# Patient Record
Sex: FEMALE | Race: BLACK OR AFRICAN AMERICAN | NOT HISPANIC OR LATINO | Employment: FULL TIME | URBAN - METROPOLITAN AREA
[De-identification: names, ages, dates, MRNs, and addresses within clinical notes are randomized per-mention and may not be internally consistent; named-entity substitution may affect disease eponyms.]

---

## 2017-12-18 PROBLEM — I87.2: Status: ACTIVE | Noted: 2017-12-18

## 2017-12-18 PROBLEM — L98.499 TYPE 2 DIABETES MELLITUS WITH OTHER SKIN ULCER: Status: ACTIVE | Noted: 2017-12-18

## 2017-12-18 PROBLEM — E66.9 OBESITY (BMI 30-39.9): Status: ACTIVE | Noted: 2017-12-18

## 2017-12-18 PROBLEM — L97.225: Status: ACTIVE | Noted: 2017-12-18

## 2017-12-18 PROBLEM — E11.622 TYPE 2 DIABETES MELLITUS WITH OTHER SKIN ULCER: Status: ACTIVE | Noted: 2017-12-18

## 2017-12-27 ENCOUNTER — HOSPITAL ENCOUNTER (OUTPATIENT)
Dept: WOUND CARE | Facility: HOSPITAL | Age: 37
Discharge: HOME | End: 2017-12-27
Attending: SURGERY
Payer: COMMERCIAL

## 2017-12-27 VITALS
HEART RATE: 101 BPM | BODY MASS INDEX: 48.82 KG/M2 | DIASTOLIC BLOOD PRESSURE: 68 MMHG | SYSTOLIC BLOOD PRESSURE: 141 MMHG | TEMPERATURE: 99 F | WEIGHT: 293 LBS | HEIGHT: 65 IN

## 2017-12-27 DIAGNOSIS — E11.622 TYPE 2 DIABETES MELLITUS WITH OTHER SKIN ULCER, WITH LONG-TERM CURRENT USE OF INSULIN: Primary | ICD-10-CM

## 2017-12-27 DIAGNOSIS — Z79.4 TYPE 2 DIABETES MELLITUS WITH OTHER SKIN ULCER, WITH LONG-TERM CURRENT USE OF INSULIN: Primary | ICD-10-CM

## 2017-12-27 PROCEDURE — 27201912 HC WOUND CARE DEBRIDEMENT SUPPLIES

## 2017-12-27 PROCEDURE — 11042 DBRDMT SUBQ TIS 1ST 20SQCM/<: CPT

## 2017-12-27 PROCEDURE — 87075 CULTR BACTERIA EXCEPT BLOOD: CPT

## 2017-12-27 PROCEDURE — 87176 TISSUE HOMOGENIZATION CULTR: CPT

## 2017-12-27 PROCEDURE — 87070 CULTURE OTHR SPECIMN AEROBIC: CPT

## 2017-12-27 PROCEDURE — 99214 OFFICE O/P EST MOD 30 MIN: CPT

## 2017-12-27 NOTE — PROGRESS NOTES
Much of the documentation for this visit was completed in the [facility] Expert system. Please see the attached documentation for further details about this patient's care.     Lexus Bean RN

## 2018-01-02 LAB — BACTERIA SPEC AEROBE CULT: NO GROWTH

## 2018-01-03 LAB — BACTERIA SPEC ANAEROBE CULT: NORMAL

## 2018-01-04 ENCOUNTER — HOSPITAL ENCOUNTER (OUTPATIENT)
Dept: WOUND CARE | Facility: HOSPITAL | Age: 38
Discharge: HOME | End: 2018-01-04
Attending: SURGERY
Payer: COMMERCIAL

## 2018-01-04 VITALS
TEMPERATURE: 97 F | HEIGHT: 65 IN | SYSTOLIC BLOOD PRESSURE: 152 MMHG | BODY MASS INDEX: 48.82 KG/M2 | DIASTOLIC BLOOD PRESSURE: 101 MMHG | HEART RATE: 105 BPM | WEIGHT: 293 LBS

## 2018-01-04 DIAGNOSIS — L98.499 DIABETES MELLITUS WITH SKIN ULCER: ICD-10-CM

## 2018-01-04 DIAGNOSIS — E11.622 DIABETES MELLITUS WITH SKIN ULCER: ICD-10-CM

## 2018-01-04 PROCEDURE — 99213 OFFICE O/P EST LOW 20 MIN: CPT | Mod: 25

## 2018-01-04 PROCEDURE — 93923 UPR/LXTR ART STDY 3+ LVLS: CPT | Mod: CCAT

## 2018-01-04 NOTE — PROGRESS NOTES
.Much of the documentation for this visit was completed in the [facility] Expert system. Please see the attached documentation for further details about this patient's care.     Marcel Pennington MD

## 2018-01-11 ENCOUNTER — HOSPITAL ENCOUNTER (OUTPATIENT)
Dept: WOUND CARE | Facility: HOSPITAL | Age: 38
Discharge: HOME | End: 2018-01-11
Attending: SURGERY
Payer: COMMERCIAL

## 2018-01-11 VITALS
WEIGHT: 293 LBS | SYSTOLIC BLOOD PRESSURE: 110 MMHG | HEART RATE: 99 BPM | DIASTOLIC BLOOD PRESSURE: 84 MMHG | TEMPERATURE: 98 F | HEIGHT: 65 IN | BODY MASS INDEX: 48.82 KG/M2

## 2018-01-11 DIAGNOSIS — I87.2 VENOUS STASIS ULCER OF LEFT CALF WITH MUSCLE INVOLVEMENT WITHOUT EVIDENCE OF NECROSIS WITHOUT VARICOSE VEINS: ICD-10-CM

## 2018-01-11 DIAGNOSIS — D50.9 IRON DEFICIENCY ANEMIA, UNSPECIFIED IRON DEFICIENCY ANEMIA TYPE: Primary | ICD-10-CM

## 2018-01-11 DIAGNOSIS — E11.622 TYPE 2 DIABETES MELLITUS WITH OTHER SKIN ULCER, WITH LONG-TERM CURRENT USE OF INSULIN: ICD-10-CM

## 2018-01-11 DIAGNOSIS — Z79.4 TYPE 2 DIABETES MELLITUS WITH OTHER SKIN ULCER, WITH LONG-TERM CURRENT USE OF INSULIN: ICD-10-CM

## 2018-01-11 DIAGNOSIS — E66.9 OBESITY (BMI 30-39.9): ICD-10-CM

## 2018-01-11 DIAGNOSIS — L97.225 VENOUS STASIS ULCER OF LEFT CALF WITH MUSCLE INVOLVEMENT WITHOUT EVIDENCE OF NECROSIS WITHOUT VARICOSE VEINS: ICD-10-CM

## 2018-01-11 PROCEDURE — 29580 STRAPPING UNNA BOOT: CPT

## 2018-01-11 PROCEDURE — 87070 CULTURE OTHR SPECIMN AEROBIC: CPT

## 2018-01-11 PROCEDURE — 87075 CULTR BACTERIA EXCEPT BLOOD: CPT

## 2018-01-11 NOTE — PROGRESS NOTES
Much of the documentation for this visit was completed in the [facility] Expert system. Please see the attached documentation for further details about this patient's care.     Marcel Pennington MD

## 2018-01-15 LAB — BACTERIA SPEC AEROBE CULT: NO GROWTH

## 2018-01-18 LAB — BACTERIA SPEC ANAEROBE CULT: NORMAL

## 2018-01-23 ENCOUNTER — OFFICE VISIT (OUTPATIENT)
Dept: CARDIOLOGY | Facility: CLINIC | Age: 38
End: 2018-01-23
Payer: COMMERCIAL

## 2018-01-23 VITALS
SYSTOLIC BLOOD PRESSURE: 133 MMHG | DIASTOLIC BLOOD PRESSURE: 87 MMHG | WEIGHT: 293 LBS | OXYGEN SATURATION: 99 % | BODY MASS INDEX: 48.82 KG/M2 | HEIGHT: 65 IN | HEART RATE: 113 BPM

## 2018-01-23 DIAGNOSIS — L97.909 VENOUS STASIS ULCER, UNSPECIFIED SITE, UNSPECIFIED ULCER STAGE, UNSPECIFIED WHETHER VARICOSE VEINS PRESENT: Primary | ICD-10-CM

## 2018-01-23 DIAGNOSIS — L98.499 CHRONIC SKIN ULCER, UNSPECIFIED ULCER STAGE: ICD-10-CM

## 2018-01-23 DIAGNOSIS — Z79.4 TYPE 2 DIABETES MELLITUS WITH OTHER SKIN ULCER, WITH LONG-TERM CURRENT USE OF INSULIN: ICD-10-CM

## 2018-01-23 DIAGNOSIS — D50.9 IRON DEFICIENCY ANEMIA, UNSPECIFIED IRON DEFICIENCY ANEMIA TYPE: ICD-10-CM

## 2018-01-23 DIAGNOSIS — E11.622 TYPE 2 DIABETES MELLITUS WITH OTHER SKIN ULCER, WITH LONG-TERM CURRENT USE OF INSULIN: ICD-10-CM

## 2018-01-23 DIAGNOSIS — I83.009 VENOUS STASIS ULCER, UNSPECIFIED SITE, UNSPECIFIED ULCER STAGE, UNSPECIFIED WHETHER VARICOSE VEINS PRESENT: Primary | ICD-10-CM

## 2018-01-23 PROCEDURE — 99999 PR PBB SHADOW E&M-EST. PATIENT-LVL III: CPT | Mod: PBBFAC,,, | Performed by: INTERNAL MEDICINE

## 2018-01-23 PROCEDURE — 99204 OFFICE O/P NEW MOD 45 MIN: CPT | Mod: S$GLB,,, | Performed by: INTERNAL MEDICINE

## 2018-01-23 NOTE — LETTER
January 23, 2018      Marcel Pennington MD  [address]  [address]  [place] [address]           [facility]  [address], [address]  [place] [address]  Phone: [phone]          Patient: Rodney Infante   MR Number: [id number]   YOB: 1980   Date of Visit: 1/23/2018       Dear Dr. Marcel Pennington:    Thank you for referring Rodney Infante to me for evaluation. Attached you will find relevant portions of my assessment and plan of care.    If you have questions, please do not hesitate to call me. I look forward to following Rodney Infante along with you.    Sincerely,    Yunior Hassan MD    Enclosure  CC:  No Recipients    If you would like to receive this communication electronically, please contact [email or web address] or [phone] to request more information on [facility] access.    For providers and/or their staff who would like to refer a patient to [facility], please contact us through our one-stop-shop provider referral line, [facility], at [phone].    If you feel you have received this communication in error or would no longer like to receive these types of communications, please e-mail [email or web address]

## 2018-01-23 NOTE — PROGRESS NOTES
"Subjective:    Patient ID:  Rodney Infante is a 37 y.o. female who presents for evaluation of Venous Ulcer      HPI  38 y/o female with hx of venous stasis ulceration of RLE (anterior shin now healed- took 2 years per pt) with current venous stasis of LLE in anterior shin (started approx 1 year ago) recently established care with Dr Pennington, DM, PAT, morbid obesity who presents for evaluation. Lesion on LLE started about 1 year ago after a small lesion was noted. Had been following with another physician and wound had not been healing. Seen by Dr Pennington in Dec 2017 and she claims wound has been healing better over the last few weeks. Per wound care note available in media section, on 1/4/2018 JESUS 1.03. TCOM's at that visit  (left lateral lower leg),  (left dorsal foot), 67-45 (left medial heel). Denies paresthesias of LE's. Complains of LLE "heaviness" with exertion with relieves with rest. Denies CP, orthopnea, PND, palps, syncope. Has chronic LIRA with moderate activity. Intermittent ankle swelling.     Review of Systems   Constitution: Negative for weakness and malaise/fatigue.   HENT: Negative for congestion.    Eyes: Negative for blurred vision.   Cardiovascular: Positive for dyspnea on exertion and leg swelling. Negative for chest pain, claudication, cyanosis, irregular heartbeat, near-syncope, orthopnea, palpitations, paroxysmal nocturnal dyspnea and syncope.   Respiratory: Negative for shortness of breath.    Endocrine: Negative for polyuria.   Hematologic/Lymphatic: Negative for bleeding problem.   Skin: Positive for poor wound healing. Negative for itching and rash.   Musculoskeletal: Negative for joint swelling, muscle cramps and muscle weakness.   Gastrointestinal: Negative for abdominal pain, hematemesis, hematochezia, melena, nausea and vomiting.   Genitourinary: Negative for dysuria and hematuria.   Neurological: Negative for dizziness, focal weakness, headaches, light-headedness " and loss of balance.   Psychiatric/Behavioral: Negative for depression. The patient is not nervous/anxious.         Objective:    Physical Exam   Constitutional: She is oriented to person, place, and time. She appears well-developed and well-nourished.   HENT:   Head: Normocephalic and atraumatic.   Neck: Neck supple. No JVD present.   Cardiovascular: Normal rate, regular rhythm and normal heart sounds.    Pulses:       Carotid pulses are 2+ on the right side, and 2+ on the left side.       Radial pulses are 2+ on the right side, and 2+ on the left side.        Femoral pulses are 2+ on the right side, and 2+ on the left side.  Biphasic bilateral PT/DP per doppler   Pulmonary/Chest: Effort normal and breath sounds normal.   Abdominal: Soft. Bowel sounds are normal.   Musculoskeletal: She exhibits no edema.   Neurological: She is alert and oriented to person, place, and time.   Skin: Skin is warm and dry.        Psychiatric: She has a normal mood and affect. Her behavior is normal. Thought content normal.         Assessment:       1. Venous stasis ulcer, unspecified site, unspecified ulcer stage, unspecified whether varicose veins present    2. Chronic skin ulcer, unspecified ulcer stage    3. Iron deficiency anemia, unspecified iron deficiency anemia type    4. Type 2 diabetes mellitus with other skin ulcer, with long-term current use of insulin      36 y/o female with hx and presentation as above. Venous stasis ulcer of LLE, currently being managed by Dr Pennington and appears to be healing slowly. Good distal pulses and JESUS normal. Will obtain arterial doppler. Will obtain venous insufficiency doppler.       Plan:       -LLE arterial doppler  -Venous insufficiency doppler  -f/u in 1 month

## 2018-01-25 ENCOUNTER — HOSPITAL ENCOUNTER (OUTPATIENT)
Dept: WOUND CARE | Facility: HOSPITAL | Age: 38
Discharge: HOME | End: 2018-01-25
Attending: SURGERY
Payer: COMMERCIAL

## 2018-01-25 VITALS
HEART RATE: 97 BPM | TEMPERATURE: 99 F | SYSTOLIC BLOOD PRESSURE: 137 MMHG | WEIGHT: 293 LBS | DIASTOLIC BLOOD PRESSURE: 87 MMHG | HEIGHT: 65 IN | BODY MASS INDEX: 48.82 KG/M2

## 2018-01-25 DIAGNOSIS — L97.225 VENOUS STASIS ULCER OF LEFT CALF WITH MUSCLE INVOLVEMENT WITHOUT EVIDENCE OF NECROSIS WITHOUT VARICOSE VEINS: ICD-10-CM

## 2018-01-25 DIAGNOSIS — Z79.4 TYPE 2 DIABETES MELLITUS WITH OTHER SKIN ULCER, WITH LONG-TERM CURRENT USE OF INSULIN: ICD-10-CM

## 2018-01-25 DIAGNOSIS — I87.2 VENOUS STASIS ULCER OF LEFT CALF WITH MUSCLE INVOLVEMENT WITHOUT EVIDENCE OF NECROSIS WITHOUT VARICOSE VEINS: ICD-10-CM

## 2018-01-25 DIAGNOSIS — E11.622 TYPE 2 DIABETES MELLITUS WITH OTHER SKIN ULCER, WITH LONG-TERM CURRENT USE OF INSULIN: ICD-10-CM

## 2018-01-25 DIAGNOSIS — E66.9 OBESITY (BMI 30-39.9): Primary | ICD-10-CM

## 2018-01-25 DIAGNOSIS — D50.9 IRON DEFICIENCY ANEMIA, UNSPECIFIED IRON DEFICIENCY ANEMIA TYPE: ICD-10-CM

## 2018-01-25 PROCEDURE — 29580 STRAPPING UNNA BOOT: CPT

## 2018-01-29 ENCOUNTER — HOSPITAL ENCOUNTER (OUTPATIENT)
Dept: WOUND CARE | Facility: HOSPITAL | Age: 38
Discharge: HOME | End: 2018-01-29
Attending: SURGERY
Payer: COMMERCIAL

## 2018-01-29 ENCOUNTER — HOSPITAL ENCOUNTER (OUTPATIENT)
Dept: RADIOLOGY | Facility: HOSPITAL | Age: 38
Discharge: HOME | End: 2018-01-29
Attending: INTERNAL MEDICINE
Payer: COMMERCIAL

## 2018-01-29 DIAGNOSIS — E11.622 DIABETES MELLITUS WITH SKIN ULCER: Primary | ICD-10-CM

## 2018-01-29 DIAGNOSIS — L97.909 VENOUS STASIS ULCER, UNSPECIFIED SITE, UNSPECIFIED ULCER STAGE, UNSPECIFIED WHETHER VARICOSE VEINS PRESENT: ICD-10-CM

## 2018-01-29 DIAGNOSIS — L98.499 DIABETES MELLITUS WITH SKIN ULCER: Primary | ICD-10-CM

## 2018-01-29 DIAGNOSIS — L98.499 CHRONIC SKIN ULCER, UNSPECIFIED ULCER STAGE: ICD-10-CM

## 2018-01-29 DIAGNOSIS — I83.009 VENOUS STASIS ULCER, UNSPECIFIED SITE, UNSPECIFIED ULCER STAGE, UNSPECIFIED WHETHER VARICOSE VEINS PRESENT: ICD-10-CM

## 2018-01-29 PROCEDURE — 93925 LOWER EXTREMITY STUDY: CPT | Mod: TC,PO

## 2018-01-29 PROCEDURE — 29580 STRAPPING UNNA BOOT: CPT

## 2018-01-29 PROCEDURE — 93970 EXTREMITY STUDY: CPT | Mod: TC,PO

## 2018-01-29 PROCEDURE — 29581 APPL MULTLAYER CMPRN SYS LEG: CPT

## 2018-02-01 ENCOUNTER — HOSPITAL ENCOUNTER (OUTPATIENT)
Dept: WOUND CARE | Facility: HOSPITAL | Age: 38
Discharge: HOME | End: 2018-02-01
Attending: SURGERY
Payer: COMMERCIAL

## 2018-02-01 VITALS
TEMPERATURE: 99 F | BODY MASS INDEX: 48.82 KG/M2 | SYSTOLIC BLOOD PRESSURE: 133 MMHG | WEIGHT: 293 LBS | HEIGHT: 65 IN | HEART RATE: 90 BPM | DIASTOLIC BLOOD PRESSURE: 92 MMHG

## 2018-02-01 PROCEDURE — 29580 STRAPPING UNNA BOOT: CPT

## 2018-02-01 NOTE — PROGRESS NOTES
Much of the documentation for this visit was completed in the [facility] Expert system. Please see the attached documentation for further details about this patient's care.     Esther Prieto RN

## 2018-02-08 ENCOUNTER — HOSPITAL ENCOUNTER (OUTPATIENT)
Dept: WOUND CARE | Facility: HOSPITAL | Age: 38
Discharge: HOME | End: 2018-02-08
Attending: SURGERY
Payer: COMMERCIAL

## 2018-02-08 VITALS
SYSTOLIC BLOOD PRESSURE: 125 MMHG | WEIGHT: 293 LBS | HEART RATE: 92 BPM | HEIGHT: 65 IN | BODY MASS INDEX: 48.82 KG/M2 | TEMPERATURE: 98 F | DIASTOLIC BLOOD PRESSURE: 80 MMHG

## 2018-02-08 DIAGNOSIS — L98.499 DIABETES MELLITUS WITH SKIN ULCER: ICD-10-CM

## 2018-02-08 DIAGNOSIS — E11.622 DIABETES MELLITUS WITH SKIN ULCER: ICD-10-CM

## 2018-02-08 PROCEDURE — 29580 STRAPPING UNNA BOOT: CPT

## 2018-02-08 PROCEDURE — 29581 APPL MULTLAYER CMPRN SYS LEG: CPT

## 2018-02-15 ENCOUNTER — HOSPITAL ENCOUNTER (OUTPATIENT)
Dept: WOUND CARE | Facility: HOSPITAL | Age: 38
Discharge: HOME | End: 2018-02-15
Attending: SURGERY
Payer: COMMERCIAL

## 2018-02-15 VITALS
SYSTOLIC BLOOD PRESSURE: 110 MMHG | TEMPERATURE: 98 F | HEART RATE: 107 BPM | BODY MASS INDEX: 48.82 KG/M2 | DIASTOLIC BLOOD PRESSURE: 60 MMHG | HEIGHT: 65 IN | WEIGHT: 293 LBS

## 2018-02-15 DIAGNOSIS — E11.622 DIABETES MELLITUS WITH SKIN ULCER: Primary | ICD-10-CM

## 2018-02-15 DIAGNOSIS — I87.2 VENOUS STASIS ULCER OF LEFT CALF WITH MUSCLE INVOLVEMENT WITHOUT EVIDENCE OF NECROSIS WITHOUT VARICOSE VEINS: ICD-10-CM

## 2018-02-15 DIAGNOSIS — E66.9 OBESITY (BMI 30-39.9): ICD-10-CM

## 2018-02-15 DIAGNOSIS — L02.212 ABSCESS OF BACK: ICD-10-CM

## 2018-02-15 DIAGNOSIS — E11.622 TYPE 2 DIABETES MELLITUS WITH OTHER SKIN ULCER, WITH LONG-TERM CURRENT USE OF INSULIN: ICD-10-CM

## 2018-02-15 DIAGNOSIS — L97.225 VENOUS STASIS ULCER OF LEFT CALF WITH MUSCLE INVOLVEMENT WITHOUT EVIDENCE OF NECROSIS WITHOUT VARICOSE VEINS: ICD-10-CM

## 2018-02-15 DIAGNOSIS — L98.499 DIABETES MELLITUS WITH SKIN ULCER: Primary | ICD-10-CM

## 2018-02-15 DIAGNOSIS — Z79.4 TYPE 2 DIABETES MELLITUS WITH OTHER SKIN ULCER, WITH LONG-TERM CURRENT USE OF INSULIN: ICD-10-CM

## 2018-02-15 DIAGNOSIS — D50.9 IRON DEFICIENCY ANEMIA, UNSPECIFIED IRON DEFICIENCY ANEMIA TYPE: ICD-10-CM

## 2018-02-15 PROCEDURE — 29580 STRAPPING UNNA BOOT: CPT

## 2018-02-15 RX ORDER — SULFAMETHOXAZOLE AND TRIMETHOPRIM 800; 160 MG/1; MG/1
1 TABLET ORAL 2 TIMES DAILY
Qty: 30 TABLET | Refills: 1 | Status: SHIPPED | OUTPATIENT
Start: 2018-02-15 | End: 2018-02-27 | Stop reason: ALTCHOICE

## 2018-02-15 RX ORDER — SODIUM CHLORIDE 9 MG/ML
INJECTION, SOLUTION INTRAVENOUS CONTINUOUS
Status: CANCELLED | OUTPATIENT
Start: 2018-02-15

## 2018-02-15 RX ORDER — CLINDAMYCIN PHOSPHATE 900 MG/50ML
900 INJECTION, SOLUTION INTRAVENOUS
Status: CANCELLED | OUTPATIENT
Start: 2018-02-15

## 2018-02-15 RX ORDER — TRAMADOL HYDROCHLORIDE 50 MG/1
50 TABLET ORAL EVERY 6 HOURS PRN
Qty: 24 TABLET | Refills: 0 | Status: SHIPPED | OUTPATIENT
Start: 2018-02-15 | End: 2018-02-25

## 2018-02-15 NOTE — PROGRESS NOTES
Today`s Date: 2/15/2018     Admit Date: 2/15/2018    Admitting Physician: Marcel Pennington MD    Patient`s Name: Rodney Infante , 37 y.o. female    HISTORY AND CHIEF COMPLAINT  Wound care and drainage of back abscess, c/o painful tender swelling  Patient Active Problem List   Diagnosis    Iron deficiency anemia    Type 2 diabetes mellitus with other skin ulcer    Venous stasis ulcer of left calf with muscle involvement without evidence of necrosis without varicose veins    Obesity (BMI 30-39.9)    Diabetes mellitus with skin ulcer    Abscess of back       Past Medical History:   Diagnosis Date    Anemia     Diabetes     Diabetes mellitus, type 2     Neuropathy        Past Surgical History:   Procedure Laterality Date    CYST REMOVAL  2014    back       Prior to Admission medications    Medication Sig Start Date End Date Taking? Authorizing Provider   allopurinol (ZYLOPRIM) 100 MG tablet  1/5/16  Yes Historical Provider, MD   canagliflozin-metformin (INVOKAMET XR) 150-1,000 mg TBph Take by mouth.   Yes Historical Provider, MD   clindamycin (CLEOCIN) 300 MG capsule Take 1 capsule (300 mg total) by mouth every 8 (eight) hours. 12/18/17  Yes Marcel Pennington MD   collagenase (SANTYL) ointment Apply topically once daily. 1/4/18  Yes Marcel Pennington MD   cyanocobalamin (VITAMIN B-12) 1000 MCG tablet Take 1 tablet (1,000 mcg total) by mouth once daily. This is a prescription for 1 year.  Take 1 tablet Monday, Wednesday, and Friday 4/9/15  Yes Harjeet Zaidi MD   gabapentin (NEURONTIN) 600 MG tablet Take 600 mg by mouth daily as needed. 10/24/17  Yes Historical Provider, MD   glimepiride (AMARYL) 4 MG tablet Take 1 tablet (4 mg total) by mouth before dinner. 5/16/16  Yes Harjeet Zaidi MD   ibuprofen (ADVIL,MOTRIN) 800 MG tablet  1/5/16  Yes Historical Provider, MD   mupirocin (BACTROBAN) 2 % ointment Apply topically once daily. appy locally daily 12/18/17  Yes Marcel CROCKER  MD [place]   ondansetron (ZOFRAN) 4 MG tablet  12/11/17  Yes Historical Provider, MD   TOPCARE UNIVERSAL1 LANCET Misc  10/24/17  Yes Historical Provider, MD   traMADol (ULTRAM) 50 mg tablet Take 50 mg by mouth every 6 (six) hours as needed. 10/24/17  Yes Historical Provider, MD   TRUETRACK TEST Strp  10/24/17  Yes Historical Provider, MD   VITAMIN D2 50,000 unit capsule  3/2/16  Yes Historical Provider, MD   folic acid (FOLVITE) 1 MG tablet Take 1 tablet (1 mg total) by mouth once daily. This is a prescription for 1 year 4/9/15 12/27/17  Harjeet Zaidi MD   sulfamethoxazole-trimethoprim 800-160mg (BACTRIM DS) 800-160 mg Tab Take 1 tablet by mouth 2 (two) times daily. 2/15/18   Marcel Pennington MD   traMADol (ULTRAM) 50 mg tablet Take 1 tablet (50 mg total) by mouth every 6 (six) hours as needed for Pain. 2/15/18 2/25/18  Marcel Pennington MD     Current Outpatient Prescriptions on File Prior to Encounter   Medication Sig Dispense Refill    allopurinol (ZYLOPRIM) 100 MG tablet       canagliflozin-metformin (INVOKAMET XR) 150-1,000 mg TBph Take by mouth.      clindamycin (CLEOCIN) 300 MG capsule Take 1 capsule (300 mg total) by mouth every 8 (eight) hours. 30 capsule 2    collagenase (SANTYL) ointment Apply topically once daily. 30 g 2    cyanocobalamin (VITAMIN B-12) 1000 MCG tablet Take 1 tablet (1,000 mcg total) by mouth once daily. This is a prescription for 1 year.  Take 1 tablet Monday, Wednesday, and Friday 36 tablet 3    gabapentin (NEURONTIN) 600 MG tablet Take 600 mg by mouth daily as needed.      glimepiride (AMARYL) 4 MG tablet Take 1 tablet (4 mg total) by mouth before dinner. 30 tablet 5    ibuprofen (ADVIL,MOTRIN) 800 MG tablet       mupirocin (BACTROBAN) 2 % ointment Apply topically once daily. appy locally daily 30 g 1    ondansetron (ZOFRAN) 4 MG tablet       TOPCARE UNIVERSAL1 LANCET Misc       traMADol (ULTRAM) 50 mg tablet Take 50 mg by mouth every 6 (six) hours as  needed.      TRUETRACK TEST Strp       VITAMIN D2 50,000 unit capsule       folic acid (FOLVITE) 1 MG tablet Take 1 tablet (1 mg total) by mouth once daily. This is a prescription for 1 year 90 tablet 3     No current facility-administered medications on file prior to encounter.         Review of patient's allergies indicates:   Allergen Reactions    Pcn [penicillins] Swelling       Social History:   reports that she has never smoked. She has never used smokeless tobacco. She reports that she does not drink alcohol or use drugs.     Family History   Problem Relation Age of Onset    No Known Problems Mother     Drug abuse Father     No Known Problems Sister        PHYSICAL EXAMINATION  Temp:  [98.3 °F (36.8 °C)] 98.3 °F (36.8 °C)  Pulse:  [107] 107  BP: (110)/(60) 110/60    General Condition:   alert x 3    Head & Neck  Anemia: None  Jaundice: None  Neck vein: Not distended  Carotid Bruits: none  Lymph nodes: none palpable  Thyroid: normal    Chest: normal    Heart: normal    Rt. Breast: not examined  Lt. Breast: not examined  Axillary lymph nodes: none    Abdomen: Soft,  None tender with no palpable mass or organ  Hernia: none    Rectal: Defered    Extremities: normal    Vascular: normal    Specific focus Examination   infected diabetic abscess back    Imp: Diabetic infected Abscess back, obesity, venous stasis ulcer bilateral legs     Plan: excision and debridement of back abscess in am

## 2018-02-15 NOTE — PROGRESS NOTES
Much of the documentation for this visit was completed in the [facility] Expert system. Please see the attached documentation for further details about this patient's care.     Nanci Gleason RN

## 2018-02-15 NOTE — PATIENT INSTRUCTIONS
Patient instructed to keep dressings dry and intact and to be NPO after midnight due to surgical I&D in am. Patient verbalized understanding of all instructions.

## 2018-02-16 ENCOUNTER — SURGERY (OUTPATIENT)
Age: 38
End: 2018-02-16

## 2018-02-16 ENCOUNTER — HOSPITAL ENCOUNTER (OUTPATIENT)
Facility: HOSPITAL | Age: 38
Discharge: HOME | End: 2018-02-16
Attending: SURGERY | Admitting: SURGERY
Payer: COMMERCIAL

## 2018-02-16 ENCOUNTER — ANESTHESIA EVENT (OUTPATIENT)
Dept: SURGERY | Facility: HOSPITAL | Age: 38
End: 2018-02-16
Payer: COMMERCIAL

## 2018-02-16 ENCOUNTER — ANESTHESIA (OUTPATIENT)
Dept: SURGERY | Facility: HOSPITAL | Age: 38
End: 2018-02-16
Payer: COMMERCIAL

## 2018-02-16 VITALS
DIASTOLIC BLOOD PRESSURE: 69 MMHG | BODY MASS INDEX: 48.82 KG/M2 | SYSTOLIC BLOOD PRESSURE: 132 MMHG | RESPIRATION RATE: 18 BRPM | TEMPERATURE: 97 F | HEART RATE: 96 BPM | WEIGHT: 293 LBS | HEIGHT: 65 IN | OXYGEN SATURATION: 97 %

## 2018-02-16 DIAGNOSIS — E66.9 OBESITY (BMI 30-39.9): ICD-10-CM

## 2018-02-16 DIAGNOSIS — L02.212 ABSCESS OF BACK: ICD-10-CM

## 2018-02-16 DIAGNOSIS — E11.622 DIABETES MELLITUS WITH SKIN ULCER: ICD-10-CM

## 2018-02-16 DIAGNOSIS — E11.622 TYPE 2 DIABETES MELLITUS WITH OTHER SKIN ULCER, WITH LONG-TERM CURRENT USE OF INSULIN: ICD-10-CM

## 2018-02-16 DIAGNOSIS — Z01.818 PREOP TESTING: ICD-10-CM

## 2018-02-16 DIAGNOSIS — Z79.4 TYPE 2 DIABETES MELLITUS WITH OTHER SKIN ULCER, WITH LONG-TERM CURRENT USE OF INSULIN: ICD-10-CM

## 2018-02-16 DIAGNOSIS — I87.2 VENOUS STASIS ULCER OF LEFT CALF WITH MUSCLE INVOLVEMENT WITHOUT EVIDENCE OF NECROSIS WITHOUT VARICOSE VEINS: ICD-10-CM

## 2018-02-16 DIAGNOSIS — L97.225 VENOUS STASIS ULCER OF LEFT CALF WITH MUSCLE INVOLVEMENT WITHOUT EVIDENCE OF NECROSIS WITHOUT VARICOSE VEINS: ICD-10-CM

## 2018-02-16 DIAGNOSIS — L98.499 DIABETES MELLITUS WITH SKIN ULCER: ICD-10-CM

## 2018-02-16 DIAGNOSIS — D50.9 IRON DEFICIENCY ANEMIA, UNSPECIFIED IRON DEFICIENCY ANEMIA TYPE: ICD-10-CM

## 2018-02-16 LAB
ANION GAP SERPL CALC-SCNC: 10 MMOL/L
BASOPHILS # BLD AUTO: 0.04 K/UL
BASOPHILS NFR BLD: 0.3 %
BUN SERPL-MCNC: 8 MG/DL
CALCIUM SERPL-MCNC: 9.6 MG/DL
CHLORIDE SERPL-SCNC: 108 MMOL/L
CO2 SERPL-SCNC: 17 MMOL/L
CREAT SERPL-MCNC: 0.9 MG/DL
DIFFERENTIAL METHOD: ABNORMAL
EOSINOPHIL # BLD AUTO: 0.5 K/UL
EOSINOPHIL NFR BLD: 3.5 %
ERYTHROCYTE [DISTWIDTH] IN BLOOD BY AUTOMATED COUNT: 14.8 %
EST. GFR  (AFRICAN AMERICAN): >60 ML/MIN/1.73 M^2
EST. GFR  (NON AFRICAN AMERICAN): >60 ML/MIN/1.73 M^2
GLUCOSE SERPL-MCNC: 194 MG/DL
GRAM STN SPEC: NORMAL
HCT VFR BLD AUTO: 34.2 %
HGB BLD-MCNC: 11.1 G/DL
LYMPHOCYTES # BLD AUTO: 3.4 K/UL
LYMPHOCYTES NFR BLD: 21.8 %
MCH RBC QN AUTO: 28.2 PG
MCHC RBC AUTO-ENTMCNC: 32.5 G/DL
MCV RBC AUTO: 87 FL
MONOCYTES # BLD AUTO: 1.1 K/UL
MONOCYTES NFR BLD: 7.1 %
NEUTROPHILS # BLD AUTO: 10.2 K/UL
NEUTROPHILS NFR BLD: 67.3 %
PLATELET # BLD AUTO: 474 K/UL
PMV BLD AUTO: 8.2 FL
POCT GLUCOSE: 126 MG/DL (ref 70–110)
POTASSIUM SERPL-SCNC: 3.8 MMOL/L
RBC # BLD AUTO: 3.93 M/UL
SODIUM SERPL-SCNC: 135 MMOL/L
WBC # BLD AUTO: 15.47 K/UL

## 2018-02-16 PROCEDURE — 37000008 HC ANESTHESIA 1ST 15 MINUTES: Performed by: SURGERY

## 2018-02-16 PROCEDURE — 87015 SPECIMEN INFECT AGNT CONCNTJ: CPT

## 2018-02-16 PROCEDURE — 25000003 PHARM REV CODE 250: Performed by: NURSE ANESTHETIST, CERTIFIED REGISTERED

## 2018-02-16 PROCEDURE — 87076 CULTURE ANAEROBE IDENT EACH: CPT

## 2018-02-16 PROCEDURE — 85025 COMPLETE CBC W/AUTO DIFF WBC: CPT

## 2018-02-16 PROCEDURE — 93010 ELECTROCARDIOGRAM REPORT: CPT | Mod: ,,, | Performed by: INTERNAL MEDICINE

## 2018-02-16 PROCEDURE — 25000003 PHARM REV CODE 250: Performed by: ANESTHESIOLOGY

## 2018-02-16 PROCEDURE — 36000706: Performed by: SURGERY

## 2018-02-16 PROCEDURE — 36000707: Performed by: SURGERY

## 2018-02-16 PROCEDURE — S0077 INJECTION, CLINDAMYCIN PHOSP: HCPCS | Performed by: SURGERY

## 2018-02-16 PROCEDURE — 36415 COLL VENOUS BLD VENIPUNCTURE: CPT

## 2018-02-16 PROCEDURE — 71000016 HC POSTOP RECOV ADDL HR: Performed by: SURGERY

## 2018-02-16 PROCEDURE — 87205 SMEAR GRAM STAIN: CPT | Mod: 59

## 2018-02-16 PROCEDURE — 87075 CULTR BACTERIA EXCEPT BLOOD: CPT

## 2018-02-16 PROCEDURE — 87206 SMEAR FLUORESCENT/ACID STAI: CPT | Mod: 91

## 2018-02-16 PROCEDURE — 25000003 PHARM REV CODE 250: Performed by: SURGERY

## 2018-02-16 PROCEDURE — 71000033 HC RECOVERY, INTIAL HOUR: Performed by: SURGERY

## 2018-02-16 PROCEDURE — 88304 TISSUE EXAM BY PATHOLOGIST: CPT | Mod: 26,,, | Performed by: PATHOLOGY

## 2018-02-16 PROCEDURE — 87186 SC STD MICRODIL/AGAR DIL: CPT

## 2018-02-16 PROCEDURE — 87070 CULTURE OTHR SPECIMN AEROBIC: CPT | Mod: 59

## 2018-02-16 PROCEDURE — 63600175 PHARM REV CODE 636 W HCPCS: Performed by: ANESTHESIOLOGY

## 2018-02-16 PROCEDURE — 87102 FUNGUS ISOLATION CULTURE: CPT

## 2018-02-16 PROCEDURE — 80048 BASIC METABOLIC PNL TOTAL CA: CPT

## 2018-02-16 PROCEDURE — 63600175 PHARM REV CODE 636 W HCPCS: Performed by: NURSE ANESTHETIST, CERTIFIED REGISTERED

## 2018-02-16 PROCEDURE — 87077 CULTURE AEROBIC IDENTIFY: CPT

## 2018-02-16 PROCEDURE — 37000009 HC ANESTHESIA EA ADD 15 MINS: Performed by: SURGERY

## 2018-02-16 PROCEDURE — 71000015 HC POSTOP RECOV 1ST HR: Performed by: SURGERY

## 2018-02-16 PROCEDURE — 88304 TISSUE EXAM BY PATHOLOGIST: CPT | Performed by: PATHOLOGY

## 2018-02-16 PROCEDURE — 93005 ELECTROCARDIOGRAM TRACING: CPT

## 2018-02-16 PROCEDURE — 87116 MYCOBACTERIA CULTURE: CPT

## 2018-02-16 RX ORDER — PROPOFOL 10 MG/ML
VIAL (ML) INTRAVENOUS
Status: DISCONTINUED | OUTPATIENT
Start: 2018-02-16 | End: 2018-02-16

## 2018-02-16 RX ORDER — GLYCOPYRROLATE 0.2 MG/ML
INJECTION INTRAMUSCULAR; INTRAVENOUS
Status: DISCONTINUED | OUTPATIENT
Start: 2018-02-16 | End: 2018-02-16

## 2018-02-16 RX ORDER — MIDAZOLAM HYDROCHLORIDE 1 MG/ML
INJECTION INTRAMUSCULAR; INTRAVENOUS
Status: DISCONTINUED | OUTPATIENT
Start: 2018-02-16 | End: 2018-02-16

## 2018-02-16 RX ORDER — ONDANSETRON HYDROCHLORIDE 2 MG/ML
INJECTION, SOLUTION INTRAMUSCULAR; INTRAVENOUS
Status: DISCONTINUED | OUTPATIENT
Start: 2018-02-16 | End: 2018-02-16

## 2018-02-16 RX ORDER — FENTANYL CITRATE 50 UG/ML
INJECTION, SOLUTION INTRAMUSCULAR; INTRAVENOUS
Status: DISCONTINUED | OUTPATIENT
Start: 2018-02-16 | End: 2018-02-16

## 2018-02-16 RX ORDER — BACITRACIN 50000 [IU]/1
INJECTION, POWDER, FOR SOLUTION INTRAMUSCULAR
Status: DISCONTINUED | OUTPATIENT
Start: 2018-02-16 | End: 2018-02-16 | Stop reason: HOSPADM

## 2018-02-16 RX ORDER — LIDOCAINE HCL/PF 100 MG/5ML
SYRINGE (ML) INTRAVENOUS
Status: DISCONTINUED | OUTPATIENT
Start: 2018-02-16 | End: 2018-02-16

## 2018-02-16 RX ORDER — NEOSTIGMINE METHYLSULFATE 1 MG/ML
INJECTION, SOLUTION INTRAVENOUS
Status: DISCONTINUED | OUTPATIENT
Start: 2018-02-16 | End: 2018-02-16

## 2018-02-16 RX ORDER — SODIUM CHLORIDE 9 MG/ML
INJECTION, SOLUTION INTRAVENOUS CONTINUOUS
Status: DISCONTINUED | OUTPATIENT
Start: 2018-02-16 | End: 2018-02-16 | Stop reason: HOSPADM

## 2018-02-16 RX ORDER — SODIUM CHLORIDE 0.9 % (FLUSH) 0.9 %
3 SYRINGE (ML) INJECTION EVERY 8 HOURS
Status: DISCONTINUED | OUTPATIENT
Start: 2018-02-16 | End: 2018-02-16 | Stop reason: HOSPADM

## 2018-02-16 RX ORDER — SODIUM CHLORIDE, SODIUM LACTATE, POTASSIUM CHLORIDE, CALCIUM CHLORIDE 600; 310; 30; 20 MG/100ML; MG/100ML; MG/100ML; MG/100ML
INJECTION, SOLUTION INTRAVENOUS CONTINUOUS PRN
Status: DISCONTINUED | OUTPATIENT
Start: 2018-02-16 | End: 2018-02-16

## 2018-02-16 RX ORDER — CLINDAMYCIN HYDROCHLORIDE 300 MG/1
300 CAPSULE ORAL EVERY 6 HOURS
Qty: 60 CAPSULE | Refills: 1 | Status: SHIPPED | OUTPATIENT
Start: 2018-02-16 | End: 2018-02-27 | Stop reason: ALTCHOICE

## 2018-02-16 RX ORDER — HYDROMORPHONE HYDROCHLORIDE 2 MG/ML
0.4 INJECTION, SOLUTION INTRAMUSCULAR; INTRAVENOUS; SUBCUTANEOUS EVERY 5 MIN PRN
Status: DISCONTINUED | OUTPATIENT
Start: 2018-02-16 | End: 2018-02-16 | Stop reason: HOSPADM

## 2018-02-16 RX ORDER — CLINDAMYCIN PHOSPHATE 900 MG/50ML
900 INJECTION, SOLUTION INTRAVENOUS
Status: COMPLETED | OUTPATIENT
Start: 2018-02-16 | End: 2018-02-16

## 2018-02-16 RX ORDER — SUCCINYLCHOLINE CHLORIDE 20 MG/ML
INJECTION INTRAMUSCULAR; INTRAVENOUS
Status: DISCONTINUED | OUTPATIENT
Start: 2018-02-16 | End: 2018-02-16

## 2018-02-16 RX ORDER — SODIUM CHLORIDE 0.9 % (FLUSH) 0.9 %
3 SYRINGE (ML) INJECTION
Status: DISCONTINUED | OUTPATIENT
Start: 2018-02-16 | End: 2018-02-16 | Stop reason: HOSPADM

## 2018-02-16 RX ORDER — HYDROCODONE BITARTRATE AND ACETAMINOPHEN 5; 325 MG/1; MG/1
1 TABLET ORAL EVERY 6 HOURS PRN
Qty: 32 TABLET | Refills: 0 | Status: SHIPPED | OUTPATIENT
Start: 2018-02-16 | End: 2018-06-06 | Stop reason: SDUPTHER

## 2018-02-16 RX ORDER — LIDOCAINE HYDROCHLORIDE 10 MG/ML
INJECTION, SOLUTION EPIDURAL; INFILTRATION; INTRACAUDAL; PERINEURAL
Status: DISCONTINUED | OUTPATIENT
Start: 2018-02-16 | End: 2018-02-16 | Stop reason: HOSPADM

## 2018-02-16 RX ORDER — HYDROMORPHONE HYDROCHLORIDE 2 MG/ML
0.2 INJECTION, SOLUTION INTRAMUSCULAR; INTRAVENOUS; SUBCUTANEOUS EVERY 5 MIN PRN
Status: DISCONTINUED | OUTPATIENT
Start: 2018-02-16 | End: 2018-02-16 | Stop reason: HOSPADM

## 2018-02-16 RX ORDER — ROCURONIUM BROMIDE 10 MG/ML
INJECTION, SOLUTION INTRAVENOUS
Status: DISCONTINUED | OUTPATIENT
Start: 2018-02-16 | End: 2018-02-16

## 2018-02-16 RX ORDER — HYDROCODONE BITARTRATE AND ACETAMINOPHEN 5; 325 MG/1; MG/1
1 TABLET ORAL EVERY 4 HOURS PRN
Status: DISCONTINUED | OUTPATIENT
Start: 2018-02-16 | End: 2018-02-16 | Stop reason: HOSPADM

## 2018-02-16 RX ADMIN — LIDOCAINE HYDROCHLORIDE 100 MG: 20 INJECTION, SOLUTION INTRAVENOUS at 09:02

## 2018-02-16 RX ADMIN — CLINDAMYCIN PHOSPHATE 900 MG: 18 INJECTION, SOLUTION INTRAVENOUS at 09:02

## 2018-02-16 RX ADMIN — FENTANYL CITRATE 100 MCG: 50 INJECTION, SOLUTION INTRAMUSCULAR; INTRAVENOUS at 09:02

## 2018-02-16 RX ADMIN — GLYCOPYRROLATE 0.4 MG: 0.2 INJECTION, SOLUTION INTRAMUSCULAR; INTRAVENOUS at 09:02

## 2018-02-16 RX ADMIN — PROMETHAZINE HYDROCHLORIDE 6.25 MG: 25 INJECTION INTRAMUSCULAR; INTRAVENOUS at 11:02

## 2018-02-16 RX ADMIN — BACITRACIN 50000 UNITS: 5000 INJECTION, POWDER, FOR SOLUTION INTRAMUSCULAR at 10:02

## 2018-02-16 RX ADMIN — SODIUM CHLORIDE, SODIUM LACTATE, POTASSIUM CHLORIDE, AND CALCIUM CHLORIDE: .6; .31; .03; .02 INJECTION, SOLUTION INTRAVENOUS at 09:02

## 2018-02-16 RX ADMIN — ONDANSETRON 8 MG: 2 INJECTION, SOLUTION INTRAMUSCULAR; INTRAVENOUS at 09:02

## 2018-02-16 RX ADMIN — PROMETHAZINE HYDROCHLORIDE 6.25 MG: 25 INJECTION INTRAMUSCULAR; INTRAVENOUS at 10:02

## 2018-02-16 RX ADMIN — ROCURONIUM BROMIDE 10 MG: 10 INJECTION, SOLUTION INTRAVENOUS at 09:02

## 2018-02-16 RX ADMIN — SUCCINYLCHOLINE CHLORIDE 160 MG: 20 INJECTION, SOLUTION INTRAMUSCULAR; INTRAVENOUS at 09:02

## 2018-02-16 RX ADMIN — MIDAZOLAM HYDROCHLORIDE 2 MG: 1 INJECTION, SOLUTION INTRAMUSCULAR; INTRAVENOUS at 09:02

## 2018-02-16 RX ADMIN — HYDROCODONE BITARTRATE AND ACETAMINOPHEN 1 TABLET: 5; 325 TABLET ORAL at 11:02

## 2018-02-16 RX ADMIN — LIDOCAINE HYDROCHLORIDE 20 ML: 10 INJECTION, SOLUTION EPIDURAL; INFILTRATION; INTRACAUDAL; PERINEURAL at 10:02

## 2018-02-16 RX ADMIN — NEOSTIGMINE METHYLSULFATE 3 MG: 1 INJECTION INTRAVENOUS at 09:02

## 2018-02-16 RX ADMIN — PROPOFOL 200 MG: 10 INJECTION, EMULSION INTRAVENOUS at 09:02

## 2018-02-16 NOTE — OP NOTE
DATE OF PROCEDURE:  02/16/2018.    PREOPERATIVE DIAGNOSIS:  Diabetic infected back abscess carbuncle.    POSTOPERATIVE DIAGNOSIS:  Diabetic infected back abscess carbuncle.    OPERATION:  Excision and debridement of carbuncle big abscess 7 x 8 x 7 cm.    SURGEON:  Marcel Pennington M.D.    ASSISTANT:  Dr. Tamayo.    ANESTHESIA:  General.    PROCEDURE:  After satisfactory general endotracheal anesthesia, the patient in   semiprone position, the area of the abscess, which was located in the middle of   the back was prepped and draped in normal sterile manner using Betadine scrub   solution.  Elliptical incision was made at the area of the necrotic abscess   excising the skin and subcutaneous tissue.  The dissection was carried down to   the deep subcutaneous tissue.  The abscess was tracking laterally to the left   flank area.  The tract was opened all infected necrotic tissue was debrided up   to freshly bleeding tissue.  Wound was cauterized using electrocautery.  Some of   the bleeders were clamped and bovied.  Hemostasis satisfactorily maintained.    Wound was again irrigated with antibiotic solution.  Wound was then openly   packed using iodoform packing.  Sterile gauze dressing was applied.  The   instrument count, sponge count, and needle count was correct.  The patient   tolerated it well.  Estimated blood loss was 50 mL.  Specimen removed was   infected tissue, the skin and fascia.  The patient was sent to Recovery Room in   stable condition.  The patient will be [facility] for dressing   changes and was given a prescription for antibiotics and the pain pill.      MS/IN  dd: 02/16/2018 10:08:24 (CST)  td: 02/16/2018 11:21:10 (CST)  Doc ID   #[id number]  Job ID #[id number]    CC:

## 2018-02-16 NOTE — OP NOTE
This note has been moved to another encounter. If you have any questions, please contact HIM Chart Correction at [phone].

## 2018-02-16 NOTE — ANESTHESIA POSTPROCEDURE EVALUATION
"Anesthesia Post Evaluation    Patient: Rodney Infante    Procedure(s) Performed: Procedure(s) (LRB):  DEBRIDEMENT-WOUND (N/A)    Final Anesthesia Type: general  Patient location during evaluation: PACU  Patient participation: Yes- Able to Participate  Level of consciousness: awake  Post-procedure vital signs: reviewed and stable  Pain management: adequate  Airway patency: patent  PONV status at discharge: No PONV  Anesthetic complications: no      Cardiovascular status: stable  Respiratory status: unassisted, spontaneous ventilation and room air  Hydration status: euvolemic  Follow-up needed         Visit Vitals  /81   Pulse 94   Temp 36.1 °C (97 °F) (Temporal)   Resp 20   Ht 5' 5" (1.651 m)   Wt (!) 168.3 kg (371 lb)   LMP 01/16/2015   SpO2 100%   Breastfeeding? No   BMI 61.74 kg/m²       Pain/Milagros Score: Pain Assessment Performed: Yes (2/16/2018 11:35 AM)  Presence of Pain: denies (2/16/2018 11:35 AM)  Pain Rating Prior to Med Admin: 6 (2/16/2018 11:10 AM)  Pain Rating Post Med Admin: 6 (2/16/2018 11:27 AM)  Milagros Score: 10 (2/16/2018 11:35 AM)      "

## 2018-02-16 NOTE — PLAN OF CARE
Pt is doing fine in recovery, VSS, nausea relieved, only has pain when she moves around. Mostly resting with eyes closed. Responds readily to verbal stim, sates feels better and ready to go back to her room. Released per Dr. Stein. Family updated by text.

## 2018-02-16 NOTE — ANESTHESIA RELEASE NOTE
"Anesthesia Release from PACU Note    Patient: Rodney Infante    Procedure(s) Performed: Procedure(s) (LRB):  DEBRIDEMENT-WOUND (N/A)    Anesthesia type: general    Post pain: Adequate analgesia    Post assessment: no apparent anesthetic complications    Last Vitals:   Visit Vitals  /81   Pulse 94   Temp 36.1 °C (97 °F) (Temporal)   Resp 20   Ht 5' 5" (1.651 m)   Wt (!) 168.3 kg (371 lb)   LMP 01/16/2015   SpO2 100%   Breastfeeding? No   BMI 61.74 kg/m²       Post vital signs: stable    Level of consciousness: awake and oriented    Nausea/Vomiting: no nausea/no vomiting    Complications: none    Airway Patency: patent    Respiratory: unassisted    Cardiovascular: stable    Hydration: euvolemic  "

## 2018-02-16 NOTE — ANESTHESIA PREPROCEDURE EVALUATION
02/16/2018  Rodney Infante is a 37 y.o., diabetic female w absces of back for debridement ([place]).    PRIOR ANES    nothing in Epic [id number]   states prior GA [facility]    ANES-RELATED MED/SURG  Patient Active Problem List   Diagnosis    Iron deficiency anemia    Type 2 diabetes mellitus with other skin ulcer    Venous stasis ulcer of left calf with muscle involvement without evidence of necrosis without varicose veins    Obesity (BMI 30-39.9)    Diabetes mellitus with skin ulcer    Abscess of back     Past Medical History:   Diagnosis Date    Anemia     Diabetes     Diabetes mellitus, type 2     Neuropathy      Past Surgical History:   Procedure Laterality Date    CYST REMOVAL  2014    back     ALLERGIES  Review of patient's allergies indicates:   Allergen Reactions    Pcn [penicillins] Swelling     ANES-RELATED MAR  Clindamycin OCTOR    ANES-RELATED HOME Rx   Glimepiride   Canagliflozin-metofrmin    Anesthesia Evaluation         Review of Systems  Anesthesia Hx:  Hx of Anesthetic complications History of prior surgery of interest to airway management or planning:  Denies Personal Hx of Anesthesia complications.   Hematology/Oncology:         -- Anemia:   EENT/Dental:EENT/Dental Normal   Cardiovascular:  Cardiovascular Normal     Pulmonary:  Pulmonary Normal  Denies Sleep Apnea.    Renal/:  Renal/ Normal     Hepatic/GI:  Hepatic/GI Normal    Neurological:   Peripheral Neuropathy    Endocrine:   Diabetes      Wt Readings from Last 1 Encounters:   02/16/18 (!) 168.3 kg (371 lb)     Temp Readings from Last 1 Encounters:   02/16/18 36.3 °C (97.3 °F) (Skin)     BP Readings from Last 1 Encounters:   02/16/18 125/80     Pulse Readings from Last 1 Encounters:   02/16/18 104     SpO2 Readings from Last 1 Encounters:   02/16/18 97%       Physical Exam  General:  Obesity    Airway/Jaw/Neck:  AIRWAY  FINDINGS: Normal       Dental:  DENTAL FINDINGS: Normal   Chest/Lungs:  Chest/Lungs Clear    Heart/Vascular:  Heart Findings: Normal Heart murmur: negative       Mental Status:  Mental Status Findings: Normal       Lab Results   Component Value Date    WBC 12.37 11/07/2017    HGB 10.0 (L) 11/07/2017    HCT 32.0 (L) 11/07/2017    MCV 90 11/07/2017     (H) 11/07/2017       Chemistry        Component Value Date/Time     (L) 11/07/2017 1154    K 3.2 (L) 11/07/2017 1154     11/07/2017 1154    CO2 21 (L) 11/07/2017 1154    BUN 9 11/07/2017 1154    CREATININE 0.8 11/07/2017 1154     (H) 11/07/2017 1154        Component Value Date/Time    CALCIUM 8.7 11/07/2017 1154    ALKPHOS 78 11/07/2017 1154    AST 17 11/07/2017 1154    ALT 12 11/07/2017 1154    BILITOT 0.3 11/07/2017 1154    ESTGFRAFRICA >60.0 11/07/2017 1154    EGFRNONAA >60.0 11/07/2017 1154            Lab Results   Component Value Date    ALBUMIN 3.1 (L) 11/07/2017    No results found for: TSH, T0WHZHW, S8AMTNE, THYROIDAB    CXR      EKG      ECHO      Anesthesia Plan  Type of Anesthesia, risks & benefits discussed:  Anesthesia Type:  general  Patient's Preference:   Intra-op Monitoring Plan:   Intra-op Monitoring Plan Comments:   Post Op Pain Control Plan:   Post Op Pain Control Plan Comments:   Induction:    Beta Blocker:  Patient is not currently on a Beta-Blocker (No further documentation required).       Informed Consent:  Anesthesia consent signed with patient.  ASA Score: 2     Day of Surgery Review of History & Physical:        Anesthesia Plan Notes: [id number]   - needs AM glucose & UPT        Ready For Surgery From Anesthesia Perspective.

## 2018-02-16 NOTE — H&P
Admitting Physician: Marcel Pennington MD     Patient`s Name: Rodney Infante , 37 y.o. female     HISTORY AND CHIEF COMPLAINT  Wound care and drainage of back abscess, c/o painful tender swelling      Patient Active Problem List   Diagnosis    Iron deficiency anemia    Type 2 diabetes mellitus with other skin ulcer    Venous stasis ulcer of left calf with muscle involvement without evidence of necrosis without varicose veins    Obesity (BMI 30-39.9)    Diabetes mellitus with skin ulcer    Abscess of back              Past Medical History:   Diagnosis Date    Anemia      Diabetes      Diabetes mellitus, type 2      Neuropathy                 Past Surgical History:   Procedure Laterality Date    CYST REMOVAL   2014     back                 Prior to Admission medications    Medication Sig Start Date End Date Taking? Authorizing Provider   allopurinol (ZYLOPRIM) 100 MG tablet   1/5/16   Yes Historical Provider, MD   canagliflozin-metformin (INVOKAMET XR) 150-1,000 mg TBph Take by mouth.     Yes Historical Provider, MD   clindamycin (CLEOCIN) 300 MG capsule Take 1 capsule (300 mg total) by mouth every 8 (eight) hours. 12/18/17   Yes Marcel Pennington MD   collagenase (SANTYL) ointment Apply topically once daily. 1/4/18   Yes Marcel Pennington MD   cyanocobalamin (VITAMIN B-12) 1000 MCG tablet Take 1 tablet (1,000 mcg total) by mouth once daily. This is a prescription for 1 year.  Take 1 tablet Monday, Wednesday, and Friday 4/9/15   Yes Harjeet Zaidi MD   gabapentin (NEURONTIN) 600 MG tablet Take 600 mg by mouth daily as needed. 10/24/17   Yes Historical Provider, MD   glimepiride (AMARYL) 4 MG tablet Take 1 tablet (4 mg total) by mouth before dinner. 5/16/16   Yes Harjeet Zaidi MD   ibuprofen (ADVIL,MOTRIN) 800 MG tablet   1/5/16   Yes Historical Provider, MD   mupirocin (BACTROBAN) 2 % ointment Apply topically once daily. appy locally daily 12/18/17   Yes Marcel Pennington,  MD   ondansetron (ZOFRAN) 4 MG tablet   12/11/17   Yes Historical Provider, MD   TOPCARE UNIVERSAL1 LANCET Misc   10/24/17   Yes Historical Provider, MD   traMADol (ULTRAM) 50 mg tablet Take 50 mg by mouth every 6 (six) hours as needed. 10/24/17   Yes Historical Provider, MD   TRUETRACK TEST Strp   10/24/17   Yes Historical Provider, MD   VITAMIN D2 50,000 unit capsule   3/2/16   Yes Historical Provider, MD   folic acid (FOLVITE) 1 MG tablet Take 1 tablet (1 mg total) by mouth once daily. This is a prescription for 1 year 4/9/15 12/27/17   Harjeet Zaidi MD   sulfamethoxazole-trimethoprim 800-160mg (BACTRIM DS) 800-160 mg Tab Take 1 tablet by mouth 2 (two) times daily. 2/15/18     Marcel Pennington MD   traMADol (ULTRAM) 50 mg tablet Take 1 tablet (50 mg total) by mouth every 6 (six) hours as needed for Pain. 2/15/18 2/25/18   Marcel Pennington MD             Current Outpatient Prescriptions on File Prior to Encounter   Medication Sig Dispense Refill    allopurinol (ZYLOPRIM) 100 MG tablet          canagliflozin-metformin (INVOKAMET XR) 150-1,000 mg TBph Take by mouth.        clindamycin (CLEOCIN) 300 MG capsule Take 1 capsule (300 mg total) by mouth every 8 (eight) hours. 30 capsule 2    collagenase (SANTYL) ointment Apply topically once daily. 30 g 2    cyanocobalamin (VITAMIN B-12) 1000 MCG tablet Take 1 tablet (1,000 mcg total) by mouth once daily. This is a prescription for 1 year.  Take 1 tablet Monday, Wednesday, and Friday 36 tablet 3    gabapentin (NEURONTIN) 600 MG tablet Take 600 mg by mouth daily as needed.        glimepiride (AMARYL) 4 MG tablet Take 1 tablet (4 mg total) by mouth before dinner. 30 tablet 5    ibuprofen (ADVIL,MOTRIN) 800 MG tablet          mupirocin (BACTROBAN) 2 % ointment Apply topically once daily. appy locally daily 30 g 1    ondansetron (ZOFRAN) 4 MG tablet          TOPCARE UNIVERSAL1 LANCET Misc          traMADol (ULTRAM) 50 mg tablet Take 50 mg by mouth  every 6 (six) hours as needed.        TRUETRACK TEST Strp          VITAMIN D2 50,000 unit capsule          folic acid (FOLVITE) 1 MG tablet Take 1 tablet (1 mg total) by mouth once daily. This is a prescription for 1 year 90 tablet 3      No current facility-administered medications on file prior to encounter.               Review of patient's allergies indicates:   Allergen Reactions    Pcn [penicillins] Swelling         Social History:   reports that she has never smoked. She has never used smokeless tobacco. She reports that she does not drink alcohol or use drugs.            Family History   Problem Relation Age of Onset    No Known Problems Mother      Drug abuse Father      No Known Problems Sister           PHYSICAL EXAMINATION  Temp:  [98.3 °F (36.8 °C)] 98.3 °F (36.8 °C)  Pulse:  [107] 107  BP: (110)/(60) 110/60     General Condition:   alert x 3     Head & Neck  Anemia: None  Jaundice: None  Neck vein: Not distended  Carotid Bruits: none  Lymph nodes: none palpable  Thyroid: normal     Chest: normal     Heart: normal     Rt. Breast: not examined  Lt. Breast: not examined  Axillary lymph nodes: none     Abdomen: Soft,  None tender with no palpable mass or organ  Hernia: none     Rectal: Defered     Extremities: normal     Vascular: normal     Specific focus Examination   infected diabetic abscess back     Imp: Diabetic infected Abscess back, obesity, venous stasis ulcer bilateral legs      Plan: excision and debridement of back abscess today

## 2018-02-16 NOTE — TRANSFER OF CARE
"Anesthesia Transfer of Care Note    Patient: Rodney Infante    Procedure(s) Performed: Procedure(s) (LRB):  DEBRIDEMENT-WOUND (N/A)    Patient location: PACU    Anesthesia Type: general    Transport from OR: Transported from OR on 100% O2 by closed face mask with adequate spontaneous ventilation    Post pain: adequate analgesia    Post assessment: no apparent anesthetic complications    Post vital signs: stable    Level of consciousness: awake and alert    Nausea/Vomiting: no nausea/vomiting    Complications: none    Transfer of care protocol was followed      Last vitals:   Visit Vitals  BP (!) 170/96 (BP Location: Right arm, Patient Position: Lying)   Pulse 105   Temp 36.1 °C (97 °F) (Temporal)   Resp (!) 21   Ht 5' 5" (1.651 m)   Wt (!) 168.3 kg (371 lb)   LMP 01/16/2015   SpO2 100%   Breastfeeding? No   BMI 61.74 kg/m²     "

## 2018-02-16 NOTE — BRIEF OP NOTE
Discharge Summary/Operative Note       Surgery Date: 2/16/2018     Surgeon(s) and Role:     * Marcel Pennington MD - Primary    Pre-op Diagnosis:  Diabetes mellitus with skin ulcer [E11.622, L98.499]  Abscess of back [L02.212]  Obesity (BMI 30-39.9) [E66.9]  Venous stasis ulcer of left calf with muscle involvement without evidence of necrosis without varicose veins [I87.2, L97.225]    Post-op Diagnosis: Post-Op Diagnosis Codes:     * Diabetes mellitus with skin ulcer [E11.622, L98.499]     * Abscess of back [L02.212]     * Obesity (BMI 30-39.9) [E66.9]     * Venous stasis ulcer of left calf with muscle involvement without evidence of necrosis without varicose veins [I87.2, L97.225]    Procedure(s) (LRB):  DEBRIDEMENT-WOUND (N/A)  Back 7 x 8 x 6 cm   Anesthesia: General    Procedure in Detail/Findings:  dictated    Estimated Blood Loss: 50 cc         Specimens      infected tissue skin, subcutaneous and fascia        Implants: * No implants in log *           Disposition: PACU - hemodynamically stable.           Condition: Good    Attestation:  I performed the procedure.           Discharge Note    Admit Date: 2/16/2018    Attending Physician: Marcel Pennington MD     Discharge Physician: Marcel Pennington MD    Final Diagnosis: Post-Op Diagnosis Codes:     * Diabetes mellitus with skin ulcer [E11.622, L98.499]     * Abscess of back [L02.212]     * Obesity (BMI 30-39.9) [E66.9]     * Venous stasis ulcer of left calf with muscle involvement without evidence of necrosis without varicose veins [I87.2, L97.225]    Disposition: Home or Self Care    Patient Instructions:   Current Discharge Medication List      START taking these medications    Details   !! clindamycin (CLEOCIN) 300 MG capsule Take 1 capsule (300 mg total) by mouth every 6 (six) hours.  Qty: 60 capsule, Refills: 1      hydrocodone-acetaminophen 5-325mg (NORCO) 5-325 mg per tablet Take 1 tablet by mouth every 6 (six) hours as needed for Pain.  Qty:  32 tablet, Refills: 0       !! - Potential duplicate medications found. Please discuss with provider.      CONTINUE these medications which have NOT CHANGED    Details   glimepiride (AMARYL) 4 MG tablet Take 1 tablet (4 mg total) by mouth before dinner.  Qty: 30 tablet, Refills: 5    Associated Diagnoses: Type 2 diabetes mellitus without complication      ibuprofen (ADVIL,MOTRIN) 800 MG tablet       allopurinol (ZYLOPRIM) 100 MG tablet       canagliflozin-metformin (INVOKAMET XR) 150-1,000 mg TBph Take by mouth.      !! clindamycin (CLEOCIN) 300 MG capsule Take 1 capsule (300 mg total) by mouth every 8 (eight) hours.  Qty: 30 capsule, Refills: 2      collagenase (SANTYL) ointment Apply topically once daily.  Qty: 30 g, Refills: 2      cyanocobalamin (VITAMIN B-12) 1000 MCG tablet Take 1 tablet (1,000 mcg total) by mouth once daily. This is a prescription for 1 year.  Take 1 tablet Monday, Wednesday, and Friday  Qty: 36 tablet, Refills: 3    Associated Diagnoses: Iron deficiency anemia      folic acid (FOLVITE) 1 MG tablet Take 1 tablet (1 mg total) by mouth once daily. This is a prescription for 1 year  Qty: 90 tablet, Refills: 3    Associated Diagnoses: Iron deficiency anemia      gabapentin (NEURONTIN) 600 MG tablet Take 600 mg by mouth daily as needed.      mupirocin (BACTROBAN) 2 % ointment Apply topically once daily. appy locally daily  Qty: 30 g, Refills: 1      ondansetron (ZOFRAN) 4 MG tablet       sulfamethoxazole-trimethoprim 800-160mg (BACTRIM DS) 800-160 mg Tab Take 1 tablet by mouth 2 (two) times daily.  Qty: 30 tablet, Refills: 1      TOPCARE UNIVERSAL1 LANCET Misc       !! traMADol (ULTRAM) 50 mg tablet Take 50 mg by mouth every 6 (six) hours as needed.      !! traMADol (ULTRAM) 50 mg tablet Take 1 tablet (50 mg total) by mouth every 6 (six) hours as needed for Pain.  Qty: 24 tablet, Refills: 0      TRUETRACK TEST Strp       VITAMIN D2 50,000 unit capsule        !! - Potential duplicate medications  found. Please discuss with provider.          Discharge Procedure Orders (must include Diet, Follow-up, Activity)    Discharge Procedure Orders (must include Diet, Follow-up, Activity)  Diet general     Activity as tolerated     Keep surgical extremity elevated     Lifting restrictions     Call MD for:  temperature >100.4     Call MD for:  persistent nausea and vomiting     Call MD for:  severe uncontrolled pain     Call MD for:  redness, tenderness, or signs of infection (pain, swelling, redness, odor or green/yellow discharge around incision site)     Leave dressing on - Keep it clean, dry, and intact until clinic visit          Discharge Date: 2/16/2018

## 2018-02-19 LAB
BACTERIA SPEC AEROBE CULT: NORMAL
BACTERIA SPEC AEROBE CULT: NORMAL

## 2018-02-20 LAB
BACTERIA SPEC ANAEROBE CULT: NORMAL

## 2018-02-20 NOTE — OP NOTE
DATE OF PROCEDURE:  02/16/2018.     SURGEON:  Marcel Pennington M.D.    PREOPERATIVE DIAGNOSES:  Diabetic with infected nonhealing abscess in the middle   back, obesity, venous stasis ulcer, bilateral lower leg.    POSTOPERATIVE DIAGNOSES:  Diabetic with infected nonhealing abscess in the   middle back, obesity, venous stasis ulcer, bilateral lower leg.    OPERATION:  Excision and debridement of infected back abscess.  Carbuncle 7 x 8   x 6 cm.    ANESTHESIA:  General.    PROCEDURE IN DETAIL:  After satisfactory anesthesia, the patient in semiprone   position, the patient was intubated.  Timeout was called.  The area was   confirmed, the patient was recognized.  Area of the big abscess and wound was   prepped and draped in normal sterile manner using Betadine scrub solution.  An   elliptical incision was made at the area of the abscess, was taken down to deep   subcutaneous tissue.  Subcutaneous bleeders were clamped and bovied.  Excision   and debridement of infected tissue was performed up to the deep tissue and the   fascia.  Tissue was sent for culture, aerobic and anaerobic.  Hemostasis   satisfactorily maintained.  Wound was thoroughly irrigated with antibiotic   solution.  Wound was then openly packed using iodoform packing.  Estimated blood   loss was 50 mL.  Specimen removed was infected tissue.   The patient was sent   to Recovery Room in stable condition and will be followed as an outpatient.      MS/IN  dd: 02/19/2018 16:00:40 (CST)  td: 02/19/2018 20:52:36 (CST)  Doc ID   #[id number]  Job ID #[id number]    CC:

## 2018-02-22 ENCOUNTER — HOSPITAL ENCOUNTER (OUTPATIENT)
Dept: WOUND CARE | Facility: HOSPITAL | Age: 38
Discharge: HOME | End: 2018-02-22
Attending: SURGERY
Payer: COMMERCIAL

## 2018-02-22 VITALS
SYSTOLIC BLOOD PRESSURE: 122 MMHG | TEMPERATURE: 99 F | BODY MASS INDEX: 48.82 KG/M2 | HEIGHT: 65 IN | WEIGHT: 293 LBS | DIASTOLIC BLOOD PRESSURE: 81 MMHG | HEART RATE: 80 BPM

## 2018-02-22 DIAGNOSIS — L98.499 DIABETES MELLITUS WITH SKIN ULCER: Primary | ICD-10-CM

## 2018-02-22 DIAGNOSIS — E11.622 DIABETES MELLITUS WITH SKIN ULCER: Primary | ICD-10-CM

## 2018-02-22 DIAGNOSIS — L02.212 ABSCESS OF BACK: ICD-10-CM

## 2018-02-22 DIAGNOSIS — E66.9 OBESITY (BMI 30-39.9): ICD-10-CM

## 2018-02-22 PROCEDURE — 11042 DBRDMT SUBQ TIS 1ST 20SQCM/<: CPT

## 2018-02-22 PROCEDURE — 27201912 HC WOUND CARE DEBRIDEMENT SUPPLIES

## 2018-02-22 PROCEDURE — 29580 STRAPPING UNNA BOOT: CPT

## 2018-02-22 PROCEDURE — 29581 APPL MULTLAYER CMPRN SYS LEG: CPT

## 2018-02-22 RX ORDER — TETRACYCLINE HYDROCHLORIDE 500 MG/1
500 CAPSULE ORAL 2 TIMES DAILY
Qty: 60 CAPSULE | Refills: 1 | Status: SHIPPED | OUTPATIENT
Start: 2018-02-22 | End: 2018-02-22 | Stop reason: SDUPTHER

## 2018-02-22 RX ORDER — TETRACYCLINE HYDROCHLORIDE 500 MG/1
500 CAPSULE ORAL 2 TIMES DAILY
Qty: 60 CAPSULE | Refills: 1 | Status: SHIPPED | OUTPATIENT
Start: 2018-02-22 | End: 2018-04-26

## 2018-02-28 ENCOUNTER — PATIENT MESSAGE (OUTPATIENT)
Dept: CARDIOLOGY | Facility: CLINIC | Age: 38
End: 2018-02-28

## 2018-02-28 ENCOUNTER — TELEPHONE (OUTPATIENT)
Dept: CARDIOLOGY | Facility: CLINIC | Age: 38
End: 2018-02-28

## 2018-02-28 NOTE — TELEPHONE ENCOUNTER
----- Message from Yunior Hassan MD sent at 2/27/2018  4:55 PM CST -----  Please let Ms Vuong know that her ultrasounds show no clots, blockages with normal circulation  THanks  BRANNON

## 2018-03-01 ENCOUNTER — HOSPITAL ENCOUNTER (OUTPATIENT)
Dept: WOUND CARE | Facility: HOSPITAL | Age: 38
Discharge: HOME | End: 2018-03-01
Attending: SURGERY
Payer: COMMERCIAL

## 2018-03-01 VITALS
TEMPERATURE: 99 F | BODY MASS INDEX: 48.82 KG/M2 | WEIGHT: 293 LBS | HEIGHT: 65 IN | HEART RATE: 97 BPM | DIASTOLIC BLOOD PRESSURE: 83 MMHG | SYSTOLIC BLOOD PRESSURE: 127 MMHG

## 2018-03-01 DIAGNOSIS — Z79.4 TYPE 2 DIABETES MELLITUS WITH OTHER SKIN ULCER, WITH LONG-TERM CURRENT USE OF INSULIN: ICD-10-CM

## 2018-03-01 DIAGNOSIS — I87.2 VENOUS STASIS ULCER OF LEFT CALF WITH MUSCLE INVOLVEMENT WITHOUT EVIDENCE OF NECROSIS WITHOUT VARICOSE VEINS: ICD-10-CM

## 2018-03-01 DIAGNOSIS — L02.212 ABSCESS OF BACK: ICD-10-CM

## 2018-03-01 DIAGNOSIS — E11.622 TYPE 2 DIABETES MELLITUS WITH OTHER SKIN ULCER, WITH LONG-TERM CURRENT USE OF INSULIN: ICD-10-CM

## 2018-03-01 DIAGNOSIS — L97.225 VENOUS STASIS ULCER OF LEFT CALF WITH MUSCLE INVOLVEMENT WITHOUT EVIDENCE OF NECROSIS WITHOUT VARICOSE VEINS: ICD-10-CM

## 2018-03-01 DIAGNOSIS — E66.9 OBESITY (BMI 30-39.9): Primary | ICD-10-CM

## 2018-03-01 PROCEDURE — 17250 CHEM CAUT OF GRANLTJ TISSUE: CPT

## 2018-03-01 PROCEDURE — 29580 STRAPPING UNNA BOOT: CPT

## 2018-03-08 ENCOUNTER — HOSPITAL ENCOUNTER (OUTPATIENT)
Dept: WOUND CARE | Facility: HOSPITAL | Age: 38
Discharge: HOME | End: 2018-03-08
Attending: SURGERY
Payer: COMMERCIAL

## 2018-03-08 VITALS — DIASTOLIC BLOOD PRESSURE: 87 MMHG | TEMPERATURE: 99 F | HEART RATE: 90 BPM | SYSTOLIC BLOOD PRESSURE: 122 MMHG

## 2018-03-08 DIAGNOSIS — L97.225 VENOUS STASIS ULCER OF LEFT CALF WITH MUSCLE INVOLVEMENT WITHOUT EVIDENCE OF NECROSIS WITHOUT VARICOSE VEINS: Primary | ICD-10-CM

## 2018-03-08 DIAGNOSIS — D50.9 IRON DEFICIENCY ANEMIA, UNSPECIFIED IRON DEFICIENCY ANEMIA TYPE: ICD-10-CM

## 2018-03-08 DIAGNOSIS — I87.2 VENOUS STASIS ULCER OF LEFT CALF WITH MUSCLE INVOLVEMENT WITHOUT EVIDENCE OF NECROSIS WITHOUT VARICOSE VEINS: Primary | ICD-10-CM

## 2018-03-08 PROCEDURE — 29580 STRAPPING UNNA BOOT: CPT

## 2018-03-08 NOTE — PROGRESS NOTES
Much of the documentation for this visit was completed in the [facility] system. Please see the attached documentation for further details about this patient's care.     Ana Delgado LPN

## 2018-03-15 ENCOUNTER — HOSPITAL ENCOUNTER (OUTPATIENT)
Dept: WOUND CARE | Facility: HOSPITAL | Age: 38
Discharge: HOME | End: 2018-03-15
Attending: SURGERY
Payer: COMMERCIAL

## 2018-03-15 VITALS
SYSTOLIC BLOOD PRESSURE: 119 MMHG | WEIGHT: 293 LBS | DIASTOLIC BLOOD PRESSURE: 74 MMHG | BODY MASS INDEX: 48.82 KG/M2 | HEART RATE: 97 BPM | HEIGHT: 65 IN | TEMPERATURE: 99 F

## 2018-03-15 DIAGNOSIS — L02.212 ABSCESS OF BACK: ICD-10-CM

## 2018-03-15 DIAGNOSIS — E11.622 TYPE 2 DIABETES MELLITUS WITH OTHER SKIN ULCER, WITH LONG-TERM CURRENT USE OF INSULIN: ICD-10-CM

## 2018-03-15 DIAGNOSIS — Z79.4 TYPE 2 DIABETES MELLITUS WITH OTHER SKIN ULCER, WITH LONG-TERM CURRENT USE OF INSULIN: ICD-10-CM

## 2018-03-15 DIAGNOSIS — L97.225 VENOUS STASIS ULCER OF LEFT CALF WITH MUSCLE INVOLVEMENT WITHOUT EVIDENCE OF NECROSIS WITHOUT VARICOSE VEINS: Primary | ICD-10-CM

## 2018-03-15 DIAGNOSIS — I87.2 VENOUS STASIS ULCER OF LEFT CALF WITH MUSCLE INVOLVEMENT WITHOUT EVIDENCE OF NECROSIS WITHOUT VARICOSE VEINS: Primary | ICD-10-CM

## 2018-03-15 PROCEDURE — 29581 APPL MULTLAYER CMPRN SYS LEG: CPT

## 2018-03-22 LAB
FUNGUS SPEC CULT: NORMAL
FUNGUS SPEC CULT: NORMAL

## 2018-03-29 ENCOUNTER — HOSPITAL ENCOUNTER (OUTPATIENT)
Dept: WOUND CARE | Facility: HOSPITAL | Age: 38
Discharge: HOME | End: 2018-03-29
Attending: SURGERY
Payer: COMMERCIAL

## 2018-03-29 VITALS — TEMPERATURE: 99 F | HEART RATE: 101 BPM | DIASTOLIC BLOOD PRESSURE: 76 MMHG | SYSTOLIC BLOOD PRESSURE: 124 MMHG

## 2018-03-29 DIAGNOSIS — E66.9 OBESITY (BMI 30-39.9): ICD-10-CM

## 2018-03-29 DIAGNOSIS — L98.499 DIABETES MELLITUS WITH SKIN ULCER: ICD-10-CM

## 2018-03-29 DIAGNOSIS — E11.622 TYPE 2 DIABETES MELLITUS WITH OTHER SKIN ULCER, WITH LONG-TERM CURRENT USE OF INSULIN: ICD-10-CM

## 2018-03-29 DIAGNOSIS — Z79.4 TYPE 2 DIABETES MELLITUS WITH OTHER SKIN ULCER, WITH LONG-TERM CURRENT USE OF INSULIN: ICD-10-CM

## 2018-03-29 DIAGNOSIS — L97.225 VENOUS STASIS ULCER OF LEFT CALF WITH MUSCLE INVOLVEMENT WITHOUT EVIDENCE OF NECROSIS WITHOUT VARICOSE VEINS: Primary | ICD-10-CM

## 2018-03-29 DIAGNOSIS — L02.212 ABSCESS OF BACK: ICD-10-CM

## 2018-03-29 DIAGNOSIS — I87.2 VENOUS STASIS ULCER OF LEFT CALF WITH MUSCLE INVOLVEMENT WITHOUT EVIDENCE OF NECROSIS WITHOUT VARICOSE VEINS: Primary | ICD-10-CM

## 2018-03-29 DIAGNOSIS — E11.622 DIABETES MELLITUS WITH SKIN ULCER: ICD-10-CM

## 2018-03-29 PROCEDURE — 99214 OFFICE O/P EST MOD 30 MIN: CPT

## 2018-04-05 ENCOUNTER — HOSPITAL ENCOUNTER (OUTPATIENT)
Dept: WOUND CARE | Facility: HOSPITAL | Age: 38
Discharge: HOME | End: 2018-04-05
Attending: SURGERY
Payer: COMMERCIAL

## 2018-04-05 VITALS — SYSTOLIC BLOOD PRESSURE: 134 MMHG | DIASTOLIC BLOOD PRESSURE: 79 MMHG | TEMPERATURE: 97 F | HEART RATE: 103 BPM

## 2018-04-05 DIAGNOSIS — E66.9 OBESITY (BMI 30-39.9): ICD-10-CM

## 2018-04-05 DIAGNOSIS — L98.499 DIABETES MELLITUS WITH SKIN ULCER: ICD-10-CM

## 2018-04-05 DIAGNOSIS — E11.622 DIABETES MELLITUS WITH SKIN ULCER: ICD-10-CM

## 2018-04-05 DIAGNOSIS — L97.225 VENOUS STASIS ULCER OF LEFT CALF WITH MUSCLE INVOLVEMENT WITHOUT EVIDENCE OF NECROSIS WITHOUT VARICOSE VEINS: ICD-10-CM

## 2018-04-05 DIAGNOSIS — Z79.4 TYPE 2 DIABETES MELLITUS WITH OTHER SKIN ULCER, WITH LONG-TERM CURRENT USE OF INSULIN: ICD-10-CM

## 2018-04-05 DIAGNOSIS — E11.622 TYPE 2 DIABETES MELLITUS WITH OTHER SKIN ULCER, WITH LONG-TERM CURRENT USE OF INSULIN: ICD-10-CM

## 2018-04-05 DIAGNOSIS — I87.2 VENOUS STASIS ULCER OF LEFT CALF WITH MUSCLE INVOLVEMENT WITHOUT EVIDENCE OF NECROSIS WITHOUT VARICOSE VEINS: ICD-10-CM

## 2018-04-05 DIAGNOSIS — D50.9 IRON DEFICIENCY ANEMIA, UNSPECIFIED IRON DEFICIENCY ANEMIA TYPE: ICD-10-CM

## 2018-04-05 DIAGNOSIS — L02.212 ABSCESS OF BACK: Primary | ICD-10-CM

## 2018-04-05 PROCEDURE — 87186 SC STD MICRODIL/AGAR DIL: CPT

## 2018-04-05 PROCEDURE — 99214 OFFICE O/P EST MOD 30 MIN: CPT

## 2018-04-05 PROCEDURE — 87075 CULTR BACTERIA EXCEPT BLOOD: CPT

## 2018-04-05 PROCEDURE — 87077 CULTURE AEROBIC IDENTIFY: CPT

## 2018-04-05 PROCEDURE — 87070 CULTURE OTHR SPECIMN AEROBIC: CPT

## 2018-04-05 PROCEDURE — 99213 OFFICE O/P EST LOW 20 MIN: CPT

## 2018-04-05 RX ORDER — ATORVASTATIN CALCIUM 20 MG/1
TABLET, FILM COATED ORAL
COMMUNITY
Start: 2018-03-07 | End: 2018-05-04

## 2018-04-06 ENCOUNTER — DOCUMENTATION ONLY (OUTPATIENT)
Dept: BARIATRICS | Facility: CLINIC | Age: 38
End: 2018-04-06

## 2018-04-06 NOTE — PROGRESS NOTES
Date: 2018-04-05 - 18:48  Patient's Name: Rodney Infante  Date of Birth: [id number] Email: [email or web address]  Phone: [phone]   Patient Address: [address] [place] [address] [place] [facility]  [place] [address]  Preferred Surgical Location: [facility]   I certify that I am 18 years of age or older:  Confirmation Code: [id number]  Verification of Bariatric Seminar: y  Insurance Information  Insurance or Self Pay? Insurance - Fill out fields below  Insurance Company Name: [employer] [facility]   Type of Coverage/Coverage Plan (i.e. PPO, HMO): PPO   Group Name: [facility] [employer]   Subscriber Name:   Member Name (patient's name)   Member ID/Policy #:[id number]   Plan Effective Date:   Card Issuance date: 12/18/2014

## 2018-04-09 ENCOUNTER — TELEPHONE (OUTPATIENT)
Dept: BARIATRICS | Facility: CLINIC | Age: 38
End: 2018-04-09

## 2018-04-09 LAB
BACTERIA SPEC AEROBE CULT: NORMAL
BACTERIA SPEC AEROBE CULT: NORMAL

## 2018-04-09 NOTE — TELEPHONE ENCOUNTER
----- Message from Wendy Roy sent at 4/9/2018  7:33 AM CDT -----  Pt has no coverage.   ----- Message -----  From: Loren Pryor RN  Sent: 4/6/2018   9:14 AM  To: Wendy Roy    Please verify benefits    Date: 2018-04-05 - 18:48  Patient's Name: Rodney Infante  Date of Birth: [id number] Email: [email or web address]  Phone: [phone]   Patient Address: [address] [place] [id number]  Preferred Surgical Location: [facility]   I certify that I am 18 years of age or older  Confirmation Code: [id number]  Verification of Bariatric Seminar: y  Insurance Information  Insurance or Self Pay? Insurance - Fill out fields below  Insurance Company Name: [facility]   Type of Coverage/Coverage Plan (i.e. PPO, HMO): PPO   Group Name: [employer]   Subscriber Name:   Member Name (patient's name)   Member ID/Policy #:[id number]   Plan Effective Date:   Card Issuance date: 12/18/2014

## 2018-04-10 LAB — BACTERIA SPEC ANAEROBE CULT: NORMAL

## 2018-04-12 ENCOUNTER — HOSPITAL ENCOUNTER (OUTPATIENT)
Dept: WOUND CARE | Facility: HOSPITAL | Age: 38
Discharge: HOME | End: 2018-04-12
Attending: SURGERY
Payer: COMMERCIAL

## 2018-04-12 VITALS — TEMPERATURE: 99 F | HEART RATE: 89 BPM | SYSTOLIC BLOOD PRESSURE: 121 MMHG | DIASTOLIC BLOOD PRESSURE: 86 MMHG

## 2018-04-12 DIAGNOSIS — E11.622 DIABETES MELLITUS WITH SKIN ULCER: ICD-10-CM

## 2018-04-12 DIAGNOSIS — L98.499 DIABETES MELLITUS WITH SKIN ULCER: ICD-10-CM

## 2018-04-12 DIAGNOSIS — L02.212 ABSCESS OF BACK: Primary | ICD-10-CM

## 2018-04-12 PROCEDURE — 99214 OFFICE O/P EST MOD 30 MIN: CPT

## 2018-04-12 RX ORDER — CIPROFLOXACIN 750 MG/1
750 TABLET, FILM COATED ORAL EVERY 12 HOURS
Qty: 60 TABLET | Refills: 1 | Status: SHIPPED | OUTPATIENT
Start: 2018-04-12 | End: 2018-06-06

## 2018-04-19 ENCOUNTER — HOSPITAL ENCOUNTER (OUTPATIENT)
Dept: WOUND CARE | Facility: HOSPITAL | Age: 38
Discharge: HOME | End: 2018-04-19
Attending: SURGERY
Payer: COMMERCIAL

## 2018-04-19 VITALS
HEIGHT: 65 IN | BODY MASS INDEX: 48.82 KG/M2 | WEIGHT: 293 LBS | TEMPERATURE: 99 F | DIASTOLIC BLOOD PRESSURE: 70 MMHG | SYSTOLIC BLOOD PRESSURE: 115 MMHG | HEART RATE: 103 BPM

## 2018-04-19 DIAGNOSIS — L02.212 ABSCESS OF BACK: Primary | ICD-10-CM

## 2018-04-19 PROCEDURE — 99214 OFFICE O/P EST MOD 30 MIN: CPT

## 2018-04-19 RX ORDER — MICONAZOLE NITRATE 2 %
1 CREAM WITH APPLICATOR VAGINAL NIGHTLY
Qty: 45 G | Refills: 1 | Status: SHIPPED | OUTPATIENT
Start: 2018-04-19 | End: 2018-09-11

## 2018-04-20 LAB
ACID FAST MOD KINY STN SPEC: NORMAL
ACID FAST MOD KINY STN SPEC: NORMAL
MYCOBACTERIUM SPEC QL CULT: NORMAL
MYCOBACTERIUM SPEC QL CULT: NORMAL

## 2018-04-26 ENCOUNTER — HOSPITAL ENCOUNTER (OUTPATIENT)
Dept: WOUND CARE | Facility: HOSPITAL | Age: 38
Discharge: HOME | End: 2018-04-26
Attending: SURGERY
Payer: COMMERCIAL

## 2018-04-26 VITALS
WEIGHT: 293 LBS | TEMPERATURE: 98 F | HEIGHT: 65 IN | BODY MASS INDEX: 48.82 KG/M2 | HEART RATE: 92 BPM | SYSTOLIC BLOOD PRESSURE: 128 MMHG | DIASTOLIC BLOOD PRESSURE: 83 MMHG

## 2018-04-26 PROCEDURE — 99203 OFFICE O/P NEW LOW 30 MIN: CPT

## 2018-04-26 PROCEDURE — 99213 OFFICE O/P EST LOW 20 MIN: CPT

## 2018-05-03 ENCOUNTER — HOSPITAL ENCOUNTER (OUTPATIENT)
Dept: WOUND CARE | Facility: HOSPITAL | Age: 38
Discharge: HOME | End: 2018-05-03
Attending: SURGERY
Payer: COMMERCIAL

## 2018-05-03 VITALS
WEIGHT: 293 LBS | SYSTOLIC BLOOD PRESSURE: 118 MMHG | BODY MASS INDEX: 48.82 KG/M2 | DIASTOLIC BLOOD PRESSURE: 77 MMHG | HEIGHT: 65 IN | TEMPERATURE: 99 F | HEART RATE: 92 BPM

## 2018-05-03 DIAGNOSIS — E11.622 DIABETES MELLITUS WITH SKIN ULCER: Primary | ICD-10-CM

## 2018-05-03 DIAGNOSIS — E66.9 OBESITY (BMI 30-39.9): ICD-10-CM

## 2018-05-03 DIAGNOSIS — L98.499 DIABETES MELLITUS WITH SKIN ULCER: Primary | ICD-10-CM

## 2018-05-03 DIAGNOSIS — L02.212 ABSCESS OF BACK: ICD-10-CM

## 2018-05-03 DIAGNOSIS — E11.622 TYPE 2 DIABETES MELLITUS WITH OTHER SKIN ULCER, WITH LONG-TERM CURRENT USE OF INSULIN: ICD-10-CM

## 2018-05-03 DIAGNOSIS — E66.9 OBESITY, UNSPECIFIED CLASSIFICATION, UNSPECIFIED OBESITY TYPE, UNSPECIFIED WHETHER SERIOUS COMORBIDITY PRESENT: ICD-10-CM

## 2018-05-03 DIAGNOSIS — I87.2 VENOUS STASIS ULCER OF LEFT CALF WITH MUSCLE INVOLVEMENT WITHOUT EVIDENCE OF NECROSIS WITHOUT VARICOSE VEINS: ICD-10-CM

## 2018-05-03 DIAGNOSIS — D50.9 IRON DEFICIENCY ANEMIA, UNSPECIFIED IRON DEFICIENCY ANEMIA TYPE: ICD-10-CM

## 2018-05-03 DIAGNOSIS — Z79.4 TYPE 2 DIABETES MELLITUS WITH OTHER SKIN ULCER, WITH LONG-TERM CURRENT USE OF INSULIN: ICD-10-CM

## 2018-05-03 DIAGNOSIS — L97.225 VENOUS STASIS ULCER OF LEFT CALF WITH MUSCLE INVOLVEMENT WITHOUT EVIDENCE OF NECROSIS WITHOUT VARICOSE VEINS: ICD-10-CM

## 2018-05-03 PROCEDURE — 99214 OFFICE O/P EST MOD 30 MIN: CPT

## 2018-05-03 RX ORDER — SODIUM CHLORIDE 9 MG/ML
INJECTION, SOLUTION INTRAVENOUS CONTINUOUS
Status: CANCELLED | OUTPATIENT
Start: 2018-05-03

## 2018-05-03 NOTE — PROGRESS NOTES
Today`s Date: 5/3/2018     Admit Date: 5/3/2018    Admitting Physician: Marcel Pennington MD    Patient`s Name: Rodney Infante , 37 y.o. female    HISTORY AND CHIEF COMPLAINT  Recurrent painful tender  Swelling low back area getting worse , no healing well recurrent swelling   Patient Active Problem List   Diagnosis    Iron deficiency anemia    Type 2 diabetes mellitus with other skin ulcer    Venous stasis ulcer of left calf with muscle involvement without evidence of necrosis without varicose veins    Obesity (BMI 30-39.9)    Diabetes mellitus with skin ulcer    Abscess of back       Past Medical History:   Diagnosis Date    Anemia     Diabetes     Diabetes mellitus, type 2     Neuropathy        Past Surgical History:   Procedure Laterality Date    CYST REMOVAL  2014    back       Prior to Admission medications    Medication Sig Start Date End Date Taking? Authorizing Provider   allopurinol (ZYLOPRIM) 100 MG tablet  1/5/16   Historical Provider, MD   atorvastatin (LIPITOR) 20 MG tablet  3/7/18   Historical Provider, MD   canagliflozin-metformin (INVOKAMET XR) 150-1,000 mg TBph Take by mouth.    Historical Provider, MD   ciprofloxacin HCl (CIPRO) 750 MG tablet Take 1 tablet (750 mg total) by mouth every 12 (twelve) hours. 4/12/18   Marcel Pennington MD   cyanocobalamin (VITAMIN B-12) 1000 MCG tablet Take 1 tablet (1,000 mcg total) by mouth once daily. This is a prescription for 1 year.  Take 1 tablet Monday, Wednesday, and Friday 4/9/15   Harjeet Zaidi MD   estradiol cypionate (DEPO-ESTRADIOL) 5 mg/mL injection Inject into the muscle every 28 days.    Historical Provider, MD   folic acid (FOLVITE) 1 MG tablet Take 1 tablet (1 mg total) by mouth once daily. This is a prescription for 1 year 4/9/15 2/27/18  Harjeet Zaidi MD   gabapentin (NEURONTIN) 600 MG tablet Take 600 mg by mouth daily as needed. 10/24/17   Historical Provider, MD   glimepiride (AMARYL) 4 MG tablet Take 1  tablet (4 mg total) by mouth before dinner. 5/16/16   Harjeet Zaidi MD   hydrocodone-acetaminophen 5-325mg (NORCO) 5-325 mg per tablet Take 1 tablet by mouth every 6 (six) hours as needed for Pain. 2/16/18   Marcel Pennington MD   ibuprofen (ADVIL,MOTRIN) 800 MG tablet  1/5/16   Historical Provider, MD   miconazole (MONISTAT 7) 2 % vaginal cream Place 1 applicator vaginally every evening. 4/19/18   Marcel Pennington MD   mupirocin (BACTROBAN) 2 % ointment Apply topically once daily. appy locally daily 12/18/17   Marcel Pennington MD   ondansetron (ZOFRAN) 4 MG tablet  12/11/17   Historical Provider, MD   TOPCARE UNIVERSAL1 LANCET Misc  10/24/17   Historical Provider, MD   TRUETRACK TEST Strp  10/24/17   Historical Provider, MD   VITAMIN D2 50,000 unit capsule  3/2/16   Historical Provider, MD     Current Outpatient Prescriptions on File Prior to Encounter   Medication Sig Dispense Refill    allopurinol (ZYLOPRIM) 100 MG tablet       atorvastatin (LIPITOR) 20 MG tablet       canagliflozin-metformin (INVOKAMET XR) 150-1,000 mg TBph Take by mouth.      ciprofloxacin HCl (CIPRO) 750 MG tablet Take 1 tablet (750 mg total) by mouth every 12 (twelve) hours. 60 tablet 1    cyanocobalamin (VITAMIN B-12) 1000 MCG tablet Take 1 tablet (1,000 mcg total) by mouth once daily. This is a prescription for 1 year.  Take 1 tablet Monday, Wednesday, and Friday 36 tablet 3    estradiol cypionate (DEPO-ESTRADIOL) 5 mg/mL injection Inject into the muscle every 28 days.      folic acid (FOLVITE) 1 MG tablet Take 1 tablet (1 mg total) by mouth once daily. This is a prescription for 1 year 90 tablet 3    gabapentin (NEURONTIN) 600 MG tablet Take 600 mg by mouth daily as needed.      glimepiride (AMARYL) 4 MG tablet Take 1 tablet (4 mg total) by mouth before dinner. 30 tablet 5    hydrocodone-acetaminophen 5-325mg (NORCO) 5-325 mg per tablet Take 1 tablet by mouth every 6 (six) hours as needed for Pain. 32 tablet 0     ibuprofen (ADVIL,MOTRIN) 800 MG tablet       miconazole (MONISTAT 7) 2 % vaginal cream Place 1 applicator vaginally every evening. 45 g 1    mupirocin (BACTROBAN) 2 % ointment Apply topically once daily. appy locally daily 30 g 1    ondansetron (ZOFRAN) 4 MG tablet       TOPCARE UNIVERSAL1 LANCET Misc       TRUETRACK TEST Strp       VITAMIN D2 50,000 unit capsule        No current facility-administered medications on file prior to encounter.         Review of patient's allergies indicates:   Allergen Reactions    Pcn [penicillins] Swelling       Social History:   reports that she has never smoked. She has never used smokeless tobacco. She reports that she drinks alcohol. She reports that she does not use drugs.     Family History   Problem Relation Age of Onset    No Known Problems Mother     Drug abuse Father     No Known Problems Sister        PHYSICAL EXAMINATION  Temp:  [98.5 °F (36.9 °C)] 98.5 °F (36.9 °C)  Pulse:  [92] 92  BP: (118)/(77) 118/77    General Condition:   alert x 3     Head & Neck  Anemia: None  Jaundice: None  Neck vein: Not distended  Carotid Bruits: none  Lymph nodes: none palpable  Thyroid: normal    Chest: normal    Heart: normal    Rt. Breast: not examined  Lt. Breast: not examined  Axillary lymph nodes: none    Abdomen: Soft,  None tender with no palpable mass or organ  Hernia: none    Rectal: Defered    Extremities: normal    Vascular: normal    Specific focus Examination    Imp: Recurrent infected hidradenitis back wound with hidradenitis, dm, obesity , htn    Plan; excision and debridement as out patient next week.

## 2018-05-04 ENCOUNTER — HOSPITAL ENCOUNTER (OUTPATIENT)
Dept: PREADMISSION TESTING | Facility: HOSPITAL | Age: 38
Discharge: HOME | End: 2018-05-04
Attending: SURGERY
Payer: COMMERCIAL

## 2018-05-04 ENCOUNTER — ANESTHESIA EVENT (OUTPATIENT)
Dept: SURGERY | Facility: HOSPITAL | Age: 38
End: 2018-05-04
Payer: COMMERCIAL

## 2018-05-04 VITALS
HEIGHT: 65 IN | WEIGHT: 293 LBS | BODY MASS INDEX: 48.82 KG/M2 | SYSTOLIC BLOOD PRESSURE: 129 MMHG | OXYGEN SATURATION: 100 % | HEART RATE: 82 BPM | DIASTOLIC BLOOD PRESSURE: 82 MMHG

## 2018-05-04 DIAGNOSIS — Z01.818 PRE-OP TESTING: Primary | ICD-10-CM

## 2018-05-04 PROBLEM — E66.01 MORBID OBESITY WITH BMI OF 60.0-69.9, ADULT: Status: ACTIVE | Noted: 2017-12-18

## 2018-05-04 PROCEDURE — 93005 ELECTROCARDIOGRAM TRACING: CPT

## 2018-05-04 RX ORDER — SODIUM CHLORIDE, SODIUM LACTATE, POTASSIUM CHLORIDE, CALCIUM CHLORIDE 600; 310; 30; 20 MG/100ML; MG/100ML; MG/100ML; MG/100ML
INJECTION, SOLUTION INTRAVENOUS CONTINUOUS
Status: CANCELLED | OUTPATIENT
Start: 2018-05-04

## 2018-05-04 RX ORDER — LIDOCAINE HYDROCHLORIDE 10 MG/ML
1 INJECTION, SOLUTION EPIDURAL; INFILTRATION; INTRACAUDAL; PERINEURAL ONCE
Status: CANCELLED | OUTPATIENT
Start: 2018-05-04 | End: 2018-05-04

## 2018-05-04 NOTE — ANESTHESIA PREPROCEDURE EVALUATION
05/04/2018  Rodney Infante is a 37 y.o., diabetic female is scheduled for excision of hidradenitis to right upper back under MAC/gen on 5/10/2018.         Past Surgical History:   Procedure Laterality Date    CYST REMOVAL  2014    back        Anesthesia Evaluation    I have reviewed the Patient Summary Reports.    I have reviewed the Nursing Notes.   I have reviewed the Medications.     Review of Systems  Anesthesia Hx:  No problems with previous Anesthesia  History of prior surgery of interest to airway management or planning: Previous anesthesia: General, MAC  Procedure performed at an [facility].  Denies Personal Hx of Anesthesia complications.   Social:  Non-Smoker, Social Alcohol Use    Hematology/Oncology:         -- Anemia:   EENT/Dental:EENT/Dental Normal   Cardiovascular:   Denies Hypertension.  Denies Dysrhythmias.   Denies Angina. PVD (hx of venous statis ulcers BLE)        Pulmonary:  Pulmonary Normal  Denies Shortness of breath.    Renal/:  Renal/ Normal     Hepatic/GI:  Hepatic/GI Normal    Neurological:   Peripheral Neuropathy    Endocrine:   Diabetes, poorly controlled, type 2  Diabetes, Type 2 Diabetes , controlled by oral hypoglycemics. , most recent HgA1c value was 8.4 on 2016; does not recall most recent value from 3/2018.    Dermatological:   Recurrent abscess to right upper back; last debridement 2/2018 at [facility] without difficulty           Physical Exam  General:  Morbid Obesity    Airway/Jaw/Neck:  Airway Findings: Mouth Opening: Normal Tongue: Normal  Mallampati: II  TM Distance: Normal, at least 6 cm  Jaw/Neck Findings:  Neck ROM: Extension Decreased, Mild  Neck Findings:  Girth Increased, Short Neck     Eyes/Ears/Nose:  EYES/EARS/NOSE FINDINGS: Normal   Dental:  Dental Findings: Periodontal disease, Severe   Chest/Lungs:  Chest/Lungs Clear     Heart/Vascular:  Heart Findings: Normal Heart murmur: negative    Abdomen:  Abdomen Findings: Normal     Skin:  Skin Findings:  Abscess to right upper back not examined     Mental Status:  Mental Status Findings: Normal           Anesthesia Plan  Type of Anesthesia, risks & benefits discussed:  Anesthesia Type:  general  Patient's Preference:   Intra-op Monitoring Plan:   Intra-op Monitoring Plan Comments:   Post Op Pain Control Plan:   Post Op Pain Control Plan Comments:   Induction:    Beta Blocker:  Patient is not currently on a Beta-Blocker (No further documentation required).       Informed Consent:  Anesthesia consent signed with patient.  ASA Score: 3     Day of Surgery Review of History & Physical: I have interviewed and examined the patient. I have reviewed the patient's H&P dated:        Anesthesia Plan Notes: Anesthesia consent will be obtained prior to procedure on 5/10/2018.        Ready For Surgery From Anesthesia Perspective.

## 2018-05-04 NOTE — DISCHARGE INSTRUCTIONS
Your surgery is scheduled for 5/10/18.    Please report to Outpatient Surgery Intake Office on the 2nd FLOOR at 7:00 a.m.          INSTRUCTIONS IMPORTANT!!!  ¨ Do not eat or drink after 12 midnight-including water. OK to brush teeth, no   gum, candy or mints!      ____  Proceed to [facility] on 5/7/18 for additional blood test.        ____  Do not wear makeup, including mascara.  ____  No powder, lotions or creams to surgical area.  ____  Please remove all jewelry, including piercings and leave at home.  ____  No money or valuables needed. Please leave at home.  ____  Please bring any documents given by your doctor.  ____  If going home the same day, arrange for a ride home. You will not be able to             drive if Anesthesia was used.  ____  Wear loose fitting clothing. Allow for dressings, bandages.  ____  Stop Aspirin, Ibuprofen, Motrin and Aleve at least 3-5 days before surgery, unless otherwise instructed by your doctor, or the nurse.   You MAY use Tylenol/acetaminophen until day of surgery.  ____  Wash the surgical area with Hibiclens the night before surgery, and again the             morning of surgery.  Be sure to rinse hibiclens off completely (if instructed by   nurse).  ____  If you take diabetic medication, do not take am of surgery unless instructed by Doctor.  ____  Call MD for temperature above 101 degrees.  ____ Stop taking any Fish Oil supplement or any Vitamins that contain Vitamin E at least 5 days prior to surgery.  ____ Do Not wear your contact lenses the day of your procedure.  You may wear your glasses.        I have read or had read and explained to me, and understand the above information.  Additional comments or instructions:  For additional questions call [phone]      Pre-Op Bathing Instructions    Before surgery, you can play an important role in your own health.    Because skin is not sterile, we need to be sure that your skin is as free of germs as possible. By  following the instructions below, you can reduce the number of germs on your skin before surgery.    IMPORTANT: You will need to shower with a special soap called Hibiclens*, available at any pharmacy.  If you are allergic to Chlorhexidine (the antiseptic in Hibiclens), use an antibacterial soap such as Dial Soap for your preoperative shower.  You will shower with Hibiclens both the night before your surgery and the morning of your surgery.  Do not use Hibiclens on the head, face or genitals to avoid injury to those areas.    STEP #1: THE NIGHT BEFORE YOUR SURGERY     1. Do not shave the area of your body where your surgery will be performed.  2. Shower and wash your hair and body as usual with your normal soap and shampoo.  3. Rinse your hair and body thoroughly after you shower to remove all soap residue.  4. With your hand, apply one packet of Hibiclens soap to the surgical site.   5. Wash the site gently for five (5) minutes. Do not scrub your skin too hard.   6. Do not wash with your regular soap after Hibiclens is used.  7. Rinse your body thoroughly.  8. Pat yourself dry with a clean, soft towel.  9. Do not use lotion, cream, or powder.  10. Wear clean clothes.    STEP #2: THE MORNING OF YOUR SURGERY     1. Repeat Step #1.    * Not to be used by people allergic to Chlorhexidine.

## 2018-05-10 ENCOUNTER — SURGERY (OUTPATIENT)
Age: 38
End: 2018-05-10

## 2018-05-10 ENCOUNTER — HOSPITAL ENCOUNTER (OUTPATIENT)
Facility: HOSPITAL | Age: 38
Discharge: HOME | End: 2018-05-10
Attending: SURGERY | Admitting: SURGERY
Payer: COMMERCIAL

## 2018-05-10 ENCOUNTER — ANESTHESIA (OUTPATIENT)
Dept: SURGERY | Facility: HOSPITAL | Age: 38
End: 2018-05-10
Payer: COMMERCIAL

## 2018-05-10 VITALS — DIASTOLIC BLOOD PRESSURE: 55 MMHG | HEART RATE: 100 BPM | SYSTOLIC BLOOD PRESSURE: 107 MMHG | OXYGEN SATURATION: 96 %

## 2018-05-10 VITALS
OXYGEN SATURATION: 97 % | TEMPERATURE: 98 F | RESPIRATION RATE: 16 BRPM | HEART RATE: 100 BPM | DIASTOLIC BLOOD PRESSURE: 77 MMHG | SYSTOLIC BLOOD PRESSURE: 135 MMHG

## 2018-05-10 DIAGNOSIS — E66.9 OBESITY, UNSPECIFIED CLASSIFICATION, UNSPECIFIED OBESITY TYPE, UNSPECIFIED WHETHER SERIOUS COMORBIDITY PRESENT: ICD-10-CM

## 2018-05-10 DIAGNOSIS — E11.622 DIABETES MELLITUS WITH SKIN ULCER: ICD-10-CM

## 2018-05-10 DIAGNOSIS — L97.225 VENOUS STASIS ULCER OF LEFT CALF WITH MUSCLE INVOLVEMENT WITHOUT EVIDENCE OF NECROSIS WITHOUT VARICOSE VEINS: ICD-10-CM

## 2018-05-10 DIAGNOSIS — E11.622 TYPE 2 DIABETES MELLITUS WITH OTHER SKIN ULCER, WITH LONG-TERM CURRENT USE OF INSULIN: ICD-10-CM

## 2018-05-10 DIAGNOSIS — Z79.4 TYPE 2 DIABETES MELLITUS WITH OTHER SKIN ULCER, WITH LONG-TERM CURRENT USE OF INSULIN: ICD-10-CM

## 2018-05-10 DIAGNOSIS — L02.212 ABSCESS OF BACK: ICD-10-CM

## 2018-05-10 DIAGNOSIS — E66.9 OBESITY (BMI 30-39.9): ICD-10-CM

## 2018-05-10 DIAGNOSIS — D50.9 IRON DEFICIENCY ANEMIA, UNSPECIFIED IRON DEFICIENCY ANEMIA TYPE: ICD-10-CM

## 2018-05-10 DIAGNOSIS — I87.2 VENOUS STASIS ULCER OF LEFT CALF WITH MUSCLE INVOLVEMENT WITHOUT EVIDENCE OF NECROSIS WITHOUT VARICOSE VEINS: ICD-10-CM

## 2018-05-10 DIAGNOSIS — L98.499 DIABETES MELLITUS WITH SKIN ULCER: ICD-10-CM

## 2018-05-10 LAB
GRAM STN SPEC: NORMAL
GRAM STN SPEC: NORMAL
POCT GLUCOSE: 133 MG/DL (ref 70–110)

## 2018-05-10 PROCEDURE — 37000009 HC ANESTHESIA EA ADD 15 MINS: Performed by: SURGERY

## 2018-05-10 PROCEDURE — 87075 CULTR BACTERIA EXCEPT BLOOD: CPT

## 2018-05-10 PROCEDURE — 25000003 PHARM REV CODE 250: Performed by: NURSE ANESTHETIST, CERTIFIED REGISTERED

## 2018-05-10 PROCEDURE — 36000706: Performed by: SURGERY

## 2018-05-10 PROCEDURE — 87147 CULTURE TYPE IMMUNOLOGIC: CPT

## 2018-05-10 PROCEDURE — 25000003 PHARM REV CODE 250: Performed by: SURGERY

## 2018-05-10 PROCEDURE — 87205 SMEAR GRAM STAIN: CPT

## 2018-05-10 PROCEDURE — S0077 INJECTION, CLINDAMYCIN PHOSP: HCPCS | Performed by: NURSE ANESTHETIST, CERTIFIED REGISTERED

## 2018-05-10 PROCEDURE — 71000016 HC POSTOP RECOV ADDL HR: Performed by: SURGERY

## 2018-05-10 PROCEDURE — 25000003 PHARM REV CODE 250: Performed by: NURSE PRACTITIONER

## 2018-05-10 PROCEDURE — 87206 SMEAR FLUORESCENT/ACID STAI: CPT

## 2018-05-10 PROCEDURE — 87070 CULTURE OTHR SPECIMN AEROBIC: CPT

## 2018-05-10 PROCEDURE — 71000015 HC POSTOP RECOV 1ST HR: Performed by: SURGERY

## 2018-05-10 PROCEDURE — 87076 CULTURE ANAEROBE IDENT EACH: CPT | Mod: 59

## 2018-05-10 PROCEDURE — 87116 MYCOBACTERIA CULTURE: CPT

## 2018-05-10 PROCEDURE — 63600175 PHARM REV CODE 636 W HCPCS: Performed by: NURSE ANESTHETIST, CERTIFIED REGISTERED

## 2018-05-10 PROCEDURE — 88305 TISSUE EXAM BY PATHOLOGIST: CPT | Mod: 26,,, | Performed by: PATHOLOGY

## 2018-05-10 PROCEDURE — 87176 TISSUE HOMOGENIZATION CULTR: CPT

## 2018-05-10 PROCEDURE — S0020 INJECTION, BUPIVICAINE HYDRO: HCPCS | Performed by: SURGERY

## 2018-05-10 PROCEDURE — 37000008 HC ANESTHESIA 1ST 15 MINUTES: Performed by: SURGERY

## 2018-05-10 PROCEDURE — 87102 FUNGUS ISOLATION CULTURE: CPT

## 2018-05-10 PROCEDURE — 88305 TISSUE EXAM BY PATHOLOGIST: CPT | Performed by: PATHOLOGY

## 2018-05-10 PROCEDURE — 36000707: Performed by: SURGERY

## 2018-05-10 RX ORDER — LIDOCAINE HYDROCHLORIDE 10 MG/ML
INJECTION INFILTRATION; PERINEURAL
Status: DISCONTINUED | OUTPATIENT
Start: 2018-05-10 | End: 2018-05-10 | Stop reason: HOSPADM

## 2018-05-10 RX ORDER — LIDOCAINE HYDROCHLORIDE 10 MG/ML
1 INJECTION, SOLUTION EPIDURAL; INFILTRATION; INTRACAUDAL; PERINEURAL ONCE
Status: DISCONTINUED | OUTPATIENT
Start: 2018-05-10 | End: 2018-05-10 | Stop reason: HOSPADM

## 2018-05-10 RX ORDER — CLINDAMYCIN PHOSPHATE 900 MG/50ML
INJECTION, SOLUTION INTRAVENOUS
Status: DISCONTINUED | OUTPATIENT
Start: 2018-05-10 | End: 2018-05-10

## 2018-05-10 RX ORDER — BUPIVACAINE HYDROCHLORIDE 2.5 MG/ML
INJECTION, SOLUTION INFILTRATION; PERINEURAL
Status: DISCONTINUED | OUTPATIENT
Start: 2018-05-10 | End: 2018-05-10 | Stop reason: HOSPADM

## 2018-05-10 RX ORDER — BACITRACIN 50000 [IU]/1
INJECTION, POWDER, FOR SOLUTION INTRAMUSCULAR
Status: DISCONTINUED | OUTPATIENT
Start: 2018-05-10 | End: 2018-05-10 | Stop reason: HOSPADM

## 2018-05-10 RX ORDER — SODIUM CHLORIDE, SODIUM LACTATE, POTASSIUM CHLORIDE, CALCIUM CHLORIDE 600; 310; 30; 20 MG/100ML; MG/100ML; MG/100ML; MG/100ML
INJECTION, SOLUTION INTRAVENOUS CONTINUOUS
Status: DISCONTINUED | OUTPATIENT
Start: 2018-05-10 | End: 2018-05-10 | Stop reason: HOSPADM

## 2018-05-10 RX ORDER — PROPOFOL 10 MG/ML
VIAL (ML) INTRAVENOUS CONTINUOUS PRN
Status: DISCONTINUED | OUTPATIENT
Start: 2018-05-10 | End: 2018-05-10

## 2018-05-10 RX ORDER — MIDAZOLAM HYDROCHLORIDE 1 MG/ML
INJECTION INTRAMUSCULAR; INTRAVENOUS
Status: DISCONTINUED | OUTPATIENT
Start: 2018-05-10 | End: 2018-05-10

## 2018-05-10 RX ORDER — SODIUM CHLORIDE 9 MG/ML
INJECTION, SOLUTION INTRAVENOUS CONTINUOUS
Status: DISCONTINUED | OUTPATIENT
Start: 2018-05-10 | End: 2018-05-10 | Stop reason: HOSPADM

## 2018-05-10 RX ORDER — SODIUM CHLORIDE 0.9 G/100ML
IRRIGANT IRRIGATION
Status: DISCONTINUED | OUTPATIENT
Start: 2018-05-10 | End: 2018-05-10 | Stop reason: HOSPADM

## 2018-05-10 RX ORDER — LIDOCAINE HCL/PF 100 MG/5ML
SYRINGE (ML) INTRAVENOUS
Status: DISCONTINUED | OUTPATIENT
Start: 2018-05-10 | End: 2018-05-10

## 2018-05-10 RX ORDER — FENTANYL CITRATE 50 UG/ML
INJECTION, SOLUTION INTRAMUSCULAR; INTRAVENOUS
Status: DISCONTINUED | OUTPATIENT
Start: 2018-05-10 | End: 2018-05-10

## 2018-05-10 RX ADMIN — SODIUM CHLORIDE, SODIUM LACTATE, POTASSIUM CHLORIDE, AND CALCIUM CHLORIDE: .6; .31; .03; .02 INJECTION, SOLUTION INTRAVENOUS at 08:05

## 2018-05-10 RX ADMIN — MIDAZOLAM HYDROCHLORIDE 2 MG: 1 INJECTION, SOLUTION INTRAMUSCULAR; INTRAVENOUS at 09:05

## 2018-05-10 RX ADMIN — LIDOCAINE HYDROCHLORIDE 10 ML: 10 INJECTION, SOLUTION INFILTRATION; PERINEURAL at 09:05

## 2018-05-10 RX ADMIN — FENTANYL CITRATE 50 MCG: 50 INJECTION, SOLUTION INTRAMUSCULAR; INTRAVENOUS at 09:05

## 2018-05-10 RX ADMIN — CLINDAMYCIN PHOSPHATE 900 MG: 18 INJECTION, SOLUTION INTRAVENOUS at 09:05

## 2018-05-10 RX ADMIN — MIDAZOLAM HYDROCHLORIDE 2 MG: 1 INJECTION, SOLUTION INTRAMUSCULAR; INTRAVENOUS at 08:05

## 2018-05-10 RX ADMIN — PROPOFOL 75 MCG/KG/MIN: 10 INJECTION, EMULSION INTRAVENOUS at 09:05

## 2018-05-10 RX ADMIN — SODIUM CHLORIDE 500 ML: 0.9 IRRIGANT IRRIGATION at 09:05

## 2018-05-10 RX ADMIN — BACITRACIN 50000 UNITS: 5000 INJECTION, POWDER, FOR SOLUTION INTRAMUSCULAR at 09:05

## 2018-05-10 RX ADMIN — BUPIVACAINE HYDROCHLORIDE 30 ML: 2.5 INJECTION, SOLUTION INFILTRATION; PERINEURAL at 10:05

## 2018-05-10 RX ADMIN — LIDOCAINE HYDROCHLORIDE 75 MG: 20 INJECTION, SOLUTION INTRAVENOUS at 09:05

## 2018-05-10 NOTE — OP NOTE
Operative Note       Surgery Date: 5/10/2018     Surgeon(s) and Role:     * Marcel Pennington MD - Primary    Pre-op Diagnosis:  Diabetes mellitus with skin ulcer [E11.622, L98.499]  Venous stasis ulcer of left calf with muscle involvement without evidence of necrosis without varicose veins [I87.2, L97.225]  Obesity, unspecified classification, unspecified obesity type, unspecified whether serious comorbidity present [E66.9]    Post-op Diagnosis: Post-Op Diagnosis Codes:     * Diabetes mellitus with skin ulcer [E11.622, L98.499]     * Venous stasis ulcer of left calf with muscle involvement without evidence of necrosis without varicose veins [I87.2, L97.225]     * Obesity, unspecified classification, unspecified obesity type, unspecified whether serious comorbidity present [E66.9]    Procedure(s) (LRB):  EXCISION-HIDRADENITIS (N/A)  Back 3 x 4 x 2 and 4 x 5 x 5 cm back  Anesthesia: Local MAC    Procedure in Detail/Findings:  dictated    Estimated Blood Loss: 30 cc         Specimens     Start     Ordered    05/10/18 1008  Specimen to Pathology - Surgery  Once     Infected tissue subcutaneous  05/10/18 1007        Implants: * No implants in log *           Disposition: PACU - hemodynamically stable.           Condition: Good    Attestation:  I performed the procedure.           Discharge Note    Admit Date: 5/10/2018    Attending Physician: Marcel Pennington MD     Discharge Physician: Marcel Pennington MD    Final Diagnosis: Post-Op Diagnosis Codes:     * Diabetes mellitus with skin ulcer [E11.622, L98.499]     * Venous stasis ulcer of left calf with muscle involvement without evidence of necrosis without varicose veins [I87.2, L97.225]     * Obesity, unspecified classification, unspecified obesity type, unspecified whether serious comorbidity present [E66.9]    Disposition: Home or Self Care    Patient Instructions:   Current Discharge Medication List      CONTINUE these medications which have NOT CHANGED     Details   canagliflozin-metformin (INVOKAMET XR) 150-1,000 mg TBph Take by mouth.      ciprofloxacin HCl (CIPRO) 750 MG tablet Take 1 tablet (750 mg total) by mouth every 12 (twelve) hours.  Qty: 60 tablet, Refills: 1      cyanocobalamin (VITAMIN B-12) 1000 MCG tablet Take 1 tablet (1,000 mcg total) by mouth once daily. This is a prescription for 1 year.  Take 1 tablet Monday, Wednesday, and Friday  Qty: 36 tablet, Refills: 3    Associated Diagnoses: Iron deficiency anemia      glimepiride (AMARYL) 4 MG tablet Take 1 tablet (4 mg total) by mouth before dinner.  Qty: 30 tablet, Refills: 5    Associated Diagnoses: Type 2 diabetes mellitus without complication      hydrocodone-acetaminophen 5-325mg (NORCO) 5-325 mg per tablet Take 1 tablet by mouth every 6 (six) hours as needed for Pain.  Qty: 32 tablet, Refills: 0      ibuprofen (ADVIL,MOTRIN) 800 MG tablet       miconazole (MONISTAT 7) 2 % vaginal cream Place 1 applicator vaginally every evening.  Qty: 45 g, Refills: 1      mupirocin (BACTROBAN) 2 % ointment Apply topically once daily. appy locally daily  Qty: 30 g, Refills: 1      ondansetron (ZOFRAN) 4 MG tablet       VITAMIN D2 50,000 unit capsule       estradiol cypionate (DEPO-ESTRADIOL) 5 mg/mL injection Inject into the muscle every 28 days.      TOPCARE UNIVERSAL1 LANCET Misc       TRUETRACK TEST Strp              Discharge Procedure Orders (must include Diet, Follow-up, Activity)    Discharge Procedure Orders (must include Diet, Follow-up, Activity)  Diet general     Call MD for:  temperature >100.4     Call MD for:  persistent nausea and vomiting     Call MD for:  severe uncontrolled pain     Call MD for:  difficulty breathing, headache or visual disturbances     Call MD for:  redness, tenderness, or signs of infection (pain, swelling, redness, odor or green/yellow discharge around incision site)     Call MD for:  extreme fatigue     Call MD for:  persistent dizziness or light-headedness     Call MD for:   hives        Return to wound center Monday.  Discharge Date: 5/10/2018

## 2018-05-10 NOTE — H&P
Admitting Physician: Marcel Pennington MD     Patient`s Name: Rodney Infante , 37 y.o. female     HISTORY AND CHIEF COMPLAINT  Recurrent painful tender  Swelling low back area getting worse , no healing well recurrent swelling       Patient Active Problem List   Diagnosis    Iron deficiency anemia    Type 2 diabetes mellitus with other skin ulcer    Venous stasis ulcer of left calf with muscle involvement without evidence of necrosis without varicose veins    Obesity (BMI 30-39.9)    Diabetes mellitus with skin ulcer    Abscess of back              Past Medical History:   Diagnosis Date    Anemia      Diabetes      Diabetes mellitus, type 2      Neuropathy                 Past Surgical History:   Procedure Laterality Date    CYST REMOVAL   2014     back                 Prior to Admission medications    Medication Sig Start Date End Date Taking? Authorizing Provider   allopurinol (ZYLOPRIM) 100 MG tablet   1/5/16     Historical Provider, MD   atorvastatin (LIPITOR) 20 MG tablet   3/7/18     Historical Provider, MD   canagliflozin-metformin (INVOKAMET XR) 150-1,000 mg TBph Take by mouth.       Historical Provider, MD   ciprofloxacin HCl (CIPRO) 750 MG tablet Take 1 tablet (750 mg total) by mouth every 12 (twelve) hours. 4/12/18     Marcel Pennington MD   cyanocobalamin (VITAMIN B-12) 1000 MCG tablet Take 1 tablet (1,000 mcg total) by mouth once daily. This is a prescription for 1 year.  Take 1 tablet Monday, Wednesday, and Friday 4/9/15     Harjeet Zaidi MD   estradiol cypionate (DEPO-ESTRADIOL) 5 mg/mL injection Inject into the muscle every 28 days.       Historical Provider, MD   folic acid (FOLVITE) 1 MG tablet Take 1 tablet (1 mg total) by mouth once daily. This is a prescription for 1 year 4/9/15 2/27/18   Harjeet Zaidi MD   gabapentin (NEURONTIN) 600 MG tablet Take 600 mg by mouth daily as needed. 10/24/17     Historical Provider, MD   glimepiride (AMARYL) 4 MG tablet  Take 1 tablet (4 mg total) by mouth before dinner. 5/16/16     Harjeet Zaidi MD   hydrocodone-acetaminophen 5-325mg (NORCO) 5-325 mg per tablet Take 1 tablet by mouth every 6 (six) hours as needed for Pain. 2/16/18     Marcel ePnnington MD   ibuprofen (ADVIL,MOTRIN) 800 MG tablet   1/5/16     Historical Provider, MD   miconazole (MONISTAT 7) 2 % vaginal cream Place 1 applicator vaginally every evening. 4/19/18     Marcel Pennington MD   mupirocin (BACTROBAN) 2 % ointment Apply topically once daily. appy locally daily 12/18/17     Marcel Pennington MD   ondansetron (ZOFRAN) 4 MG tablet   12/11/17     Historical Provider, MD   TOPCARE UNIVERSAL1 LANCET Misc   10/24/17     Historical Provider, MD   TRUETRACK TEST Strp   10/24/17     Historical Provider, MD   VITAMIN D2 50,000 unit capsule   3/2/16     Historical Provider, MD             Current Outpatient Prescriptions on File Prior to Encounter   Medication Sig Dispense Refill    allopurinol (ZYLOPRIM) 100 MG tablet          atorvastatin (LIPITOR) 20 MG tablet          canagliflozin-metformin (INVOKAMET XR) 150-1,000 mg TBph Take by mouth.        ciprofloxacin HCl (CIPRO) 750 MG tablet Take 1 tablet (750 mg total) by mouth every 12 (twelve) hours. 60 tablet 1    cyanocobalamin (VITAMIN B-12) 1000 MCG tablet Take 1 tablet (1,000 mcg total) by mouth once daily. This is a prescription for 1 year.  Take 1 tablet Monday, Wednesday, and Friday 36 tablet 3    estradiol cypionate (DEPO-ESTRADIOL) 5 mg/mL injection Inject into the muscle every 28 days.        folic acid (FOLVITE) 1 MG tablet Take 1 tablet (1 mg total) by mouth once daily. This is a prescription for 1 year 90 tablet 3    gabapentin (NEURONTIN) 600 MG tablet Take 600 mg by mouth daily as needed.        glimepiride (AMARYL) 4 MG tablet Take 1 tablet (4 mg total) by mouth before dinner. 30 tablet 5    hydrocodone-acetaminophen 5-325mg (NORCO) 5-325 mg per tablet Take 1 tablet by mouth  every 6 (six) hours as needed for Pain. 32 tablet 0    ibuprofen (ADVIL,MOTRIN) 800 MG tablet          miconazole (MONISTAT 7) 2 % vaginal cream Place 1 applicator vaginally every evening. 45 g 1    mupirocin (BACTROBAN) 2 % ointment Apply topically once daily. appy locally daily 30 g 1    ondansetron (ZOFRAN) 4 MG tablet          TOPCARE UNIVERSAL1 LANCET Misc          TRUETRACK TEST Strp          VITAMIN D2 50,000 unit capsule            No current facility-administered medications on file prior to encounter.               Review of patient's allergies indicates:   Allergen Reactions    Pcn [penicillins] Swelling         Social History:   reports that she has never smoked. She has never used smokeless tobacco. She reports that she drinks alcohol. She reports that she does not use drugs.            Family History   Problem Relation Age of Onset    No Known Problems Mother      Drug abuse Father      No Known Problems Sister           PHYSICAL EXAMINATION  Temp:  [98.5 °F (36.9 °C)] 98.5 °F (36.9 °C)  Pulse:  [92] 92  BP: (118)/(77) 118/77     General Condition:   alert x 3      Head & Neck  Anemia: None  Jaundice: None  Neck vein: Not distended  Carotid Bruits: none  Lymph nodes: none palpable  Thyroid: normal     Chest: normal     Heart: normal     Rt. Breast: not examined  Lt. Breast: not examined  Axillary lymph nodes: none     Abdomen: Soft,  None tender with no palpable mass or organ  Hernia: none     Rectal: Defered     Extremities: normal     Vascular: normal     Specific focus Examination     Imp: Recurrent infected hidradenitis back wound with hidradenitis, dm, obesity , htn     Plan; Excision and debridement as out patient today

## 2018-05-10 NOTE — TRANSFER OF CARE
Anesthesia Transfer of Care Note    Patient: Rodney Infante    Procedure(s) Performed: Procedure(s) (LRB):  EXCISION-HIDRADENITIS (N/A)    Patient location: OPS    Anesthesia Type: MAC    Transport from OR: Transported from OR on room air with adequate spontaneous ventilation    Post pain: adequate analgesia    Post assessment: no apparent anesthetic complications    Post vital signs: stable    Level of consciousness: awake    Nausea/Vomiting: no nausea/vomiting    Complications: none    Transfer of care protocol was followed      Last vitals:   Visit Vitals  /79   Pulse 107   Temp 36.5 °C (97.7 °F) (Skin)   Resp 18   SpO2 (!) 93%   Breastfeeding? No

## 2018-05-10 NOTE — OP NOTE
DATE OF PROCEDURE:  05/10/2018.    PREOPERATIVE DIAGNOSIS:  Infected hidradenitis back x2, 3 x 4 x 5 x 3.    POSTOPERATIVE DIAGNOSIS:  Infected hidradenitis back x2, 3 x 4 x 5 x 3.    SURGEON:  Marcel Pennington M.D.    ANESTHESIA:  Xylocaine 1% with IV sedation.    PROCEDURE IN DETAIL:  After satisfactory IV sedation and the patient in prone   position, the area of the infected wound was prepped and draped in normal   sterile manner using Betadine scrub solution.  The area of both lesions was then   infiltrated using 1% Xylocaine solution.  An elliptical incision was made   completely excising the infected tissue into the deep subcutaneous tissue.  All   bleeders were clamped and bovied.  Hemostasis was satisfactorily maintained.    Wound was thoroughly irrigated with antibiotic solution and was cauterized using   electrocautery.  It was thoroughly irrigated with antibiotic solution.  Wound   was openly packed using iodoform packing.  Tissue was sent for culture and   biopsy.  Hemostasis was satisfactorily maintained.  Sterile gauze dressing was   applied.  The instrument count, sponge count, and needle count was correct.  The   patient tolerated it well.  Estimated blood loss was 30 mL.  Specimen removed   was infected skin and subcutaneous tissue.  The patient was sent to Recovery   Room in stable condition.      MS/IN  dd: 05/10/2018 10:22:50 (CDT)  td: 05/10/2018 13:24:18 (CDT)  Doc ID   #[id number]  Job ID #[id number]    CC:

## 2018-05-10 NOTE — ANESTHESIA POSTPROCEDURE EVALUATION
Anesthesia Post Evaluation    Patient: Rodney Infante    Procedure(s) Performed: Procedure(s) (LRB):  EXCISION-HIDRADENITIS (N/A)    Final Anesthesia Type: MAC  Patient location during evaluation: OPS  Patient participation: Yes- Able to Participate  Level of consciousness: awake and alert  Post-procedure vital signs: reviewed and stable  Pain management: adequate  Airway patency: patent  PONV status at discharge: No PONV  Anesthetic complications: no      Cardiovascular status: blood pressure returned to baseline and hemodynamically stable  Respiratory status: unassisted, spontaneous ventilation and room air  Hydration status: euvolemic  Follow-up not needed.        Visit Vitals  /79   Pulse 107   Temp 36.5 °C (97.7 °F) (Skin)   Resp 18   SpO2 (!) 93%   Breastfeeding? No       Pain/Milagros Score: Pain Assessment Performed: Yes (5/10/2018  7:30 AM)  Presence of Pain: complains of pain/discomfort (5/10/2018  7:30 AM)  Milagros Score: 10 (5/10/2018  7:30 AM)

## 2018-05-10 NOTE — DISCHARGE INSTRUCTIONS
ANESTHESIA  -For the first 24 hours after surgery:  Do not drive, use heavy equipment, make important decisions, or drink alcohol  -It is normal to feel sleepy for several hours.  Rest until you are more awake.  -Have someone stay with you, if needed.  They can watch for problems and help keep you safe.  -Some possible post anesthesia side effects include: nausea and vomiting, sore throat and hoarseness, sleepiness, and dizziness.    PAIN  -If you have pain after surgery, pain medicine will help you feel better.  Take it as directed, before pain becomes severe.  Most pain relievers taken by mouth need at least 20-30 minutes to start working.  -Do not drive or drink alcohol while taking pain medicine.  -Pain medication can upset your stomach.  Taking them with a little food may help.  -Other ways to help control pain: elevation, ice, and relaxation  -Call your surgeon if still having unmanageable pain an hour after taking pain medicine.  -Pain medicine can cause constipation.  Taking an over-the counter stool softener while on prescription pain medicine and drinking plenty of fluids can prevent this side effect.  -Call your surgeon if you have severe side effects like: breathing problems, trouble waking up, dizziness, confusion, or severe constipation.    NAUSEA  -Some people have nausea after surgery.  This is often because of anesthesia, pain, pain medicine, or the stress of surgery.  -Do not push yourself to eat.  Start off with clear liquids and soup.  Slowly move to solid foods.  Don't eat fatty, rich, spicy foods at first.  Eat smaller amounts.  -If you develop persistent nausea and vomiting please notify your surgeon immediately.    BLEEDING  -Different types of surgery require different types of care and dressing changes.  It is important to follow all instructions and advice from your surgeon.  Change dressing as directed.  Call your surgeon for any concerns regarding postop bleeding.    SIGNS OF  INFECTION  -Signs of infection include: fever, swelling, drainage, and redness  -Notify your surgeon if you have a fever of 100.4 F (38.0 C) or higher.  -Notify your surgeon if you notice redness, swelling, increased pain, pus, or a foul smell at the incision site.

## 2018-05-12 LAB — BACTERIA SPEC AEROBE CULT: NORMAL

## 2018-05-14 LAB — BACTERIA SPEC ANAEROBE CULT: NORMAL

## 2018-05-17 ENCOUNTER — HOSPITAL ENCOUNTER (OUTPATIENT)
Dept: WOUND CARE | Facility: HOSPITAL | Age: 38
Discharge: HOME | End: 2018-05-17
Attending: SURGERY
Payer: COMMERCIAL

## 2018-05-17 VITALS
HEART RATE: 90 BPM | HEIGHT: 65 IN | TEMPERATURE: 99 F | DIASTOLIC BLOOD PRESSURE: 85 MMHG | SYSTOLIC BLOOD PRESSURE: 137 MMHG | BODY MASS INDEX: 48.82 KG/M2 | WEIGHT: 293 LBS

## 2018-05-17 DIAGNOSIS — E66.01 MORBID OBESITY WITH BMI OF 60.0-69.9, ADULT: Primary | ICD-10-CM

## 2018-05-17 DIAGNOSIS — E11.622 DIABETES MELLITUS WITH SKIN ULCER: ICD-10-CM

## 2018-05-17 DIAGNOSIS — L98.499 DIABETES MELLITUS WITH SKIN ULCER: ICD-10-CM

## 2018-05-17 DIAGNOSIS — L02.212 ABSCESS OF BACK: ICD-10-CM

## 2018-05-17 PROCEDURE — 99214 OFFICE O/P EST MOD 30 MIN: CPT

## 2018-05-31 ENCOUNTER — HOSPITAL ENCOUNTER (OUTPATIENT)
Dept: WOUND CARE | Facility: HOSPITAL | Age: 38
Discharge: HOME | End: 2018-05-31
Attending: SURGERY
Payer: COMMERCIAL

## 2018-05-31 VITALS
WEIGHT: 293 LBS | BODY MASS INDEX: 48.82 KG/M2 | DIASTOLIC BLOOD PRESSURE: 60 MMHG | TEMPERATURE: 98 F | HEIGHT: 65 IN | SYSTOLIC BLOOD PRESSURE: 161 MMHG | HEART RATE: 83 BPM

## 2018-05-31 PROCEDURE — 99214 OFFICE O/P EST MOD 30 MIN: CPT

## 2018-05-31 RX ORDER — SULFAMETHOXAZOLE AND TRIMETHOPRIM 800; 160 MG/1; MG/1
1 TABLET ORAL 2 TIMES DAILY
Qty: 60 TABLET | Refills: 1 | Status: SHIPPED | OUTPATIENT
Start: 2018-05-31 | End: 2018-08-02 | Stop reason: ALTCHOICE

## 2018-06-07 ENCOUNTER — DOCUMENTATION ONLY (OUTPATIENT)
Dept: BARIATRICS | Facility: CLINIC | Age: 38
End: 2018-06-07

## 2018-06-07 ENCOUNTER — HOSPITAL ENCOUNTER (OUTPATIENT)
Dept: WOUND CARE | Facility: HOSPITAL | Age: 38
Discharge: HOME | End: 2018-06-07
Attending: SURGERY
Payer: COMMERCIAL

## 2018-06-07 VITALS
HEART RATE: 105 BPM | WEIGHT: 293 LBS | TEMPERATURE: 98 F | SYSTOLIC BLOOD PRESSURE: 131 MMHG | DIASTOLIC BLOOD PRESSURE: 86 MMHG | HEIGHT: 65 IN | BODY MASS INDEX: 48.82 KG/M2

## 2018-06-07 PROCEDURE — 99214 OFFICE O/P EST MOD 30 MIN: CPT

## 2018-06-07 NOTE — PROGRESS NOTES
Bariatric Surgery Online Course Form Submission  Someone has submitted a Bariatric Surgery Online Course Form Submission on this page.  Date: 2018-06-06 - 14:47  Patient's Name: Rodney Infante  Date of Birth: [birth year] Email: [email or web address]  Phone: [phone]   Patient Address: [address] [place] [address]  Preferred Surgical Location: [facility]   I certify that I am 18 years of age or older:   Confirmation Code: [id number]  Verification of Bariatric Seminar: y  Insurance Information  Insurance or Self Pay? Insurance - Fill out fields below  Insurance Company Name: [employer]   Type of Coverage/Coverage Plan (i.e. PPO, HMO): Community plan   Group Name: [place] Medicaid   Subscriber Name:   Member Name (patient's name)   Member ID/Policy #:[id number]   Plan Effective Date: June 1, 2018  Card Issuance date: May 15, 2018

## 2018-06-07 NOTE — PROGRESS NOTES
Message sent Wendy Roy, ,  to check insurance- listed insurance in epic is not what is listed on the on line submission form.  On line submission form states Medicaid.  Wendy to verify. If medicaid will not be able to see patient

## 2018-06-08 NOTE — PROGRESS NOTES
Wendy Pryor RN             She has Medicaid I already spoke w/her.    Previous Messages      ----- Message -----   From: Loren Pryor RN   Sent: 6/7/2018   3:21 PM   To: Wendy Roy     Please check- patient did seminar back in April as well.  Can you check insurance/benefits ( looks like she now has Medicaid- can you check

## 2018-06-13 LAB — FUNGUS SPEC CULT: NORMAL

## 2018-06-21 ENCOUNTER — HOSPITAL ENCOUNTER (OUTPATIENT)
Dept: WOUND CARE | Facility: HOSPITAL | Age: 38
Discharge: HOME | End: 2018-06-21
Attending: SURGERY
Payer: COMMERCIAL

## 2018-06-21 VITALS — HEART RATE: 89 BPM | TEMPERATURE: 99 F | SYSTOLIC BLOOD PRESSURE: 131 MMHG | DIASTOLIC BLOOD PRESSURE: 86 MMHG

## 2018-06-21 DIAGNOSIS — L02.212 ABSCESS OF BACK: Primary | ICD-10-CM

## 2018-06-21 PROCEDURE — 99214 OFFICE O/P EST MOD 30 MIN: CPT

## 2018-06-28 ENCOUNTER — HOSPITAL ENCOUNTER (OUTPATIENT)
Dept: WOUND CARE | Facility: HOSPITAL | Age: 38
Discharge: HOME | End: 2018-06-28
Attending: SURGERY
Payer: COMMERCIAL

## 2018-06-28 VITALS
BODY MASS INDEX: 48.82 KG/M2 | SYSTOLIC BLOOD PRESSURE: 111 MMHG | TEMPERATURE: 99 F | HEIGHT: 65 IN | WEIGHT: 293 LBS | DIASTOLIC BLOOD PRESSURE: 83 MMHG | HEART RATE: 93 BPM

## 2018-06-28 PROCEDURE — 99213 OFFICE O/P EST LOW 20 MIN: CPT

## 2018-07-12 LAB
ACID FAST MOD KINY STN SPEC: NORMAL
MYCOBACTERIUM SPEC QL CULT: NORMAL

## 2018-07-19 ENCOUNTER — HOSPITAL ENCOUNTER (OUTPATIENT)
Dept: WOUND CARE | Facility: HOSPITAL | Age: 38
Discharge: HOME | End: 2018-07-19
Attending: SURGERY
Payer: COMMERCIAL

## 2018-07-19 VITALS
HEART RATE: 92 BPM | SYSTOLIC BLOOD PRESSURE: 125 MMHG | TEMPERATURE: 98 F | DIASTOLIC BLOOD PRESSURE: 83 MMHG | HEIGHT: 65 IN | WEIGHT: 293 LBS | BODY MASS INDEX: 48.82 KG/M2

## 2018-07-19 DIAGNOSIS — L98.499 DIABETES MELLITUS WITH SKIN ULCER: ICD-10-CM

## 2018-07-19 DIAGNOSIS — E11.622 DIABETES MELLITUS WITH SKIN ULCER: ICD-10-CM

## 2018-07-19 DIAGNOSIS — E66.9 OBESITY, UNSPECIFIED CLASSIFICATION, UNSPECIFIED OBESITY TYPE, UNSPECIFIED WHETHER SERIOUS COMORBIDITY PRESENT: ICD-10-CM

## 2018-07-19 DIAGNOSIS — L02.212 ABSCESS OF BACK: Primary | ICD-10-CM

## 2018-07-19 PROCEDURE — 99214 OFFICE O/P EST MOD 30 MIN: CPT

## 2018-07-26 ENCOUNTER — HOSPITAL ENCOUNTER (OUTPATIENT)
Dept: WOUND CARE | Facility: HOSPITAL | Age: 38
Discharge: HOME | End: 2018-07-26
Attending: SURGERY
Payer: COMMERCIAL

## 2018-07-26 VITALS
HEART RATE: 107 BPM | BODY MASS INDEX: 47.09 KG/M2 | DIASTOLIC BLOOD PRESSURE: 83 MMHG | WEIGHT: 293 LBS | TEMPERATURE: 99 F | HEIGHT: 66 IN | SYSTOLIC BLOOD PRESSURE: 124 MMHG

## 2018-07-26 DIAGNOSIS — L02.212 ABSCESS OF BACK: Primary | ICD-10-CM

## 2018-07-26 DIAGNOSIS — L98.499 DIABETES MELLITUS WITH SKIN ULCER: ICD-10-CM

## 2018-07-26 DIAGNOSIS — E11.622 DIABETES MELLITUS WITH SKIN ULCER: ICD-10-CM

## 2018-07-26 PROCEDURE — 87186 SC STD MICRODIL/AGAR DIL: CPT

## 2018-07-26 PROCEDURE — 87077 CULTURE AEROBIC IDENTIFY: CPT

## 2018-07-26 PROCEDURE — 87070 CULTURE OTHR SPECIMN AEROBIC: CPT

## 2018-07-26 PROCEDURE — 99214 OFFICE O/P EST MOD 30 MIN: CPT

## 2018-07-30 LAB — BACTERIA SPEC AEROBE CULT: NORMAL

## 2018-08-02 ENCOUNTER — HOSPITAL ENCOUNTER (OUTPATIENT)
Dept: WOUND CARE | Facility: HOSPITAL | Age: 38
Discharge: HOME | End: 2018-08-02
Attending: SURGERY
Payer: COMMERCIAL

## 2018-08-02 VITALS
BODY MASS INDEX: 47.09 KG/M2 | HEART RATE: 106 BPM | WEIGHT: 293 LBS | DIASTOLIC BLOOD PRESSURE: 84 MMHG | HEIGHT: 66 IN | SYSTOLIC BLOOD PRESSURE: 129 MMHG | TEMPERATURE: 99 F

## 2018-08-02 DIAGNOSIS — L02.212 ABSCESS OF BACK: Primary | ICD-10-CM

## 2018-08-02 DIAGNOSIS — E66.9 OBESITY, UNSPECIFIED CLASSIFICATION, UNSPECIFIED OBESITY TYPE, UNSPECIFIED WHETHER SERIOUS COMORBIDITY PRESENT: ICD-10-CM

## 2018-08-02 DIAGNOSIS — E11.622 DIABETES MELLITUS WITH SKIN ULCER: ICD-10-CM

## 2018-08-02 DIAGNOSIS — L98.499 DIABETES MELLITUS WITH SKIN ULCER: ICD-10-CM

## 2018-08-02 DIAGNOSIS — E66.01 MORBID OBESITY WITH BMI OF 60.0-69.9, ADULT: ICD-10-CM

## 2018-08-02 PROCEDURE — 17250 CHEM CAUT OF GRANLTJ TISSUE: CPT

## 2018-08-09 ENCOUNTER — HOSPITAL ENCOUNTER (OUTPATIENT)
Dept: WOUND CARE | Facility: HOSPITAL | Age: 38
Discharge: HOME | End: 2018-08-09
Attending: SURGERY
Payer: COMMERCIAL

## 2018-08-09 VITALS
DIASTOLIC BLOOD PRESSURE: 84 MMHG | TEMPERATURE: 98 F | HEART RATE: 106 BPM | HEIGHT: 66 IN | SYSTOLIC BLOOD PRESSURE: 128 MMHG | BODY MASS INDEX: 47.09 KG/M2 | WEIGHT: 293 LBS

## 2018-08-09 DIAGNOSIS — L02.212 ABSCESS OF BACK: Primary | ICD-10-CM

## 2018-08-09 PROCEDURE — 99214 OFFICE O/P EST MOD 30 MIN: CPT

## 2018-08-16 ENCOUNTER — HOSPITAL ENCOUNTER (OUTPATIENT)
Dept: WOUND CARE | Facility: HOSPITAL | Age: 38
Discharge: HOME | End: 2018-08-16
Attending: SURGERY
Payer: COMMERCIAL

## 2018-08-16 VITALS
DIASTOLIC BLOOD PRESSURE: 82 MMHG | HEIGHT: 66 IN | HEART RATE: 95 BPM | TEMPERATURE: 99 F | BODY MASS INDEX: 47.09 KG/M2 | WEIGHT: 293 LBS | SYSTOLIC BLOOD PRESSURE: 129 MMHG

## 2018-08-16 DIAGNOSIS — L02.212 ABSCESS OF BACK: Primary | ICD-10-CM

## 2018-08-16 DIAGNOSIS — L97.225 VENOUS STASIS ULCER OF LEFT CALF WITH MUSCLE INVOLVEMENT WITHOUT EVIDENCE OF NECROSIS WITHOUT VARICOSE VEINS: ICD-10-CM

## 2018-08-16 DIAGNOSIS — I87.2 VENOUS STASIS ULCER OF LEFT CALF WITH MUSCLE INVOLVEMENT WITHOUT EVIDENCE OF NECROSIS WITHOUT VARICOSE VEINS: ICD-10-CM

## 2018-08-16 PROCEDURE — 17250 CHEM CAUT OF GRANLTJ TISSUE: CPT

## 2018-08-26 ENCOUNTER — TELEPHONE (OUTPATIENT)
Dept: BARIATRICS | Facility: CLINIC | Age: 38
End: 2018-08-26

## 2018-08-27 NOTE — TELEPHONE ENCOUNTER
Per Wendy Roy, 's notes for April and June 2018- pt can not afford to pay out of pocket for surgery and has since obtained Medicaid which we do not accept.  She directed patient to call either [facility] or [facility] to see if the accept Medicaid

## 2018-08-30 ENCOUNTER — HOSPITAL ENCOUNTER (OUTPATIENT)
Dept: WOUND CARE | Facility: HOSPITAL | Age: 38
Discharge: HOME | End: 2018-08-30
Attending: SURGERY
Payer: COMMERCIAL

## 2018-08-30 VITALS
WEIGHT: 293 LBS | HEIGHT: 66 IN | TEMPERATURE: 99 F | HEART RATE: 94 BPM | SYSTOLIC BLOOD PRESSURE: 131 MMHG | DIASTOLIC BLOOD PRESSURE: 88 MMHG | BODY MASS INDEX: 47.09 KG/M2

## 2018-08-30 DIAGNOSIS — E11.622 DIABETES MELLITUS WITH SKIN ULCER: ICD-10-CM

## 2018-08-30 DIAGNOSIS — L98.499 DIABETES MELLITUS WITH SKIN ULCER: ICD-10-CM

## 2018-08-30 DIAGNOSIS — L02.212 ABSCESS OF BACK: Primary | ICD-10-CM

## 2018-08-30 PROCEDURE — 99214 OFFICE O/P EST MOD 30 MIN: CPT

## 2018-09-13 ENCOUNTER — HOSPITAL ENCOUNTER (OUTPATIENT)
Dept: WOUND CARE | Facility: HOSPITAL | Age: 38
Discharge: HOME | End: 2018-09-13
Attending: SURGERY
Payer: COMMERCIAL

## 2018-09-13 VITALS
SYSTOLIC BLOOD PRESSURE: 147 MMHG | WEIGHT: 293 LBS | DIASTOLIC BLOOD PRESSURE: 92 MMHG | HEART RATE: 79 BPM | TEMPERATURE: 99 F | BODY MASS INDEX: 48.82 KG/M2 | HEIGHT: 65 IN

## 2018-09-13 DIAGNOSIS — L97.225 VENOUS STASIS ULCER OF LEFT CALF WITH MUSCLE INVOLVEMENT WITHOUT EVIDENCE OF NECROSIS WITHOUT VARICOSE VEINS: ICD-10-CM

## 2018-09-13 DIAGNOSIS — E11.622 DIABETES MELLITUS WITH SKIN ULCER: ICD-10-CM

## 2018-09-13 DIAGNOSIS — L02.212 ABSCESS OF BACK: Primary | ICD-10-CM

## 2018-09-13 DIAGNOSIS — I87.2 VENOUS STASIS ULCER OF LEFT CALF WITH MUSCLE INVOLVEMENT WITHOUT EVIDENCE OF NECROSIS WITHOUT VARICOSE VEINS: ICD-10-CM

## 2018-09-13 DIAGNOSIS — L98.499 DIABETES MELLITUS WITH SKIN ULCER: ICD-10-CM

## 2018-09-13 PROCEDURE — 99214 OFFICE O/P EST MOD 30 MIN: CPT

## 2018-09-18 NOTE — PROGRESS NOTES
Much of the documentation for this visit was completed in the [facility] system. Please see the attached documentation for further details about this patient's care.     Marcel Pennington MD      
Much of the documentation for this visit was completed in the [facility] system. Please see the attached documentation for further details about this patient's care.     Nanci Gleason RN    
Quality 431: Preventive Care And Screening: Unhealthy Alcohol Use - Screening: Patient screened for unhealthy alcohol use using a single question and scores less than 2 times per year
Detail Level: Zone
Quality 226: Preventive Care And Screening: Tobacco Use: Screening And Cessation Intervention: Patient screened for tobacco and never smoked

## 2018-09-20 ENCOUNTER — HOSPITAL ENCOUNTER (OUTPATIENT)
Dept: WOUND CARE | Facility: HOSPITAL | Age: 38
Discharge: HOME | End: 2018-09-20
Attending: SURGERY
Payer: COMMERCIAL

## 2018-09-20 VITALS
TEMPERATURE: 99 F | SYSTOLIC BLOOD PRESSURE: 128 MMHG | WEIGHT: 293 LBS | BODY MASS INDEX: 48.82 KG/M2 | HEIGHT: 65 IN | DIASTOLIC BLOOD PRESSURE: 82 MMHG | HEART RATE: 101 BPM

## 2018-09-20 DIAGNOSIS — E11.622 DIABETES MELLITUS WITH SKIN ULCER: ICD-10-CM

## 2018-09-20 DIAGNOSIS — L02.212 ABSCESS OF BACK: Primary | ICD-10-CM

## 2018-09-20 DIAGNOSIS — L98.499 DIABETES MELLITUS WITH SKIN ULCER: ICD-10-CM

## 2018-09-20 PROCEDURE — 99214 OFFICE O/P EST MOD 30 MIN: CPT

## 2018-09-20 PROCEDURE — 87077 CULTURE AEROBIC IDENTIFY: CPT

## 2018-09-20 PROCEDURE — 87070 CULTURE OTHR SPECIMN AEROBIC: CPT

## 2018-09-20 PROCEDURE — 87186 SC STD MICRODIL/AGAR DIL: CPT

## 2018-09-24 LAB — BACTERIA SPEC AEROBE CULT: NORMAL

## 2018-09-27 ENCOUNTER — HOSPITAL ENCOUNTER (OUTPATIENT)
Dept: WOUND CARE | Facility: HOSPITAL | Age: 38
Discharge: HOME | End: 2018-09-27
Attending: SURGERY
Payer: COMMERCIAL

## 2018-09-27 DIAGNOSIS — E11.622 DIABETES MELLITUS WITH SKIN ULCER: ICD-10-CM

## 2018-09-27 DIAGNOSIS — L02.212 ABSCESS OF BACK: Primary | ICD-10-CM

## 2018-09-27 DIAGNOSIS — L98.499 DIABETES MELLITUS WITH SKIN ULCER: ICD-10-CM

## 2018-09-27 PROCEDURE — 99214 OFFICE O/P EST MOD 30 MIN: CPT

## 2018-10-11 ENCOUNTER — HOSPITAL ENCOUNTER (OUTPATIENT)
Dept: WOUND CARE | Facility: HOSPITAL | Age: 38
Discharge: HOME | End: 2018-10-11
Attending: SURGERY
Payer: COMMERCIAL

## 2018-10-11 VITALS
WEIGHT: 293 LBS | HEART RATE: 96 BPM | HEIGHT: 65 IN | BODY MASS INDEX: 48.82 KG/M2 | TEMPERATURE: 98 F | SYSTOLIC BLOOD PRESSURE: 142 MMHG | DIASTOLIC BLOOD PRESSURE: 93 MMHG

## 2018-10-11 DIAGNOSIS — E11.622 DIABETES MELLITUS WITH SKIN ULCER: ICD-10-CM

## 2018-10-11 DIAGNOSIS — L02.212 ABSCESS OF BACK: Primary | ICD-10-CM

## 2018-10-11 DIAGNOSIS — D50.9 IRON DEFICIENCY ANEMIA, UNSPECIFIED IRON DEFICIENCY ANEMIA TYPE: ICD-10-CM

## 2018-10-11 DIAGNOSIS — L98.499 DIABETES MELLITUS WITH SKIN ULCER: ICD-10-CM

## 2018-10-11 PROCEDURE — 99214 OFFICE O/P EST MOD 30 MIN: CPT

## 2018-10-11 NOTE — PROGRESS NOTES
Much of the documentation for this visit was completed in the [facility] system. Please see the attached documentation for further details about this patient's care.     Loren Tong LPN

## 2018-11-01 ENCOUNTER — HOSPITAL ENCOUNTER (OUTPATIENT)
Dept: WOUND CARE | Facility: HOSPITAL | Age: 38
Discharge: HOME | End: 2018-11-01
Attending: SURGERY
Payer: COMMERCIAL

## 2018-11-01 VITALS
HEART RATE: 99 BPM | TEMPERATURE: 99 F | HEIGHT: 65 IN | BODY MASS INDEX: 48.82 KG/M2 | SYSTOLIC BLOOD PRESSURE: 151 MMHG | DIASTOLIC BLOOD PRESSURE: 100 MMHG | WEIGHT: 293 LBS

## 2018-11-01 DIAGNOSIS — Z79.4 TYPE 2 DIABETES MELLITUS WITH OTHER SKIN ULCER, WITH LONG-TERM CURRENT USE OF INSULIN: ICD-10-CM

## 2018-11-01 DIAGNOSIS — L98.499 DIABETES MELLITUS WITH SKIN ULCER: ICD-10-CM

## 2018-11-01 DIAGNOSIS — I87.2 VENOUS STASIS ULCER OF LEFT CALF WITH MUSCLE INVOLVEMENT WITHOUT EVIDENCE OF NECROSIS WITHOUT VARICOSE VEINS: ICD-10-CM

## 2018-11-01 DIAGNOSIS — L02.212 ABSCESS OF BACK: Primary | ICD-10-CM

## 2018-11-01 DIAGNOSIS — E11.622 DIABETES MELLITUS WITH SKIN ULCER: ICD-10-CM

## 2018-11-01 DIAGNOSIS — E11.622 TYPE 2 DIABETES MELLITUS WITH OTHER SKIN ULCER, WITH LONG-TERM CURRENT USE OF INSULIN: ICD-10-CM

## 2018-11-01 DIAGNOSIS — L97.225 VENOUS STASIS ULCER OF LEFT CALF WITH MUSCLE INVOLVEMENT WITHOUT EVIDENCE OF NECROSIS WITHOUT VARICOSE VEINS: ICD-10-CM

## 2018-11-01 PROCEDURE — 99213 OFFICE O/P EST LOW 20 MIN: CPT

## 2018-11-01 NOTE — PROGRESS NOTES
[facility]                Progress Note    Subjective:       Patient ID: Rodney Infante is a 38 y.o. female.    Chief Complaint: Non-healing Wound (right back)    12/27/17: Patient presents to clinic today with wound to LLE, reports initially developed approximately one year ago as an abscess, I&D around 8 or 9 months ago per Dr. Murray. Patient has healed wound to RLE that began the same way, treated with wound care and took about a year and a half to heal. Hx of DM, BS this morning 164 patient reports this is a little high for her. JESUS done today 1.03  02/15/18 Patient scheduled to have surgical I&D of back wound tomorrow 02/16/18.  02/22/18 Patient is S/P I&D from 02/16/18. Patient's antibiotic regimen changed today in clinic. Patient started on Tetracycline.  fasting per patient report this am.  03/08/18 Patient remains on Tetracycline with good toleration.  fasting per patient report this am.  03/15/18 Patient continues on Tetracycline.  fasting this am per patient report.  6/7/18: Patient reports she is on the waiting list for bariatric surgery   9/13/18-- Patient has reached BMI goal and is in process of arranging appt with bariatric surgeon.  11/1/18: All sites resolved. Patient discharged at this time.          Review of Systems   Constitutional: Negative.    HENT: Negative.    Eyes: Negative.    Respiratory: Negative.    Cardiovascular: Negative.    Gastrointestinal: Negative.    Genitourinary: Negative.    Musculoskeletal: Negative.    Skin: Negative.    Neurological: Negative.    Psychiatric/Behavioral: Negative.          Objective:        Physical Exam   Constitutional: She is oriented to person, place, and time. She appears well-developed and well-nourished.   HENT:   Head: Normocephalic.   Eyes: Conjunctivae and EOM are normal. Pupils are equal, round, and reactive to light.   Neck: Normal range of motion. Neck supple.    Cardiovascular: Normal rate, regular rhythm, normal heart sounds and intact distal pulses.   Pulmonary/Chest: Effort normal and breath sounds normal.   Abdominal: Soft. Bowel sounds are normal.   Musculoskeletal: Normal range of motion.   Neurological: She is alert and oriented to person, place, and time. She has normal reflexes.   Skin: Skin is warm and dry.       Vitals:    11/01/18 1034   BP: (!) 151/100   Pulse: 99   Temp: 98.5 °F (36.9 °C)       Assessment:           ICD-10-CM ICD-9-CM   1. Abscess of back L02.212 682.2   2. Diabetes mellitus with skin ulcer E11.622 250.80    L98.499 707.9        All sites resolved. Discharged from clinic today. Return prn as needed.        Plan:            Doing well wounds healing well discharged rtc as needed

## 2019-03-13 ENCOUNTER — HOSPITAL ENCOUNTER (OUTPATIENT)
Dept: WOUND CARE | Facility: HOSPITAL | Age: 39
Discharge: HOME | End: 2019-03-13
Attending: SURGERY
Payer: COMMERCIAL

## 2019-03-13 VITALS
SYSTOLIC BLOOD PRESSURE: 121 MMHG | DIASTOLIC BLOOD PRESSURE: 70 MMHG | BODY MASS INDEX: 48.82 KG/M2 | TEMPERATURE: 99 F | WEIGHT: 293 LBS | HEART RATE: 101 BPM | RESPIRATION RATE: 18 BRPM | HEIGHT: 65 IN

## 2019-03-13 DIAGNOSIS — L97.225 VENOUS STASIS ULCER OF LEFT CALF WITH MUSCLE INVOLVEMENT WITHOUT EVIDENCE OF NECROSIS WITHOUT VARICOSE VEINS: ICD-10-CM

## 2019-03-13 DIAGNOSIS — Z79.4 TYPE 2 DIABETES MELLITUS WITH OTHER SKIN ULCER, WITH LONG-TERM CURRENT USE OF INSULIN: ICD-10-CM

## 2019-03-13 DIAGNOSIS — I87.2 VENOUS STASIS ULCER OF LEFT CALF WITH MUSCLE INVOLVEMENT WITHOUT EVIDENCE OF NECROSIS WITHOUT VARICOSE VEINS: ICD-10-CM

## 2019-03-13 DIAGNOSIS — L98.499 DIABETES MELLITUS WITH SKIN ULCER: ICD-10-CM

## 2019-03-13 DIAGNOSIS — M25.649 STIFFNESS OF HAND JOINT, UNSPECIFIED LATERALITY: Primary | ICD-10-CM

## 2019-03-13 DIAGNOSIS — E11.622 DIABETES MELLITUS WITH SKIN ULCER: ICD-10-CM

## 2019-03-13 DIAGNOSIS — E11.622 TYPE 2 DIABETES MELLITUS WITH OTHER SKIN ULCER, WITH LONG-TERM CURRENT USE OF INSULIN: ICD-10-CM

## 2019-03-13 DIAGNOSIS — E66.01 MORBID OBESITY WITH BMI OF 50.0-59.9, ADULT: ICD-10-CM

## 2019-03-13 DIAGNOSIS — S81.801A WOUND OF RIGHT LEG, INITIAL ENCOUNTER: ICD-10-CM

## 2019-03-13 PROCEDURE — 99214 OFFICE O/P EST MOD 30 MIN: CPT

## 2019-03-13 PROCEDURE — 87075 CULTR BACTERIA EXCEPT BLOOD: CPT

## 2019-03-13 PROCEDURE — 87070 CULTURE OTHR SPECIMN AEROBIC: CPT

## 2019-03-13 RX ORDER — CLINDAMYCIN HYDROCHLORIDE 300 MG/1
300 CAPSULE ORAL EVERY 8 HOURS
COMMUNITY
End: 2019-05-14 | Stop reason: ALTCHOICE

## 2019-03-13 NOTE — PROGRESS NOTES
Subjective:       Patient ID: Rodney Infante is a 38 y.o. female.    Chief Complaint: Non-healing Wound    03/13/2019 Returns to wound care clinic with a new open wound to right upper and lower leg. Per Mrs. Infante and record, was discharged on 11/1/2018 from wound care clinic. Seen by Dr. Pennington today, c/o drainage from Right lower leg wound, draining presenting no odor, culture collected and sent to lab, pending results. Strong pedal pulses present, swelling to bilateral lower extremities noted, states elevate legs while sitting or lying down. New order to start on Clindamycin 300 mg PO TID #30, prescription given to Mrs. Infante by Dr. Pennington. C/o right hand stiffness, unable to close, painful, swelling, referred to Rheumatology (Dr. May Rob at [facility]). New orders for dressing change per Dr. Pennington, orders followed; education provided to Mrs. Infante on hand washing and wound dressing techniques, voices understanding.                  Review of Systems   Constitutional: Negative.    HENT: Negative.    Eyes: Negative.    Respiratory: Negative.    Cardiovascular: Negative.    Gastrointestinal: Negative.    Genitourinary: Negative.    Musculoskeletal: Negative.    Skin: Negative.    Neurological: Negative.    Psychiatric/Behavioral: Negative.        Objective:      Physical Exam   Constitutional: She is oriented to person, place, and time. She appears well-developed and well-nourished.   HENT:   Head: Normocephalic.   Eyes: Conjunctivae and EOM are normal. Pupils are equal, round, and reactive to light.   Neck: Normal range of motion. Neck supple.   Cardiovascular: Normal rate, regular rhythm, normal heart sounds and intact distal pulses.   Pulmonary/Chest: Effort normal and breath sounds normal.   Abdominal: Soft. Bowel sounds are normal.   Musculoskeletal: Normal range of motion.   Neurological: She is alert and oriented to person, place, and time. She has normal reflexes.   Skin: Skin is  warm and dry.       Assessment:       1. Stiffness of hand joint, unspecified laterality    2. Diabetes mellitus with skin ulcer    3. Type 2 diabetes mellitus with other skin ulcer, with long-term current use of insulin    4. Wound of right leg, initial encounter           Wound 03/13/19 1012 Diabetic Ulcer lower Leg #1 (Active)   03/13/19 1012    Pre-existing: Yes   Primary Wound Type: Diabetic ulc   Side:    Orientation: lower   Location: Leg   Wound/PI Number (optional): #1   Ankle-Brachial Index:    Pulses:    Removal Indication and Assessment:    Wound Outcome:    (Retired) Wound Type:    (Retired) Wound Length (cm):    (Retired) Wound Width (cm):    (Retired) Depth (cm):    Wound Description (Comments):    Removal Indications:    Wound Image    3/13/2019 10:11 AM   Dressing Appearance Intact;Dry 3/13/2019 10:11 AM   Drainage Amount Scant 3/13/2019 10:11 AM   Drainage Characteristics/Odor Serosanguineous 3/13/2019 10:11 AM   Appearance Pink;Red;Slough;Yellow 3/13/2019 10:11 AM   Tissue loss description Partial thickness 3/13/2019 10:11 AM   Red (%), Wound Tissue Color 70 % 3/13/2019 10:11 AM   Yellow (%), Wound Tissue Color 30 % 3/13/2019 10:11 AM   Periwound Area Edematous;Excoriated;Hemosiderin Staining;Pink;Moist 3/13/2019 10:11 AM   Wound Edges Irregular 3/13/2019 10:11 AM   Wound Length (cm) 1 cm 3/13/2019 10:11 AM   Wound Width (cm) 2 cm 3/13/2019 10:11 AM   Wound Depth (cm) 0.2 cm 3/13/2019 10:11 AM   Wound Volume (cm^3) 0.4 cm^3 3/13/2019 10:11 AM   Wound Surface Area (cm^2) 2 cm^2 3/13/2019 10:11 AM   Care Cleansed with:;Sterile normal saline 3/13/2019 10:11 AM   Dressing Applied 3/13/2019 10:11 AM   Dressing Change Due 03/14/19 3/13/2019 10:11 AM            Wound 03/13/19 1011 Diabetic Ulcer upper #2 (Active)   03/13/19 1011    Pre-existing: Yes   Primary Wound Type: Diabetic ulc   Side: Right   Orientation: upper   Location:    Wound/PI Number (optional): #2   Ankle-Brachial Index:    Pulses:     Removal Indication and Assessment:    Wound Outcome:    (Retired) Wound Type:    (Retired) Wound Length (cm):    (Retired) Wound Width (cm):    (Retired) Depth (cm):    Wound Description (Comments):    Removal Indications:    Wound Image   3/13/2019 10:11 AM   Dressing Appearance Dry;Intact 3/13/2019 10:11 AM   Drainage Amount None 3/13/2019 10:11 AM   Appearance Pink;Red;Purple;Black 3/13/2019 10:11 AM   Tissue loss description Partial thickness 3/13/2019 10:11 AM   Black (%), Wound Tissue Color 50 % 3/13/2019 10:11 AM   Red (%), Wound Tissue Color 50 % 3/13/2019 10:11 AM   Periwound Area Intact 3/13/2019 10:11 AM   Wound Edges Defined 3/13/2019 10:11 AM   Wound Length (cm) 1 cm 3/13/2019 10:11 AM   Wound Width (cm) 1 cm 3/13/2019 10:11 AM   Wound Depth (cm) 0.1 cm 3/13/2019 10:11 AM   Wound Volume (cm^3) 0.1 cm^3 3/13/2019 10:11 AM   Wound Surface Area (cm^2) 1 cm^2 3/13/2019 10:11 AM   Care Cleansed with:;Sterile normal saline 3/13/2019 10:11 AM   Dressing Applied 3/13/2019 10:11 AM   Dressing Change Due 03/14/19 3/13/2019 10:11 AM         Wound dressing change  Right lower leg wound #1 and right upper leg wound #2  Cleanse wounds with: normal saline, pat and dry  Periwound care: cavilon  Primary dressing: xeroform, small mepore dressing  Edema control: elevate legs        Plan:            Follow-up in about 8 days (around 3/21/2019).  RX. For Clindamycin 300 mg PO TID #30 given in clinic 3/13/2019  Wound cultures from right lower leg wound collected, sent to lab/micro, pending results.  Referral to Rheumatology (Dr. Yadira Rob at [facility]) Right hand stiffeness, pain, swelling

## 2019-03-16 LAB — BACTERIA SPEC AEROBE CULT: NORMAL

## 2019-03-18 LAB — BACTERIA SPEC ANAEROBE CULT: NORMAL

## 2019-03-20 ENCOUNTER — HOSPITAL ENCOUNTER (OUTPATIENT)
Dept: WOUND CARE | Facility: HOSPITAL | Age: 39
Discharge: HOME | End: 2019-03-20
Attending: SURGERY
Payer: COMMERCIAL

## 2019-03-20 VITALS
BODY MASS INDEX: 48.82 KG/M2 | HEART RATE: 97 BPM | SYSTOLIC BLOOD PRESSURE: 117 MMHG | TEMPERATURE: 99 F | HEIGHT: 65 IN | DIASTOLIC BLOOD PRESSURE: 71 MMHG | WEIGHT: 293 LBS | RESPIRATION RATE: 18 BRPM

## 2019-03-20 DIAGNOSIS — I87.2 VENOUS STASIS ULCER OF LEFT CALF WITH MUSCLE INVOLVEMENT WITHOUT EVIDENCE OF NECROSIS WITHOUT VARICOSE VEINS: ICD-10-CM

## 2019-03-20 DIAGNOSIS — S81.801A WOUND OF RIGHT LEG, INITIAL ENCOUNTER: Primary | ICD-10-CM

## 2019-03-20 DIAGNOSIS — E66.01 MORBID OBESITY WITH BMI OF 50.0-59.9, ADULT: ICD-10-CM

## 2019-03-20 DIAGNOSIS — L98.499 DIABETES MELLITUS WITH SKIN ULCER: ICD-10-CM

## 2019-03-20 DIAGNOSIS — L97.225 VENOUS STASIS ULCER OF LEFT CALF WITH MUSCLE INVOLVEMENT WITHOUT EVIDENCE OF NECROSIS WITHOUT VARICOSE VEINS: ICD-10-CM

## 2019-03-20 DIAGNOSIS — E11.622 DIABETES MELLITUS WITH SKIN ULCER: ICD-10-CM

## 2019-03-20 PROCEDURE — 99212 OFFICE O/P EST SF 10 MIN: CPT

## 2019-03-20 NOTE — PROGRESS NOTES
[facility]                Progress Note    Subjective:       Patient ID: Rodney Infante is a 38 y.o. female.    Chief Complaint: Non-healing Wound (right leg)    03/13/2019 Returns to wound care clinic with a new open wound to right upper and lower leg. Per Mrs. Infante and record, was discharged on 11/1/2018 from wound care clinic. Seen by Dr. Pennington today, c/o drainage from Right lower leg wound, draining presenting no odor, culture collected and sent to lab, pending results. Strong pedal pulses present, swelling to bilateral lower extremities noted, states elevate legs while sitting or lying down. New order to start on Clindamycin 300 mg PO TID #30, prescription given to Mrs. Infante by Dr. Pennington. C/o right hand stiffness, unable to close, painful, swelling, referred to Rheumatology (Darron Paulino at [facility]). New orders for dressing change per Dr. Pennington, orders followed; education provided to Mrs. Infante on hand washing and wound dressing techniques, voices understanding.     03/20/2019: F/u clinic visit with Dr. Pennington for non-healing wounds to right leg. Per assessment, lower leg wound improving, positive pedal pulses. Dr. Pennington reviewed wound cultures results with Mrs. Infante, continue taking Clindamycin until completion, pt. Voices understanding. States made an appointment with Rheumatology, early appointment in August 2019 for right hand stiffness and pain. Wound care dressings orders followed as per Dr. Pennington's orders.          Review of Systems   Constitutional: Negative.    HENT: Negative.    Eyes: Negative.    Respiratory: Negative.    Cardiovascular: Negative.    Gastrointestinal: Negative.    Genitourinary: Negative.    Musculoskeletal: Negative.    Skin: Negative.    Neurological: Negative.    Psychiatric/Behavioral: Negative.          Objective:        Physical Exam   Constitutional: She is oriented to person,  place, and time. She appears well-developed and well-nourished.   HENT:   Head: Normocephalic.   Eyes: Conjunctivae and EOM are normal. Pupils are equal, round, and reactive to light.   Neck: Normal range of motion. Neck supple.   Cardiovascular: Normal rate, regular rhythm, normal heart sounds and intact distal pulses.   Pulmonary/Chest: Effort normal and breath sounds normal.   Abdominal: Soft. Bowel sounds are normal.   Musculoskeletal: Normal range of motion.   Neurological: She is alert and oriented to person, place, and time. She has normal reflexes.   Skin: Skin is warm and dry.       Vitals:    03/20/19 0951   BP: 117/71   Pulse: 97   Resp: 18   Temp: 98.7 °F (37.1 °C)       Assessment:           ICD-10-CM ICD-9-CM   1. Wound of right leg, initial encounter S81.801A 894.0            Wound 03/13/19 1012 Diabetic Ulcer lower Leg #1 (Active)   03/13/19 1012    Pre-existing: Yes   Primary Wound Type: Diabetic ulc   Side:    Orientation: lower   Location: Leg   Wound/PI Number (optional): #1   Ankle-Brachial Index:    Pulses:    Removal Indication and Assessment:    Wound Outcome:    (Retired) Wound Type:    (Retired) Wound Length (cm):    (Retired) Wound Width (cm):    (Retired) Depth (cm):    Wound Description (Comments):    Removal Indications:    Dressing Appearance Dry;Intact 3/20/2019 10:00 AM   Drainage Amount Scant 3/20/2019 10:00 AM   Drainage Characteristics/Odor Serosanguineous 3/20/2019 10:00 AM   Appearance Pink;Red;Slough;Yellow 3/20/2019 10:00 AM   Tissue loss description Partial thickness 3/20/2019 10:00 AM   Red (%), Wound Tissue Color 80 % 3/20/2019 10:00 AM   Yellow (%), Wound Tissue Color 20 % 3/20/2019 10:00 AM   Periwound Area Edematous;Satellite lesion 3/20/2019 10:00 AM   Wound Edges Irregular 3/20/2019 10:00 AM   Wound Length (cm) 0.8 cm 3/20/2019 10:00 AM   Wound Width (cm) 2 cm 3/20/2019 10:00 AM   Wound Depth (cm) 0.2 cm 3/20/2019 10:00 AM   Wound Volume (cm^3) 0.32 cm^3 3/20/2019  10:00 AM   Wound Surface Area (cm^2) 1.6 cm^2 3/20/2019 10:00 AM   Care Cleansed with:;Sterile normal saline 3/20/2019 10:00 AM   Dressing Applied 3/20/2019 10:00 AM   Dressing Change Due 03/21/19 3/20/2019 10:00 AM            Wound 03/13/19 1011 Diabetic Ulcer upper #2 (Active)   03/13/19 1011    Pre-existing: Yes   Primary Wound Type: Diabetic ulc   Side: Right   Orientation: upper   Location:    Wound/PI Number (optional): #2   Ankle-Brachial Index:    Pulses:    Removal Indication and Assessment:    Wound Outcome:    (Retired) Wound Type:    (Retired) Wound Length (cm):    (Retired) Wound Width (cm):    (Retired) Depth (cm):    Wound Description (Comments):    Removal Indications:    Dressing Appearance Dry;Intact 3/20/2019 10:00 AM   Drainage Amount None 3/20/2019 10:00 AM   Appearance Pink;Red 3/20/2019 10:00 AM   Tissue loss description Partial thickness 3/20/2019 10:00 AM   Black (%), Wound Tissue Color 40 % 3/20/2019 10:00 AM   Red (%), Wound Tissue Color 60 % 3/20/2019 10:00 AM   Periwound Area Intact 3/20/2019 10:00 AM   Wound Edges Defined 3/20/2019 10:00 AM   Wound Length (cm) 1 cm 3/20/2019 10:00 AM   Wound Width (cm) 1 cm 3/20/2019 10:00 AM   Wound Depth (cm) 0.1 cm 3/20/2019 10:00 AM   Wound Volume (cm^3) 0.1 cm^3 3/20/2019 10:00 AM   Wound Surface Area (cm^2) 1 cm^2 3/20/2019 10:00 AM   Care Cleansed with:;Sterile normal saline 3/20/2019 10:00 AM   Dressing Applied 3/20/2019 10:00 AM   Dressing Change Due 03/21/19 3/20/2019 10:00 AM         Wound dressing change  Right lower leg wound #1 and right upper leg wound #2  Cleanse wounds with: normal saline, pat and dry  Periwound care: cavilon  Primary dressing: xeroform, small mepore dressing  Edema control: elevate legs      Plan:          Follow-up in about 8 days (around 3/27/2019).  Continue taking Clindamycin 300 mg PO TID as previously ordered.  F/U with Rheumatology (Dr. Yadira Rob at [facility]) Right hand stiffeness, pain,  swelling

## 2019-03-27 ENCOUNTER — HOSPITAL ENCOUNTER (OUTPATIENT)
Dept: WOUND CARE | Facility: HOSPITAL | Age: 39
Discharge: HOME | End: 2019-03-27
Attending: SURGERY
Payer: COMMERCIAL

## 2019-03-27 VITALS
TEMPERATURE: 98 F | SYSTOLIC BLOOD PRESSURE: 127 MMHG | BODY MASS INDEX: 48.82 KG/M2 | WEIGHT: 293 LBS | HEIGHT: 65 IN | DIASTOLIC BLOOD PRESSURE: 78 MMHG | HEART RATE: 108 BPM

## 2019-03-27 DIAGNOSIS — L02.212 ABSCESS OF BACK: ICD-10-CM

## 2019-03-27 DIAGNOSIS — E66.01 MORBID OBESITY WITH BMI OF 50.0-59.9, ADULT: ICD-10-CM

## 2019-03-27 DIAGNOSIS — L98.499 DIABETES MELLITUS WITH SKIN ULCER: ICD-10-CM

## 2019-03-27 DIAGNOSIS — Z79.4 TYPE 2 DIABETES MELLITUS WITH OTHER SKIN ULCER, WITH LONG-TERM CURRENT USE OF INSULIN: ICD-10-CM

## 2019-03-27 DIAGNOSIS — E11.622 TYPE 2 DIABETES MELLITUS WITH OTHER SKIN ULCER, WITH LONG-TERM CURRENT USE OF INSULIN: ICD-10-CM

## 2019-03-27 DIAGNOSIS — E11.622 DIABETES MELLITUS WITH SKIN ULCER: ICD-10-CM

## 2019-03-27 DIAGNOSIS — S81.801A WOUND OF RIGHT LEG, INITIAL ENCOUNTER: Primary | ICD-10-CM

## 2019-03-27 PROCEDURE — 99214 OFFICE O/P EST MOD 30 MIN: CPT

## 2019-03-27 NOTE — PROGRESS NOTES
Subjective:       Patient ID: Rodney Infante is a 38 y.o. female.    Chief Complaint: Wound Care and Venous Ulcer    03/13/2019 Returns to wound care clinic with a new open wound to right upper and lower leg. Per Mrs. Infante and record, was discharged on 11/1/2018 from wound care clinic. Seen by Dr. Pennington today, c/o drainage from Right lower leg wound, draining presenting no odor, culture collected and sent to lab, pending results. Strong pedal pulses present, swelling to bilateral lower extremities noted, states elevate legs while sitting or lying down. New order to start on Clindamycin 300 mg PO TID #30, prescription given to Mrs. Infante by Dr. Pennington. C/o right hand stiffness, unable to close, painful, swelling, referred to Rheumatology (Darron Paulino at [facility]). New orders for dressing change per Dr. Pennington, orders followed; education provided to Mrs. Infante on hand washing and wound dressing techniques, voices understanding.     03/20/2019: F/u clinic visit with Dr. Pennington for non-healing wounds to right leg. Per assessment, lower leg wound improving, positive pedal pulses. Dr. Pennington reviewed wound cultures results with Mrs. Infante, continue taking Clindamycin until completion, pt. Voices understanding. States made an appointment with Rheumatology, early appointment in August 2019 for right hand stiffness and pain. Wound care dressings orders followed as per Dr. Pennington's orders.  3/27/19:   Here for f/u with Dr. Pennington. Continues antibiotics. Script given for lotrisone cream for enlarged closed wound on right proximal, lateral calf.  Work excuse given to patient.    Review of Systems   Constitutional: Negative.    HENT: Negative.    Eyes: Negative.    Respiratory: Negative.    Cardiovascular: Negative.    Gastrointestinal: Negative.    Genitourinary: Negative.    Musculoskeletal: Negative.    Skin: Negative.    Neurological: Negative.    Psychiatric/Behavioral: Negative.         Objective:      Physical Exam   Constitutional: She is oriented to person, place, and time. She appears well-developed and well-nourished.   HENT:   Head: Normocephalic.   Eyes: Pupils are equal, round, and reactive to light. Conjunctivae and EOM are normal.   Neck: Normal range of motion. Neck supple.   Cardiovascular: Normal rate, regular rhythm, normal heart sounds and intact distal pulses.   Pulmonary/Chest: Effort normal and breath sounds normal.   Abdominal: Soft. Bowel sounds are normal.   Musculoskeletal: Normal range of motion.   Neurological: She is alert and oriented to person, place, and time. She has normal reflexes.   Skin: Skin is warm and dry.       Assessment:       1. Wound of right leg, subsequent encounter           Wound 03/13/19 1012 Diabetic Ulcer lower Leg #1 (Active)   03/13/19 1012    Pre-existing: Yes   Primary Wound Type: Diabetic ulc   Side:    Orientation: lower   Location: Leg   Wound/PI Number (optional): #1   Ankle-Brachial Index:    Pulses:    Removal Indication and Assessment:    Wound Outcome:    (Retired) Wound Type:    (Retired) Wound Length (cm):    (Retired) Wound Width (cm):    (Retired) Depth (cm):    Wound Description (Comments):    Removal Indications:    Dressing Appearance Dry;Intact;Open to air 3/27/2019  9:00 AM   Drainage Amount Scant 3/27/2019  9:00 AM   Drainage Characteristics/Odor Serosanguineous 3/27/2019  9:00 AM   Appearance Pink;Red;Yellow;Fibrin;Epithelialization 3/27/2019  9:00 AM   Tissue loss description Partial thickness 3/27/2019  9:00 AM   Periwound Area Dry;Intact;Satellite lesion 3/27/2019  9:00 AM   Wound Edges Irregular 3/27/2019  9:00 AM   Wound Length (cm) 0.7 cm 3/27/2019  9:00 AM   Wound Width (cm) 1.7 cm 3/27/2019  9:00 AM   Wound Depth (cm) 0.1 cm 3/27/2019  9:00 AM   Wound Volume (cm^3) 0.12 cm^3 3/27/2019  9:00 AM   Wound Surface Area (cm^2) 1.19 cm^2 3/27/2019  9:00 AM   Care Cleansed with:;Sterile normal saline 3/27/2019  9:00 AM    Dressing Island/border;Non-adherent 3/27/2019  9:00 AM   Dressing Change Due 03/28/19 3/27/2019  9:00 AM            Wound 03/13/19 1011 Other (comment) upper #2 (Active)   03/13/19 1011    Pre-existing: Yes   Primary Wound Type: Other   Side: Right   Orientation: upper   Location:    Wound/PI Number (optional): #2   Ankle-Brachial Index:    Pulses:    Removal Indication and Assessment:    Wound Outcome:    (Retired) Wound Type:    (Retired) Wound Length (cm):    (Retired) Wound Width (cm):    (Retired) Depth (cm):    Wound Description (Comments):    Removal Indications:    Dressing Appearance Open to air 3/27/2019  9:00 AM   Drainage Amount None 3/27/2019  9:00 AM   Appearance Dry;Epithelialization;Closed/resurfaced 3/27/2019  9:00 AM   Tissue loss description Not applicable 3/27/2019  9:00 AM   Periwound Area Intact;Dry 3/27/2019  9:00 AM   Wound Edges Defined 3/27/2019  9:00 AM   Wound Length (cm) 2.7 cm 3/27/2019  9:00 AM   Wound Width (cm) 4.7 cm 3/27/2019  9:00 AM   Wound Depth (cm) 0 cm 3/27/2019  9:00 AM   Wound Volume (cm^3) 0 cm^3 3/27/2019  9:00 AM   Wound Surface Area (cm^2) 12.69 cm^2 3/27/2019  9:00 AM   Care Cleansed with:;Antimicrobial agent;Sterile normal saline 3/27/2019  9:00 AM   Dressing Applied;Island/border 3/27/2019  9:00 AM   Periwound Care Topical treatment applied;Skin barrier film applied 3/27/2019  9:00 AM   Dressing Change Due 03/28/19 3/27/2019  9:00 AM       Here for f/u with Dr. Pennington. Continues antibiotics. Script given for lotrisone cream for enlarged closed wound on right proximal, lateral calf.  Work excuse given to patient.    Dressing change orders:    Right lower leg wound #1     Cleanse wound with: normal saline, pat and dry  Periwound care: cavilon  Primary dressing: xeroform, small mepore dressing  Edema control: elevate legs  Frequency: daily per patient except on clinic days at wound care clinic  Home Health: n/a     Right upper leg wound #2  Cleanse wound with: acetic  acid, rinse with normal saline, pat and dry  Periwound care: cavilon  Primary dressing: lotrisone cream, small mepore dressing as needed. May leave open to air at night.    Edema control: elevate legs  Frequency: daily per patient except on clinic days at wound care clinic  Home Health: n/a    Follow-up in about 8 days (around 4/04/2019).    Other orders:  Continue taking Clindamycin 300 mg PO TID as previously ordered  Apply lotrisone cream to upper leg wound as directed daily.    Plan:                Follow up in about 8 days (around 4/4/2019) for Dr. Pennington.

## 2019-04-03 ENCOUNTER — OFFICE VISIT (OUTPATIENT)
Dept: ORTHOPEDICS | Facility: CLINIC | Age: 39
End: 2019-04-03
Payer: COMMERCIAL

## 2019-04-03 ENCOUNTER — LAB VISIT (OUTPATIENT)
Dept: LAB | Facility: OTHER | Age: 39
End: 2019-04-03
Attending: PLASTIC SURGERY
Payer: COMMERCIAL

## 2019-04-03 VITALS
SYSTOLIC BLOOD PRESSURE: 120 MMHG | BODY MASS INDEX: 48.82 KG/M2 | DIASTOLIC BLOOD PRESSURE: 78 MMHG | HEART RATE: 92 BPM | HEIGHT: 65 IN | WEIGHT: 293 LBS

## 2019-04-03 DIAGNOSIS — R22.31 LOCALIZED SWELLING ON RIGHT HAND: Primary | ICD-10-CM

## 2019-04-03 DIAGNOSIS — M79.641 RIGHT HAND PAIN: ICD-10-CM

## 2019-04-03 DIAGNOSIS — G56.03 BILATERAL CARPAL TUNNEL SYNDROME: ICD-10-CM

## 2019-04-03 DIAGNOSIS — R22.31 LOCALIZED SWELLING ON RIGHT HAND: ICD-10-CM

## 2019-04-03 LAB
ALBUMIN SERPL BCP-MCNC: 2.6 G/DL (ref 3.5–5.2)
ALP SERPL-CCNC: 103 U/L (ref 55–135)
ALT SERPL W/O P-5'-P-CCNC: 10 U/L (ref 10–44)
ANION GAP SERPL CALC-SCNC: 12 MMOL/L (ref 8–16)
AST SERPL-CCNC: 10 U/L (ref 10–40)
BASOPHILS # BLD AUTO: 0.03 K/UL (ref 0–0.2)
BASOPHILS NFR BLD: 0.2 % (ref 0–1.9)
BILIRUB SERPL-MCNC: 0.2 MG/DL (ref 0.1–1)
BUN SERPL-MCNC: 9 MG/DL (ref 6–20)
CALCIUM SERPL-MCNC: 9.4 MG/DL (ref 8.7–10.5)
CCP AB SER IA-ACNC: 0.7 U/ML
CHLORIDE SERPL-SCNC: 106 MMOL/L (ref 95–110)
CO2 SERPL-SCNC: 19 MMOL/L (ref 23–29)
CREAT SERPL-MCNC: 1 MG/DL (ref 0.5–1.4)
CRP SERPL-MCNC: 37.7 MG/L (ref 0–8.2)
DIFFERENTIAL METHOD: ABNORMAL
EOSINOPHIL # BLD AUTO: 0.5 K/UL (ref 0–0.5)
EOSINOPHIL NFR BLD: 3.5 % (ref 0–8)
ERYTHROCYTE [DISTWIDTH] IN BLOOD BY AUTOMATED COUNT: 14.4 % (ref 11.5–14.5)
EST. GFR  (AFRICAN AMERICAN): >60 ML/MIN/1.73 M^2
EST. GFR  (NON AFRICAN AMERICAN): >60 ML/MIN/1.73 M^2
GLUCOSE SERPL-MCNC: 140 MG/DL (ref 70–110)
HCT VFR BLD AUTO: 33.3 % (ref 37–48.5)
HGB BLD-MCNC: 10.4 G/DL (ref 12–16)
LYMPHOCYTES # BLD AUTO: 3.5 K/UL (ref 1–4.8)
LYMPHOCYTES NFR BLD: 25 % (ref 18–48)
MCH RBC QN AUTO: 27 PG (ref 27–31)
MCHC RBC AUTO-ENTMCNC: 31.2 G/DL (ref 32–36)
MCV RBC AUTO: 87 FL (ref 82–98)
MONOCYTES # BLD AUTO: 0.9 K/UL (ref 0.3–1)
MONOCYTES NFR BLD: 6.5 % (ref 4–15)
NEUTROPHILS # BLD AUTO: 8.9 K/UL (ref 1.8–7.7)
NEUTROPHILS NFR BLD: 63.7 % (ref 38–73)
PLATELET # BLD AUTO: 668 K/UL (ref 150–350)
PMV BLD AUTO: 8.1 FL (ref 9.2–12.9)
POTASSIUM SERPL-SCNC: 3.5 MMOL/L (ref 3.5–5.1)
PROT SERPL-MCNC: 10 G/DL (ref 6–8.4)
RBC # BLD AUTO: 3.85 M/UL (ref 4–5.4)
RHEUMATOID FACT SERPL-ACNC: <10 IU/ML (ref 0–15)
SODIUM SERPL-SCNC: 137 MMOL/L (ref 136–145)
WBC # BLD AUTO: 13.94 K/UL (ref 3.9–12.7)

## 2019-04-03 PROCEDURE — 85025 COMPLETE CBC W/AUTO DIFF WBC: CPT

## 2019-04-03 PROCEDURE — 3008F BODY MASS INDEX DOCD: CPT | Mod: CPTII,S$GLB,, | Performed by: PLASTIC SURGERY

## 2019-04-03 PROCEDURE — 86803 HEPATITIS C AB TEST: CPT

## 2019-04-03 PROCEDURE — 20526 THER INJECTION CARP TUNNEL: CPT | Mod: 50,S$GLB,, | Performed by: PLASTIC SURGERY

## 2019-04-03 PROCEDURE — 3008F PR BODY MASS INDEX (BMI) DOCUMENTED: ICD-10-PCS | Mod: CPTII,S$GLB,, | Performed by: PLASTIC SURGERY

## 2019-04-03 PROCEDURE — 99203 OFFICE O/P NEW LOW 30 MIN: CPT | Mod: 25,S$GLB,, | Performed by: PLASTIC SURGERY

## 2019-04-03 PROCEDURE — 36415 COLL VENOUS BLD VENIPUNCTURE: CPT

## 2019-04-03 PROCEDURE — 80053 COMPREHEN METABOLIC PANEL: CPT

## 2019-04-03 PROCEDURE — 20526 PR INJECT CARPAL TUNNEL: ICD-10-PCS | Mod: 50,S$GLB,, | Performed by: PLASTIC SURGERY

## 2019-04-03 PROCEDURE — 86200 CCP ANTIBODY: CPT

## 2019-04-03 PROCEDURE — 86235 NUCLEAR ANTIGEN ANTIBODY: CPT

## 2019-04-03 PROCEDURE — 99999 PR PBB SHADOW E&M-EST. PATIENT-LVL IV: ICD-10-PCS | Mod: PBBFAC,,, | Performed by: PLASTIC SURGERY

## 2019-04-03 PROCEDURE — 86038 ANTINUCLEAR ANTIBODIES: CPT

## 2019-04-03 PROCEDURE — 99999 PR PBB SHADOW E&M-EST. PATIENT-LVL IV: CPT | Mod: PBBFAC,,, | Performed by: PLASTIC SURGERY

## 2019-04-03 PROCEDURE — 86140 C-REACTIVE PROTEIN: CPT

## 2019-04-03 PROCEDURE — 86431 RHEUMATOID FACTOR QUANT: CPT

## 2019-04-03 PROCEDURE — 99203 PR OFFICE/OUTPT VISIT, NEW, LEVL III, 30-44 MIN: ICD-10-PCS | Mod: 25,S$GLB,, | Performed by: PLASTIC SURGERY

## 2019-04-03 RX ORDER — MELOXICAM 15 MG/1
TABLET ORAL
COMMUNITY
Start: 2019-03-19 | End: 2019-05-08

## 2019-04-03 RX ORDER — TRIAMCINOLONE ACETONIDE 40 MG/ML
80 INJECTION, SUSPENSION INTRA-ARTICULAR; INTRAMUSCULAR
Status: COMPLETED | OUTPATIENT
Start: 2019-04-03 | End: 2019-04-03

## 2019-04-03 RX ORDER — CLOTRIMAZOLE AND BETAMETHASONE DIPROPIONATE 10; .64 MG/G; MG/G
CREAM TOPICAL
COMMUNITY
Start: 2019-03-28

## 2019-04-03 RX ORDER — NAPROXEN 375 MG/1
TABLET ORAL
COMMUNITY
Start: 2019-03-22 | End: 2019-05-08

## 2019-04-03 RX ADMIN — TRIAMCINOLONE ACETONIDE 80 MG: 40 INJECTION, SUSPENSION INTRA-ARTICULAR; INTRAMUSCULAR at 10:04

## 2019-04-03 NOTE — PROGRESS NOTES
Chief Complaint: Pain of the Right Hand      HPI:    Rodney Infante is a right hand dominant 38 y.o. female presenting today for right hand pain and swelling. The patient stays stat her pain began in October of 2018.  She initially complained of numbness and tingling within all digits of the right hand.  She states that the pain slowly began without any recent history of trauma.  Then in January of this year she began to note swelling and decreased range of motion of the entire right hand.  She states that her symptoms of stabbing throbbing pains are mainly at night while sleeping.  She has had difficulty performing daily activities as well as her job.  She underwent an x-ray with her primary care physician which demonstrated a possibility of inflammatory arthritis the patient was referred to [facility] for further evaluation treatment.  She has not had previous injections or tried splinting.  She has no other complaints today    Past Medical History:   Diagnosis Date    Anemia     Diabetes mellitus, type 2     Neuropathy        Past Surgical History:   Procedure Laterality Date    CYST REMOVAL  2014, 2/2018    back    DEBRIDEMENT-WOUND N/A 2/16/2018    Performed by [facility]el Pennington [facility] at [facility] OR    EXCISION-HIDRADENITIS N/A 5/10/2018    Performed by Marcel Pennington MD at [facility] OR        Family History   Problem Relation Age of Onset    No Known Problems Mother     Drug abuse Father     No Known Problems Sister        Social History     Socioeconomic History    Marital status: Single     Spouse name: Not on file    Number of children: Not on file    Years of education: Not on file    Highest education level: Not on file   Occupational History    Not on file   Social Needs    Financial resource strain: Not on file    Food insecurity:     Worry: Not on file     Inability: Not on file    Transportation needs:     Medical: Not on file     Non-medical: Not on file    Tobacco Use    Smoking status: Never Smoker    Smokeless tobacco: Never Used   Substance and Sexual Activity    Alcohol use: Yes     Comment: occasionally    Drug use: No    Sexual activity: Not Currently   Lifestyle    Physical activity:     Days per week: Not on file     Minutes per session: Not on file    Stress: Not on file   Relationships    Social connections:     Talks on phone: Not on file     Gets together: Not on file     Attends [employer] service: Not on file     Active member of club or organization: Not on file     Attends meetings of clubs or organizations: Not on file     Relationship status: Not on file   Other Topics Concern    Not on file   Social History Narrative    Not on file       Review of patient's allergies indicates:   Allergen Reactions    Pcn [penicillins] Swelling and Hives         Current Outpatient Medications:     acetaminophen-codeine 300-30mg (TYLENOL #3) 300-30 mg Tab, , Disp: , Rfl:     canagliflozin-metformin (INVOKAMET XR) 150-1,000 mg TBph, Take by mouth., Disp: , Rfl:     carvedilol (COREG) 25 MG tablet, , Disp: , Rfl:     clindamycin (CLEOCIN) 300 MG capsule, Take 300 mg by mouth every 8 (eight) hours., Disp: , Rfl:     clotrimazole-betamethasone 1-0.05% (LOTRISONE) cream, , Disp: , Rfl:     cyanocobalamin (VITAMIN B-12) 1000 MCG tablet, Take 1 tablet (1,000 mcg total) by mouth once daily. This is a prescription for 1 year.  Take 1 tablet Monday, Wednesday, and Friday, Disp: 36 tablet, Rfl: 3    ergocalciferol (ERGOCALCIFEROL) 50,000 unit Cap, , Disp: , Rfl:     estradiol cypionate (DEPO-ESTRADIOL) 5 mg/mL injection, Inject into the muscle every 28 days., Disp: , Rfl:     gentamicin (GARAMYCIN) 0.1 % ointment, , Disp: , Rfl:     glimepiride (AMARYL) 4 MG tablet, Take 1 tablet (4 mg total) by mouth before dinner., Disp: 30 tablet, Rfl: 5    ibuprofen (ADVIL,MOTRIN) 800 MG tablet, , Disp: , Rfl:     losartan-hydrochlorothiazide 100-12.5 mg (HYZAAR)  "100-12.5 mg Tab, , Disp: , Rfl:     meloxicam (MOBIC) 15 MG tablet, , Disp: , Rfl:     naproxen (NAPROSYN) 375 MG tablet, , Disp: , Rfl:     ondansetron (ZOFRAN) 4 MG tablet, , Disp: , Rfl:     TOPCARE UNIVERSAL1 LANCET Misc, , Disp: , Rfl:     TRUETRACK TEST Strp, , Disp: , Rfl:     vitamin D 1000 units Tab, Take 1,000 Units by mouth once daily., Disp: , Rfl:     Current Facility-Administered Medications:     triamcinolone acetonide injection 80 mg, 80 mg, Other, 1 time in Clinic/HOD, Nirav Lyn Jr., MD        Review of Systems:  Constitutional: no fever or chills  ENT: no nasal congestion or sore throat  Respiratory: no cough or shortness of breath  Cardiovascular: no chest pain or palpitations  Gastrointestinal: no nausea or vomiting, PUD, GERD, NSAID intolerance  Genitourinary: no hematuria or dysuria  Integument/Breast: no rash or pruritis  Hematologic/Lymphatic: no easy bruising or lymphadenopathy  Musculoskeletal: see HPI  Neurological: no seizures or tremors  Behavioral/Psych: no auditory or visual hallucinations      Objective:      PHYSICAL EXAM:  Vitals:    04/03/19 0957   BP: 120/78   Pulse: 92   Weight: (!) 164.2 kg (361 lb 15.9 oz)   Height: 5' 5" (1.651 m)   PainSc: 10-Worst pain ever     General Appearance: WDWN, NAD  Neuro/Psych: Mood & affect appropriate  Lungs: Respirations equal and unlabored.   CV: No apparent CV dysfunction, distal pulses intact, RRR  Skin: Intact throughout  Extremities:   Right Hand Exam     Tenderness   Right hand tenderness location: Tenderness and moderate swelling of the entire hand with decreased range of motion secondary to stiffness and discomfort.    Tests   Phalens sign: positive  Tinel's sign (median nerve): positive  Finkelstein's test: negative    Other   Erythema: absent  Sensation: normal  Pulse: present    Comments:  Passive range of motion greater than active range of motion. Unable to make a composite fist secondary to discomfort.  Loss of " flexor extensor skin creases due to swelling and lack of range of motion. Dark banding skin over the dorsal aspect of the small joints of the hand no signs of erythema or warmth there is moderate edema.              DIAGNOSTICS/IMAGING:    Radiologist's Impression:     EXAMINATION:  XR HAND COMPLETE 3 VIEW RIGHT    CLINICAL HISTORY:  edema/pain of right hand/wrist;  Pain in right hand    COMPARISON:  None    FINDINGS:  Noted is juxta-articular calcification adjacent to the DIP joint index and middle fingers with question of small erosions at the distal radial margin of the middle phalanx index finger.  Mild narrowing of the radial aspect of the radiocarpal articulation is questioned.  Joint spaces appear otherwise well maintained.  Alignment anatomic.      Impression       Juxta-articular calcification DIP joints index and middle finger with question of small erosion adjacent to the DIP joint index finger.  Findings raise possibility of inflammatory arthritis including spondyloarthropathy.      Electronically signed by: Kelly Magaña MD  Date: 02/12/2019  Time: 11:32             Last Resulted: 02/12/19 11:32 Order Details View Encounter Lab and Collection Details Routing Result History            External Result Report     External Result Report   Narrative     EXAMINATION:  XR HAND COMPLETE 3 VIEW RIGHT    CLINICAL HISTORY:  edema/pain of right hand/wrist;  Pain in right hand    COMPARISON:  None    FINDINGS:  Noted is juxta-articular calcification adjacent to the DIP joint index and middle fingers with question of small erosions at the distal radial margin of the middle phalanx index finger.  Mild narrowing of the radial aspect of the radiocarpal articulation is questioned.  Joint spaces appear otherwise well maintained.  Alignment anatomic.   Impression       Juxta-articular calcification DIP joints index and middle finger with question of small erosion adjacent to the DIP joint index finger.  Findings raise  possibility of inflammatory arthritis including spondyloarthropathy.            Comments: I have personnaly reviewed the imaging and I agree with the above radiologist's report.    I have explained the risks, benefits, and alternatives of the procedure in detail.  The patient voices understanding and all questions have been answered.  The patient agrees to proceed as planned. After a sterile prep of the skin the bilateral carpal tunnel is injected from the volar approach using a 25 gauge needle with a combination of 1cc 1% plain xylocaine and 40 mg of Kenalog.  The patient is cautioned and immediate relief of pain is secondary to the local anesthetic and will be temporary.  After the anesthetic wears off there may be a increase in pain that may last for a few hours or a few days and they should use ice to help alleviate this flair up of pain.         Assessment:     Encounter Diagnoses   Name Primary?    Localized swelling on right hand Yes    Right hand pain     Bilateral carpal tunnel syndrome               Plan/Discussion:   Rodney was seen today for pain.    Diagnoses and all orders for this visit:    Localized swelling on right hand  -     CBC auto differential; Future  -     Comprehensive metabolic panel; Future  -     C-reactive protein; Future  -     MILTON; Future  -     Rheumatoid factor; Future  -     Cyclic citrul peptide antibody, IgG; Future  -     Sjogrens syndrome-A extractable nuclear antibody; Future  -     Hepatitis C antibody; Future  -     Ambulatory Referral to Physical/Occupational Therapy    Right hand pain  -     CBC auto differential; Future  -     Comprehensive metabolic panel; Future  -     C-reactive protein; Future  -     MILTON; Future  -     Rheumatoid factor; Future  -     Cyclic citrul peptide antibody, IgG; Future  -     Sjogrens syndrome-A extractable nuclear antibody; Future  -     Hepatitis C antibody; Future  -     Ambulatory Referral to Physical/Occupational Therapy    Bilateral  carpal tunnel syndrome  -     EMG W/ ULTRASOUND AND NERVE CONDUCTION TEST 2 Extremities; Future  -     Ambulatory Referral to Physical/Occupational Therapy    Other orders  -     triamcinolone acetonide injection 80 mg     Based on today's findings I believe that the majority of her symptoms may be related to chronic carpal tunnel syndrome of the right hand.  After receiving the injections today in clinic she appeared to have improved function of the right hand however a the swelling and stiffness remained.  I discussed I would like to obtain an EMG to assess for carpal tunnel of both upper extremities.  In addition she is referred to occupational therapy to begin range of motion exercises.  A basic rheumatology panel was also ordered today and the patient was scheduled for her rheumatology consult which had been placed on March 13th.  I discussed I would like to see her back after the EMG is performed discuss results or 8 weeks if her symptoms fail improve or worsen from the injections.

## 2019-04-04 LAB
ANA SER QL IF: NORMAL
ANTI-SSA ANTIBODY: 1.08 EU (ref 0–19.99)
ANTI-SSA INTERPRETATION: NEGATIVE
HCV AB SERPL QL IA: NEGATIVE

## 2019-04-10 ENCOUNTER — HOSPITAL ENCOUNTER (OUTPATIENT)
Dept: WOUND CARE | Facility: HOSPITAL | Age: 39
Discharge: HOME | End: 2019-04-10
Attending: SURGERY
Payer: COMMERCIAL

## 2019-04-10 VITALS
WEIGHT: 293 LBS | HEIGHT: 65 IN | TEMPERATURE: 99 F | RESPIRATION RATE: 18 BRPM | BODY MASS INDEX: 48.82 KG/M2 | DIASTOLIC BLOOD PRESSURE: 79 MMHG | SYSTOLIC BLOOD PRESSURE: 143 MMHG | HEART RATE: 93 BPM

## 2019-04-10 DIAGNOSIS — E66.01 MORBID OBESITY WITH BMI OF 50.0-59.9, ADULT: ICD-10-CM

## 2019-04-10 DIAGNOSIS — E11.622 DIABETES MELLITUS WITH SKIN ULCER: ICD-10-CM

## 2019-04-10 DIAGNOSIS — E11.622 TYPE 2 DIABETES MELLITUS WITH OTHER SKIN ULCER, WITH LONG-TERM CURRENT USE OF INSULIN: ICD-10-CM

## 2019-04-10 DIAGNOSIS — S81.801A WOUND OF RIGHT LEG, INITIAL ENCOUNTER: Primary | ICD-10-CM

## 2019-04-10 DIAGNOSIS — Z79.4 TYPE 2 DIABETES MELLITUS WITH OTHER SKIN ULCER, WITH LONG-TERM CURRENT USE OF INSULIN: ICD-10-CM

## 2019-04-10 DIAGNOSIS — L98.499 DIABETES MELLITUS WITH SKIN ULCER: ICD-10-CM

## 2019-04-10 PROCEDURE — 99212 OFFICE O/P EST SF 10 MIN: CPT

## 2019-04-10 NOTE — PROGRESS NOTES
[facility]                Progress Note    Subjective:       Patient ID: Rodney Infante is a 38 y.o. female.    Chief Complaint: Wound Care and Venous Ulcer       03/13/2019 Returns to wound care clinic with a new open wound to right upper and lower leg. Per Mrs. Infante and record, was discharged on 11/1/2018 from wound care clinic. Seen by Dr. Pennington today, c/o drainage from Right lower leg wound, draining presenting no odor, culture collected and sent to lab, pending results. Strong pedal pulses present, swelling to bilateral lower extremities noted, states elevate legs while sitting or lying down. New order to start on Clindamycin 300 mg PO TID #30, prescription given to Mrs. Infante by Dr. Pennington. C/o right hand stiffness, unable to close, painful, swelling, referred to Rheumatology (Darron Paulino at [facility]). New orders for dressing change per Dr. Pennington, orders followed; education provided to Mrs. Infante on hand washing and wound dressing techniques, voices understanding.     03/20/2019: F/u clinic visit with Dr. Pennington for non-healing wounds to right leg. Per assessment, lower leg wound improving, positive pedal pulses. Dr. Pennington reviewed wound cultures results with Mrs. Infante, continue taking Clindamycin until completion, pt. Voices understanding. States made an appointment with Rheumatology, early appointment in August 2019 for right hand stiffness and pain. Wound care dressings orders followed as per Dr. Pennington's orders.  3/27/19:   Here for f/u with Dr. Pennington. Continues antibiotics. Script given for lotrisone cream for enlarged closed wound on right proximal, lateral calf.  Work excuse given to patient.     4/10/2019: Clinic visit with Dr. Abraham. Wound to right open leg healed. Continue wound improvement to right lower leg wound. C/o rash below right breast and lateral side of upper body, Dr. Pennington assessed and  ordered to wash area with soap and water, pat and dry, apply mupirocin cream 2% once a day. Continue Clindamycin antibiotic as per Dr. Pennington. This nurse spoke to Yoko at [facility] pharmacy at [phone] in reference to Mupirocin and Clindamycin prescriptions orders by Dr. Pennington, per Mrs. Babcock, will have medication ready for Mrs. Infante to .        Review of Systems   Constitutional: Negative.    HENT: Negative.    Eyes: Negative.    Respiratory: Negative.    Cardiovascular: Negative.    Gastrointestinal: Negative.    Genitourinary: Negative.    Musculoskeletal: Negative.    Skin: Negative.    Neurological: Negative.    Psychiatric/Behavioral: Negative.          Objective:        Physical Exam   Constitutional: She is oriented to person, place, and time. She appears well-developed and well-nourished.   HENT:   Head: Normocephalic.   Eyes: Pupils are equal, round, and reactive to light. Conjunctivae and EOM are normal.   Neck: Normal range of motion. Neck supple.   Cardiovascular: Normal rate, regular rhythm, normal heart sounds and intact distal pulses.   Pulmonary/Chest: Effort normal and breath sounds normal.   Abdominal: Soft. Bowel sounds are normal.   Musculoskeletal: Normal range of motion.   Neurological: She is alert and oriented to person, place, and time. She has normal reflexes.   Skin: Skin is warm and dry.       Vitals:    04/10/19 0928   BP: (!) 143/79   Pulse: 93   Resp: 18   Temp: 98.5 °F (36.9 °C)       Assessment:         No diagnosis found.         Wound 03/13/19 1012 Diabetic Ulcer lower Leg #1 (Active)   03/13/19 1012    Pre-existing: Yes   Primary Wound Type: Diabetic ulc   Side:    Orientation: lower   Location: Leg   Wound/PI Number (optional): #1   Ankle-Brachial Index:    Pulses:    Removal Indication and Assessment:    Wound Outcome:    (Retired) Wound Type:    (Retired) Wound Length (cm):    (Retired) Wound Width (cm):    (Retired) Depth (cm):    Wound Description  (Comments):    Removal Indications:    Wound Image   4/10/2019 11:40 AM   Dressing Appearance Dry;Dried drainage 4/10/2019 11:40 AM   Drainage Amount Scant 4/10/2019 11:40 AM   Drainage Characteristics/Odor Serosanguineous 4/10/2019 11:40 AM   Appearance Pink;Red;Yellow;Fibrin;Epithelialization 4/10/2019 11:40 AM   Tissue loss description Partial thickness 4/10/2019 11:40 AM   Periwound Area Intact;Dry;Satellite lesion 4/10/2019 11:40 AM   Wound Edges Irregular 4/10/2019 11:40 AM   Wound Length (cm) 0.5 cm 4/10/2019 11:40 AM   Wound Width (cm) 1.2 cm 4/10/2019 11:40 AM   Wound Depth (cm) 0.1 cm 4/10/2019 11:40 AM   Wound Volume (cm^3) 0.06 cm^3 4/10/2019 11:40 AM   Wound Surface Area (cm^2) 0.6 cm^2 4/10/2019 11:40 AM   Care Cleansed with:;Sterile normal saline 4/10/2019 11:40 AM   Dressing Applied 4/10/2019 11:40 AM   Dressing Change Due 04/11/19 4/10/2019 11:40 AM       [REMOVED]      Wound 03/13/19 1011 Other (comment) upper #2 (Removed)   03/13/19 1011    Pre-existing: Yes   Primary Wound Type: Other   Side: Right   Orientation: upper   Location:    Wound/PI Number (optional): #2   Ankle-Brachial Index:    Pulses:    Removal Indication and Assessment:    Wound Outcome: Healed   (Retired) Wound Type:    (Retired) Wound Length (cm):    (Retired) Wound Width (cm):    (Retired) Depth (cm):    Wound Description (Comments):    Removal Indications:    Removed 04/10/19 1140   Wound Image   4/10/2019 11:40 AM   Dressing Appearance Open to air 4/10/2019 11:40 AM   Drainage Amount None 4/10/2019 11:40 AM   Appearance Pink;Red 4/10/2019 11:40 AM   Tissue loss description Not applicable 4/10/2019 11:40 AM   Red (%), Wound Tissue Color 100 % 4/10/2019 11:40 AM   Periwound Area Intact 4/10/2019 11:40 AM   Wound Edges Irregular 4/10/2019 11:40 AM   Wound Length (cm) 0.5 cm 4/10/2019 11:40 AM   Wound Width (cm) 0.5 cm 4/10/2019 11:40 AM   Wound Depth (cm) 0 cm 4/10/2019 11:40 AM   Wound Volume (cm^3) 0 cm^3 4/10/2019 11:40 AM    Wound Surface Area (cm^2) 0.25 cm^2 4/10/2019 11:40 AM   Care Cleansed with:;Sterile normal saline 4/10/2019 11:40 AM         Right lower leg wound #1     Cleanse wound with: normal saline, pat and dry  Periwound care: cavilon  Primary dressing: xeroform, small mepore dressing  Edema control: elevate legs  Frequency: daily per patient except on clinic days at wound care clinic  Home Health: n/a      Right upper leg wound #2  Clean area with soap and water, pat and dry, leave open to air. Wound healed 4/10/2019.      Right lateral side of upper body and below right breast, wash with soap and water, pat and dry, apply mupirocin cream 2% to area daily.      Plan:            Follow up in about 1 week (around 4/17/2019).    Mupirocin cream 2% once a day to right lateral side of upper body and below right breast as ordered.  Continue Clindamycin antibiotic as per Dr. Pennington.

## 2019-04-15 ENCOUNTER — CLINICAL SUPPORT (OUTPATIENT)
Dept: REHABILITATION | Facility: HOSPITAL | Age: 39
End: 2019-04-15
Attending: PLASTIC SURGERY
Payer: COMMERCIAL

## 2019-04-15 DIAGNOSIS — M79.641 RIGHT HAND PAIN: ICD-10-CM

## 2019-04-15 DIAGNOSIS — M25.641 JOINT STIFFNESS OF HAND, RIGHT: ICD-10-CM

## 2019-04-15 DIAGNOSIS — M62.81 MUSCLE WEAKNESS: ICD-10-CM

## 2019-04-15 DIAGNOSIS — R27.9 INCOORDINATION: ICD-10-CM

## 2019-04-15 PROCEDURE — 97110 THERAPEUTIC EXERCISES: CPT | Mod: PO

## 2019-04-15 PROCEDURE — 97140 MANUAL THERAPY 1/> REGIONS: CPT | Mod: PO

## 2019-04-15 PROCEDURE — 97166 OT EVAL MOD COMPLEX 45 MIN: CPT | Mod: PO

## 2019-04-15 PROCEDURE — 97018 PARAFFIN BATH THERAPY: CPT | Mod: PO

## 2019-04-15 NOTE — PATIENT INSTRUCTIONS
"[facility], OCCUPATIONAL THERAPY  HOME EXERCISE PROGRAM     Complete the following massages for 3-5min each, 1-2x/day.                                            Complete the following exercises with 10 repetitions each, 2x/day.     AROM: Supination / Pronation   With your elbow by your side, turn your palm up then turn your palm down.     AROM: Wrist Flexion / Extension               Bend your wrist forward and back as far as possible.      AROM: Wrist Radial / Ulnar Deviation  Bend your wrist from side to side as far as possible.    AROM: Wrist Flexion / Extension  Make a fist, then bend your wrist forward then back as far as possible.         AROM: Wrist Radial / Ulnar Deviation   Make a fist then bend your wrist toward your body, then away.         AROM: Isolated MCP Flexion / Extension ("Wave")   Bend only your large, bottom knuckles. Hold 3 seconds. Keep the tips of your fingers straight. Straighten fingers.    AROM: Isolated IPJ Flexion / Extension ("Hook")  Bend only your middle and end knuckles. Hold 3 seconds.   Straighten your fingers.     AROM: MCP and PIP Flexion / Extension ("Straight Fist")  Bend your bottom and middle knuckles, keeping the tips of your fingers straight. Try to touch the pads of your fingers on your palm. Hold 3 seconds. Straighten your fingers.     AROM: Composite Flexion / Extension ("Full Fist")  Bend every joint in your hand into a fist. Hold 3 seconds. Straighten your fingers.     AROM: Composte Extension ("Finger Lifts")  Lift your finger off of the table one at a time. Hold 3 seconds. Relax your finger.    AROM: Abduction / Adduction  With hand flat on table, spread all fingers apart, then bring them together as close as possible.    Copyright © I. All rights reserved.     Therapist: TIMOTHY Collazo    "

## 2019-04-15 NOTE — PLAN OF CARE
[facility] Occupational Therapy  Initial Evaluation     Date: 4/15/2019  Name: Rodney Infante  Clinic Number: [id number]    Therapy Diagnosis: No diagnosis found.  Physician: Nirav Lyn Jr*    Physician Orders: Eval & Treat  Medical Diagnosis: R CTS  Surgical Procedure and Date: None  Evaluation Date: 04/15/19  Insurance Authorization Period Expiration: 05/15/19  Plan of Care Certification Period: 4/15/2019 to 06/10/19  Date of Return to MD: 07/10/19    Visit # / Visits authorized: 1 / 20  Time In:11:00 am  Time Out: 12:15 am  Total Billable Time: 75 minutes    Precautions:  Standard, Diabetes and HTN, swlooen ankles, anemia    Subjective     Involved Side: R hand  Dominant Side: Right  Date of Onset: 11/2018  Mechanism of Injury: Gradual onset that progressively worsened with time  History of Current Condition: She went to PCP, who ordered X-rays.  Based on X-ray results, pt was referred to a Hand Specialist MD.  She saw Dr. Fulton on 04/03/19 and received a cortisone injection in each hand. She had temproary relief, but after 2 days, all symptoms returned.  Surgical Procedure: None  Imaging: X-rays  Previous Therapy: None for this condition    Past Medical History/Physical Systems Review:   Rodney Infante  has a past medical history of Anemia, Diabetes mellitus, type 2, and Neuropathy.    Rodney Infante  has a past surgical history that includes Cyst Removal (2014, 2/2018).    Rodney has a current medication list which includes the following prescription(s): acetaminophen-codeine 300-30mg, canagliflozin-metformin, carvedilol, clindamycin, clotrimazole-betamethasone 1-0.05%, cyanocobalamin, ergocalciferol, estradiol cypionate, gentamicin, glimepiride, ibuprofen, losartan-hydrochlorothiazide 100-12.5 mg, meloxicam, naproxen, ondansetron, topcare universal1 lancet, truetrack test, and vitamin d.    Review of patient's allergies indicates:   Allergen Reactions     "Pcn [penicillins] Swelling and Hives        Patient's Goals for Therapy: "get rid of the pain and use my hand again"    Pain:  Functional Pain Scale Rating 0-10:   8/10 on average  5/10 at best  10/10 at worst  Location: R elbow/wrist/hand   Description: Aching, Throbbing, Deep & Numb in hand and Shooting in elbow  Aggravating Factors: No known factors, inconsistent  Easing Factors: pain medication, hot bath and elevation    Occupation:  , full-time for [employer]  Working presently: disability/medical leave  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 Independent    Current FunctionalStatus   Home/Living environment : lives with their family      Limitation of Functional Status as follows:   ADLs/IADLs:     - Feeding: moderatee to minimal difficulty, using non-dom hand    - Bathing: mod assist    - Dressing/Grooming: mod to min difficulty and assist as needed    - Driving: unable with R hand     Leisure: socializing with friends and family        Objective     Observation/Appearance:  Skin intact, Skin dry, Joint stiffness, Edema present and Deformities noted: LF & RF DIP's (swan-neck deformity)    Edema. Measured in centimeters.   4/15/2019 4/15/2019    Left Right   Proximal Wrist Crease 17.3 19.3   Mid palm 21.0 23.2   MCPs 20.0 21.0     Forearm/Wrist ROM (measured in degrees)   Left  Right   Forearm Sup/pron WFL 60/WFL   Wrist E/F WFL 44/20   Wrist RD/UD WFL 9/22   Thumb R/P Abd WFL 42/38   Thumb MP flex WFL 45   Thumb IP flex WFL 30   Thumb [place]ona WFL Base of RF       Hand ROM. Measured in degrees.   4/15/2019 4/15/2019    Left Right   Index: MP  WFL -7/48              PIP     WFL -10/41              DIP WFL 0/13              TOVAR         Long:  MP WFL -9/54              PIP WFL -15/60              DIP WFL +4/9              TOVAR WFL 95        Ring:  "  MP WFL -7/72              PIP WFL -16/63              DIP WFL +10/9              TOVAR    :     Small:  MP WFL -5/64               PIP WFL 0/43               DIP WFL -24/40              TOVAR      Sensation:  Intact for light touch & temp; Patient reports numbness and tingling in R wrist/hand    Special Tests:   Right   4/15/2019   Phalen's Test Positive   Tinel's Test Positive        Strength (Dyanmometer) and Pinch Strength (Pinch Gauge)  Measured in pounds and psi. Average of three trials.   4/15/2019 4/15/2019    Left Right   Rung II 45 3   Lopez Pinch 10 1   3pt Pinch 11 1   2pt Pinch 10 0       CMS Impairment/Limitation/Restriction for FOTO Hand Survey    Therapist reviewed FOTO scores for Rodney Infante on 4/15/2019.   FOTO documents entered into [place] - see Media section.    Limitation Score: 87%  Category: Self Care    Current : CM = at least 80% but < 100% impaired limited or restricted  Goal: CJ = at least 20% but < 40% impaired, limited or restricted         Treatment     Treatment Time In: 11:35 am  Treatment Time Out: 12:15 pm  Total Treatment time se clivee from Evaluation time:40    Rodney received the following supervised modalities after being cleared for contradictions for 10 minutes:   -Paraffin to R hand, pre-tx    Rodney received the following manual therapy techniques for 15 minutes:   -Edema mgmt w/ lymphatic drainage technique;  STM, including MFR, CFM & scar mgmt to R forearm/wrist/hand, using hand techniques to improve soft tissue pliability and decrease pain and edema      Rodney received therapeutic exercises for 15 minutes including:  Performed all TE's on HEP today, see attached for details    Home Exercise Program/Education:  Issued HEP (see patient instructions in EMR) and educated on modality use for pain management . Exercises were reviewed and Rodney was able to demonstrate them prior to the end of the session.   Pt received a written copy of exercises to  perform at home. Rodney demonstrated good  understanding of the education provided.  Pt was advised to perform these exercises free of pain, and to stop performing them if pain occurs.    Patient/Family Education: role of OT, goals for OT, scheduling/cancellations - pt verbalized understanding. Discussed insurance limitations with patient.    Additional Education provided: Edema mgmt; Basic Joint Protection Principles in ADL's    Assessment     Rodney Infante is a 38 y.o. female referred to outpatient occupational therapy and presents with a medical diagnosis of R CTS, resulting in Decreased ROM, Decreased  strength, Decreased pinch strength, Decreased functional hand use, Increased pain, Edema, Joint Stiffness, Diminished/Impaired Sensation and Diminished/Impaired Coordination and demonstrates limitations as described in the chart below. Following medical record review it is determined that pt will benefit from occupational therapy services in order to maximize pain free and/or functional use of right hand. The following goals were discussed with the patient and patient is in agreement with them as to be addressed in the treatment plan. The patient's rehab potential is Good.     Anticipated barriers to occupational therapy: Diabetes; possible inflammatory arthritis condition vs. Median Nerve damage  Pt has no cultural, educational or language barriers to learning provided.    Profile and History Assessment of Occupational Performance Level of Clinical Decision Making Complexity Score   Occupational Profile:   Rodney Infante is a 38 y.o. female who lives with their family and is currently employed and on medical leave as . Rodney Infante has difficulty with  feeding, bathing, grooming and dressing  driving/transportation management, shopping, phone/computer use, housework/household chores and medication management  affecting his/her daily functional abilities. His/her  main goal for therapy is be able to use her hand again.     Comorbidities:   diabetes and Anemia, Neuropathy    Medical and Therapy History Review:   Expanded               Performance Deficits    Physical:  Joint Mobility  Joint Stability  Muscle Endurance  Edema   Strength  Pinch Strength  Fine Motor Coordination  Pain    Cognitive:  No Deficits    Psychosocial:    No Deficits     Clinical Decision Making:  moderate    Assessment Process:  Comprehensive Assessments    Modification/Need for Assistance:  Minimal-Moderate Modifications/Assistance    Intervention Selection:  Multiple Treatment Options       moderate  Based on PMHX, co morbidities , data from assessments and functional level of assistance required with task and clinical presentation directly impacting function.       The following goals were discussed with the patient and patient is in agreement with them as to be addressed in the treatment plan.     Goals:   Short Term Goals: (in 2 weeks)  1) Patient will be independent in HEP  2) Decrease pain in R hand  to no more than 6-7/10 worst   3) Increase AROM in R digit TAMS by 15-20 for improved functioning in ADL/IADL's  4) Increase R  strength by 3-5 psi for increased functional use  5) Decrease edema in R hand by .2-.3  cm     Long Term Goals: (in 8 weeks)  1) Decrease pain in R wrist/hand to no more than 3/10 worst   2) Increase AROM of R digit TAMS to WFL for increased functioning in ADL/IADL's  3) Increase strength in R hand by 15% of initial measures for improved functioning in ADL/IADL's  4) Increased functioning in ADL/IADL's, as evidenced by a FOTO impairment rating of no more than 40%  5) Decrease edema in R wrist/hand to trace or none        Plan   Certification Period/Plan of care expiration: 4/15/2019 to 06/10/19.    Outpatient Occupational Therapy 3 times weekly for 8 weeks to include the following interventions: Paraffin, Manual therapy/joint mobilizations, Modalities for pain  management, US 3 mhz, Therapeutic exercises/activities., Strengthening, Orthotic Fabrication/Fit/Training, Edema Control, Electrical Modalities, Joint Protection and Energy Conservation.      Russell Weber, OT

## 2019-04-17 ENCOUNTER — CLINICAL SUPPORT (OUTPATIENT)
Dept: REHABILITATION | Facility: HOSPITAL | Age: 39
End: 2019-04-17
Attending: PLASTIC SURGERY
Payer: COMMERCIAL

## 2019-04-17 DIAGNOSIS — M62.81 MUSCLE WEAKNESS: ICD-10-CM

## 2019-04-17 DIAGNOSIS — R27.9 INCOORDINATION: ICD-10-CM

## 2019-04-17 DIAGNOSIS — M79.641 RIGHT HAND PAIN: ICD-10-CM

## 2019-04-17 DIAGNOSIS — M25.641 JOINT STIFFNESS OF HAND, RIGHT: ICD-10-CM

## 2019-04-17 PROCEDURE — 97035 APP MDLTY 1+ULTRASOUND EA 15: CPT | Mod: PO

## 2019-04-17 PROCEDURE — 97140 MANUAL THERAPY 1/> REGIONS: CPT | Mod: PO

## 2019-04-17 PROCEDURE — 97110 THERAPEUTIC EXERCISES: CPT | Mod: PO

## 2019-04-17 NOTE — PROGRESS NOTES
"  Occupational Therapy Daily Treatment Note     Date: 2019  Name: Rodney Gonzalez [place]  Clinic Number: [id number]    Therapy Diagnosis:   Encounter Diagnoses   Name Primary?    Right hand pain     Joint stiffness of hand, right     Muscle weakness     Incoordination      Physician: Nirav Lyn Jr*    Physician Orders: Eval & Treat  Medical Diagnosis: R CTS  Surgical Procedure and Date: None  Evaluation Date: 04/15/19  Insurance Authorization Period Expiration: 05/15/19  Plan of Care Certification Period: 4/15/2019 to 06/10/19  Date of Return to MD: 07/10/19    Visit # / Visits authorized:   Time In:9:00 am  Time Out: 10:10 am  Total Billable Time: 70 minutes (1US, 2MT, 2TE)    Precautions:  Standard and Diabetes      Subjective     Pt reports: "It doesn't hurt as much as the first time I came and I am able to move my fingers more"  she was compliant with home exercise program given last session.   Response to previous treatment: Improvemnet in ROM & decreased pain  Functional change: patient able to use her hand as a functional assist for light ADL's with minimal to moderate difficulty vs. Non-functional    Pain: 4/10  Location: right hand  and wrist      Objective     Rodney received the following supervised modalities after being cleared for contradictions for 10 minutes:   -Paraffin w/ MHP to R wrist/hand, pre-tx to decrease pain & increase tissue extensibility    Rodney received the following manual therapy techniques for 25 minutes:   -Edema mgmt w/ lymphatic drainage technique;  Deep STM, including MFR, CFM, TPR & scar mgmt to R wrist/hand, using hand techniques and IASTM tools to increase blood flow/circulation, improve soft tissue pliability and decrease pain.    Rodney received therapeutic exercises for 35 minutes including:TE per lo2019 Vist# 2   A/AAROM:    Forearm sup/pron 10 reps    Wrist E/F/RD/UD 10 reps each   TGE's-wave,hook & fist 10 reps each   Isolated IF & LF " MCP-DIP flexion 10 reps each   Composite finger flex, IF-RF 10 repseach   Finger ext lifts 10 reps each   Thumb R/P Abd 10 reps each   Thumb/pinky slides 10 reps         Home Exercises and Education Provided     Education provided:   - Joint Protection Principles in ADL/IADL's    Written Home Exercises Provided: Patient instructed to cont prior HEP.  Exercises were reviewed and Rodney was able to demonstrate them prior to the end of the session.  Rodney demonstrated good  understanding of the HEP provided.   .   See EMR under Patient Instructions for exercises provided prior visit.      Assessment     Pt would continue to benefit from skilled OT to address areas of deficits and maximize functioning of R wrist/hand for improved ADL/IADL performance     Rodney is progressing well towards her goals and there are no updates to goals at this time. Pt prognosis is Good.     Pt will continue to benefit from skilled outpatient occupational therapy to address the deficits listed in the problem list on initial evaluation provide pt/family education and to maximize pt's level of independence in the home and community environment.     Anticipated barriers to occupational therapy: diabetes, pain    Pt's spiritual, cultural and educational needs considered and pt agreeable to plan of care and goals.    Goals:   Short Term Goals: (in 2 weeks)  1) Patient will be independent in HEP  2) Decrease pain in R hand  to no more than 6-7/10 worst   3) Increase AROM in R digit TAMS by 15-20 for improved functioning in ADL/IADL's  4) Increase R  strength by 3-5 psi for increased functional use  5) Decrease edema in R hand by .2-.3  cm      Long Term Goals: (in 8 weeks)  1) Decrease pain in R wrist/hand to no more than 3/10 worst   2) Increase AROM of R digit TAMS to WFL for increased functioning in ADL/IADL's  3) Increase strength in R hand by 15% of initial measures for improved functioning in ADL/IADL's  4) Increased functioning in  ADL/IADL's, as evidenced by a FOTO impairment rating of no more than 40%  5) Decrease edema in R wrist/hand to trace or none      Plan   Certification Period/Plan of care expiration: 4/15/2019 to 06/10/19.     Outpatient Occupational Therapy 3 times weekly for 8 weeks to include the following interventions: Paraffin, Manual therapy/joint mobilizations, Modalities for pain management, US 3 mhz, Therapeutic exercises/activities., Strengthening, Orthotic Fabrication/Fit/Training, Edema Control, Electrical Modalities, Joint Protection and Energy Conservation.    Russell Weber, OT

## 2019-04-22 ENCOUNTER — CLINICAL SUPPORT (OUTPATIENT)
Dept: REHABILITATION | Facility: HOSPITAL | Age: 39
End: 2019-04-22
Attending: PLASTIC SURGERY
Payer: COMMERCIAL

## 2019-04-22 DIAGNOSIS — R27.9 INCOORDINATION: ICD-10-CM

## 2019-04-22 DIAGNOSIS — M62.81 MUSCLE WEAKNESS: ICD-10-CM

## 2019-04-22 DIAGNOSIS — M79.641 RIGHT HAND PAIN: ICD-10-CM

## 2019-04-22 DIAGNOSIS — M25.641 JOINT STIFFNESS OF HAND, RIGHT: ICD-10-CM

## 2019-04-22 PROCEDURE — 97110 THERAPEUTIC EXERCISES: CPT | Mod: PO

## 2019-04-22 PROCEDURE — 97035 APP MDLTY 1+ULTRASOUND EA 15: CPT | Mod: PO

## 2019-04-22 PROCEDURE — 97140 MANUAL THERAPY 1/> REGIONS: CPT | Mod: PO

## 2019-04-22 NOTE — PROGRESS NOTES
"  Occupational Therapy Daily Treatment Note     Date: 4/22/2019  Name: Rodney Gonzalez [place]  Clinic Number: [id number]    Therapy Diagnosis:   Encounter Diagnoses   Name Primary?    Right hand pain     Joint stiffness of hand, right     Muscle weakness     Incoordination      Physician: Nirav Lyn Jr*    Physician Orders: Eval & Treat  Medical Diagnosis: R CTS  Surgical Procedure and Date: None  Evaluation Date: 04/15/19  Insurance Authorization Period Expiration: 05/15/19  Plan of Care Certification Period: 4/15/2019 to 06/10/19  Date of Return to MD: 07/10/19    Visit # / Visits authorized: 3 / 20  Time In:8:05 am  Time Out: 9:10 am  Total Billable Time: 65 minutes (1US, 2MT, 2TE)    Precautions:  Standard and Diabetes      Subjective     Pt reports: "My hand stays swollen most of the time"  she was compliant with home exercise program given last session.   Response to previous treatment: Improvemnet in ROM and coordination  Functional change: patient able to use her hand as a functional assist for light ADL's with minimal to moderate difficulty vs. Previously was non-functional    Pain: 5/10  Location: right hand and wrist      Objective     Rodney received the following supervised modalities (after being cleared for contradictions) for 10 minutes:   -U/S at 3.0 MHz, .8 W/cm2, continuous to R wrist/hand    Rodney received the following manual therapy techniques for 25 minutes:   -Edema mgmt w/ lymphatic drainage technique;  Deep STM, including MFR, CFM, TPR & scar mgmt to R wrist/hand, using hand techniques and IASTM tools to increase blood flow/circulation, improve soft tissue pliability and decrease pain.  -Kinesiotaping: spiral wrap and EDF tapng pattern of IF-R for edema mgmt; Also button-hole CTS taping pattern applied to R forearm/wrist/hand w/ space correction over wrist & arroyo MCP's.. Patient instructed on purpose, wear, care, precautions to monitor and removal of KT. Patient verbalized " understanding of all instructions provided.     Rodney received therapeutic exercises for 30 minutes including:TE per lo2019 Vist# 3   A/AAROM:    Wrist E/F/RD/UD 10 reps each   TGE's-wave,hook & fist 10 reps each   Composite finger flex, IF-RF 10 repseach   Thumb R/P Abd 10 reps each   Thumb/pinky slides 10 reps   Dexterciser 3 min   Coordination Balls 3 min   T-Putty yellow   -Molding 2 min   -Grasp/Manip 3 reps   -Log rolls w/ tripod pinch 1 trial   9 Peg 3 trials (remove/replace)     Home Exercises and Education Provided     Education provided:   - Chronic Edema mgmt with use of Isotoner Fingerless Compression Gloves at home.  Patient was provided with information on how to obtain gloves and proper size according to her condition. Patient verbalized understanding of all instruction received today.    Written Home Exercises Provided: Patient instructed to cont prior HEP.  Exercises were reviewed and Rodney was able to demonstrate them prior to the end of the session.  Rodney demonstrated good  understanding of the HEP provided.   .   See EMR under Patient Instructions for exercises provided prior visit.      Assessment     Patient presents with an improvement in AROM (composite fist) since initial evaluation, however, she continues to present with edema in her R hand/fingers.  Pt would continue to benefit from skilled OT to address areas of deficits and maximize functioning of R wrist/hand for improved ADL/IADL performance     Rodney is progressing well towards her goals and there are no updates to goals at this time. Pt prognosis is Good.     Pt will continue to benefit from skilled outpatient occupational therapy to address the deficits listed in the problem list on initial evaluation provide pt/family education and to maximize pt's level of independence in the home and community environment.     Anticipated barriers to occupational therapy: diabetes, pain    Pt's spiritual, cultural and educational  needs considered and pt agreeable to plan of care and goals.    Goals:   Short Term Goals: (in 2 weeks)  1) Patient will be independent in HEP  2) Decrease pain in R hand  to no more than 6-7/10 worst -met  3) Increase AROM in R digit TAMS by 15-20 for improved functioning in ADL/IADL's  4) Increase R  strength by 3-5 psi for increased functional use  5) Decrease edema in R hand by .2-.3  cm      Long Term Goals: (in 8 weeks)  1) Decrease pain in R wrist/hand to no more than 3/10 worst   2) Increase AROM of R digit TAMS to WFL for increased functioning in ADL/IADL's  3) Increase strength in R hand by 15% of initial measures for improved functioning in ADL/IADL's  4) Increased functioning in ADL/IADL's, as evidenced by a FOTO impairment rating of no more than 40%  5) Decrease edema in R wrist/hand to trace or none      Plan   Certification Period/Plan of care expiration: 4/15/2019 to 06/10/19.     Outpatient Occupational Therapy 3 times weekly for 8 weeks to include the following interventions: Paraffin, Manual therapy/joint mobilizations, Modalities for pain management, US 3 mhz, Therapeutic exercises/activities., Strengthening, Orthotic Fabrication/Fit/Training, Edema Control, Electrical Modalities, Joint Protection and Energy Conservation.    Russell Weber, OT

## 2019-04-24 ENCOUNTER — HOSPITAL ENCOUNTER (OUTPATIENT)
Dept: WOUND CARE | Facility: HOSPITAL | Age: 39
Discharge: HOME | End: 2019-04-24
Attending: SURGERY
Payer: COMMERCIAL

## 2019-04-24 VITALS
HEIGHT: 65 IN | DIASTOLIC BLOOD PRESSURE: 67 MMHG | SYSTOLIC BLOOD PRESSURE: 108 MMHG | RESPIRATION RATE: 18 BRPM | TEMPERATURE: 99 F | BODY MASS INDEX: 48.82 KG/M2 | WEIGHT: 293 LBS | HEART RATE: 95 BPM

## 2019-04-24 DIAGNOSIS — L97.225 VENOUS STASIS ULCER OF LEFT CALF WITH MUSCLE INVOLVEMENT WITHOUT EVIDENCE OF NECROSIS WITHOUT VARICOSE VEINS: ICD-10-CM

## 2019-04-24 DIAGNOSIS — E11.622 DIABETES MELLITUS WITH SKIN ULCER: ICD-10-CM

## 2019-04-24 DIAGNOSIS — I87.2 VENOUS STASIS ULCER OF LEFT CALF WITH MUSCLE INVOLVEMENT WITHOUT EVIDENCE OF NECROSIS WITHOUT VARICOSE VEINS: ICD-10-CM

## 2019-04-24 DIAGNOSIS — E66.01 MORBID OBESITY WITH BMI OF 50.0-59.9, ADULT: ICD-10-CM

## 2019-04-24 DIAGNOSIS — L98.499 DIABETES MELLITUS WITH SKIN ULCER: ICD-10-CM

## 2019-04-24 DIAGNOSIS — S81.801A WOUND OF RIGHT LEG, INITIAL ENCOUNTER: Primary | ICD-10-CM

## 2019-04-24 PROCEDURE — 99212 OFFICE O/P EST SF 10 MIN: CPT

## 2019-04-24 NOTE — PROGRESS NOTES
[facility]                Progress Note    Subjective:       Patient ID: Rodney Infante is a 38 y.o. female.    Chief Complaint: Non-healing Wound (Right lower leg)    03/13/2019 Returns to wound care clinic with a new open wound to right upper and lower leg. Per Mrs. Infante and record, was discharged on 11/1/2018 from wound care clinic. Seen by Dr. Pennington today, c/o drainage from Right lower leg wound, draining presenting no odor, culture collected and sent to lab, pending results. Strong pedal pulses present, swelling to bilateral lower extremities noted, states elevate legs while sitting or lying down. New order to start on Clindamycin 300 mg PO TID #30, prescription given to Mrs. Infante by Dr. Pennington. C/o right hand stiffness, unable to close, painful, swelling, referred to Rheumatology (Darron Paulino at [facility]). New orders for dressing change per Dr. Pennington, orders followed; education provided to Mrs. Infante on hand washing and wound dressing techniques, voices understanding.     03/20/2019: F/u clinic visit with Dr. Pennington for non-healing wounds to right leg. Per assessment, lower leg wound improving, positive pedal pulses. Dr. Pennington reviewed wound cultures results with Mrs. Infante, continue taking Clindamycin until completion, pt. Voices understanding. States made an appointment with Rheumatology, early appointment in August 2019 for right hand stiffness and pain. Wound care dressings orders followed as per Dr. Pennington's orders.  3/27/19:   Here for f/u with Dr. Pennington. Continues antibiotics. Script given for lotrisone cream for enlarged closed wound on right proximal, lateral calf.  Work excuse given to patient.     4/10/2019: Clinic visit with Dr. Abraham. Wound to right open leg healed. Continue wound improvement to right lower leg wound. C/o rash below right breast and lateral side of upper body, Dr. Pennington assessed and  ordered to wash area with soap and water, pat and dry, apply mupirocin cream 2% once a day. Continue Clindamycin antibiotic as per Dr. Pennington. This nurse spoke to Yoko at [facility] pharmacy at [phone] in reference to Mupirocin and Clindamycin prescriptions orders by Dr. Pennington, per Rubina Babcock, will have medication ready for Mrs. Infante to .     4/24/2019: F/U clinic visit with Dr. Pennington, wound improvement present to right lower leg. States no more rash below right breast and lateral side of upper body. Continue wound care dressing change as per Dr. Pennington.        Review of Systems   Constitutional: Negative.    HENT: Negative.    Eyes: Negative.    Respiratory: Negative.    Cardiovascular: Negative.    Gastrointestinal: Negative.    Genitourinary: Negative.    Musculoskeletal: Negative.    Skin: Negative.    Neurological: Negative.    Psychiatric/Behavioral: Negative.          Objective:        Physical Exam   Constitutional: She is oriented to person, place, and time. She appears well-developed and well-nourished.   HENT:   Head: Normocephalic.   Eyes: Pupils are equal, round, and reactive to light. Conjunctivae and EOM are normal.   Neck: Normal range of motion. Neck supple.   Cardiovascular: Normal rate, regular rhythm, normal heart sounds and intact distal pulses.   Pulmonary/Chest: Effort normal and breath sounds normal.   Abdominal: Soft. Bowel sounds are normal.   Musculoskeletal: Normal range of motion.   Neurological: She is alert and oriented to person, place, and time. She has normal reflexes.   Skin: Skin is warm and dry.       Vitals:    04/24/19 0936   BP: 108/67   Pulse: 95   Resp: 18   Temp: 98.5 °F (36.9 °C)       Assessment:           ICD-10-CM ICD-9-CM   1. Wound of right leg, initial encounter S81.801A 894.0            Wound 03/13/19 1012 Diabetic Ulcer lower Leg #1 (Active)   03/13/19 1012    Pre-existing: Yes   Primary Wound Type: Diabetic ulc   Side:    Orientation: lower    Location: Leg   Wound/PI Number (optional): #1   Ankle-Brachial Index:    Pulses:    Removal Indication and Assessment:    Wound Outcome:    (Retired) Wound Type:    (Retired) Wound Length (cm):    (Retired) Wound Width (cm):    (Retired) Depth (cm):    Wound Description (Comments):    Removal Indications:    Dressing Appearance Dry;Dried drainage 4/24/2019 10:00 AM   Drainage Amount Scant 4/24/2019 10:00 AM   Drainage Characteristics/Odor Serosanguineous 4/24/2019 10:00 AM   Appearance Pink;Red;Yellow;Fibrin;Epithelialization 4/24/2019 10:00 AM   Tissue loss description Partial thickness 4/24/2019 10:00 AM   Periwound Area Intact;Dry 4/24/2019 10:00 AM   Wound Edges Irregular 4/24/2019 10:00 AM   Wound Length (cm) 0.5 cm 4/24/2019 10:00 AM   Wound Width (cm) 1 cm 4/24/2019 10:00 AM   Wound Depth (cm) 0.1 cm 4/24/2019 10:00 AM   Wound Volume (cm^3) 0.05 cm^3 4/24/2019 10:00 AM   Wound Surface Area (cm^2) 0.5 cm^2 4/24/2019 10:00 AM   Care Cleansed with:;Sterile normal saline 4/24/2019 10:00 AM   Dressing Applied 4/24/2019 10:00 AM   Dressing Change Due 04/25/19 4/24/2019 10:00 AM         Right lower leg wound #1     Cleanse wound with: normal saline, pat and dry  Periwound care: cavilon  Primary dressing: xeroform, small mepore dressing  Edema control: elevate legs    Plan:               Follow up in about 1 week (around 5/1/2019). Clinic visit with Dr. Pennington.

## 2019-04-26 ENCOUNTER — CLINICAL SUPPORT (OUTPATIENT)
Dept: REHABILITATION | Facility: HOSPITAL | Age: 39
End: 2019-04-26
Attending: PLASTIC SURGERY
Payer: COMMERCIAL

## 2019-04-26 DIAGNOSIS — M79.641 RIGHT HAND PAIN: ICD-10-CM

## 2019-04-26 DIAGNOSIS — M25.641 JOINT STIFFNESS OF HAND, RIGHT: ICD-10-CM

## 2019-04-26 DIAGNOSIS — M62.81 MUSCLE WEAKNESS: ICD-10-CM

## 2019-04-26 DIAGNOSIS — R27.9 INCOORDINATION: ICD-10-CM

## 2019-04-26 PROCEDURE — 97110 THERAPEUTIC EXERCISES: CPT | Mod: PO

## 2019-04-26 PROCEDURE — 97035 APP MDLTY 1+ULTRASOUND EA 15: CPT | Mod: PO

## 2019-04-26 PROCEDURE — 97140 MANUAL THERAPY 1/> REGIONS: CPT | Mod: PO

## 2019-04-26 NOTE — PROGRESS NOTES
"  Occupational Therapy Daily Treatment Note     Date: 4/26/2019  Name: Rodney Gonzalez [place]  Clinic Number: [id number]    Therapy Diagnosis:   Encounter Diagnoses   Name Primary?    Right hand pain     Joint stiffness of hand, right     Muscle weakness     Incoordination      Physician: Nirav Lyn Jr*    Physician Orders: Eval & Treat  Medical Diagnosis: R CTS  Surgical Procedure and Date: None  Evaluation Date: 04/15/19  Insurance Authorization Period Expiration: 05/15/19  Plan of Care Certification Period: 4/15/2019 to 06/10/19  Date of Return to MD: 07/10/19    Visit # / Visits authorized: 4 / 20  Time In:9:00 am  Time Out: 10:10 am  Total Billable Time: 70 minutes (1US, 2MT, 2TE)    Precautions:  Standard and Diabetes      Subjective     Pt reports: "It's getting better, but it still hurts and stops me from doing certain things"  she was compliant with home exercise program given last session.   Response to previous treatment: Improvemnet in ROM and coordination  Functional change: patient able to use her hand as a functional assist for light ADL's with minimal to moderate difficulty vs. Previously was non-functional    Pain: 2/10  Location: right hand and wrist      Objective     Rodney received the following supervised modalities (after being cleared for contradictions) for 10 minutes:   -U/S at 3.0 MHz, .8 W/cm2, continuous to R wrist/hand    Rodney received the following manual therapy techniques for 25 minutes:   -Edema mgmt w/ lymphatic drainage technique;  Deep STM, including MFR, CFM, TPR & scar mgmt to R wrist/hand, using hand techniques and IASTM tools to increase blood flow/circulation, improve soft tissue pliability and decrease pain.  -Kinesiotaping: spiral wrap and EDF tapng pattern of IF-R for edema mgmt; Also button-hole CTS taping pattern applied to R forearm/wrist/hand w/ space correction over wrist & arroyo MCP's.. Patient instructed on purpose, wear, care, precautions to monitor " and removal of KT. Patient verbalized understanding of all instructions provided.     Rodney received therapeutic exercises for 30 minutes including:TE per lo2019 Vist# 4   Dexterciser 3 min   Coordination Balls 3 min   T-Putty yellow   -Molding 2 min   -Grasp/Manip 3 reps   -Log rolls w/ tripod pinch 1 trial   9 Peg 3 trials (remove/replace)   CTS Decompression sequence 2 trials (1-13); 5 sec hold each position     Home Exercises and Education Provided     Education provided: Progression of HEP to include T-putty and CTS Decompression Sequence. Written handout was provided & patient verbalized understanding of all instructions provided.  Also discussed Trigger Finger pathology and possible management options    Written Home Exercises Provided: Patient instructed to cont prior HEP.  Exercises were reviewed and Rodney was able to demonstrate them prior to the end of the session.  Rodney demonstrated good  understanding of the HEP provided.   .   See EMR under Patient Instructions for exercises provided prior visit.      Assessment     Patient continues to have improvement in condition, including decreased pain and increased ROM.  Patient is noted to have a significant amount of fibrotic tissue at IF-SF MCP's and multiple nodules at A1 & A2 pulleys (mostly for LF & RF).  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  These symptoms may indicate that patient has underlying trigger finger of R LF & RF.  Further diagnostics may be indicated to completely resolve condition, however, symptomatic management is indicated for therapy.  Patient may benefit from a protective splint during resistive gripping activity in ADL.IADL's to prevent further  Problems or progression of condition.  Pt would continue to benefit from skilled OT to address areas of deficits and maximize functioning of R wrist/hand for improved ADL/IADL performance     Rodney is progressing well towards her goals and there are no  updates to goals at this time. Pt prognosis is Good.     Pt will continue to benefit from skilled outpatient occupational therapy to address the deficits listed in the problem list on initial evaluation provide pt/family education and to maximize pt's level of independence in the home and community environment.     Anticipated barriers to occupational therapy: diabetes, pain    Pt's spiritual, cultural and educational needs considered and pt agreeable to plan of care and goals.    Goals:   Short Term Goals: (in 2 weeks)  1) Patient will be independent in HEP  2) Decrease pain in R hand  to no more than 6-7/10 worst -met  3) Increase AROM in R digit TAMS by 15-20 for improved functioning in ADL/IADL's  4) Increase R  strength by 3-5 psi for increased functional use  5) Decrease edema in R hand by .2-.3  cm      Long Term Goals: (in 8 weeks)  1) Decrease pain in R wrist/hand to no more than 3/10 worst   2) Increase AROM of R digit TAMS to WFL for increased functioning in ADL/IADL's  3) Increase strength in R hand by 15% of initial measures for improved functioning in ADL/IADL's  4) Increased functioning in ADL/IADL's, as evidenced by a FOTO impairment rating of no more than 40%  5) Decrease edema in R wrist/hand to trace or none      Plan   Certification Period/Plan of care expiration: 4/15/2019 to 06/10/19.     Outpatient Occupational Therapy 3 times weekly for 8 weeks to include the following interventions: Paraffin, Manual therapy/joint mobilizations, Modalities for pain management, US 3 mhz, Therapeutic exercises/activities., Strengthening, Orthotic Fabrication/Fit/Training, Edema Control, Electrical Modalities, Joint Protection and Energy Conservation.    Russell Weber, OT

## 2019-04-30 ENCOUNTER — CLINICAL SUPPORT (OUTPATIENT)
Dept: REHABILITATION | Facility: HOSPITAL | Age: 39
End: 2019-04-30
Attending: PLASTIC SURGERY
Payer: COMMERCIAL

## 2019-04-30 DIAGNOSIS — M25.641 JOINT STIFFNESS OF HAND, RIGHT: ICD-10-CM

## 2019-04-30 DIAGNOSIS — M79.641 RIGHT HAND PAIN: ICD-10-CM

## 2019-04-30 DIAGNOSIS — R27.9 INCOORDINATION: ICD-10-CM

## 2019-04-30 DIAGNOSIS — M62.81 MUSCLE WEAKNESS: ICD-10-CM

## 2019-04-30 PROCEDURE — 97110 THERAPEUTIC EXERCISES: CPT | Mod: PO

## 2019-04-30 PROCEDURE — 97140 MANUAL THERAPY 1/> REGIONS: CPT | Mod: PO

## 2019-04-30 PROCEDURE — 97018 PARAFFIN BATH THERAPY: CPT | Mod: PO

## 2019-04-30 PROCEDURE — L3913 HFO W/O JOINTS CF: HCPCS | Mod: PO

## 2019-04-30 NOTE — PROGRESS NOTES
"  Occupational Therapy Daily Treatment Note     Date: 4/30/2019  Name: Rodney Infante  Clinic Number: [id number]    Therapy Diagnosis:   Encounter Diagnoses   Name Primary?    Right hand pain     Joint stiffness of hand, right     Muscle weakness     Incoordination      Physician: Nirav Lyn Jr*    Physician Orders: Eval & Treat  Medical Diagnosis: R CTS  Surgical Procedure and Date: None  Evaluation Date: 04/15/19  Insurance Authorization Period Expiration: 05/15/19  Plan of Care Certification Period: 4/15/2019 to 06/10/19  Date of Return to MD: 07/10/19    Visit # / Visits authorized: 5 / 20  Time In:8:05 am  Time Out: 9:20 am  Total Billable Time: 75 minutes (P, 2MT, 1TE, )    Precautions:  Standard and Diabetes      Subjective     Pt reports: "My hand really hurt last night. It kept me awake and I didn't get much sleep because of the pain". Patient feels condition is improving, but that her pain in at the MCP's is what is causing her most of her pain. She feels the s/s of CTS is resolving.  she was compliant with home exercise program given last session.   Response to previous treatment: Improvemnet in ROM and coordination  Functional change: patient     Pain: 6/10  Location: right hand and wrist      Objective     Observations: Increased localized edema in R MCP's through PIP joints today.  Patient is TTP at A1 pulleys of R IF/LF/RF, with palpable nodules noted at each morales.      Rodney received the following supervised modalities (after being cleared for contradictions) for 10 minutes:   -Paraffin w/ MHP to R wrist/hand for 10 min, pre-tx to decrease pain & increase tissue extensibility    Rodney received the following manual therapy techniques for 25 minutes:   -Edema mgmt w/ lymphatic drainage technique;  Deep STM, including MFR, CFM, TPR & scar mgmt to R wrist/hand, using hand techniques and IASTM tools to increase blood flow/circulation, improve soft tissue pliability and decrease " pain.  -Kinesiotaping: spiral wrap and EDF tapng pattern of IF-R for edema mgmt; Also button-hole CTS taping pattern applied to R forearm/wrist/hand w/ space correction over wrist & arroyo MCP's.. Patient instructed on purpose, wear, care, precautions to monitor and removal of KT. Patient verbalized understanding of all instructions provided.     Rodney received therapeutic exercises for 15 minutes including:TE per lo2019 Vist# 5   Dexterciser 3 min   Coordination Balls 3 min   T-Putty yellow   -Molding (not today)   -Grasp/Manip (not today)   -Log rolls w/ tripod pinch (not today)   9 Peg 3 trials (remove/replace)   CTS Decompression sequence 2 trials (1-13); 5 sec hold each position     Orthotic Fitting & Training:  A custom hand-based trigger finger orthosis () fabricated today to address ongoing pain and fibrotic tissue at the A1 pulleys of R IF-RF.  Total time of fabrication and fitting was 25 minutes.  Good fit & positioning achieved upon completion of splint fabrication.       Home Exercises and Education Provided     Education provided: Joint Protection Principles and Activity restrictions to address s/s of Trigger Finger.  Patient was provided instruct on purpose, wear schedule, care, precautions to monitor and proper donning & doffing of orthosis.  Patient verbalized understanding of all instruct provided today    Written Home Exercises Provided: Patient instructed to cont prior HEP.  Exercises were reviewed and Rodney was able to demonstrate them prior to the end of the session.  Rodney demonstrated good  understanding of the HEP provided.   .   See EMR under Patient Instructions for exercises provided prior visit.      Assessment     Patient presents with ongoing localized edema and s/s of trigger finger in R IF/LF/RF that is affecting her progress and daily functioning.  Overall, she has made some progress towards improvement of R CTS, however, s/s of Trigger Finger may prevent resolution  of conditton and ultimately affect overall functioning of R hand in ADl/IADL's.  Pt would continue to benefit from skilled OT to address areas of deficits and maximize functioning of R wrist/hand for improved ADL/IADL performance     Rodney is progressing well towards her goals and there are no updates to goals at this time. Pt prognosis is Good.     Pt will continue to benefit from skilled outpatient occupational therapy to address the deficits listed in the problem list on initial evaluation provide pt/family education and to maximize pt's level of independence in the home and community environment.     Anticipated barriers to occupational therapy: diabetes, pain    Pt's spiritual, cultural and educational needs considered and pt agreeable to plan of care and goals.    Goals:   Short Term Goals: (in 2 weeks)  1) Patient will be independent in HEP-ongoing  2) Decrease pain in R hand  to no more than 6-7/10 worst -met  3) Increase AROM in R digit TAMS by 15-20 for improved functioning in ADL/IADL's-part met  4) Increase R  strength by 3-5 psi for increased functional use-ongoing  5) Decrease edema in R hand by .2-.3  cm-ongoing     Long Term Goals: (in 8 weeks)  1) Decrease pain in R wrist/hand to no more than 3/10 worst-ongoing  2) Increase AROM of R digit TAMS to WFL for increased functioning in ADL/IADL's-ongoing  3) Increase strength in R hand by 15% of initial measures for improved functioning in ADL/IADL's-ongoing  4) Increased functioning in ADL/IADL's, as evidenced by a FOTO impairment rating of no more than 40%-ongoing  5) Decrease edema in R wrist/hand to trace or none-ongoing      Plan     Certification Period/Plan of care expiration: 4/15/2019 to 06/10/19.     Outpatient Occupational Therapy 3 times weekly for 8 weeks to include the following interventions: Paraffin, Manual therapy/joint mobilizations, Modalities for pain management, US 3 mhz, Therapeutic exercises/activities., Strengthening,  Orthotic Fabrication/Fit/Training, Edema Control, Electrical Modalities, Joint Protection and Energy Conservation.    Russell Weber, OT

## 2019-05-01 ENCOUNTER — HOSPITAL ENCOUNTER (OUTPATIENT)
Dept: WOUND CARE | Facility: HOSPITAL | Age: 39
Discharge: HOME | End: 2019-05-01
Attending: SURGERY
Payer: COMMERCIAL

## 2019-05-01 VITALS
DIASTOLIC BLOOD PRESSURE: 77 MMHG | SYSTOLIC BLOOD PRESSURE: 118 MMHG | HEART RATE: 100 BPM | BODY MASS INDEX: 48.82 KG/M2 | TEMPERATURE: 98 F | HEIGHT: 65 IN | WEIGHT: 293 LBS | RESPIRATION RATE: 18 BRPM

## 2019-05-01 DIAGNOSIS — E66.01 MORBID OBESITY WITH BMI OF 50.0-59.9, ADULT: ICD-10-CM

## 2019-05-01 DIAGNOSIS — S81.801A WOUND OF RIGHT LEG, INITIAL ENCOUNTER: Primary | ICD-10-CM

## 2019-05-01 DIAGNOSIS — Z79.4 TYPE 2 DIABETES MELLITUS WITH OTHER SKIN ULCER, WITH LONG-TERM CURRENT USE OF INSULIN: ICD-10-CM

## 2019-05-01 DIAGNOSIS — E11.622 TYPE 2 DIABETES MELLITUS WITH OTHER SKIN ULCER, WITH LONG-TERM CURRENT USE OF INSULIN: ICD-10-CM

## 2019-05-01 PROCEDURE — 99212 OFFICE O/P EST SF 10 MIN: CPT

## 2019-05-01 NOTE — PROGRESS NOTES
[facility]                Progress Note    Subjective:       Patient ID: Rodney Inafnte is a 38 y.o. female.    Chief Complaint: Non-healing Wound (Right lower leg)    03/13/2019 Returns to wound care clinic with a new open wound to right upper and lower leg. Per Mrs. Infante and record, was discharged on 11/1/2018 from wound care clinic. Seen by Dr. Pennington today, c/o drainage from Right lower leg wound, draining presenting no odor, culture collected and sent to lab, pending results. Strong pedal pulses present, swelling to bilateral lower extremities noted, states elevate legs while sitting or lying down. New order to start on Clindamycin 300 mg PO TID #30, prescription given to Mrs. Infante by Dr. Pennington. C/o right hand stiffness, unable to close, painful, swelling, referred to Rheumatology (Darron Paulino at [facility]). New orders for dressing change per Dr. Pennington, orders followed; education provided to Mrs. Inafnte on hand washing and wound dressing techniques, voices understanding.     03/20/2019: F/u clinic visit with Dr. Pennington for non-healing wounds to right leg. Per assessment, lower leg wound improving, positive pedal pulses. Dr. Pennington reviewed wound cultures results with Mrs. Infante, continue taking Clindamycin until completion, pt. Voices understanding. States made an appointment with Rheumatology, early appointment in August 2019 for right hand stiffness and pain. Wound care dressings orders followed as per Dr. Pennington's orders.  3/27/19:   Here for f/u with Dr. Pennington. Continues antibiotics. Script given for lotrisone cream for enlarged closed wound on right proximal, lateral calf.  Work excuse given to patient.     4/10/2019: Clinic visit with Dr. Abraham. Wound to right open leg healed. Continue wound improvement to right lower leg wound. C/o rash below right breast and lateral side of upper body, Dr. Pennington assessed and  ordered to wash area with soap and water, pat and dry, apply mupirocin cream 2% once a day. Continue Clindamycin antibiotic as per Dr. Pennington. This nurse spoke to Yoko at [facility] pharmacy at [phone] in reference to Mupirocin and Clindamycin prescriptions orders by Dr. Pennington, per  Yoko, will have medication ready for Mrs. Infante to .     4/24/2019: F/U clinic visit with Dr. Pennington, wound improvement present to right lower leg. States no more rash below right breast and lateral side of upper body. Continue wound care dressing change as per Dr. Pennington.     51/2019: Clinic visit with Dr. Pennington, wound healing present to right lower leg. Pedal pulses strong, positive bilateral feet. Dr. Pennington discharged Mrs. Infante from wound care clinic and ordered to apply Mupirocin cream 2% to right lower leg wound every other day and leave open to air. Education provided on hand washing techniques and application on mupirocin cream to right lower leg, verbalizes and demonstrates understanding. Healed Wound Instructions provided to Mrs. Infante including: Your wound is healed, it is extremely fragile at this stage; protect it from friction, wash it gently and pat dry.  If the wound should re-open, please call [phone] for further instructions.        Review of Systems   Constitutional: Negative.    HENT: Negative.    Eyes: Negative.    Respiratory: Negative.    Cardiovascular: Negative.    Gastrointestinal: Negative.    Genitourinary: Negative.    Musculoskeletal: Negative.    Skin: Negative.    Neurological: Negative.    Psychiatric/Behavioral: Negative.          Objective:        Physical Exam   Constitutional: She is oriented to person, place, and time. She appears well-developed and well-nourished.   HENT:   Head: Normocephalic.   Eyes: Pupils are equal, round, and reactive to light. Conjunctivae and EOM are normal.   Neck: Normal range of motion. Neck supple.   Cardiovascular: Normal rate,  regular rhythm, normal heart sounds and intact distal pulses.   Pulmonary/Chest: Effort normal and breath sounds normal.   Abdominal: Soft. Bowel sounds are normal.   Musculoskeletal: Normal range of motion.   Neurological: She is alert and oriented to person, place, and time. She has normal reflexes.   Skin: Skin is warm and dry.       Vitals:    05/01/19 1034   BP: 118/77   Pulse: 100   Resp: 18   Temp: 98.1 °F (36.7 °C)       Assessment:           ICD-10-CM ICD-9-CM   1. Wound of right leg, initial encounter S81.801A 894.0            Wound 03/13/19 1012 Diabetic Ulcer lower Leg #1 (Active)   03/13/19 1012    Pre-existing: Yes   Primary Wound Type: Diabetic ulc   Side:    Orientation: lower   Location: Leg   Wound/PI Number (optional): #1   Ankle-Brachial Index:    Pulses:    Removal Indication and Assessment:    Wound Outcome:    (Retired) Wound Type:    (Retired) Wound Length (cm):    (Retired) Wound Width (cm):    (Retired) Depth (cm):    Wound Description (Comments):    Removal Indications:    Wound Image   5/1/2019 10:39 AM   Dressing Appearance Open to air 5/1/2019 10:39 AM   Drainage Amount Scant 5/1/2019 10:39 AM   Drainage Characteristics/Odor Serosanguineous;Tan 5/1/2019 10:39 AM   Appearance Pink;Red;Fibrin;Epithelialization;Yellow 5/1/2019 10:39 AM   Tissue loss description Partial thickness 5/1/2019 10:39 AM   Red (%), Wound Tissue Color 90 % 5/1/2019 10:39 AM   Yellow (%), Wound Tissue Color 10 % 5/1/2019 10:39 AM   Periwound Area Satellite lesion;Hemosiderin Staining;Intact 5/1/2019 10:39 AM   Wound Edges Irregular 5/1/2019 10:39 AM   Wound Length (cm) 0.4 cm 5/1/2019 10:39 AM   Wound Width (cm) 0.8 cm 5/1/2019 10:39 AM   Wound Depth (cm) 0.1 cm 5/1/2019 10:39 AM   Wound Volume (cm^3) 0.03 cm^3 5/1/2019 10:39 AM   Wound Surface Area (cm^2) 0.32 cm^2 5/1/2019 10:39 AM   Care Cleansed with:;Sterile normal saline 5/1/2019 10:39 AM   Dressing Applied 5/1/2019 10:39 AM   Periwound Care Skin barrier film  applied 5/1/2019 10:39 AM   Dressing Change Due 05/02/19 5/1/2019 10:39 AM             Right lower leg wound #1     Cleanse wound with: normal saline, pat and dry, may wash with soap and water  Primary dressing: apply mupirocin cream 2% every other day, leave area open to air  Edema control: elevate legs      Plan:          Healed Wound Instructions provided including, If the wound should re-open, please call [phone] for further instructions.

## 2019-05-02 ENCOUNTER — CLINICAL SUPPORT (OUTPATIENT)
Dept: REHABILITATION | Facility: HOSPITAL | Age: 39
End: 2019-05-02
Attending: PLASTIC SURGERY
Payer: COMMERCIAL

## 2019-05-02 DIAGNOSIS — R27.9 INCOORDINATION: ICD-10-CM

## 2019-05-02 DIAGNOSIS — M25.641 JOINT STIFFNESS OF HAND, RIGHT: ICD-10-CM

## 2019-05-02 DIAGNOSIS — M62.81 MUSCLE WEAKNESS: ICD-10-CM

## 2019-05-02 DIAGNOSIS — M79.641 RIGHT HAND PAIN: ICD-10-CM

## 2019-05-02 PROCEDURE — 97140 MANUAL THERAPY 1/> REGIONS: CPT | Mod: PO

## 2019-05-02 PROCEDURE — 97018 PARAFFIN BATH THERAPY: CPT | Mod: PO

## 2019-05-02 PROCEDURE — 97110 THERAPEUTIC EXERCISES: CPT | Mod: PO

## 2019-05-02 NOTE — PROGRESS NOTES
"  Occupational Therapy Daily Treatment Note     Date: 5/2/2019  Name: Rodney Gonzalez [place]  Clinic Number: [id number]    Therapy Diagnosis:   Encounter Diagnoses   Name Primary?    Right hand pain     Joint stiffness of hand, right     Muscle weakness     Incoordination      Physician: Nirav Lyn Jr*    Physician Orders: Eval & Treat  Medical Diagnosis: R CTS  Surgical Procedure and Date: None  Evaluation Date: 04/15/19  Insurance Authorization Period Expiration: 05/15/19  Plan of Care Certification Period: 4/15/2019 to 06/10/19  Date of Return to MD: 07/10/19    Visit # / Visits authorized: 6 / 20  Time In:1:05 pm  Time Out: 2:10 pm  Total Billable Time: 65 minutes (P, 2MT, 1TE)    Precautions:  Standard and Diabetes      Subjective     Pt reports: No discomfort with wearing custom hand based trigger finger orthosis, but continues to report aching pain in RF & ulnar aspect of hand; She states "I wear the splint most of the time"  she was compliant with home exercise program given last session.   Response to previous treatment:slight increase in pain since last visit  Functional change: patient     Pain:  7/10  Location: right hand and wrist      Objective     Observations: increase in localized edema in R hand/MCP's through PIP joints noted today.  She remains TTP at A1 pulleys of R IF/LF/RF, but is less than last treatment.  She is TTP to MCP's as well today      Rodney received the following supervised modalities (after being cleared for contradictions) for 10 minutes:   -Paraffin w/ MHP to R wrist/hand for 10 min, pre-tx to decrease pain & increase tissue extensibility    Rodney received the following manual therapy techniques for 40 minutes:   -Edema mgmt w/ lymphatic drainage technique and compressive dressing wrapping of R forearm/wrist/hand/fingers with Coban;  Patient was provided instruction on edema mgmt with Coban wrapping for HEP.  Patient needed Min Assist for proper wrapping technique, " but verbalized understanding of training and has help at home to assist her as needed. Also provided STM, including MFR, CFM, TPR & scar mgmt to R wrist/hand, using hand techniques and IASTM tools to increase blood flow/circulation, improve soft tissue pliability and decrease pain.    Rodney received therapeutic exercises for 15 minutes including:TE per lo2019 Vist# 6   Dexterciser 3 min   Coordination Balls 3 min   T-Putty yellow   -Molding (not today)   -Grasp/Manip (not today)   -Log rolls w/ tripod pinch (not today)   9 Peg 3 trials (remove/replace)   CTS Decompression sequence 2 trials (1-13); 5 sec hold each position          Home Exercises and Education Provided     Education provided: Further edema mgmt with compressive Coban wrapping method.  Patient was educated on improtance of managing edema and the benefits of using a fingerless compression glove to assist in managing this problem.      Written Home Exercises Provided: Patient instructed to cont prior HEP.  Exercises were reviewed and Rodney was able to demonstrate them prior to the end of the session.  Rodney demonstrated good  understanding of the HEP provided.   .   See EMR under Patient Instructions for exercises provided prior visit.      Assessment     Patient continues to have a significant amount of localized edema and fibrotic tissue in R IF/LF/RF. Patient is agreement with obtaining fingerless compression gloves to address ongoing edema in R hand.  At this point progress has been limited for the last 2 treatment session due to excessive localized edema in R hand/fingers.  Recommend patient to follow up with referring MD for medical mgmt if problem persists.  Otherwise, patient would continue to benefit from skilled OT to address areas of deficits and maximize functioning of R wrist/hand for improved ADL/IADL performance     Rodney is progressing well towards her goals and there are no updates to goals at this time. Pt prognosis is  Good.     Pt will continue to benefit from skilled outpatient occupational therapy to address the deficits listed in the problem list on initial evaluation provide pt/family education and to maximize pt's level of independence in the home and community environment.     Anticipated barriers to occupational therapy: diabetes, pain    Pt's spiritual, cultural and educational needs considered and pt agreeable to plan of care and goals.    Goals:   Short Term Goals: (in 2 weeks)  1) Patient will be independent in HEP-ongoing  2) Decrease pain in R hand  to no more than 6-7/10 worst -met (5/2/2019)  3) Increase AROM in R digit TAMS by 15-20 for improved functioning in ADL/IADL's-part met  4) Increase R  strength by 3-5 psi for increased functional use-ongoing  5) Decrease edema in R hand by .2-.3  cm-ongoing     Long Term Goals: (in 8 weeks)  1) Decrease pain in R wrist/hand to no more than 3/10 worst-ongoing  2) Increase AROM of R digit TAMS to WFL for increased functioning in ADL/IADL's-ongoing  3) Increase strength in R hand by 15% of initial measures for improved functioning in ADL/IADL's-ongoing  4) Increased functioning in ADL/IADL's, as evidenced by a FOTO impairment rating of no more than 40%-ongoing  5) Decrease edema in R wrist/hand to trace or none-ongoing      Plan     Certification Period/Plan of care expiration: 4/15/2019 to 06/10/19.     Outpatient Occupational Therapy 3 times weekly for 8 weeks to include the following interventions: Paraffin, Manual therapy/joint mobilizations, Modalities for pain management, US 3 mhz, Therapeutic exercises/activities., Strengthening, Orthotic Fabrication/Fit/Training, Edema Control, Electrical Modalities, Joint Protection and Energy Conservation.    Russell Weber, OT

## 2019-05-06 ENCOUNTER — CLINICAL SUPPORT (OUTPATIENT)
Dept: REHABILITATION | Facility: HOSPITAL | Age: 39
End: 2019-05-06
Attending: PLASTIC SURGERY
Payer: COMMERCIAL

## 2019-05-06 DIAGNOSIS — M62.81 MUSCLE WEAKNESS: ICD-10-CM

## 2019-05-06 DIAGNOSIS — R27.9 INCOORDINATION: ICD-10-CM

## 2019-05-06 DIAGNOSIS — M25.641 JOINT STIFFNESS OF HAND, RIGHT: ICD-10-CM

## 2019-05-06 DIAGNOSIS — M79.641 RIGHT HAND PAIN: ICD-10-CM

## 2019-05-06 PROCEDURE — 97018 PARAFFIN BATH THERAPY: CPT | Mod: PO

## 2019-05-06 PROCEDURE — 97140 MANUAL THERAPY 1/> REGIONS: CPT | Mod: PO

## 2019-05-06 PROCEDURE — 97110 THERAPEUTIC EXERCISES: CPT | Mod: PO

## 2019-05-06 NOTE — PROGRESS NOTES
"  Occupational Therapy Daily Treatment Note     Date: 5/6/2019  Name: Rodney Infante  Clinic Number: [id number]    Therapy Diagnosis:   Encounter Diagnoses   Name Primary?    Right hand pain     Joint stiffness of hand, right     Muscle weakness     Incoordination      Physician: Nirav Lyn Jr*    Physician Orders: Eval & Treat  Medical Diagnosis: R CTS  Surgical Procedure and Date: None  Evaluation Date: 04/15/19  Insurance Authorization Period Expiration: 05/15/19  Plan of Care Certification Period: 4/15/2019 to 06/10/19  Date of Return to MD: 07/10/19    Visit # / Visits authorized: 7 / 20  Time In:9:05 pm  Time Out: 10:10 pm  Total Billable Time: 65 minutes (P, 2MT, 1TE)    Precautions:  Standard and Diabetes      Subjective     Pt reports: "I'm still swollen" (referring to her R hand).   she was compliant with home exercise program given last session.   Response to previous treatment: decrease in edema of hand  Functional change: patient is able to use her R hand in light ADL's with difficulty    Pain:  6/10  Location: right hand and wrist      Objective     Objective measures taekn today:  Edema. Measured in centimeters.    4/15/2019 4/15/2019 5/6/2019     Left Right Right   Wrist Crease 17.3 19.3 18.8 (-.5)   Mid palm 21.0 23.2 21.4 (-1.8)   MCPs 20.0 21.0 21.0 (=)     Rodney received the following supervised modalities (after being cleared for contradictions) for 10 minutes:   -Paraffin w/ MHP to R wrist/hand for 10 min, pre-tx to decrease pain & increase tissue extensibility    Rodney received the following manual therapy techniques for 40 minutes:   - Provided deep STM, including MFR, CFM, TPR & scar mgmt to R wrist/hand, using hand techniques and IASTM tools to increase blood flow/circulation, improve soft tissue pliability and decrease pain.  -Kinesiotaping: lymphatic taping pattern applied to dorsum of IF-RF through mid forearm for edema mgmt. Patient instructed on purpose, wear, " care, precautions to monitor and removal of KT.    -Provided edema mgmt w/ lymphatic drainage technique and compressive dressing wrapping of R forearm/wrist/hand/fingers with Coban over KT    Rodney received therapeutic exercises for 15 minutes including:TE per lo2019 Vist# 7   Dexterciser 3 min   Coordination Balls 3 min   T-Putty yellow   -Molding (not today)   -Grasp/Manip (not today)   -Log rolls w/ tripod pinch (not today)   9 Peg 3 trials (remove/replace)   CTS Decompression sequence 2 trials (1-13); 5 sec hold each position          Home Exercises and Education Provided     Education provided: Discussed results of objective measures for edema.  Reviewed edema mgmt for HEP.      Written Home Exercises Provided: Patient instructed to cont prior HEP.  Exercises were reviewed and Rodney was able to demonstrate them prior to the end of the session.  Rodney demonstrated good  understanding of the HEP provided.   .   See EMR under Patient Instructions for exercises provided prior visit.      Assessment     Patient has made some progress towards goals set for decreased edema in R wrist/hand, however, she continues to have a moderate localized edema.  She continues to have a significant amount of pain in her R wrist/hand but is less than initial evaluation. She continues to present with A1 pulley nodules in R IF, LF & RF, but seems to be improving with each treatment.  It is still recommended that patient to follow up with referring MD prior to next scheduled MD visit in .  Until then, patient would continue to benefit from skilled OT to address areas of deficits and maximize functioning of R wrist/hand for improved ADL/IADL performance     Rodney is progressing fairly well towards her goals and there are no updates to goals at this time. Pt prognosis is Good.     Pt will continue to benefit from skilled outpatient occupational therapy to address the deficits listed in the problem list on initial  evaluation provide pt/family education and to maximize pt's level of independence in the home and community environment.     Anticipated barriers to occupational therapy: diabetes, pain    Pt's spiritual, cultural and educational needs considered and pt agreeable to plan of care and goals.    Goals:   Short Term Goals: (in 2 weeks)  1) Patient will be independent in HEP-ongoing  2) Decrease pain in R hand  to no more than 6-7/10 worst -met (04/22/19)  3) Increase AROM in R digit TAMS by 15-20 for improved functioning in ADL/IADL's-part met  4) Increase R  strength by 3-5 psi for increased functional use-ongoing  5) Decrease edema in R hand by .2-.3  cm-met (05/06/19)     Long Term Goals: (in 8 weeks)  1) Decrease pain in R wrist/hand to no more than 3/10 worst-ongoing  2) Increase AROM of R digit TAMS to WFL for increased functioning in ADL/IADL's-ongoing  3) Increase strength in R hand by 15% of initial measures for improved functioning in ADL/IADL's-ongoing  4) Increased functioning in ADL/IADL's, as evidenced by a FOTO impairment rating of no more than 40%-ongoing  5) Decrease edema in R wrist/hand to trace or none-ongoing      Plan     Certification Period/Plan of care expiration: 4/15/2019 to 06/10/19.     Outpatient Occupational Therapy 3 times weekly for 8 weeks to include the following interventions: Paraffin, Manual therapy/joint mobilizations, Modalities for pain management, US 3 mhz, Therapeutic exercises/activities., Strengthening, Orthotic Fabrication/Fit/Training, Edema Control, Electrical Modalities, Joint Protection and Energy Conservation.    Russell Weber, OT

## 2019-05-07 ENCOUNTER — PROCEDURE VISIT (OUTPATIENT)
Dept: NEUROLOGY | Facility: CLINIC | Age: 39
End: 2019-05-07
Payer: COMMERCIAL

## 2019-05-07 DIAGNOSIS — G56.03 BILATERAL CARPAL TUNNEL SYNDROME: ICD-10-CM

## 2019-05-07 PROCEDURE — 95886 PR EMG COMPLETE, W/ NERVE CONDUCTION STUDIES, 5+ MUSCLES: ICD-10-PCS | Mod: S$GLB,,, | Performed by: PSYCHIATRY & NEUROLOGY

## 2019-05-07 PROCEDURE — 95912 PR NERVE CONDUCTION STUDY; 11 -12 STUDIES: ICD-10-PCS | Mod: S$GLB,,, | Performed by: PSYCHIATRY & NEUROLOGY

## 2019-05-07 PROCEDURE — 95886 MUSC TEST DONE W/N TEST COMP: CPT | Mod: S$GLB,,, | Performed by: PSYCHIATRY & NEUROLOGY

## 2019-05-07 PROCEDURE — 95912 NRV CNDJ TEST 11-12 STUDIES: CPT | Mod: S$GLB,,, | Performed by: PSYCHIATRY & NEUROLOGY

## 2019-05-08 ENCOUNTER — OFFICE VISIT (OUTPATIENT)
Dept: RHEUMATOLOGY | Facility: CLINIC | Age: 39
End: 2019-05-08
Payer: COMMERCIAL

## 2019-05-08 ENCOUNTER — LAB VISIT (OUTPATIENT)
Dept: LAB | Facility: HOSPITAL | Age: 39
End: 2019-05-08
Attending: INTERNAL MEDICINE
Payer: COMMERCIAL

## 2019-05-08 VITALS
HEIGHT: 65 IN | DIASTOLIC BLOOD PRESSURE: 70 MMHG | SYSTOLIC BLOOD PRESSURE: 110 MMHG | WEIGHT: 293 LBS | BODY MASS INDEX: 48.82 KG/M2

## 2019-05-08 DIAGNOSIS — M19.90 ARTHRITIS: ICD-10-CM

## 2019-05-08 DIAGNOSIS — M19.90 ARTHRITIS: Primary | ICD-10-CM

## 2019-05-08 PROCEDURE — 86705 HEP B CORE ANTIBODY IGM: CPT

## 2019-05-08 PROCEDURE — 3008F BODY MASS INDEX DOCD: CPT | Mod: CPTII,S$GLB,, | Performed by: INTERNAL MEDICINE

## 2019-05-08 PROCEDURE — 86255 FLUORESCENT ANTIBODY SCREEN: CPT | Mod: 91

## 2019-05-08 PROCEDURE — 3008F PR BODY MASS INDEX (BMI) DOCUMENTED: ICD-10-PCS | Mod: CPTII,S$GLB,, | Performed by: INTERNAL MEDICINE

## 2019-05-08 PROCEDURE — 99999 PR PBB SHADOW E&M-EST. PATIENT-LVL III: CPT | Mod: PBBFAC,,, | Performed by: INTERNAL MEDICINE

## 2019-05-08 PROCEDURE — 36415 COLL VENOUS BLD VENIPUNCTURE: CPT

## 2019-05-08 PROCEDURE — 99999 PR PBB SHADOW E&M-EST. PATIENT-LVL III: ICD-10-PCS | Mod: PBBFAC,,, | Performed by: INTERNAL MEDICINE

## 2019-05-08 PROCEDURE — 87340 HEPATITIS B SURFACE AG IA: CPT

## 2019-05-08 PROCEDURE — 99205 OFFICE O/P NEW HI 60 MIN: CPT | Mod: S$GLB,,, | Performed by: INTERNAL MEDICINE

## 2019-05-08 PROCEDURE — 99205 PR OFFICE/OUTPT VISIT, NEW, LEVL V, 60-74 MIN: ICD-10-PCS | Mod: S$GLB,,, | Performed by: INTERNAL MEDICINE

## 2019-05-08 PROCEDURE — 86704 HEP B CORE ANTIBODY TOTAL: CPT

## 2019-05-08 RX ORDER — SULFASALAZINE 500 MG/1
1000 TABLET, DELAYED RELEASE ORAL 2 TIMES DAILY
Qty: 40 TABLET | Refills: 0 | Status: SHIPPED | OUTPATIENT
Start: 2019-05-08 | End: 2019-05-18

## 2019-05-08 RX ORDER — NABUMETONE 750 MG/1
750 TABLET, FILM COATED ORAL 2 TIMES DAILY
Qty: 60 TABLET | Refills: 1 | Status: SHIPPED | OUTPATIENT
Start: 2019-05-08 | End: 2019-06-11

## 2019-05-08 NOTE — PATIENT INSTRUCTIONS
Nabumetone tablets  What is this medicine?  NABUMETONE (na BYOO me tone) is a non-steroidal anti-inflammatory drug (NSAID). It is used to reduce swelling and to treat pain. It is used for osteoarthritis or rheumatoid arthritis.  How should I use this medicine?  Take this medicine by mouth with a full glass of water. Follow the directions on the prescription label. You can take it with or without food. If it upsets your stomach, take it with food. Try to not lie down for at least 10 minutes after you take this medicine. Take your medicine at regular intervals. Do not take your medicine more often than directed. Long term, continuous use may increase the risk of heart attack or stroke.  A special MedGuide will be given to you by the pharmacist with each prescription and refill. Be sure to read this information carefully each time.  Talk to your pediatrician regarding the use of this medicine in children. Special care may be needed.  What side effects may I notice from receiving this medicine?  Side effects that you should report to your doctor or health care professional as soon as possible:  · black or bloody stools, blood in the urine or vomit  · blurred vision  · chest pain  · difficulty breathing or wheezing  · nausea or vomiting  · skin rash, skin redness, blistering or peeling skin, hives, or itching  · slurred speech or weakness on one side of the body  · severe stomach pain  · swelling of eyelids, throat, lips  · unexplained weight gain or swelling  · unusually weak or tired  · yellowing of eyes or skin  Side effects that usually do not require medical attention (report to your doctor or health care professional if they continue or are bothersome):  · constipation or diarrhea  · gas or heartburn  What may interact with this medicine?  · alcohol  · aspirin  · cidofovir  · diuretics  · lithium  · medicines for high blood pressure  · methotrexate  · other drugs for inflammation like ketorolac, ibuprofen, and  prednisone  · pemetrexed  · warfarin  What if I miss a dose?  If you miss a dose, take it as soon as you can. If it is almost time for your next dose, take only that dose. Do not take double or extra doses.  Where should I keep my medicine?  Keep out of the reach of children.  Store at room temperature between 15 and 30 degrees C (59 and 86 degrees F). Keep container tightly closed. Throw away any unused medicine after the expiration date.  What should I tell my health care provider before I take this medicine?  They need to know if you have any of these conditions:  · asthma  · cigarette smoker  · drink more than 3 alcohol containing drinks a day  · heart disease or circulation problems such as heart failure or leg edema (fluid retention)  · hemophilia or bleeding problems  · high blood pressure  · kidney disease  · liver disease  · stomach bleeding or ulcers  · an unusual or allergic reaction to nabumetone, aspirin, other NSAIDs, other medicines, foods, dyes, or preservatives  · pregnant or trying to get pregnant  · breast-feeding  What should I watch for while using this medicine?  Tell your doctor or health care professional if your pain does not get better. Talk to your doctor before taking another medicine for pain. Do not treat yourself.  This medicine does not prevent heart attack or stroke. In fact, this medicine may increase the chance of a heart attack or stroke. The chance may increase with longer use of this medicine and in people who have heart disease. If you take aspirin to prevent heart attack or stroke, talk with your doctor or health care professional.  Do not take medicines such as ibuprofen and naproxen with this medicine. Side effects such as stomach upset, nausea, or ulcers may be more likely to occur. Many medicines available without a prescription should not be taken with this medicine.  This medicine can cause ulcers and bleeding in the stomach and intestines at any time during treatment.  Do not smoke cigarettes or drink alcohol. These increase irritation to your stomach and can make it more susceptible to damage from this medicine. Ulcers and bleeding can happen without warning symptoms and can cause death.  You may get drowsy or dizzy. Do not drive, use machinery, or do anything that needs mental alertness until you know how this medicine affects you. Do not stand or sit up quickly, especially if you are an older patient. This reduces the risk of dizzy or fainting spells.  This medicine can cause you to bleed more easily. Try to avoid damage to your teeth and gums when you brush or floss your teeth.  NOTE:This sheet is a summary. It may not cover all possible information. If you have questions about this medicine, talk to your doctor, pharmacist, or health care provider. Copyright© 2017 [employer]        Sulfasalazine tablets  What is this medicine?  SULFASALAZINE (sul fa SAL a zeen) is used to treat ulcerative colitis.  How should I use this medicine?  Take this medicine by mouth with a full glass of water. Follow the directions on the prescription label. If the medicine upsets your stomach, take it with food or milk. Take your medicine at regular intervals. Do not take your medicine more often than directed. Do not stop taking except on your doctor's advice.  Talk to your pediatrician regarding the use of this medicine in children. While this drug may be prescribed for children as young as 6 years for selected conditions, precautions do apply.  Patients over 65 years old may have a stronger reaction and need a smaller dose.  What side effects may I notice from receiving this medicine?  Side effects that you should report to your doctor or health care professional as soon as possible:  · allergic reactions like skin rash, itching or hives, swelling of the face, lips, or tongue  · fever, chills, or any other sign of infection  · painful, difficult, or reduced urination  · redness, blistering,  peeling or loosening of the skin, including inside the mouth  · severe stomach pain  · unusual bleeding or bruising  · unusually weak or tired  · yellowing of the skin or eyes  Side effects that usually do not require medical attention (report to your doctor or health care professional if they continue or are bothersome):  · headache  · loss of appetite  · nausea, vomiting  · orange color to the urine  · reduced sperm count  What may interact with this medicine?  · digoxin  · folic acid  What if I miss a dose?  If you miss a dose, take it as soon as you can. If it is almost time for your next dose, take only that dose. Do not take double or extra doses.  Where should I keep my medicine?  Keep out of the reach of children.  Store at room temperature between 15 and 30 degrees C (59 and 86 degrees F). Keep container tightly closed. Throw away any unused medicine after the expiration date.  What should I tell my health care provider before I take this medicine?  They need to know if you have any of these conditions:  · asthma  · blood disorders or anemia  · glucose-6-phosphate dehydrogenase (G6PD) deficiency  · intestinal obstruction  · kidney disease  · liver disease  · porphyria  · urinary tract obstruction  · an unusual reaction to sulfasalazine, sulfa drugs, salicylates, or other medicines, foods, dyes, or preservatives  · pregnant or trying to get pregnant  · breast-feeding  What should I watch for while using this medicine?  Visit your doctor or health care professional for regular checks on your progress. You will need frequent blood and urine checks.  This medicine can make you more sensitive to the sun. Keep out of the sun. If you cannot avoid being in the sun, wear protective clothing and use sunscreen. Do not use sun lamps or tanning beds/booths.  Drink plenty of water while taking this medicine.  NOTE:This sheet is a summary. It may not cover all possible information. If you have questions about this  medicine, talk to your doctor, pharmacist, or health care provider. Copyright© 2017 [employer]

## 2019-05-08 NOTE — PROGRESS NOTES
RHEUMATOLOGY OUTPATIENT CLINIC NOTE    5/8/2019    Attending Rheumatologist: Ken Alberto  Primary Care Provider: [facility]   Physician Requesting Consultation: Aaareferral Self  No address on file  Chief Complaint/Reason For Consultation:  Pain    Subjective:       HPI  Rodney Infante is a 38 y.o. Black or  female with hand pain and swelling refer for rheumatic evaluation.  Previously seen by Rheumatology on December 2014.  Impression was DJD, recommended for bariatric surgery evaluation.  She has a history of morbid obesity, diabetes type 2, peripheral vascular disease, and ulcers.  Her main complaint today is chronic right hand pain and swelling.  Onset in November, worsened around February without any particular precipitating event or food/beverage intake. Denies association with trauma or previous episodes.  Pain is sharp, 10/10, worse in the evening.  Associated with tenderness, swelling, and stiffness of the hand.  Good response to ibuprofen 2/10 (which she takes above recommended dose), and resolution of symptoms reported with Medrol pack provided by PMD.  Denies history of uveitis, dactylitis or joint swelling otherwise, rash, or IBD symptoms.    Review of Systems   Constitutional: Negative for chills, fever and weight loss.   HENT:        Denies eye or mouth sicca symptoms, denies oral ulcers, denies parotid swelling, denies swollen glands.   Eyes: Negative for blurred vision, photophobia, pain and redness.   Respiratory: Positive for shortness of breath (LIRA). Negative for cough, hemoptysis and sputum production.    Cardiovascular: Negative for chest pain and PND.   Gastrointestinal: Negative for abdominal pain, blood in stool, constipation, diarrhea and heartburn.   Genitourinary: Positive for hematuria (Chronic, intermittent.). Negative for dysuria.        Denies genital ulcers or sicca symptoms, denies foaming of the urine, denies nephrolithiasis.   Musculoskeletal:  Positive for joint pain (Right hand, associated with swelling and stiffness.). Negative for back pain, myalgias and neck pain.   Skin: Negative for rash.        + history of ulcers, onset as a teenager.    Denies photosensitivity, denies alopecia, denies sclerodactyly, denies Raynaud's phenomenon,denies digital ulcers, no psoriasis, oral/nasal ulcerations, no purpura, denies nodules.   Neurological: Positive for tingling (Intermittent on hands and feet.). Negative for sensory change, focal weakness, seizures, weakness and headaches.   Endo/Heme/Allergies: Does not bruise/bleed easily.   Psychiatric/Behavioral: Negative for substance abuse. The patient has insomnia (Due to right hand pain.).    All other systems reviewed and are negative.    Chronic comorbid conditions affecting medical decision making today:  Past Medical History:   Diagnosis Date    Anemia     Diabetes mellitus, type 2     Neuropathy      Past Surgical History:   Procedure Laterality Date    CYST REMOVAL  2014, 2/2018    back    DEBRIDEMENT-WOUND N/A 2/16/2018    Performed by Marcel [facility] at [facility]    EXCISION-HIDRADENITIS N/A 5/10/2018    Performed by Marcel Pennington MD at [facility] OR     Family History   Problem Relation Age of Onset    No Known Problems Mother     Drug abuse Father     No Known Problems Sister      Social History     Substance and Sexual Activity   Alcohol Use Yes    Comment: occasionally     Social History     Tobacco Use   Smoking Status Never Smoker   Smokeless Tobacco Never Used     Social History     Substance and Sexual Activity   Drug Use No       Current Outpatient Medications:     canagliflozin-metformin (INVOKAMET XR) 150-1,000 mg TBph, Take by mouth., Disp: , Rfl:     carvedilol (COREG) 25 MG tablet, , Disp: , Rfl:     clindamycin (CLEOCIN) 300 MG capsule, Take 300 mg by mouth every 8 (eight) hours., Disp: , Rfl:     clotrimazole-betamethasone 1-0.05% (LOTRISONE) cream, , Disp: , Rfl:      cyanocobalamin (VITAMIN B-12) 1000 MCG tablet, Take 1 tablet (1,000 mcg total) by mouth once daily. This is a prescription for 1 year.  Take 1 tablet Monday, Wednesday, and Friday, Disp: 36 tablet, Rfl: 3    ergocalciferol (ERGOCALCIFEROL) 50,000 unit Cap, , Disp: , Rfl:     estradiol cypionate (DEPO-ESTRADIOL) 5 mg/mL injection, Inject into the muscle every 28 days., Disp: , Rfl:     gentamicin (GARAMYCIN) 0.1 % ointment, , Disp: , Rfl:     glimepiride (AMARYL) 4 MG tablet, Take 1 tablet (4 mg total) by mouth before dinner., Disp: 30 tablet, Rfl: 5    losartan-hydrochlorothiazide 100-12.5 mg (HYZAAR) 100-12.5 mg Tab, , Disp: , Rfl:     ondansetron (ZOFRAN) 4 MG tablet, , Disp: , Rfl:     TOPCARE UNIVERSAL1 LANCET Misc, , Disp: , Rfl:     TRUETRACK TEST Strp, , Disp: , Rfl:     vitamin D 1000 units Tab, Take 1,000 Units by mouth once daily., Disp: , Rfl:     acetaminophen-codeine 300-30mg (TYLENOL #3) 300-30 mg Tab, , Disp: , Rfl:     nabumetone (RELAFEN) 750 MG tablet, Take 1 tablet (750 mg total) by mouth 2 (two) times daily., Disp: 60 tablet, Rfl: 1    sulfaSALAzine (AZULFIDINE) 500 MG TbEC, Take 2 tablets (1,000 mg total) by mouth 2 (two) times daily. for 10 days, Disp: 40 tablet, Rfl: 0     Objective:         Vitals:    05/08/19 0953   BP: 110/70     Physical Exam   Nursing note and vitals reviewed.  Constitutional: She is oriented to person, place, and time and well-developed, well-nourished, and in no distress.   Obese BMI 60.6   HENT:   Head: Normocephalic.   no parotid enlargement.   Eyes: Conjunctivae are normal. Pupils are equal, round, and reactive to light.   Absent Episcleritis/scleritis.   Neck: Normal range of motion.   Cardiovascular: Normal rate and intact distal pulses.    Pulmonary/Chest: Effort normal. No respiratory distress.   Abdominal: Soft. She exhibits no distension.   Neurological: She is alert and oriented to person, place, and time. Gait normal.   absent sensory  deficits  muscle strength 5/5 through.   Lhermitte's sign - Spurling's test -   Skin: No rash noted.     Lipodermatosclerosis appearance lower extremity, evidence of multiple healed ulcers.    No Skin fibrosis, teleangiectasias, rashes, discoid lesions, purpura, nodules, or livedo reticularis, no nail pitting.   Musculoskeletal: Normal range of motion. She exhibits edema, tenderness and deformity ([place]-neck right 5th PIP, reducible Boutonniere appearance some PIPs).   :  Limited due to pain on right hand  Swelling ++, Warmth, +, Erythema -  History form swelling most fingers right hand, wrist.  Synovitis Rt PIPs, and wrist.  No synovitis elsewhere.    AROM:  Limited due to pain on right hand, otherwise intact  PROM: intact    Devices used by patient: none    + crepitus knees.       Reviewed old and all outside pertinent medical records available.    All lab results personally reviewed and interpreted by me.  Lab Results   Component Value Date    WBC 13.94 (H) 04/03/2019    HGB 10.4 (L) 04/03/2019    HCT 33.3 (L) 04/03/2019    MCV 87 04/03/2019    MCH 27.0 04/03/2019    MCHC 31.2 (L) 04/03/2019    RDW 14.4 04/03/2019     (H) 04/03/2019    MPV 8.1 (L) 04/03/2019    PLTEST Increased (A) 11/12/2018     Lab Results   Component Value Date     04/03/2019    K 3.5 04/03/2019     04/03/2019    CO2 19 (L) 04/03/2019     (H) 04/03/2019    BUN 9 04/03/2019    CALCIUM 9.4 04/03/2019    PROT 10.0 (H) 04/03/2019    ALBUMIN 2.6 (L) 04/03/2019    BILITOT 0.2 04/03/2019    AST 10 04/03/2019    ALKPHOS 103 04/03/2019    ALT 10 04/03/2019     Lab Results   Component Value Date    COLORU Yellow 02/27/2018    APPEARANCEUA Cloudy (A) 02/27/2018    SPECGRAV 1.020 02/27/2018    PHUR 5.0 02/27/2018    PROTEINUA Negative 02/27/2018    KETONESU Negative 02/27/2018    LEUKOCYTESUR 3+ (A) 02/27/2018    NITRITE Negative 02/27/2018    UROBILINOGEN Negative 02/27/2018     Lab Results   Component Value Date    CRP 37.7  (H) 04/03/2019       Lab Results   Component Value Date    SEDRATE 95 (H) 02/11/2019       Lab Results   Component Value Date    RF <10.0 04/03/2019    SEDRATE 95 (H) 02/11/2019       No components found for: 25OHVITDTOT, 91CDARYO5, 49GCTDZL0, METHODNOTE    No results found for: URICACID    No components found for: TSPOTTB    · HbA1C 8.4% (2016)  · Uric acid 6 October 2013  · SPEP/NAN 2/2018: Increased total protein. Increased gamma globulins, polyclonal pattern.  No discrete monoclonal peaks identified.    Rheum Labs:  MILTON negative  Rheumatoid factor/CCP negative  SSA negative  CRP 37.7     Infectious Labs:  HCV antibody nonreactive April 2019     Imaging:  All imaging reviewed and independently  interpreted by me.    X-ray hand March 2014  No definite bony or joint abnormalities demonstrated in the right hand.  Joint spaces are preserved.  There is no soft tissue swelling   appreciated.    X-ray hands May 2019  juxta-articular calcification adjacent to the DIP joint index and middle fingers with question of small erosions at the distal radial margin of the middle phalanx index finger.  Mild narrowing of the radial aspect of the radiocarpal articulation is questioned.  Joint spaces appear otherwise well maintained.  Alignment anatomic.      EMG May 2018  normal study, though there are significant differences between the right and left median nerve sensory and motor nerve conduction findings, which may indicate early carpal tunnel pathology on the right.       · Biopsies  Lower back subcutaneous mass x2 biopsy May 2018.  Skin fragment #1 benign skin with central defect contiguous with a sinus tract lined by acutely and chronically  inflamed granulation tissue.  No evidence of atypia or malignancy.  The lesion appears to be completely excised.  Skin fragment #2 benign skin with central ulceration lined by acutely and chronically inflamed granulation tissue.  No evidence of atypia or malignancy.     Infected skin  subcutaneous fascia 2/2018.   Infected skin and subcutaneous fascia:  -Skin and subcutaneous tissue represented  -Subcutaneous soft tissue with necroinflammatory debris  -No atypia or malignancy     ASSESSMENT / PLAN:     Rodney Infante is a 38 y.o. Black or  female with:    1. Rt hand swelling  - Negative rheumatoid factor, dactylitis/synovitis present, radiographic evidence of juxta-articular new bone formation and probable erosion.  - considering for seronegative inflammatory arthritis or peripheral spondyloarthritis.  Good response to NSAIDs and systemic corticosteroids.  - will recommend trial of sulfasalazine and Relafen.  - depending on response, may consider for sending HLA B27 and/or do MRI or affected joint.  - blood work as below.  - nabumetone (RELAFEN) 750 MG tablet; Take 1 tablet (750 mg total) by mouth 2 (two) times daily.  Dispense: 60 tablet; Refill: 1  - sulfaSALAzine (AZULFIDINE) 500 MG TbEC; Take 2 tablets (1,000 mg total) by mouth 2 (two) times daily. for 10 days  Dispense: 40 tablet; Refill: 0  - Hepatitis B surface antigen; Future  - Hepatitis B core antibody, total; Future  - Hepatitis B core antibody, IgM; Future    2. History of ulcers and paresthesias  - findings be explained by organic causes.  - recommend for ANCA screen.  - of mention, patient with history of history of diabetes, venous stasis, and currently on therapy with INVOKAMET.  - This agent has being recommended to be discontinued in the setting of prior amputation, peripheral vascular disease, neuropathy, and diabetic foot ulcers; due to risk of increased incidence of lower limb amputation.  Manage per PMD recommendations.    3. Other specified counseling  - over 10 minutes spent regarding below topics:  - Immunization counseling done.  - Weight loss counseling done.  - Nutrition and exercise counseling.  - Medication counseling provided.    4. Morbid Obesity  - would benefit from decreasing at least 10%  of body weight.  - recommended goal of losing 1 lb per week.  - consider nutritionist evaluation.  - would consider screening for ASHISH per PMD.    Follow up in about 1 month (around 6/8/2019).    Method of contact with patient concerns: [facility]    Disclaimer:  This note is prepared using voice recognition software and as such is likely to have errors and has not been proof read. Please contact me for questions.     Time spent: 60 minutes in face to face discussion concerning diagnosis, prognosis, review of lab and test results, benefits of treatment as well as management of disease, counseling of patient and coordination of care between various health care providers.  Greater than half the time spent was used for coordination of care and counseling of patient.    Ken Alberto M.D.  Rheumatology Department   [facility]

## 2019-05-09 LAB
HBV CORE AB SERPL QL IA: NEGATIVE
HBV CORE IGM SERPL QL IA: NEGATIVE
HBV SURFACE AG SERPL QL IA: NEGATIVE

## 2019-05-10 ENCOUNTER — CLINICAL SUPPORT (OUTPATIENT)
Dept: REHABILITATION | Facility: HOSPITAL | Age: 39
End: 2019-05-10
Attending: PLASTIC SURGERY
Payer: COMMERCIAL

## 2019-05-10 DIAGNOSIS — M62.81 MUSCLE WEAKNESS: ICD-10-CM

## 2019-05-10 DIAGNOSIS — M79.641 RIGHT HAND PAIN: ICD-10-CM

## 2019-05-10 DIAGNOSIS — M25.641 JOINT STIFFNESS OF HAND, RIGHT: ICD-10-CM

## 2019-05-10 DIAGNOSIS — R27.9 INCOORDINATION: ICD-10-CM

## 2019-05-10 PROCEDURE — 97018 PARAFFIN BATH THERAPY: CPT | Mod: PO

## 2019-05-10 PROCEDURE — 97535 SELF CARE MNGMENT TRAINING: CPT | Mod: PO

## 2019-05-10 PROCEDURE — 97140 MANUAL THERAPY 1/> REGIONS: CPT | Mod: PO

## 2019-05-10 PROCEDURE — 97110 THERAPEUTIC EXERCISES: CPT | Mod: PO

## 2019-05-10 NOTE — PROGRESS NOTES
"  Occupational Therapy Daily Treatment Note     Date: 5/10/2019  Name: Rodney Gonzalez [place]  Clinic Number: [id number]    Therapy Diagnosis:   Encounter Diagnoses   Name Primary?    Right hand pain     Joint stiffness of hand, right     Muscle weakness     Incoordination      Physician: Nirav Lyn Jr*    Physician Orders: Eval & Treat  Medical Diagnosis: R CTS  Surgical Procedure and Date: None  Evaluation Date: 04/15/19  Insurance Authorization Period Expiration: 05/15/19  Plan of Care Certification Period: 4/15/2019 to 06/10/19  Date of Return to MD: 07/10/19    Visit # / Visits authorized: 8 / 20  Time In:9:05 pm  Time Out: 10:00 am  Total Billable Time: 55 minutes (P, 1MT, 1TE, 1ADL)    Precautions:  Standard and Diabetes      Subjective     Pt reports: "I had an appointment with the rheumatologist and had a nerve conduction study since I came here last".   she was compliant with home exercise program given last session.   Response to previous treatment: decrease in edema of hand  Functional change: patient is able to use her R hand in light ADL's with difficulty    Pain:  5/10  Location: right hand and wrist      Objective     Rodney received the following supervised modalities (after being cleared for contradictions) for 10 minutes:   -Paraffin w/ MHP to R wrist/hand for 10 min, pre-tx to decrease pain & increase tissue extensibility    Rodney received the following manual therapy techniques for 20 minutes:   - Provided deep STM, including MFR, CFM, TPR & scar mgmt to R wrist/hand, using hand techniques and IASTM tools to increase blood flow/circulation, improve soft tissue pliability and decrease pain.  -Kinesiotaping: lymphatic taping pattern applied to dorsum of IF-RF through mid forearm for edema mgmt. Patient instructed on purpose, wear, care, precautions to monitor and removal of KT.    -Provided edema mgmt w/ lymphatic drainage technique and compressive dressing wrapping of R " forearm/wrist/hand/fingers with Coban over KT    Rodney received Self-care/ADL's management training for 15 minutes to include the following:   -Adaptive equipment and methods for long term management of ADL/IADL's for maximum independence; Discussed Joint Protection Principles in ADL/IADL's    Rodney received therapeutic exercises for 10 minutes including:TE per lo/10/2019 Vist# [id number]   TGE's (wave, hook & fist) 10 reps each   Dexterciser 3 min   Coordination Balls 3 min          Home Exercises and Education Provided     Education provided: Discussed results of objective measures for edema.  Reviewed edema mgmt for HEP.      Written Home Exercises Provided: Patient instructed to cont prior HEP.  Exercises were reviewed and Rodney was able to demonstrate them prior to the end of the session.  Rodney demonstrated good  understanding of the HEP provided.   .   See EMR under Patient Instructions for exercises provided prior visit.      Assessment     Patient continues to have ongoing localized edema in her R hand, however, in review of her recent rheumatology office visit, she was placed on a new NSAID medication.  It is expected that this will improve.  Patient has also agreed to obtain compression gloves to help manage edema in R hand.  Her pain continues to remain at a moderate intensity regardless of rest at this time.  Synovitis in R palm at MCP's has decreased, but remains unresolved.  Patient would continue to benefit from skilled OT to address areas of deficits and maximize functioning of R wrist/hand for improved ADL/IADL performance and focus on further pt education for long-term mgmt of arthritis condition.     Rodney is progressing fairly well towards her goals and there are no updates to goals at this time. Pt prognosis is Good.     Anticipated barriers to occupational therapy: diabetes, pain    Pt's spiritual, cultural and educational needs considered and pt agreeable to plan of care and goals.    Goals:    Short Term Goals: (in 2 weeks)  1) Patient will be independent in HEP-ongoing  2) Decrease pain in R hand  to no more than 6-7/10 worst -met (04/22/19)  3) Increase AROM in R digit TAMS by 15-20 for improved functioning in ADL/IADL's-part met  4) Increase R  strength by 3-5 psi for increased functional use-ongoing  5) Decrease edema in R hand by .2-.3  cm-met (05/06/19)     Long Term Goals: (in 8 weeks)  1) Decrease pain in R wrist/hand to no more than 3/10 worst-ongoing  2) Increase AROM of R digit TAMS to WFL for increased functioning in ADL/IADL's-ongoing  3) Increase strength in R hand by 15% of initial measures for improved functioning in ADL/IADL's-ongoing  4) Increased functioning in ADL/IADL's, as evidenced by a FOTO impairment rating of no more than 40%-ongoing  5) Decrease edema in R wrist/hand to trace or none-ongoing      Plan     Certification Period/Plan of care expiration: 4/15/2019 to 06/10/19.     Outpatient Occupational Therapy 3 times weekly for 8 weeks to include the following interventions: Paraffin, Manual therapy/joint mobilizations, Modalities for pain management, US 3 mhz, Therapeutic exercises/activities., Strengthening, Orthotic Fabrication/Fit/Training, Edema Control, Electrical Modalities, Joint Protection and Energy Conservation.    Russell Weber, OT

## 2019-05-13 LAB
ANCA AB TITR SER IF: NORMAL TITER
P-ANCA TITR SER IF: NORMAL TITER

## 2019-05-14 PROBLEM — L02.212 ABSCESS OF BACK: Status: RESOLVED | Noted: 2018-02-15 | Resolved: 2019-05-14

## 2019-05-14 PROBLEM — D75.838 REACTIVE THROMBOCYTOSIS: Status: ACTIVE | Noted: 2019-05-14

## 2019-05-14 PROBLEM — R77.8 ELEVATED TOTAL PROTEIN: Status: ACTIVE | Noted: 2019-05-14

## 2019-05-17 ENCOUNTER — CLINICAL SUPPORT (OUTPATIENT)
Dept: REHABILITATION | Facility: HOSPITAL | Age: 39
End: 2019-05-17
Attending: PLASTIC SURGERY
Payer: COMMERCIAL

## 2019-05-17 DIAGNOSIS — M62.81 MUSCLE WEAKNESS: ICD-10-CM

## 2019-05-17 DIAGNOSIS — M79.641 RIGHT HAND PAIN: ICD-10-CM

## 2019-05-17 DIAGNOSIS — M25.641 JOINT STIFFNESS OF HAND, RIGHT: ICD-10-CM

## 2019-05-17 DIAGNOSIS — R27.9 INCOORDINATION: ICD-10-CM

## 2019-05-17 PROCEDURE — 97140 MANUAL THERAPY 1/> REGIONS: CPT | Mod: PO

## 2019-05-17 PROCEDURE — 97018 PARAFFIN BATH THERAPY: CPT | Mod: PO

## 2019-05-17 PROCEDURE — 97110 THERAPEUTIC EXERCISES: CPT | Mod: PO

## 2019-05-17 NOTE — PROGRESS NOTES
"  Occupational Therapy Daily Treatment Note     Date: 2019  Name: Rodney Gonzalez [place]  Clinic Number: [id number]    Therapy Diagnosis:   Encounter Diagnoses   Name Primary?    Right hand pain     Joint stiffness of hand, right     Muscle weakness     Incoordination      Physician: Nirav Lyn Jr*    Physician Orders: Eval & Treat  Medical Diagnosis: R CTS  Surgical Procedure and Date: None  Evaluation Date: 04/15/19  Insurance Authorization Period Expiration: 05/15/19  Plan of Care Certification Period: 4/15/2019 to 06/10/19  Date of Return to MD: 07/10/19    Visit # / Visits authorized:   Time In:10:00 pm  Time Out: 11:00 am  Total Billable Time: 60 minutes (P, 1MT, 2TE)    Precautions:  Standard and Diabetes      Subjective     Pt reports: "The numbness & tingling has gotten better, but the pain still comes and goes, mostly at night".   she was compliant with home exercise program given last session.   Response to previous treatment: decrease in edema of hand  Functional change: patient is able to use her R hand in light ADL's with difficulty    Pain:  4/10  Location: right hand and wrist      Objective     Rodney received the following supervised modalities (after being cleared for contradictions) for 10 minutes:   -Paraffin w/ MHP to R wrist/hand for 10 min, pre-tx to decrease pain & increase tissue extensibility    Rodney received the following manual therapy techniques for 20 minutes:   - Provided deep STM, including MFR, CFM, TPR & scar mgmt to R wrist/hand, using hand techniques and IASTM tools to increase blood flow/circulation, improve soft tissue pliability and decrease pain.    Rodney received therapeutic exercises for 30 minutes including:TE per lo2019    TGE's (wave, hook & fist) 10 reps each   Dexterciser 3 min   Coordination Balls 3 min   T-putty yellow   -molding 3 min   -grasp/manip 3 reps   -log roll & tripod pinch 2 logs        Rodney received an LMB PIP extension " splint today for R LF/RF and instructions on recommended wear, care, precautions to monitor for HEP.  She verbalized understanding of instruct today.      Home Exercises and Education Provided     Education provided: Discussed results of objective measures for edema.  Reviewed edema mgmt for HEP.      Written Home Exercises Provided: Patient instructed to cont prior HEP.  Exercises were reviewed and Rodney was able to demonstrate them prior to the end of the session.  Rodney demonstrated good  understanding of the HEP provided.   .   See EMR under Patient Instructions for exercises provided prior visit.      Assessment     Patient is noted to have a slight decrease in pain since last treatment.  She is also noted to have an increase in A/PROM for composite fist.  She is tolerating treatment well.  Patient would continue to benefit from skilled OT to address areas of deficits and maximize functioning of R wrist/hand for improved ADL/IADL performance and focus on further pt education for long-term mgmt of arthritis condition.     Rodney is progressing fairly well towards her goals and there are no updates to goals at this time. Pt prognosis is Good.     Anticipated barriers to occupational therapy: diabetes, pain    Pt's spiritual, cultural and educational needs considered and pt agreeable to plan of care and goals.    Goals:   Short Term Goals: (in 2 weeks)  1) Patient will be independent in HEP-ongoing  2) Decrease pain in R hand  to no more than 6-7/10 worst -met (04/22/19)  3) Increase AROM in R digit TAMS by 15-20 for improved functioning in ADL/IADL's-part met  4) Increase R  strength by 3-5 psi for increased functional use-ongoing  5) Decrease edema in R hand by .2-.3  cm-met (05/06/19)     Long Term Goals: (in 8 weeks)  1) Decrease pain in R wrist/hand to no more than 3/10 worst-ongoing  2) Increase AROM of R digit TAMS to WFL for increased functioning in ADL/IADL's-ongoing  3) Increase strength in R hand  by 15% of initial measures for improved functioning in ADL/IADL's-ongoing  4) Increased functioning in ADL/IADL's, as evidenced by a FOTO impairment rating of no more than 40%-ongoing  5) Decrease edema in R wrist/hand to trace or none-ongoing      Plan     Certification Period/Plan of care expiration: 4/15/2019 to 06/10/19.     Outpatient Occupational Therapy 3 times weekly for 8 weeks to include the following interventions: Paraffin, Manual therapy/joint mobilizations, Modalities for pain management, US 3 mhz, Therapeutic exercises/activities., Strengthening, Orthotic Fabrication/Fit/Training, Edema Control, Electrical Modalities, Joint Protection and Energy Conservation.    Russell Weber, OT

## 2019-05-27 ENCOUNTER — CLINICAL SUPPORT (OUTPATIENT)
Dept: REHABILITATION | Facility: HOSPITAL | Age: 39
End: 2019-05-27
Attending: PLASTIC SURGERY
Payer: COMMERCIAL

## 2019-05-27 DIAGNOSIS — R27.9 INCOORDINATION: ICD-10-CM

## 2019-05-27 DIAGNOSIS — M25.641 JOINT STIFFNESS OF HAND, RIGHT: ICD-10-CM

## 2019-05-27 DIAGNOSIS — M79.641 RIGHT HAND PAIN: ICD-10-CM

## 2019-05-27 DIAGNOSIS — M62.81 MUSCLE WEAKNESS: ICD-10-CM

## 2019-05-27 PROCEDURE — 97018 PARAFFIN BATH THERAPY: CPT | Mod: PO

## 2019-05-27 PROCEDURE — 97110 THERAPEUTIC EXERCISES: CPT | Mod: PO

## 2019-05-27 PROCEDURE — 97140 MANUAL THERAPY 1/> REGIONS: CPT | Mod: PO

## 2019-05-27 NOTE — PROGRESS NOTES
"  Occupational Therapy Daily Treatment Note     Date: 5/27/2019  Name: Rodney Infante  Clinic Number: [id number]    Therapy Diagnosis:   Encounter Diagnoses   Name Primary?    Right hand pain     Joint stiffness of hand, right     Muscle weakness     Incoordination      Physician: Nirav Lyn Jr*    Physician Orders: Eval & Treat  Medical Diagnosis: R CTS  Surgical Procedure and Date: None  Evaluation Date: 04/15/19  Insurance Authorization Period Expiration: 05/15/19  Plan of Care Certification Period: 4/15/2019 to 06/10/19  Date of Return to MD: 07/10/19    Visit # / Visits authorized: 10 / 20  Time In:8:00 pm  Time Out: 9:00 am  Total Billable Time: 60minutes (P, 1MT, 2TE)    Precautions:  Standard and Diabetes      Subjective     Pt reports: "I still can't use my hand like I need to use it". Patient reported obtaining compression gloves and feels it helps with the pain and temporary relief of edema in R hand. She also reports that pain mostly limits her functioning   she was compliant with home exercise program given last session.   Response to previous treatment: decrease in edema of hand  Functional change: None reported today    Pain:  7/10 prior to treatment; 4/10 by end of treatment  Location: right hand and wrist      Objective     Objective measures taken today, as follows:  Edema. Measured in centimeters.    4/15/2019 4/15/2019 05/27/19     Left Right Right   Wrist Crease 17.3 19.3 18.5 (-.8)   Mid palm 21.0 23.2 21.4 (-1.8)   MCPs 20.0 21.0 20.1 (-.9)      Forearm/Wrist ROM (measured in degrees)   04/15/19 04/15/19 05/27/19     Left  Right Right   Forearm Sup/pron WFL 60/WFL 60/WFL   Wrist E/F WFL 44/20 65/50 (+)   Wrist RD/UD WFL 9/22 15/35 (+)   Thumb R/P Abd WFL 42/38  42/38 (=)   Thumb MP flex WFL  45 62 (+)   Thumb IP flex WFL 30 45 (+)   Thumb Jackhorn WFL Base of RF SF DPC         Hand ROM. Measured in degrees.    4/15/2019 4/15/2019 05/27/19     Left Right Right   Index: MP  WFL " -7/48 -2/50              PIP     WFL -10/41 -10/79              DIP WFL 0/13 0/25              TOVAR WFL 85 142 (+57)            Long:  MP WFL -9/54 0/79              PIP WFL -15/60 -11/80              DIP WFL +4/9 +4/26              TOVAR WFL 95 178 (+83)            Ring:   MP WFL -7/72 -5/82              PIP WFL -16/63 -15/90              DIP WFL +10/9 +10/31              TOVAR  193 (+62)   :        Small:  MP WFL -5/64 0/84               PIP WFL 0/43 0/83               DIP WFL -24/40 -12/70              TOVAR  225 (+107)       Strength (Dyanmometer) and Pinch Strength (Pinch Gauge)  Measured in pounds and psi. Average of three trials.    4/15/2019 4/15/2019 05/27/19     Left Right Right   Rung II 45 3 9   Lopez Pinch 10 1 2   3pt Pinch 11 1 1   2pt Pinch 10 0 .5      Rodney received the following supervised modalities (after being cleared for contradictions) for 10 minutes:   -Paraffin w/ MHP to R wrist/hand for 10 min, pre-tx to decrease pain & increase tissue extensibility    Rodney received the following manual therapy techniques for 15 minutes:   - Provided deep STM, including MFR, CFM, TPR & scar mgmt to R wrist/hand, using hand techniques and IASTM tools to increase blood flow/circulation, improve soft tissue pliability and decrease pain.    Rodney received therapeutic exercises for 35 minutes including objective measures taken above:  TGE's (wave, hook & fist) 10 reps each   Dexterciser 3 min   9 Peg 3 trials   Coordination Balls 3 min   T-putty yellow   -molding 3 min   -grasp/manip (not today)3 reps   -log roll & tripod pinch 2 logs   -dowel digs 1 trial       Home Exercises and Education Provided     Education provided: Discussed results of objective measures listed above.  Reviewed edema mgmt for HEP.      Written Home Exercises Provided: Patient instructed to cont prior HEP.  Exercises were reviewed and Amanloraine was able to demonstrate them prior to the end of the session.  Amanloraine demonstrated  good  understanding of the HEP provided.   .   See EMR under Patient Instructions for exercises provided prior visit.      Assessment     Patient has made good progress towards goals set at initial evaluation, including increased ROM, strength and coordination of R hand. She continues to have significant pain and unresolved edema, however, progress has been made here as well.   She is tolerating treatment well.  Patient would continue to benefit from skilled OT to address areas of deficits and maximize functioning of R wrist/hand for improved ADL/IADL performance and focus on further pt education for long-term mgmt of arthritis condition.     Rodney is progressing fairly well towards her goals and there are no updates to goals at this time. Pt prognosis is Good.     Anticipated barriers to occupational therapy: diabetes, pain    Pt's spiritual, cultural and educational needs considered and pt agreeable to plan of care and goals.    Goals:   Short Term Goals: (in 2 weeks)  1) Patient will be independent in HEP-met  2) Decrease pain in R hand  to no more than 6-7/10 worst -met (04/22/19)  3) Increase AROM in R digit TAMS by 15-20 for improved functioning in ADL/IADL's-met/ongoing (05/27/19)  4) Increase R  strength by 3-5 psi for increased functional use-met/ongoing (05/27/19)  5) Decrease edema in R hand by .2-.3  cm-met/ongoing (05/27/19)     Long Term Goals: (in 8 weeks)  1) Decrease pain in R wrist/hand to no more than 3/10 worst-ongoing  2) Increase AROM of R digit TAMS to WFL for increased functioning in ADL/IADL's-ongoing  3) Increase strength in R hand by 15% of initial measures for improved functioning in ADL/IADL's-ongoing  4) Increased functioning in ADL/IADL's, as evidenced by a FOTO impairment rating of no more than 40%-ongoing  5) Decrease edema in R wrist/hand to trace or none-ongoing      Plan     Certification Period/Plan of care expiration: 4/15/2019 to 06/10/19.     Outpatient Occupational  Therapy 3 times weekly for 8 weeks to include the following interventions: Paraffin, Manual therapy/joint mobilizations, Modalities for pain management, US 3 mhz, Therapeutic exercises/activities., Strengthening, Orthotic Fabrication/Fit/Training, Edema Control, Electrical Modalities, Joint Protection and Energy Conservation.    Russell Weber, OT

## 2019-05-29 ENCOUNTER — CLINICAL SUPPORT (OUTPATIENT)
Dept: REHABILITATION | Facility: HOSPITAL | Age: 39
End: 2019-05-29
Attending: PLASTIC SURGERY
Payer: COMMERCIAL

## 2019-05-29 DIAGNOSIS — M79.641 RIGHT HAND PAIN: ICD-10-CM

## 2019-05-29 DIAGNOSIS — M25.641 JOINT STIFFNESS OF HAND, RIGHT: ICD-10-CM

## 2019-05-29 DIAGNOSIS — R27.9 INCOORDINATION: ICD-10-CM

## 2019-05-29 DIAGNOSIS — M62.81 MUSCLE WEAKNESS: ICD-10-CM

## 2019-05-29 PROCEDURE — 97140 MANUAL THERAPY 1/> REGIONS: CPT | Mod: PO

## 2019-05-29 PROCEDURE — 97035 APP MDLTY 1+ULTRASOUND EA 15: CPT | Mod: PO

## 2019-05-29 PROCEDURE — 97110 THERAPEUTIC EXERCISES: CPT | Mod: PO

## 2019-05-29 NOTE — PROGRESS NOTES
"  Occupational Therapy Daily Treatment Note     Date: 5/29/2019  Name: Rodney Infante  Clinic Number: [id number]    Therapy Diagnosis:   Encounter Diagnoses   Name Primary?    Right hand pain     Joint stiffness of hand, right     Muscle weakness     Incoordination      Physician: Nirav Lyn Jr*    Physician Orders: Eval & Treat  Medical Diagnosis: R CTS  Surgical Procedure and Date: None  Evaluation Date: 04/15/19  Insurance Authorization Period Expiration: 05/15/19  Plan of Care Certification Period: 4/15/2019 to 06/10/19  Date of Return to MD: 07/10/19, Rheumatologist 06/11/19    Visit # / Visits authorized: 11 / 20  Time In:11:00 am  Time Out: 12::00 pm  Total Billable Time: 60minutes (1U/S, 2MT, 1TE)    Precautions:  Standard and Diabetes      Subjective     Pt reports: "The glove seems to be helping the swelling, but my hand is still pretty swollen"  she was compliant with home exercise program given last session.   Response to previous treatment: decrease in edema of hand  Functional change: None reported today    Pain:  4/10 prior to treatment; 3-4/10 by end of treatment  Location: right hand and wrist      Objective      Rodney received the following direct contact modalities (after being cleared for contradictions) for 10 minutes:   -3.0 MHz, .8 W/cm2 pulsed to R wrist/hand, to decrease pain & edema, increase circulation and tissue extensibility    Rodney received the following manual therapy techniques for 25 minutes:   - Provided deep STM, including MFR, CFM, TPR & scar mgmt to R wrist/hand, using hand techniques and IASTM tools to increase blood flow/circulation, improve soft tissue pliability and decrease pain.    -Kinesiotaping: spiral wrap to IF, LF & RF, with EDF/lymphatic taping pattern over dorsum of hand/wrist/forearm for edema mgmt; space correction over dorsal wrist and arroyo MCP's.  Patient instructed on purpose, wear, care, precautions to monitor and removal of KT. Patient " verbalized understanding of all instructions provided.     Rodney received therapeutic exercises for 35 minutes including objective measures taken above:  TGE's (wave, hook & fist) 10 reps each   Dexterciser 3 min   9 Peg 3 trials   Coordination Balls 3 min   T-putty yellow   -molding 3 min   -grasp/manip (not today)3 reps   -log roll & tripod pinch 2 logs   -dowel digs 1 trial       Home Exercises and Education Provided     Education provided: Discussed results of objective measures listed above.  Reviewed edema mgmt for HEP.      Written Home Exercises Provided: Patient instructed to cont prior HEP.  Exercises were reviewed and Rodney was able to demonstrate them prior to the end of the session.  Rodney demonstrated good  understanding of the HEP provided.   .   See EMR under Patient Instructions for exercises provided prior visit.      Assessment     Pain level is decreasing and edema has decreased as well, however, both continue to remain deficits that are affecting her progress.  It is recommended that patient return to referring MD to address medical mgmt of edema at next follow up visit.   She is tolerating treatment well.  Patient would continue to benefit from skilled OT to address areas of deficits and maximize functioning of R wrist/hand for improved ADL/IADL performance and focus on further pt education for long-term mgmt of arthritis condition.     Rodney is progressing fairly well towards her goals and there are no updates to goals at this time. Pt prognosis is Good.     Anticipated barriers to occupational therapy: diabetes, pain    Pt's spiritual, cultural and educational needs considered and pt agreeable to plan of care and goals.    Goals:   Short Term Goals: (in 2 weeks)  1) Patient will be independent in HEP-met  2) Decrease pain in R hand  to no more than 6-7/10 worst -met (04/22/19)  3) Increase AROM in R digit TAMS by 15-20 for improved functioning in ADL/IADL's-met/ongoing (05/27/19)  4)  Increase R  strength by 3-5 psi for increased functional use-met/ongoing (05/27/19)  5) Decrease edema in R hand by .2-.3  cm-met/ongoing (05/27/19)     Long Term Goals: (in 8 weeks)  1) Decrease pain in R wrist/hand to no more than 3/10 worst-ongoing  2) Increase AROM of R digit TAMS to WFL for increased functioning in ADL/IADL's-ongoing  3) Increase strength in R hand by 15% of initial measures for improved functioning in ADL/IADL's-ongoing  4) Increased functioning in ADL/IADL's, as evidenced by a FOTO impairment rating of no more than 40%-ongoing  5) Decrease edema in R wrist/hand to trace or none-ongoing      Plan     Certification Period/Plan of care expiration: 4/15/2019 to 06/10/19.     Outpatient Occupational Therapy 3 times weekly for 8 weeks to include the following interventions: Paraffin, Manual therapy/joint mobilizations, Modalities for pain management, US 3 mhz, Therapeutic exercises/activities., Strengthening, Orthotic Fabrication/Fit/Training, Edema Control, Electrical Modalities, Joint Protection and Energy Conservation.    Russell Weber, OT

## 2019-06-06 ENCOUNTER — CLINICAL SUPPORT (OUTPATIENT)
Dept: REHABILITATION | Facility: HOSPITAL | Age: 39
End: 2019-06-06
Attending: PLASTIC SURGERY
Payer: COMMERCIAL

## 2019-06-06 DIAGNOSIS — M25.641 JOINT STIFFNESS OF HAND, RIGHT: ICD-10-CM

## 2019-06-06 DIAGNOSIS — M79.641 RIGHT HAND PAIN: ICD-10-CM

## 2019-06-06 DIAGNOSIS — M62.81 MUSCLE WEAKNESS: ICD-10-CM

## 2019-06-06 DIAGNOSIS — R27.9 INCOORDINATION: ICD-10-CM

## 2019-06-06 PROCEDURE — 97018 PARAFFIN BATH THERAPY: CPT | Mod: PO

## 2019-06-06 PROCEDURE — 97110 THERAPEUTIC EXERCISES: CPT | Mod: PO

## 2019-06-06 PROCEDURE — 97140 MANUAL THERAPY 1/> REGIONS: CPT | Mod: PO

## 2019-06-06 NOTE — PROGRESS NOTES
"  Occupational Therapy Daily Treatment Note     Date: 6/6/2019  Name: Rodnye Gonzalez [place]  Clinic Number: [id number]    Therapy Diagnosis:   Encounter Diagnoses   Name Primary?    Right hand pain     Joint stiffness of hand, right     Muscle weakness     Incoordination      Physician: Nirav Lyn Jr*    Physician Orders: Eval & Treat  Medical Diagnosis: R CTS  Surgical Procedure and Date: None  Evaluation Date: 04/15/19  Insurance Authorization Period Expiration: 05/15/19  Plan of Care Certification Period: 4/15/2019 to 06/10/19  Date of Return to MD: 07/10/19, Rheumatologist 06/11/19    Visit # / Visits authorized: 12 / 20    Time In:8:00 am  Time Out: 9::05 pm  Total Billable Time: 60 minutes (P, 1MT, 2TE)    Precautions:  Standard and Diabetes      Subjective     Pt reports: "I have an appointment with a rheumatologist after my next therapy visit"   she was compliant with home exercise program given last session.   Response to previous treatment: decrease in pain & edema of R hand  Functional change: able to hold objects and Light use of R hand in ADL tasks    Pain:  3/10 prior to treatment; 2/10 by end of treatment  Location: right hand and wrist      Objective      Rodney received the following supervised modalities (after being cleared for contradictions) for 10 minutes:   -Paraffin w/ MHP to R wrist/hand, pre-tx to decrease pain & increase tissue extensibility    Rodney received the following manual therapy techniques for 15 minutes:   - Provided deep STM, including MFR, CFM, TPR & scar mgmt to R wrist/hand, using hand techniques and IASTM tools to increase blood flow/circulation, improve soft tissue pliability and decrease pain.    Rodney received therapeutic exercises for 35 minutes including objective measures taken above:    TGE's (wave, hook & fist) 10 reps each   Dexterciser 3 min   9 Peg 3 trials   Coordination Balls 3 min   T-putty yellow   -molding 3 min   -grasp/manip (not today)3 reps "   -log roll & tripod pinch 2 logs   -dowel digs 1 trial       Home Exercises and Education Provided     Education provided: Reviewed HEP and activity restrictin.      Written Home Exercises Provided: Patient instructed to cont prior HEP.  Exercises were reviewed and Rodney was able to demonstrate them prior to the end of the session.  Rodney demonstrated good  understanding of the HEP provided.   .   See EMR under Patient Instructions for exercises provided prior visit.      Assessment     Pain level is decreasing and edema has decreased as well, however, both continue to remain deficits that are affecting her progress. Patient is going to a rheumatologist consult next week.  Continued therapy will be contingent on results and findings.  She is tolerating treatment well.  Patient would continue to benefit from skilled OT to address areas of deficits and maximize functioning of R wrist/hand for improved ADL/IADL performance and focus on further pt education for long-term mgmt of arthritis condition.     Rodney is progressing fairly well towards her goals and there are no updates to goals at this time. Pt prognosis is Good.     Anticipated barriers to occupational therapy: diabetes, pain    Pt's spiritual, cultural and educational needs considered and pt agreeable to plan of care and goals.    Goals:   Short Term Goals: (in 2 weeks)  1) Patient will be independent in HEP-met  2) Decrease pain in R hand  to no more than 6-7/10 worst -met (04/22/19)  3) Increase AROM in R digit TAMS by 15-20 for improved functioning in ADL/IADL's-met/ongoing (05/27/19)  4) Increase R  strength by 3-5 psi for increased functional use-met/ongoing (05/27/19)  5) Decrease edema in R hand by .2-.3  cm-met/ongoing (05/27/19)     Long Term Goals: (in 8 weeks)  1) Decrease pain in R wrist/hand to no more than 3/10 worst-ongoing  2) Increase AROM of R digit TAMS to WFL for increased functioning in ADL/IADL's-ongoing  3) Increase strength in  R hand by 15% of initial measures for improved functioning in ADL/IADL's-ongoing  4) Increased functioning in ADL/IADL's, as evidenced by a FOTO impairment rating of no more than 40%-ongoing  5) Decrease edema in R wrist/hand to trace or none-ongoing      Plan     Certification Period/Plan of care expiration: 4/15/2019 to 06/10/19.     Outpatient Occupational Therapy 3 times weekly for 8 weeks to include the following interventions: Paraffin, Manual therapy/joint mobilizations, Modalities for pain management, US 3 mhz, Therapeutic exercises/activities., Strengthening, Orthotic Fabrication/Fit/Training, Edema Control, Electrical Modalities, Joint Protection and Energy Conservation.    Russell Weber, OT

## 2019-06-10 ENCOUNTER — CLINICAL SUPPORT (OUTPATIENT)
Dept: REHABILITATION | Facility: HOSPITAL | Age: 39
End: 2019-06-10
Payer: COMMERCIAL

## 2019-06-10 DIAGNOSIS — M62.81 MUSCLE WEAKNESS: ICD-10-CM

## 2019-06-10 DIAGNOSIS — R27.9 INCOORDINATION: ICD-10-CM

## 2019-06-10 DIAGNOSIS — M25.641 JOINT STIFFNESS OF HAND, RIGHT: ICD-10-CM

## 2019-06-10 DIAGNOSIS — M79.641 RIGHT HAND PAIN: ICD-10-CM

## 2019-06-10 PROCEDURE — 97140 MANUAL THERAPY 1/> REGIONS: CPT | Mod: PO

## 2019-06-10 PROCEDURE — 97110 THERAPEUTIC EXERCISES: CPT | Mod: PO

## 2019-06-10 PROCEDURE — 97018 PARAFFIN BATH THERAPY: CPT | Mod: PO

## 2019-06-10 NOTE — PROGRESS NOTES
"  Occupational Therapy Daily Treatment Note     Date: 6/10/2019  Name: Rodney Gonzalez [place]  Clinic Number: [id number]    Therapy Diagnosis:   Encounter Diagnoses   Name Primary?    Right hand pain     Joint stiffness of hand, right     Muscle weakness     Incoordination      Physician: Nirav Lyn Jr*    Physician Orders: Eval & Treat  Medical Diagnosis: R CTS  Surgical Procedure and Date: None  Evaluation Date: 04/15/19  Insurance Authorization Period Expiration: 05/15/19  Plan of Care Certification Period: 4/15/2019 to 06/10/19  Date of Return to MD: 07/10/19, Rheumatologist 06/11/19    Visit # / Visits authorized: 13 / 20    Time In:8:00 am  Time Out: 9::05 pm  Total Billable Time: 65 minutes (P, 1MT, 2TE)    Precautions:  Standard and Diabetes      Subjective     Pt reports: "The swelling is down slightly and not as much pain in my hand "   she was compliant with home exercise program given last session.   Response to previous treatment: decrease in pain & edema of R hand  Functional change: able to hold objects and Light use of R hand in ADL tasks    Pain:  2/10 prior to treatment  Location: right hand and wrist      Objective      Rodney received the following supervised modalities (after being cleared for contradictions) for 10 minutes:   -Paraffin w/ MHP to R wrist/hand, pre-tx to decrease pain & increase tissue extensibility    Rodney received the following manual therapy techniques for 15 minutes:   - Provided deep STM, including MFR, CFM, TPR & scar mgmt to R wrist/hand, using hand techniques and IASTM tools to increase blood flow/circulation, improve soft tissue pliability and decrease pain.    Rodney received therapeutic exercises for 40 minutes including objective measures taken above:    TGE's (wave, hook & fist) 10 reps each   Dexterciser 3 min   9 Peg 3 trials   Coordination Balls 3 min   T-putty Yellow/green   -molding 3 min   -grasp/manip 3 reps   -log roll & tripod pinch 2 logs "   -dowel digs 3 min   Ergo gripper 2/10 reps, 3 red bands       Home Exercises and Education Provided     Education provided: Reviewed HEP and activity restrictin.      Written Home Exercises Provided: Patient instructed to cont prior HEP.  Exercises were reviewed and Rodney was able to demonstrate them prior to the end of the session.  Rodney demonstrated good  understanding of the HEP provided.   .   See EMR under Patient Instructions for exercises provided prior visit.      Assessment     Pain level continues to be low and edema appears to be decreasing as well in her R hand.  She tolerated treatment well today without any increase in pain or edema.  Patient would continue to benefit from skilled OT to address areas of deficits and maximize functioning of R wrist/hand for improved ADL/IADL performance and focus on further pt education for long-term mgmt of arthritis condition. Await results and recommendations from upcoming rheumatology appointment.     Rodney is progressing fairly well towards her goals and there are no updates to goals at this time. Pt prognosis is Good.     Anticipated barriers to occupational therapy: diabetes, pain    Pt's spiritual, cultural and educational needs considered and pt agreeable to plan of care and goals.    Goals:   Short Term Goals: (in 2 weeks)  1) Patient will be independent in HEP-met  2) Decrease pain in R hand  to no more than 6-7/10 worst -met (04/22/19)  3) Increase AROM in R digit TAMS by 15-20 for improved functioning in ADL/IADL's-met/ongoing (05/27/19)  4) Increase R  strength by 3-5 psi for increased functional use-met/ongoing (05/27/19)  5) Decrease edema in R hand by .2-.3  cm-met/ongoing (05/27/19)     Long Term Goals: (in 8 weeks)  1) Decrease pain in R wrist/hand to no more than 3/10 worst-ongoing  2) Increase AROM of R digit TAMS to WFL for increased functioning in ADL/IADL's-ongoing  3) Increase strength in R hand by 15% of initial measures for improved  functioning in ADL/IADL's-ongoing  4) Increased functioning in ADL/IADL's, as evidenced by a FOTO impairment rating of no more than 40%-ongoing  5) Decrease edema in R wrist/hand to trace or none-ongoing      Plan     Certification Period/Plan of care expiration: 4/15/2019 to 06/10/19.     Outpatient Occupational Therapy 3 times weekly for 8 weeks to include the following interventions: Paraffin, Manual therapy/joint mobilizations, Modalities for pain management, US 3 mhz, Therapeutic exercises/activities., Strengthening, Orthotic Fabrication/Fit/Training, Edema Control, Electrical Modalities, Joint Protection and Energy Conservation.    Russell Weber, OT

## 2019-06-11 ENCOUNTER — OFFICE VISIT (OUTPATIENT)
Dept: RHEUMATOLOGY | Facility: CLINIC | Age: 39
End: 2019-06-11
Payer: COMMERCIAL

## 2019-06-11 ENCOUNTER — LAB VISIT (OUTPATIENT)
Dept: LAB | Facility: HOSPITAL | Age: 39
End: 2019-06-11
Attending: INTERNAL MEDICINE
Payer: COMMERCIAL

## 2019-06-11 VITALS
HEART RATE: 117 BPM | HEIGHT: 65 IN | BODY MASS INDEX: 48.82 KG/M2 | SYSTOLIC BLOOD PRESSURE: 146 MMHG | WEIGHT: 293 LBS | DIASTOLIC BLOOD PRESSURE: 92 MMHG

## 2019-06-11 DIAGNOSIS — Z71.89 COUNSELING ON HEALTH PROMOTION AND DISEASE PREVENTION: ICD-10-CM

## 2019-06-11 DIAGNOSIS — M19.90 ARTHRITIS: Primary | ICD-10-CM

## 2019-06-11 DIAGNOSIS — E66.01 MORBID OBESITY WITH BMI OF 50.0-59.9, ADULT: ICD-10-CM

## 2019-06-11 DIAGNOSIS — M19.90 ARTHRITIS: ICD-10-CM

## 2019-06-11 DIAGNOSIS — Z79.60 LONG-TERM USE OF IMMUNOSUPPRESSANT MEDICATION: ICD-10-CM

## 2019-06-11 DIAGNOSIS — G56.03 BILATERAL CARPAL TUNNEL SYNDROME: ICD-10-CM

## 2019-06-11 LAB
ALBUMIN SERPL BCP-MCNC: 2.6 G/DL (ref 3.5–5.2)
ALP SERPL-CCNC: 102 U/L (ref 55–135)
ALT SERPL W/O P-5'-P-CCNC: 9 U/L (ref 10–44)
ANION GAP SERPL CALC-SCNC: 11 MMOL/L (ref 8–16)
AST SERPL-CCNC: 8 U/L (ref 10–40)
BASOPHILS # BLD AUTO: 0.03 K/UL (ref 0–0.2)
BASOPHILS NFR BLD: 0.2 % (ref 0–1.9)
BILIRUB SERPL-MCNC: 0.2 MG/DL (ref 0.1–1)
BUN SERPL-MCNC: 5 MG/DL (ref 6–20)
CALCIUM SERPL-MCNC: 8.9 MG/DL (ref 8.7–10.5)
CHLORIDE SERPL-SCNC: 107 MMOL/L (ref 95–110)
CO2 SERPL-SCNC: 20 MMOL/L (ref 23–29)
CREAT SERPL-MCNC: 0.8 MG/DL (ref 0.5–1.4)
CRP SERPL-MCNC: 36.3 MG/L (ref 0–8.2)
DIFFERENTIAL METHOD: ABNORMAL
EOSINOPHIL # BLD AUTO: 0.3 K/UL (ref 0–0.5)
EOSINOPHIL NFR BLD: 2.1 % (ref 0–8)
ERYTHROCYTE [DISTWIDTH] IN BLOOD BY AUTOMATED COUNT: 15.9 % (ref 11.5–14.5)
ERYTHROCYTE [SEDIMENTATION RATE] IN BLOOD BY WESTERGREN METHOD: 72 MM/HR (ref 0–36)
EST. GFR  (AFRICAN AMERICAN): >60 ML/MIN/1.73 M^2
EST. GFR  (NON AFRICAN AMERICAN): >60 ML/MIN/1.73 M^2
GLUCOSE SERPL-MCNC: 251 MG/DL (ref 70–110)
HCT VFR BLD AUTO: 34.2 % (ref 37–48.5)
HGB BLD-MCNC: 10 G/DL (ref 12–16)
IMM GRANULOCYTES # BLD AUTO: 0.28 K/UL (ref 0–0.04)
IMM GRANULOCYTES NFR BLD AUTO: 2.1 % (ref 0–0.5)
LYMPHOCYTES # BLD AUTO: 3.5 K/UL (ref 1–4.8)
LYMPHOCYTES NFR BLD: 26.2 % (ref 18–48)
MCH RBC QN AUTO: 25 PG (ref 27–31)
MCHC RBC AUTO-ENTMCNC: 29.2 G/DL (ref 32–36)
MCV RBC AUTO: 86 FL (ref 82–98)
MONOCYTES # BLD AUTO: 0.9 K/UL (ref 0.3–1)
MONOCYTES NFR BLD: 6.9 % (ref 4–15)
NEUTROPHILS # BLD AUTO: 8.6 K/UL (ref 1.8–7.7)
NEUTROPHILS NFR BLD: 64.6 % (ref 38–73)
NRBC BLD-RTO: 0 /100 WBC
PLATELET # BLD AUTO: 508 K/UL (ref 150–350)
PMV BLD AUTO: 8.1 FL (ref 9.2–12.9)
POTASSIUM SERPL-SCNC: 3.8 MMOL/L (ref 3.5–5.1)
PROT SERPL-MCNC: 8.9 G/DL (ref 6–8.4)
RBC # BLD AUTO: 4 M/UL (ref 4–5.4)
SODIUM SERPL-SCNC: 138 MMOL/L (ref 136–145)
WBC # BLD AUTO: 13.31 K/UL (ref 3.9–12.7)

## 2019-06-11 PROCEDURE — 85652 RBC SED RATE AUTOMATED: CPT

## 2019-06-11 PROCEDURE — 3008F PR BODY MASS INDEX (BMI) DOCUMENTED: ICD-10-PCS | Mod: CPTII,S$GLB,, | Performed by: INTERNAL MEDICINE

## 2019-06-11 PROCEDURE — 36415 COLL VENOUS BLD VENIPUNCTURE: CPT

## 2019-06-11 PROCEDURE — 99999 PR PBB SHADOW E&M-EST. PATIENT-LVL III: CPT | Mod: PBBFAC,,, | Performed by: INTERNAL MEDICINE

## 2019-06-11 PROCEDURE — 99215 PR OFFICE/OUTPT VISIT, EST, LEVL V, 40-54 MIN: ICD-10-PCS | Mod: S$GLB,,, | Performed by: INTERNAL MEDICINE

## 2019-06-11 PROCEDURE — 80053 COMPREHEN METABOLIC PANEL: CPT

## 2019-06-11 PROCEDURE — 99215 OFFICE O/P EST HI 40 MIN: CPT | Mod: S$GLB,,, | Performed by: INTERNAL MEDICINE

## 2019-06-11 PROCEDURE — 99999 PR PBB SHADOW E&M-EST. PATIENT-LVL III: ICD-10-PCS | Mod: PBBFAC,,, | Performed by: INTERNAL MEDICINE

## 2019-06-11 PROCEDURE — 3008F BODY MASS INDEX DOCD: CPT | Mod: CPTII,S$GLB,, | Performed by: INTERNAL MEDICINE

## 2019-06-11 PROCEDURE — 86140 C-REACTIVE PROTEIN: CPT

## 2019-06-11 PROCEDURE — 81374 HLA I TYPING 1 ANTIGEN LR: CPT

## 2019-06-11 PROCEDURE — 85025 COMPLETE CBC W/AUTO DIFF WBC: CPT

## 2019-06-11 RX ORDER — HYDROXYCHLOROQUINE SULFATE 200 MG/1
400 TABLET, FILM COATED ORAL NIGHTLY
Qty: 60 TABLET | Refills: 2 | Status: SHIPPED | OUTPATIENT
Start: 2019-06-11 | End: 2019-10-08

## 2019-06-11 RX ORDER — METHOTREXATE 2.5 MG/1
15 TABLET ORAL
Qty: 40 TABLET | Refills: 2 | Status: SHIPPED | OUTPATIENT
Start: 2019-06-11 | End: 2019-08-27

## 2019-06-11 RX ORDER — SULFASALAZINE 500 MG/1
1000 TABLET, DELAYED RELEASE ORAL 2 TIMES DAILY
Qty: 40 TABLET | Refills: 0 | Status: SHIPPED | OUTPATIENT
Start: 2019-06-11 | End: 2019-06-21

## 2019-06-11 RX ORDER — FOLIC ACID 1 MG/1
1 TABLET ORAL DAILY
Qty: 30 TABLET | Refills: 4 | Status: SHIPPED | OUTPATIENT
Start: 2019-06-11 | End: 2019-10-08

## 2019-06-11 RX ORDER — GABAPENTIN 100 MG/1
300 CAPSULE ORAL NIGHTLY
Qty: 90 CAPSULE | Refills: 3 | Status: SHIPPED | OUTPATIENT
Start: 2019-06-11 | End: 2020-05-07 | Stop reason: SDUPTHER

## 2019-06-11 NOTE — PROGRESS NOTES
RHEUMATOLOGY OUTPATIENT CLINIC NOTE    6/11/2019    Attending Rheumatologist: Ken Alberto  Primary Care Provider: [facility]   Physician Requesting Consultation: No referring provider defined for this encounter.  Chief Complaint/Reason For Consultation:  Arthritis    Subjective:       HPI  Rodney Infante is a 38 y.o. Black or  female with hand pain and swelling comes for follow-up.    Today  Last seen on early May.  Suspected for inflammatory arthritis based on pattern of joint pain and synovitis on exam.  Relafen and sulfasalazine recommended.  No response to Relafen, has not yet seen any effect of sulfasalazine.  No acute complaints today.  Reports having joint pain swelling, located on right hands 2nd/3rd MCPs and PIPs.  Reports prolonged morning stiffness approximately 2 hr.  Denies any rash or /GI complaints.  Does not have any active ulcers at this time.    Review of Systems   Constitutional: Negative for chills and fever.   Eyes: Negative for pain and redness.   Respiratory: Positive for shortness of breath (LIRA). Negative for cough.    Cardiovascular: Negative for chest pain and PND.   Gastrointestinal: Negative for abdominal pain, blood in stool, diarrhea and melena.   Genitourinary: Negative for dysuria and urgency. Hematuria: Chronic, intermittent.   Musculoskeletal: Positive for joint pain (Right hand, associated with swelling and stiffness.). Negative for back pain.   Skin: Negative for rash.        + history of ulcers, onset as a teenager.    Denies photosensitivity, denies alopecia, denies sclerodactyly, denies Raynaud's phenomenon,denies digital ulcers, no psoriasis, oral/nasal ulcerations, no purpura, denies nodules.   Neurological: Positive for tingling (Intermittent on hands and feet.). Negative for focal weakness and weakness.   Endo/Heme/Allergies: Does not bruise/bleed easily.   Psychiatric/Behavioral: Negative for substance abuse. The patient has insomnia  (Due to right hand pain.).    All other systems reviewed and are negative.    Chronic comorbid conditions affecting medical decision making today:  Past Medical History:   Diagnosis Date    Anemia     Arthritis     Diabetes mellitus, type 2     Neuropathy      Past Surgical History:   Procedure Laterality Date    CYST REMOVAL  2014, 2/2018    back    DEBRIDEMENT-WOUND N/A 2/16/2018    Performed by [facility] at [facility]    EXCISION-HIDRADENITIS N/A 5/10/2018    Performed by Marcel [facility]ton [facility] at [facility] OR     Family History   Problem Relation Age of Onset    No Known Problems Mother     Drug abuse Father     No Known Problems Sister      Social History     Substance and Sexual Activity   Alcohol Use Yes    Comment: occasionally     Social History     Tobacco Use   Smoking Status Never Smoker   Smokeless Tobacco Never Used     Social History     Substance and Sexual Activity   Drug Use No       Current Outpatient Medications:     canagliflozin-metformin (INVOKAMET XR) 150-1,000 mg TBph, Take by mouth., Disp: , Rfl:     carvedilol (COREG) 25 MG tablet, , Disp: , Rfl:     clotrimazole-betamethasone 1-0.05% (LOTRISONE) cream, , Disp: , Rfl:     cyanocobalamin (VITAMIN B-12) 1000 MCG tablet, Take 1 tablet (1,000 mcg total) by mouth once daily. This is a prescription for 1 year.  Take 1 tablet Monday, Wednesday, and Friday, Disp: 36 tablet, Rfl: 3    ergocalciferol (ERGOCALCIFEROL) 50,000 unit Cap, , Disp: , Rfl:     estradiol cypionate (DEPO-ESTRADIOL) 5 mg/mL injection, Inject into the muscle every 28 days., Disp: , Rfl:     glimepiride (AMARYL) 4 MG tablet, Take 1 tablet (4 mg total) by mouth before dinner., Disp: 30 tablet, Rfl: 5    losartan-hydrochlorothiazide 100-12.5 mg (HYZAAR) 100-12.5 mg Tab, , Disp: , Rfl:     ondansetron (ZOFRAN) 4 MG tablet, , Disp: , Rfl:     TOPCARE UNIVERSAL1 LANCET Misc, , Disp: , Rfl:     TRUETRACK TEST Strp, , Disp: , Rfl:     folic acid (FOLVITE) 1  MG tablet, Take 1 tablet (1 mg total) by mouth once daily., Disp: 30 tablet, Rfl: 4    gabapentin (NEURONTIN) 100 MG capsule, Take 3 capsules (300 mg total) by mouth every evening., Disp: 90 capsule, Rfl: 3    hydroxychloroquine (PLAQUENIL) 200 mg tablet, Take 2 tablets (400 mg total) by mouth every evening., Disp: 60 tablet, Rfl: 2    methotrexate 2.5 MG Tab, Take 6 tablets (15 mg total) by mouth every 7 days., Disp: 40 tablet, Rfl: 2    sulfaSALAzine (AZULFIDINE) 500 MG TbEC, Take 2 tablets (1,000 mg total) by mouth 2 (two) times daily. for 10 days, Disp: 40 tablet, Rfl: 0     Objective:         Vitals:    06/11/19 0924   BP: (!) 146/92   Pulse: (!) 117     Physical Exam   Nursing note and vitals reviewed.  Constitutional: She is oriented to person, place, and time and well-developed, well-nourished, and in no distress.   Obese BMI 60.7   HENT:   Head: Normocephalic.   no parotid enlargement.   Eyes: Conjunctivae are normal. Pupils are equal, round, and reactive to light.   Absent Episcleritis/scleritis.   Neck: Normal range of motion.   Cardiovascular: Normal rate and intact distal pulses.    Pulmonary/Chest: Effort normal. No respiratory distress.   Abdominal: Soft. She exhibits no distension.   Neurological: She is alert and oriented to person, place, and time. Gait normal.   absent sensory deficits  muscle strength 5/5 through.   Lhermitte's sign - Spurling's test -   Skin: No erythema.     Lipodermatosclerosis appearance lower extremity, evidence of multiple healed ulcers.   Musculoskeletal: Normal range of motion. She exhibits edema, tenderness (2nd/3rd PIPs and MCP Rt hand Volar aspect MCP 2nd/3rd rt.  ) and deformity ([place]-neck right 5th PIP, reducible Boutonniere appearance some PIPs).   :  Limited due to pain on right hand  Swelling ++, Warmth, +, Erythema -  History form swelling most fingers right hand, wrist.  Synovitis Rt PIPs, and wrist.  No synovitis elsewhere.    AROM:  Limited due to pain  on right hand, otherwise intact  PROM: intact    Devices used by patient: none    + crepitus knees.       Reviewed old and all outside pertinent medical records available.    All lab results personally reviewed and interpreted by me.  Lab Results   Component Value Date    WBC 14.38 (H) 05/13/2019    HGB 10.5 (L) 05/13/2019    HCT 34.9 (L) 05/13/2019    MCV 88 05/13/2019    MCH 26.3 (L) 05/13/2019    MCHC 30.1 (L) 05/13/2019    RDW 15.0 (H) 05/13/2019     (H) 05/13/2019    MPV 8.2 (L) 05/13/2019    PLTEST Increased (A) 11/12/2018     Lab Results   Component Value Date     05/13/2019    K 3.8 05/13/2019     05/13/2019    CO2 19 (L) 05/13/2019     (H) 05/13/2019    BUN 9 05/13/2019    CALCIUM 9.4 05/13/2019    PROT 9.3 (H) 05/13/2019    ALBUMIN 2.5 (L) 05/13/2019    BILITOT 0.2 05/13/2019    AST 11 05/13/2019    ALKPHOS 109 05/13/2019    ALT 11 05/13/2019     Lab Results   Component Value Date    COLORU Yellow 02/27/2018    APPEARANCEUA Cloudy (A) 02/27/2018    SPECGRAV 1.020 02/27/2018    PHUR 5.0 02/27/2018    PROTEINUA Negative 02/27/2018    KETONESU Negative 02/27/2018    LEUKOCYTESUR 3+ (A) 02/27/2018    NITRITE Negative 02/27/2018    UROBILINOGEN Negative 02/27/2018     Lab Results   Component Value Date    CRP 37.7 (H) 04/03/2019       Lab Results   Component Value Date    SEDRATE 108 (H) 05/13/2019       Lab Results   Component Value Date    RF <10.0 04/03/2019    SEDRATE 108 (H) 05/13/2019       No components found for: 25OHVITDTOT, 84QFIBHL0, 04VAFFBA9, METHODNOTE    No results found for: URICACID    No components found for: TSPOTTB    · HbA1C 8.4% (2016)  · Uric acid 6 October 2013  · SPEP/NAN 2/2018: Increased total protein. Increased gamma globulins, polyclonal pattern.  No discrete monoclonal peaks identified.    Rheum Labs:  MILTON negative  Rheumatoid factor/CCP negative  SSA negative  ANCA screen negative  CRP 37.7     Infectious Labs:  HCV antibody nonreactive April 2019    Hepatitis panel nonreactive    Imaging:  All imaging reviewed and independently  interpreted by me.    X-ray hand March 2014  No definite bony or joint abnormalities demonstrated in the right hand.  Joint spaces are preserved.  There is no soft tissue swelling   appreciated.    X-ray hands May 2019  juxta-articular calcification adjacent to the DIP joint index and middle fingers with question of small erosions at the distal radial margin of the middle phalanx index finger.  Mild narrowing of the radial aspect of the radiocarpal articulation is questioned.  Joint spaces appear otherwise well maintained.  Alignment anatomic.      EMG May 2018  normal study, though there are significant differences between the right and left median nerve sensory and motor nerve conduction findings, which may indicate early carpal tunnel pathology on the right.       · Biopsies  Lower back subcutaneous mass x2 biopsy May 2018.  Skin fragment #1 benign skin with central defect contiguous with a sinus tract lined by acutely and chronically  inflamed granulation tissue.  No evidence of atypia or malignancy.  The lesion appears to be completely excised.  Skin fragment #2 benign skin with central ulceration lined by acutely and chronically inflamed granulation tissue.  No evidence of atypia or malignancy.     Infected skin subcutaneous fascia 2/2018.   Infected skin and subcutaneous fascia:  -Skin and subcutaneous tissue represented  -Subcutaneous soft tissue with necroinflammatory debris  -No atypia or malignancy     ASSESSMENT / PLAN:     Rodney Infante is a 38 y.o. Black or  female with:    1. Seronegative arthritis  - Negative rheumatoid factor, dactylitis/synovitis present, radiographic evidence of juxta-articular new bone formation and probable erosion.  - still considering for seronegative inflammatory arthritis or peripheral spondyloarthritis.  Good response to NSAIDs and systemic corticosteroids. DDx  diabetic arthropathy, stenosing tenosynovitis.  - SSZ 500bid last visit, no response reported by patient yet (would still be early for onset of action).    - Will step up to triple therapy and monitor. MTX ramp to 15mg once per week and HCQ 400mg daily.  Daily folic acid supplementation.  - clinical significance side effects of therapy discussed in detail, particular emphasis made on infections.  - ESR, CRP  - HLAB27    2. DMARD use  - CBC, MP  - monitor for toxicities    3. History of ulcers and paresthesias  - findings be explained by organic causes. Hx of CTS.  - of mention, patient with history of history of diabetes, venous stasis, and currently on therapy with INVOKAMET.  - This agent has being recommended to be discontinued in the setting of prior amputation, peripheral vascular disease, neuropathy, and diabetic foot ulcers; due to risk of increased incidence of lower limb amputation.  Manage per PMD recommendations.  - gabapentin trial.  Hand splint use.   - consider CSI for CTS and trigger finger    4. Other specified counseling  - over 10 minutes spent regarding below topics:  - Immunization counseling done.  - Weight loss counseling done.  - Nutrition and exercise counseling.  - Medication counseling provided.    5. Morbid Obesity  - would benefit from decreasing at least 10% of body weight.  - recommended goal of losing 1 lb per week.  - consider nutritionist evaluation.  - would consider screening for ASHISH per PMD.    Follow up in about 3 months (around 9/11/2019).    Method of contact with patient concerns: [facility]    Disclaimer:  This note is prepared using voice recognition software and as such is likely to have errors and has not been proof read. Please contact me for questions.     Time spent: 40 minutes in face to face discussion concerning diagnosis, prognosis, review of lab and test results, benefits of treatment as well as management of disease, counseling of patient and  coordination of care between various health care providers.  Greater than half the time spent was used for coordination of care and counseling of patient.    Ken Alberto M.D.  Rheumatology Department   [facility] [place] [facility]

## 2019-06-11 NOTE — PATIENT INSTRUCTIONS
What is Trigger Finger?  Trigger finger is an inflammation of tissue inside your finger or thumb. It is also called tenosynovitis (ten-oh-sin-oh-VY-tis). Tendons (cordlike fibers that attach muscle to bone and allow you to bend the joints) become swollen. So does the synovium (a slick membrane that allows the tendons to move easily). This makes it difficult to straighten the finger or thumb.    Causes  Repeated use of a tool with strong gripping, such as a drill or wrench, can irritate and inflame the tendons and the synovium. It is also more common in certain medical conditions such as rheumatoid arthritis, gout, and diabetes. But often the cause of trigger finger is unknown.  Inside your finger  Tendons connect muscles in your forearm to the bones in your fingers. The tendons in each finger are surrounded by a protective tendon sheath. This sheath is lined with synovium, which produces a fluid that allows the tendons to slide easily when you bend and straighten the finger. If a tendon is irritated, it becomes inflamed.  When a tendon is inflamed  When a tendon is inflamed, it causes the lining of the tendon sheath to swell and thicken. Or the tendon itself may thicken. Then the sheath pinches the tendon, and the tendon can no longer slide easily inside the sheath. When you straighten your finger, the tendon sticks or locks as it tries to squeeze back through the sheath.    Symptoms  The first sign of trigger finger may be pain where the finger or thumb joins the palm. You may also notice some swelling. As the tendon becomes more inflamed, the finger may start to catch when you try to straighten it. When the locked tendon releases, the finger jumps, as if you were releasing the trigger of a gun. This further irritates the tendon, and may set up a cycle of catching and swelling.   Date Last Reviewed: 9/28/2015  © 2946-9222 [employer]. [address] [place] [address], [place] [address]. All rights reserved.  This information is not intended as a substitute for professional medical care. Always follow your healthcare professional's instructions.        Treating Trigger Finger     The tendon sheath is opened to release the tendon. Once the tendon can move freely again, the finger can bend and straighten more normally.     Trigger finger occurs when the tissue inside your finger or thumb becomes inflamed. Mild cases can be treated without surgery. If the problem is severe, surgery may be needed. Your doctor will discuss your options with you.  Nonsurgical treatment  For mild symptoms, your doctor may have you rest the finger or thumb. You may also be told to take anti-inflammatory medicines. These include ibuprofen or aspirin. You may be given an injection of medicine in the base of the finger or thumb. This typically is a steroid, such as cortisone.  Surgery  If nonsurgical treatments dont ease your symptoms, surgery may be recommended. A tendon is a cordlike fiber that attaches muscle to bone and allows joints to bend. The tendon is surrounded by a protective cover called a sheath. During surgery, the sheath in your finger or thumb is opened to enlarge the space and release the swollen tendon. This allows the finger or thumb to bend and straighten normally. Surgery takes about 20 minutes. It can often be done using a local anesthetic. You may be able to go home the same day. Your hand will be wrapped in a soft bandage. You may need to wear a plaster splint for a short time to keep the finger or thumb still as it heals. The stitches will be removed in about 2 weeks. Your doctor can discuss the risks and benefits of surgery with you.  Date Last Reviewed: 9/21/2015 © 2000-2017 The "[employer] [place]. [address] [place] [id number]. All rights reserved. This information is not intended as a substitute for professional medical care. Always follow your healthcare professional's  instructions.        Understanding Carpal Tunnel Syndrome    The carpal tunnel is a narrow space inside the wrist. It is ringed by bone and a band of tough tissue called the transverse carpal ligament. A major nerve called the median nerve runs from the forearm into the hand through the carpal tunnel. Tendons also run through the carpal tunnel.  With carpal tunnel syndrome, the tendons or nearby tissues within the carpal tunnel may swell or thicken. Or the transverse carpal ligament may harden and shorten. This narrows the space in the carpal tunnel and puts pressure on the median nerve. This pressure leads to tingling and numbness of the hand and wrist. In time, the condition can make even simple tasks hard to do.  What causes carpal tunnel syndrome?  Doctors arent entirely clear why the condition occurs. Certain things may make a person more likely to have it. These include:  · Being female  · Being pregnant  · Being overweight  · Having diabetes or rheumatoid arthritis  Symptoms of carpal tunnel syndrome  Symptoms often come and go. At first, symptoms may occur mainly at night. Later, they may be noticed during the day as well. They may get worse with activities such as driving, reading, typing, or holding a phone. Symptoms can include:  · Tingling and numbness in the hand or wrist  · Sharp pain that shoots up the arm or down to the fingers  · Hand stiffness or cramping, especially in the morning  · Trouble making a fist  · Hand weakness and clumsiness  Treatment for carpal tunnel syndrome  Certain treatments help reduce the pressure on the median nerve and relieve symptoms. Choices for treatment may include one or more of the following:  · Wrist splint. This involves wearing a special brace on the wrist and hand. The splint holds the wrist straight, in a neutral position. This helps keep the carpal tunnel as open as possible.  · Cortisone shots. Cortisone is a medicine that helps reduce swelling. It is injected  directly into the wrist. It helps shrink tissues inside the carpal tunnel. This relieves symptoms for a time.  · Pain medicines. You may take over-the-counter or prescription medicines to help reduce swelling and relieve symptoms.  · Surgery. If the condition doesnt respond to other treatments and doesnt go away on its own, you may need surgery. During surgery, the surgeon cuts the transverse carpal ligament to relieve pressure on the median nerve.     When to call your healthcare provider  Call your healthcare provider right away if you have any of these:  · Fever of 100.4°F (38°C) or higher, or as directed  · Symptoms that dont get better, or get worse  · New symptoms   Date Last Reviewed: 3/10/2016  © 6977-9309 [employer]. [address] [place] [address], [place] [address]. All rights reserved. This information is not intended as a substitute for professional medical care. Always follow your healthcare professional's instructions.        Carpal Tunnel Syndrome Prevention Tips  Some repetitive hand activities put you at higher risk for carpal tunnel syndrome (CTS). But you can reduce your risk. Learn how to change the way you use your hands. Below are tips for at home and on the job. Be sure to also follow the hand and wrist safety policies at your workplace.      Keep your wrist in a neutral (straight) position when exercising.      Keep your wrist in neutral  Keep a neutral (straight) wrist position as often as you can. Dont use your wrist in a bent (flexed) position for long periods of time. This includes extended or twisted positions.  Watch your   Dont just use your thumb and index finger to grasp or lift. This can put stress on your wrist. When you can, use your whole hand and all its fingers to grasp an object.  Minimize repetition  Dont move your arms or hands or hold an object in the same way for long periods of time. Even simple, light tasks can cause injury this way. Instead, alternate  tasks or switch hands.  Rest your hands  Give your hands a break from time to time with a rest. Even a few minutes once an hour can help.  Reduce speed and force  Slow down the speed in which you do a forceful, repetitive motion. This gives your wrist time to recover from the effort. Use power tools to help reduce the force.  Strengthen the muscles  Weak muscles may lead to a poor wrist or arm position. Exercises will make your hand and arm muscles stronger. This can help you keep a better position.  Date Last Reviewed: 9/11/2015 © 2000-2017 [employer]. [address] [place] [id number]. All rights reserved. This information is not intended as a substitute for professional medical care. Always follow your healthcare professional's instructions.        Discharge Instructions for Carpal Tunnel Release  You had a carpal tunnel release procedure to help relieve the symptoms of carpal tunnel syndrome. In carpal tunnel syndrome, a nerve in the wrist is compressed and irritated. This causes numbness and pain in the fingers and hand. Carpal tunnel release relieves the compression of the nerve. Here are instructions that will help you care for your arm and wrist when you are at home.  Home care  · Avoid gripping objects tightly or lifting with your affected arm.  · Wear your bandage, splint, or cast as directed by your doctor.  · Always keep the dressing, splint, or cast dry and clean.  · When showering, cover your hand and  wrist with plastic and use tape or rubberbands to keep the dressing, splint, or cast dry. Shower as necessary.    · Use an ice pack or bag of frozen peas -- or something similar -- wrapped in a thin towel on your wrist to reduce swelling for the first 48 hours. Leave the ice pack on for 20 minutes; then take it off for 20 minutes. Repeat as needed.  · Keep your arm elevated above your heart for 24 to 48 hours after surgery.  · Do the exercises you learned in the hospital, or as  instructed by your doctor.  · Take pain medicine as directed.  · Dont drive until your doctor says its OK. Never drive while you are taking opioid pain medicine.  · Ask your doctor when you can return to work. If your job requires heavy lifting, you may not be able to begin working again for several weeks.  Follow-up care  Make a follow-up appointment as directed by your doctor.     When to seek medical care  Call 911 right away if you have any of the following:  · Chest pain  · Shortness of breath  Otherwise, call your doctor immediately if you have any of the following:  · A splint, cast, or dressing that has gotten wet  · Increased bleeding or drainage from the incision (cut)  · Opening of the incision  · Fever above 100.4°F (38.9°C) taken by mouth, or shaking chills  · Any new numbness in the fingers or thumb  · Blue hand or fingers  · Increased pain with or without activity  · Increased redness, tenderness, or swelling of the incision   Date Last Reviewed: 11/15/2015  © 9448-6724 [employer]. [address] [place] [address]. All rights reserved. This information is not intended as a substitute for professional medical care. Always follow your healthcare professional's instructions.        Methotrexate tablets  What is this medicine?  METHOTREXATE (METH oh TREX ate) is a chemotherapy drug used to treat cancer including breast cancer, leukemia, and lymphoma. This medicine can also be used to treat psoriasis and certain kinds of arthritis.  How should I use this medicine?  Take this medicine by mouth with a glass of water. Follow the directions on the prescription label. Take your medicine at regular intervals. Do not take it more often than directed. Do not stop taking except on your doctor's advice.  Make sure you know why you are taking this medicine and how often you should take it. If this medicine is used for a condition that is not cancer, like arthritis or psoriasis, it should be  taken weekly, NOT daily. Taking this medicine more often than directed can cause serious side effects, even death.  Talk to your healthcare provider about safe handling and disposal of this medicine. You may need to take special precautions.  Talk to your pediatrician regarding the use of this medicine in children. While this drug may be prescribed for selected conditions, precautions do apply.  What side effects may I notice from receiving this medicine?  Side effects that you should report to your doctor or health care professional as soon as possible:  · allergic reactions like skin rash, itching or hives, swelling of the face, lips, or tongue  · breathing problems or shortness of breath  · diarrhea  · dry, nonproductive cough  · low blood counts - this medicine may decrease the number of white blood cells, red blood cells and platelets. You may be at increased risk for infections and bleeding.  · mouth sores  · redness, blistering, peeling or loosening of the skin, including inside the mouth  · signs of infection - fever or chills, cough, sore throat, pain or trouble passing urine  · signs and symptoms of bleeding such as bloody or black, tarry stools; red or dark-brown urine; spitting up blood or brown material that looks like coffee grounds; red spots on the skin; unusual bruising or bleeding from the eye, gums, or nose  · signs and symptoms of kidney injury like trouble passing urine or change in the amount of urine  · signs and symptoms of liver injury like dark yellow or brown urine; general ill feeling or flu-like symptoms; light-colored stools; loss of appetite; nausea; right upper belly pain; unusually weak or tired; yellowing of the eyes or skin  Side effects that usually do not require medical attention (report to your doctor or health care professional if they continue or are bothersome):  · dizziness  · hair loss  · tiredness  · upset stomach  · vomiting  What may interact with this medicine?  This  medicine may interact with the following medication:  · acitretin  · aspirin and aspirin-like medicines including salicylates  · azathioprine  · certain antibiotics like penicillins, tetracycline, and chloramphenicol  · cyclosporine  · gold  · hydroxychloroquine  · live virus vaccines  · NSAIDs, medicines for pain and inflammation, like ibuprofen or naproxen  · other cytotoxic agents  · penicillamine  · phenylbutazone  · phenytoin  · probenecid  · retinoids such as isotretinoin and tretinoin  · steroid medicines like prednisone or cortisone  · sulfonamides like sulfasalazine and trimethoprim/sulfamethoxazole  · theophylline  What if I miss a dose?  If you miss a dose, talk with your doctor or health care professional. Do not take double or extra doses.  Where should I keep my medicine?  Keep out of the reach of children.  Store at room temperature between 20 and 25 degrees C (68 and 77 degrees F). Protect from light. Throw away any unused medicine after the expiration date.  What should I tell my health care provider before I take this medicine?  They need to know if you have any of these conditions:  · fluid in the stomach area or lungs  · if you often drink alcohol  · infection or immune system problems  · kidney disease or on hemodialysis  · liver disease  · low blood counts, like low white cell, platelet, or red cell counts  · lung disease  · radiation therapy  · stomach ulcers  · ulcerative colitis  · an unusual or allergic reaction to methotrexate, other medicines, foods, dyes, or preservatives  · pregnant or trying to get pregnant  · breast-feeding  What should I watch for while using this medicine?  Avoid alcoholic drinks.  This medicine can make you more sensitive to the sun. Keep out of the sun. If you cannot avoid being in the sun, wear protective clothing and use sunscreen. Do not use sun lamps or tanning beds/booths.  You may need blood work done while you are taking this medicine.  Call your doctor or  health care professional for advice if you get a fever, chills or sore throat, or other symptoms of a cold or flu. Do not treat yourself. This drug decreases your body's ability to fight infections. Try to avoid being around people who are sick.  This medicine may increase your risk to bruise or bleed. Call your doctor or health care professional if you notice any unusual bleeding.  Check with your doctor or health care professional if you get an attack of severe diarrhea, nausea and vomiting, or if you sweat a lot. The loss of too much body fluid can make it dangerous for you to take this medicine.  Talk to your doctor about your risk of cancer. You may be more at risk for certain types of cancers if you take this medicine.  Both men and women must use effective birth control with this medicine. Do not become pregnant while taking this medicine or until at least 1 normal menstrual cycle has occurred after stopping it. Women should inform their doctor if they wish to become pregnant or think they might be pregnant. Men should not father a child while taking this medicine and for 3 months after stopping it. There is a potential for serious side effects to an unborn child. Talk to your health care professional or pharmacist for more information. Do not breast-feed an infant while taking this medicine.  NOTE:This sheet is a summary. It may not cover all possible information. If you have questions about this medicine, talk to your doctor, pharmacist, or health care provider. Copyright© 2017 [employer]        Hydroxychloroquine tablets  What is this medicine?  HYDROXYCHLOROQUINE (sylvia drox ee KLOR oh kwin) is used to treat rheumatoid arthritis and systemic lupus erythematosus. It is also used to treat malaria.  How should I use this medicine?  Take this medicine by mouth with a glass of water. Follow the directions on the prescription label. If this medicine upsets your stomach take it with food or milk. Take your  doses at regular intervals. Do not take your medicine more often than directed.  Talk to your pediatrician regarding the use of this medicine in children. Special care may be needed.  What side effects may I notice from receiving this medicine?  Side effects that you should report to your doctor or health care professional as soon as possible:  · allergic reactions like skin rash, itching or hives, swelling of the face, lips, or tongue  · change in vision  · fever, infection  · hearing loss or ringing  · muscle weakness, tremor, or numbness  · redness, blistering, peeling or loosening of the skin, including inside the mouth  · seizures  · unusual bleeding or bruising  · unusually weak or tired  Side effects that usually do not require medical attention (report to your doctor or health care professional if they continue or are bothersome):  · change in coloration of the mouth or skin  · dizziness  · hair loss, lightening  · headache  · irritability, nervousness, nightmares  · loss of appetite  · stomach upset, diarrhea  What may interact with this medicine?  · antacids  · botulinum toxins  · digoxin  · kaolin  · penicillamine  What if I miss a dose?  If you miss a dose, take it as soon as you can. If it is almost time for your next dose, take only that dose. Do not take double or extra doses.  Where should I keep my medicine?  Keep out of the reach of children. In children, this medicine can cause overdose with small doses.  Store at room temperature between 15 and 30 degrees C (59 and 86 degrees F). Protect from moisture and light. Throw away any unused medicine after the expiration date.  What should I tell my health care provider before I take this medicine?  They need to know if you have any of these conditions:  · alcoholism  · anemia or other blood disorder  · eye disease  · glucose 6-phosphate dehydrogenase (G6PD) deficiency  · liver disease  · porphyria  · psoriasis  · an unusual or allergic reaction to  chloroquine, hydroxychloroquine, other medicines, foods, dyes, or preservatives  · pregnant or trying to get pregnant  · breast-feeding  What should I watch for while using this medicine?  Visit your doctor or health care professional for regular check ups. Tell your doctor if your symptoms do not improve. Arthritis symptoms may take several weeks to improve. If you are taking this medicine for a long time, you will need important blood work done. You will also need to have your eyes checked as directed.  This medicine can make you more sensitive to the sun. Keep out of the sun. If you cannot avoid being in the sun, wear protective clothing and use sunscreen. Do not use sun lamps or tanning beds/booths.  Avoid antacids and kaolin containing products for 2 hours before and after taking a dose of this medicine.  NOTE:This sheet is a summary. It may not cover all possible information. If you have questions about this medicine, talk to your doctor, pharmacist, or health care provider. Copyright© 2017 [employer]

## 2019-06-14 ENCOUNTER — CLINICAL SUPPORT (OUTPATIENT)
Dept: REHABILITATION | Facility: HOSPITAL | Age: 39
End: 2019-06-14
Attending: PLASTIC SURGERY
Payer: COMMERCIAL

## 2019-06-14 DIAGNOSIS — R27.9 INCOORDINATION: ICD-10-CM

## 2019-06-14 DIAGNOSIS — M25.641 JOINT STIFFNESS OF HAND, RIGHT: ICD-10-CM

## 2019-06-14 DIAGNOSIS — M62.81 MUSCLE WEAKNESS: ICD-10-CM

## 2019-06-14 DIAGNOSIS — M79.641 RIGHT HAND PAIN: ICD-10-CM

## 2019-06-14 PROCEDURE — 97018 PARAFFIN BATH THERAPY: CPT | Mod: PO

## 2019-06-14 PROCEDURE — 97140 MANUAL THERAPY 1/> REGIONS: CPT | Mod: PO

## 2019-06-14 PROCEDURE — 97110 THERAPEUTIC EXERCISES: CPT | Mod: PO

## 2019-06-14 NOTE — PROGRESS NOTES
"  Occupational Therapy Daily Treatment Note     Date: 6/14/2019  Name: Rodney Gonzalez Lohman  Clinic Number: [id number]    Therapy Diagnosis:   Encounter Diagnoses   Name Primary?    Right hand pain     Joint stiffness of hand, right     Muscle weakness     Incoordination      Physician: Nirav Lyn Jr*    Physician Orders: Eval & Treat  Medical Diagnosis: R CTS  Surgical Procedure and Date: None  Evaluation Date: 04/15/19  Insurance Authorization Period Expiration: 05/15/19  Plan of Care Certification Period: 4/15/2019 to 06/10/19  Date of Return to MD: 07/10/19, Rheumatologist 06/11/19    Visit # / Visits authorized: 14 / 20    Time In:8:00 am  Time Out: 9:00::00 am  Total Billable Time: 60 minutes (P, 1MT, 2TE)    Precautions:  Standard and Diabetes      Subjective     Pt reports: "It's just aching today, but not too bad. The swelling is still in my hand". Patient reports completing a rheumatology appointment & received new medications. She reports good compliance with home exercise program.    Response to previous treatment: decrease in pain & edema of R hand  Functional change: able to hold objects and Light use of R hand in ADL tasks    Pain:  1-2/10 prior to treatment  Location: right hand and wrist      Objective      Rodney received the following supervised modalities (after being cleared for contradictions) for 10 minutes:   -Paraffin w/ MHP to R wrist/hand, pre-tx to decrease pain & increase tissue extensibility    Rodney received the following manual therapy techniques for 15 minutes:   - Provided deep STM, including MFR, CFM, TPR & scar mgmt to R wrist/hand, using hand techniques and IASTM tools to increase blood flow/circulation, improve soft tissue pliability and decrease pain.  -Kinesiotaping: single spiral wrap od R IF & RF with space correction over PIP's.      Rodney received therapeutic exercises for 40 minutes including objective measures taken above:    TGE's (wave, hook & fist) 10 " reps each   Dexterciser 3 min   9 Peg 3 trials   Coordination Balls 3 min   T-putty Yellow/green   -molding 3 min   -grasp/manip 3 reps   -log roll & tripod pinch 2 logs   -dowel digs 3 min   Ergo gripper 2/10 reps, 3 red bands       Home Exercises and Education Provided     Education provided: Reviewed HEP and activity restrictin.      Written Home Exercises Provided: Patient instructed to cont prior HEP.  Exercises were reviewed and Rodney was able to demonstrate them prior to the end of the session.  Rodney demonstrated good  understanding of the HEP provided.   .   See EMR under Patient Instructions for exercises provided prior visit.      Assessment     Patient tolerated treatment well today as she did not have any increase in pain.  She continues to have moderate to significant edema in R hand, but this is slowly improving with each treatment.  Recommend re-assessment to Update Plan of Care at next treatment session.      Rodney is progressing fairly well towards her goals and there are no updates to goals at this time. Pt prognosis is Good.     Anticipated barriers to occupational therapy: diabetes, pain    Pt's spiritual, cultural and educational needs considered and pt agreeable to plan of care and goals.    Goals:   Short Term Goals: (in 2 weeks)  1) Patient will be independent in HEP-met  2) Decrease pain in R hand  to no more than 6-7/10 worst -met (04/22/19)  3) Increase AROM in R digit TAMS by 15-20 for improved functioning in ADL/IADL's-met/ongoing (05/27/19)  4) Increase R  strength by 3-5 psi for increased functional use-met/ongoing (05/27/19)  5) Decrease edema in R hand by .2-.3  cm-met/ongoing (05/27/19)     Long Term Goals: (in 8 weeks)  1) Decrease pain in R wrist/hand to no more than 3/10 worst-ongoing  2) Increase AROM of R digit TAMS to WFL for increased functioning in ADL/IADL's-ongoing  3) Increase strength in R hand by 15% of initial measures for improved functioning in  ADL/IADL's-ongoing  4) Increased functioning in ADL/IADL's, as evidenced by a FOTO impairment rating of no more than 40%-ongoing  5) Decrease edema in R wrist/hand to trace or none-ongoing      Plan     Certification Period/Plan of care expiration: 4/15/2019 to 06/10/19.  Update Plan of Care needed at next visit.      Outpatient Occupational Therapy 3 times weekly for 8 weeks to include the following interventions: Paraffin, Manual therapy/joint mobilizations, Modalities for pain management, US 3 mhz, Therapeutic exercises/activities., Strengthening, Orthotic Fabrication/Fit/Training, Edema Control, Electrical Modalities, Joint Protection and Energy Conservation.    Russell Weber, OT

## 2019-06-17 LAB
HLA-B27 RELATED AG QL: NEGATIVE
HLA-B27 RELATED AG QL: NORMAL

## 2019-07-22 PROBLEM — B99.9 ANEMIA OF INFECTION AND CHRONIC DISEASE: Status: ACTIVE | Noted: 2019-07-22

## 2019-07-22 PROBLEM — D63.8 ANEMIA OF INFECTION AND CHRONIC DISEASE: Status: ACTIVE | Noted: 2019-07-22

## 2019-07-31 ENCOUNTER — HOSPITAL ENCOUNTER (OUTPATIENT)
Dept: WOUND CARE | Facility: HOSPITAL | Age: 39
Discharge: HOME | End: 2019-07-31
Attending: SURGERY
Payer: COMMERCIAL

## 2019-07-31 VITALS
SYSTOLIC BLOOD PRESSURE: 111 MMHG | TEMPERATURE: 98 F | BODY MASS INDEX: 48.82 KG/M2 | DIASTOLIC BLOOD PRESSURE: 65 MMHG | WEIGHT: 293 LBS | HEART RATE: 93 BPM | HEIGHT: 65 IN

## 2019-07-31 DIAGNOSIS — M19.90 ARTHRITIS: ICD-10-CM

## 2019-07-31 DIAGNOSIS — Z79.4 TYPE 2 DIABETES MELLITUS WITH OTHER SKIN ULCER, WITH LONG-TERM CURRENT USE OF INSULIN: ICD-10-CM

## 2019-07-31 DIAGNOSIS — L08.9 BACTERIAL SKIN INFECTION: Primary | ICD-10-CM

## 2019-07-31 DIAGNOSIS — B96.89 BACTERIAL SKIN INFECTION: Primary | ICD-10-CM

## 2019-07-31 DIAGNOSIS — E66.01 MORBID OBESITY WITH BMI OF 50.0-59.9, ADULT: ICD-10-CM

## 2019-07-31 DIAGNOSIS — E11.622 TYPE 2 DIABETES MELLITUS WITH OTHER SKIN ULCER, WITH LONG-TERM CURRENT USE OF INSULIN: ICD-10-CM

## 2019-07-31 DIAGNOSIS — L73.2 HIDRADENITIS SUPPURATIVA OF RIGHT AXILLA: ICD-10-CM

## 2019-07-31 DIAGNOSIS — L73.2 LEFT AXILLARY HIDRADENITIS: ICD-10-CM

## 2019-07-31 PROCEDURE — 99204 OFFICE O/P NEW MOD 45 MIN: CPT

## 2019-07-31 PROCEDURE — 87076 CULTURE ANAEROBE IDENT EACH: CPT | Mod: 59

## 2019-07-31 PROCEDURE — 87070 CULTURE OTHR SPECIMN AEROBIC: CPT

## 2019-07-31 PROCEDURE — 87075 CULTR BACTERIA EXCEPT BLOOD: CPT

## 2019-07-31 NOTE — PROGRESS NOTES
Subjective:       Patient ID: Rodney Infante is a 38 y.o. female.    Chief Complaint: Wound Care (bilateral axillae)    Client here today with open areas to right and left axilla. Seen by Dr. Pennington, who states sweat glands infected. Rx for clindamycin, bactroban, and tramadol provided. Stop methatrexate ( prescribed for arthritis). Client plans to have abdominal bypass surgery in September. Culture done today of right axilla.    Review of Systems   Constitutional: Negative.    HENT: Negative.    Eyes: Negative.    Respiratory: Negative.    Cardiovascular: Negative.    Gastrointestinal: Negative.    Genitourinary: Negative.    Musculoskeletal: Negative.    Skin: Negative.    Neurological: Negative.    Psychiatric/Behavioral: Negative.        Objective:      Physical Exam   Constitutional: She is oriented to person, place, and time. She appears well-developed and well-nourished.   HENT:   Head: Normocephalic.   Eyes: Pupils are equal, round, and reactive to light. Conjunctivae and EOM are normal.   Neck: Normal range of motion. Neck supple.   Cardiovascular: Normal rate, regular rhythm, normal heart sounds and intact distal pulses.   Pulmonary/Chest: Effort normal and breath sounds normal.   Abdominal: Soft. Bowel sounds are normal.   Musculoskeletal: Normal range of motion.   Neurological: She is alert and oriented to person, place, and time. She has normal reflexes.   Skin: Skin is warm and dry.       Assessment:       1. Bacterial skin infection           Wound 07/31/19 1300 Other (comment) Axilla #2 (Active)   07/31/19 1300    Pre-existing: Yes   Primary Wound Type: Other   Side: Left   Orientation:    Location: Axilla   Wound/PI Number (optional): #2   Ankle-Brachial Index:    Pulses:    Removal Indication and Assessment:    Wound Outcome:    (Retired) Wound Type:    (Retired) Wound Length (cm):    (Retired) Wound Width (cm):    (Retired) Depth (cm):    Wound Description (Comments):    Removal  Indications:    Wound Image   7/31/2019  5:05 PM   Dressing Appearance Open to air 7/31/2019  5:05 PM   Drainage Amount Small 7/31/2019  5:05 PM   Drainage Characteristics/Odor Serosanguineous 7/31/2019  5:05 PM   Appearance Red;Pink 7/31/2019  5:05 PM   Tissue loss description Full thickness 7/31/2019  5:05 PM   Red (%), Wound Tissue Color 100 % 7/31/2019  5:05 PM   Periwound Area Moist;Excoriated;Redness 7/31/2019  5:05 PM   Wound Edges Irregular;Open 7/31/2019  5:05 PM   Care Cleansed with:;Sterile normal saline 7/31/2019  5:05 PM   Dressing Applied;Island/border;Other (see comments) 7/31/2019  5:05 PM   Periwound Care Absorptive dressing applied 7/31/2019  5:05 PM   Dressing Change Due 08/01/19 7/31/2019  5:05 PM            Wound 07/31/19 1300 Other (comment) Axilla #1 (Active)   07/31/19 1300    Pre-existing: Yes   Primary Wound Type: Other   Side: Right   Orientation:    Location: Axilla   Wound/PI Number (optional): #1   Ankle-Brachial Index:    Pulses:    Removal Indication and Assessment:    Wound Outcome:    (Retired) Wound Type:    (Retired) Wound Length (cm):    (Retired) Wound Width (cm):    (Retired) Depth (cm):    Wound Description (Comments):    Removal Indications:    Wound Image   7/31/2019  5:09 PM   Dressing Appearance Open to air 7/31/2019  5:09 PM   Drainage Amount Small 7/31/2019  5:09 PM   Drainage Characteristics/Odor Serosanguineous 7/31/2019  5:09 PM   Appearance Red;Pink;Moist 7/31/2019  5:09 PM   Tissue loss description Full thickness 7/31/2019  5:09 PM   Red (%), Wound Tissue Color 100 % 7/31/2019  5:09 PM   Periwound Area Redness;Excoriated;Moist 7/31/2019  5:09 PM   Wound Edges Irregular;Open 7/31/2019  5:09 PM   Care Cleansed with:;Sterile normal saline 7/31/2019  5:09 PM   Dressing Applied;Island/border;Other (see comments) 7/31/2019  5:09 PM   Periwound Care Absorptive dressing applied 7/31/2019  5:09 PM   Dressing Change Due 08/01/19 7/31/2019  5:09 PM      Clean axillae with NS.  Bactroban applied, and covered with Mepore 9 x 20 dressings. Change daily per patient. Culture done today.    Plan:            Dr. Pennington Wednesday 8/7/19.

## 2019-08-02 LAB — BACTERIA SPEC AEROBE CULT: NORMAL

## 2019-08-06 LAB — BACTERIA SPEC ANAEROBE CULT: ABNORMAL

## 2019-08-07 ENCOUNTER — HOSPITAL ENCOUNTER (OUTPATIENT)
Dept: WOUND CARE | Facility: HOSPITAL | Age: 39
Discharge: HOME | End: 2019-08-07
Attending: SURGERY
Payer: COMMERCIAL

## 2019-08-07 VITALS
BODY MASS INDEX: 48.82 KG/M2 | HEART RATE: 98 BPM | SYSTOLIC BLOOD PRESSURE: 105 MMHG | WEIGHT: 293 LBS | DIASTOLIC BLOOD PRESSURE: 69 MMHG | HEIGHT: 65 IN | TEMPERATURE: 98 F

## 2019-08-07 DIAGNOSIS — B96.89 BACTERIAL SKIN INFECTION: Primary | ICD-10-CM

## 2019-08-07 DIAGNOSIS — L73.2 LEFT AXILLARY HIDRADENITIS: ICD-10-CM

## 2019-08-07 DIAGNOSIS — L98.499 DIABETES MELLITUS WITH SKIN ULCER: ICD-10-CM

## 2019-08-07 DIAGNOSIS — L08.9 BACTERIAL SKIN INFECTION: Primary | ICD-10-CM

## 2019-08-07 DIAGNOSIS — E11.622 DIABETES MELLITUS WITH SKIN ULCER: ICD-10-CM

## 2019-08-07 DIAGNOSIS — L73.2 HIDRADENITIS SUPPURATIVA OF RIGHT AXILLA: ICD-10-CM

## 2019-08-07 DIAGNOSIS — E66.01 MORBID OBESITY WITH BMI OF 50.0-59.9, ADULT: ICD-10-CM

## 2019-08-07 DIAGNOSIS — D50.0 IRON DEFICIENCY ANEMIA DUE TO CHRONIC BLOOD LOSS: ICD-10-CM

## 2019-08-07 PROCEDURE — 99213 OFFICE O/P EST LOW 20 MIN: CPT

## 2019-08-07 NOTE — PROGRESS NOTES
Subjective:       Patient ID: Rodney Infante is a 38 y.o. female.    Chief Complaint: Non-healing Wound (right and left axilla)    Client here today with open areas to right and left axilla. Seen by Dr. Pennington, who states sweat glands infected. Rx for clindamycin, bactroban, and tramadol provided. Stop methatrexate ( prescribed for arthritis). Client plans to have abdominal bypass surgery in September. Culture done today of right axilla.  8/7/19: F/U with Dr. Pennington. Culture results negative. Continue POC.    Review of Systems   Constitutional: Negative.    HENT: Negative.    Eyes: Negative.    Respiratory: Negative.    Cardiovascular: Negative.    Gastrointestinal: Negative.    Genitourinary: Negative.    Musculoskeletal: Negative.    Skin: Negative.    Neurological: Negative.    Psychiatric/Behavioral: Negative.        Objective:      Physical Exam   Constitutional: She is oriented to person, place, and time. She appears well-developed and well-nourished.   HENT:   Head: Normocephalic.   Eyes: Pupils are equal, round, and reactive to light. Conjunctivae and EOM are normal.   Neck: Normal range of motion. Neck supple.   Cardiovascular: Normal rate, regular rhythm, normal heart sounds and intact distal pulses.   Pulmonary/Chest: Effort normal and breath sounds normal.   Abdominal: Soft. Bowel sounds are normal.   Musculoskeletal: Normal range of motion.   Neurological: She is alert and oriented to person, place, and time. She has normal reflexes.   Skin: Skin is warm and dry.       Assessment:       1. Bacterial skin infection    2. Hidradenitis suppurativa of right axilla    3. Left axillary hidradenitis           Wound 07/31/19 1300 Other (comment) Axilla #2 (Active)   07/31/19 1300    Pre-existing: Yes   Primary Wound Type: Other   Side: Left   Orientation:    Location: Axilla   Wound/PI Number (optional): #2   Ankle-Brachial Index:    Pulses:    Removal Indication and Assessment:    Wound Outcome:     (Retired) Wound Type:    (Retired) Wound Length (cm):    (Retired) Wound Width (cm):    (Retired) Depth (cm):    Wound Description (Comments):    Removal Indications:    Dressing Appearance Intact;Moist drainage 8/7/2019 10:29 AM   Drainage Amount Small 8/7/2019 10:29 AM   Drainage Characteristics/Odor Serosanguineous 8/7/2019 10:29 AM   Appearance Red;Moist 8/7/2019 10:29 AM   Tissue loss description Partial thickness 8/7/2019 10:29 AM   Red (%), Wound Tissue Color 100 % 8/7/2019 10:29 AM   Periwound Area Moist;Excoriated 8/7/2019 10:29 AM   Wound Edges Irregular 8/7/2019 10:29 AM   Care Cleansed with:;Sterile normal saline 8/7/2019 10:29 AM   Dressing Changed;Island/border;Other (see comments) 8/7/2019 10:29 AM   Periwound Care Dry periwound area maintained 8/7/2019 10:29 AM   Dressing Change Due 08/08/19 8/7/2019 10:29 AM            Wound 07/31/19 1300 Other (comment) Axilla #1 (Active)   07/31/19 1300    Pre-existing: Yes   Primary Wound Type: Other   Side: Right   Orientation:    Location: Axilla   Wound/PI Number (optional): #1   Ankle-Brachial Index:    Pulses:    Removal Indication and Assessment:    Wound Outcome:    (Retired) Wound Type:    (Retired) Wound Length (cm):    (Retired) Wound Width (cm):    (Retired) Depth (cm):    Wound Description (Comments):    Removal Indications:    Dressing Appearance Moist drainage 8/7/2019 10:32 AM   Drainage Amount Small 8/7/2019 10:32 AM   Drainage Characteristics/Odor Serosanguineous 8/7/2019 10:32 AM   Appearance Red 8/7/2019 10:32 AM   Tissue loss description Partial thickness 8/7/2019 10:32 AM   Red (%), Wound Tissue Color 100 % 8/7/2019 10:32 AM   Periwound Area Intact;[place];Moist 8/7/2019 10:32 AM   Wound Edges Irregular 8/7/2019 10:32 AM   Care Cleansed with:;Sterile normal saline 8/7/2019 10:32 AM   Dressing Changed;Island/border;Other (see comments) 8/7/2019 10:32 AM   Periwound Care Dry periwound area maintained 8/7/2019 10:32 AM   Dressing Change Due  08/08/19 8/7/2019 10:32 AM      Clean axillae with NS. Bactroban applied, and covered with Mepore 9 x 20 dressings. Change daily per patient. Continue Clindamycin.    Plan:            Dr. Pennington Wednesday 8/14/19.

## 2019-08-13 ENCOUNTER — LAB VISIT (OUTPATIENT)
Dept: LAB | Facility: HOSPITAL | Age: 39
End: 2019-08-13
Attending: INTERNAL MEDICINE
Payer: COMMERCIAL

## 2019-08-13 ENCOUNTER — OFFICE VISIT (OUTPATIENT)
Dept: RHEUMATOLOGY | Facility: CLINIC | Age: 39
End: 2019-08-13
Payer: COMMERCIAL

## 2019-08-13 VITALS
WEIGHT: 293 LBS | BODY MASS INDEX: 48.82 KG/M2 | HEIGHT: 65 IN | DIASTOLIC BLOOD PRESSURE: 70 MMHG | SYSTOLIC BLOOD PRESSURE: 97 MMHG | HEART RATE: 94 BPM

## 2019-08-13 DIAGNOSIS — M19.90 ARTHRITIS: ICD-10-CM

## 2019-08-13 DIAGNOSIS — Z79.60 LONG-TERM USE OF IMMUNOSUPPRESSANT MEDICATION: ICD-10-CM

## 2019-08-13 DIAGNOSIS — M13.80 SERONEGATIVE ARTHRITIS: Primary | ICD-10-CM

## 2019-08-13 DIAGNOSIS — E66.01 MORBID OBESITY: ICD-10-CM

## 2019-08-13 DIAGNOSIS — Z71.89 COUNSELING ON HEALTH PROMOTION AND DISEASE PREVENTION: ICD-10-CM

## 2019-08-13 PROCEDURE — 3008F PR BODY MASS INDEX (BMI) DOCUMENTED: ICD-10-PCS | Mod: CPTII,S$GLB,, | Performed by: INTERNAL MEDICINE

## 2019-08-13 PROCEDURE — 99214 PR OFFICE/OUTPT VISIT, EST, LEVL IV, 30-39 MIN: ICD-10-PCS | Mod: S$GLB,,, | Performed by: INTERNAL MEDICINE

## 2019-08-13 PROCEDURE — 36415 COLL VENOUS BLD VENIPUNCTURE: CPT

## 2019-08-13 PROCEDURE — 99999 PR PBB SHADOW E&M-EST. PATIENT-LVL III: ICD-10-PCS | Mod: PBBFAC,,, | Performed by: INTERNAL MEDICINE

## 2019-08-13 PROCEDURE — 86480 TB TEST CELL IMMUN MEASURE: CPT

## 2019-08-13 PROCEDURE — 99999 PR PBB SHADOW E&M-EST. PATIENT-LVL III: CPT | Mod: PBBFAC,,, | Performed by: INTERNAL MEDICINE

## 2019-08-13 PROCEDURE — 3008F BODY MASS INDEX DOCD: CPT | Mod: CPTII,S$GLB,, | Performed by: INTERNAL MEDICINE

## 2019-08-13 PROCEDURE — 99214 OFFICE O/P EST MOD 30 MIN: CPT | Mod: S$GLB,,, | Performed by: INTERNAL MEDICINE

## 2019-08-13 RX ORDER — SULFASALAZINE 500 MG/1
1000 TABLET ORAL 2 TIMES DAILY
Qty: 120 TABLET | Refills: 2 | Status: SHIPPED | OUTPATIENT
Start: 2019-08-13 | End: 2019-09-12

## 2019-08-13 NOTE — PATIENT INSTRUCTIONS
Adalimumab Injection  What is this medicine?  ADALIMUMAB (a jesse AYE mu mab) is used to treat rheumatoid and psoriatic arthritis. It is also used to treat ankylosing spondylitis, Crohn's disease, ulcerative colitis, plaque psoriasis, hidradenitis suppurativa, and uveitis.  How should I use this medicine?  This medicine is for injection under the skin. You will be taught how to prepare and give this medicine. Use exactly as directed. Take your medicine at regular intervals. Do not take your medicine more often than directed.  A special MedGuide will be given to you by the pharmacist with each prescription and refill. Be sure to read this information carefully each time.  It is important that you put your used needles and syringes in a special sharps container. Do not put them in a trash can. If you do not have a sharps container, call your pharmacist or healthcare provider to get one.  Talk to your pediatrician regarding the use of this medicine in children. While this drug may be prescribed for children as young as 2 years for selected conditions, precautions do apply.  The  of the medicine offers free information to patients and their health care partners. Call 1-441.621.2416 for more information.  What side effects may I notice from receiving this medicine?  Side effects that you should report to your doctor or health care professional as soon as possible:  · allergic reactions like skin rash, itching or hives, swelling of the face, lips, or tongue  · breathing problems  · changes in vision  · chest pain  · fever, chills, or any other sign of infection  · numbness or tingling  · red, scaly patches or raised bumps on the skin  · swelling of the ankles  · swollen lymph nodes in the neck, underarm, or groin areas  · unexplained weight loss  · unusual bleeding or bruising  · unusually weak or tired  Side effects that usually do not require medical attention (report to your doctor or health care  professional if they continue or are bothersome):  · headache  · nausea  · redness, itching, swelling, or bruising at site where injected  What may interact with this medicine?  Do not take this medicine with any of the following medications:  · abatacept  · anakinra  · etanercept  · infliximab  · live virus vaccines  · rilonacept  This medicine may also interact with the following medications:  · vaccines  What if I miss a dose?  If you miss a dose, take it as soon as you can. If it is almost time for your next dose, take only that dose. Do not take double or extra doses. Give the next dose when your next scheduled dose is due. Call your doctor or health care professional if you are not sure how to handle a missed dose.  Where should I keep my medicine?  Keep out of the reach of children.  Store in the original container and in the refrigerator between 2 and 8 degrees C (36 and 46 degrees F). Do not freeze. The product may be stored in a cool carrier with an ice pack, if needed. Protect from light. Throw away any unused medicine after the expiration date.  What should I tell my health care provider before I take this medicine?  They need to know if you have any of these conditions:  · diabetes  · heart disease  · hepatitis B or history of hepatitis B infection  · immune system problems  · infection or history of infections  · multiple sclerosis  · recently received or scheduled to receive a vaccine  · scheduled to have surgery  · tuberculosis, a positive skin test for tuberculosis or have recently been in close contact with someone who has tuberculosis  · an unusual reaction to adalimumab, other medicines, mannitol, latex, rubber, foods, dyes, or preservatives  · pregnant or trying to get pregnant  · breast-feeding  What should I watch for while using this medicine?  Visit your doctor or health care professional for regular checks on your progress. Tell your doctor or healthcare professional if your symptoms do not  start to get better or if they get worse.  You will be tested for tuberculosis (TB) before you start this medicine. If your doctor prescribes any medicine for TB, you should start taking the TB medicine before starting this medicine. Make sure to finish the full course of TB medicine.  Call your doctor or health care professional if you get a cold or other infection while receiving this medicine. Do not treat yourself. This medicine may decrease your body's ability to fight infection.  Talk to your doctor about your risk of cancer. You may be more at risk for certain types of cancers if you take this medicine.  NOTE:This sheet is a summary. It may not cover all possible information. If you have questions about this medicine, talk to your doctor, pharmacist, or health care provider. Copyright© 2017 [employer]

## 2019-08-13 NOTE — PROGRESS NOTES
RHEUMATOLOGY OUTPATIENT CLINIC NOTE    8/13/2019    Attending Rheumatologist: Ken Alberto  Primary Care Provider: [facility]   Physician Requesting Consultation: No referring provider defined for this encounter.  Chief Complaint/Reason For Consultation:  Arthritis    Subjective:       HPI  Rodney Infante is a 38 y.o. Black or  female with hand pain and swelling comes for follow-up.    Today  Last seen on June.  Suspected for seronegative arthritis, recommended to add methotrexate and continue with Plaquenil and sulfasalazine.  Patient reports worsening of hidradenitis suppurativa on axillary areas, had methotrexate discontinued by wound care provider.  Main complaint unchanged since last visit.  Improvement reported since starting sulfasalazine and Plaquenil, however still reports having joint pain swelling, located on right hand 2nd/3rd MCPs and PIPs.  Reports prolonged morning stiffness approximately 2 hr.  Denies any rash or /GI complaints.  Does not have any active lower extremity ulcers at this time.    Review of Systems   Constitutional: Negative for chills and fever.   Eyes: Negative for pain and redness.   Respiratory: Negative for cough and shortness of breath.    Cardiovascular: Negative for chest pain and PND.   Gastrointestinal: Negative for abdominal pain, blood in stool, diarrhea and melena.   Genitourinary: Negative for dysuria and urgency. Hematuria: Chronic, intermittent.   Musculoskeletal: Positive for joint pain (Right hand, associated with swelling and stiffness.). Negative for back pain.   Skin: Negative for rash.        + history of ulcers, onset as a teenager.    Denies photosensitivity, denies alopecia, denies sclerodactyly, denies Raynaud's phenomenon,denies digital ulcers, no psoriasis, oral/nasal ulcerations, no purpura, denies nodules.   Neurological: Positive for tingling (Intermittent on hands and feet.). Negative for focal weakness and weakness.    Endo/Heme/Allergies: Does not bruise/bleed easily.   Psychiatric/Behavioral: Negative for substance abuse. The patient does not have insomnia.    All other systems reviewed and are negative.    Chronic comorbid conditions affecting medical decision making today:  Past Medical History:   Diagnosis Date    Anemia     Arthritis     Diabetes mellitus, type 2     Neuropathy      Past Surgical History:   Procedure Laterality Date    CYST REMOVAL  2014, 2/2018    back    DEBRIDEMENT-WOUND N/A 2/16/2018    Performed by Marcel [facility]ton MD at [facility]    EXCISION-HIDRADENITIS N/A 5/10/2018    Performed by Marcel [facility]ton MD at [facility] OR     Family History   Problem Relation Age of Onset    No Known Problems Mother     Drug abuse Father     No Known Problems Sister      Social History     Substance and Sexual Activity   Alcohol Use Yes    Comment: occasionally     Social History     Tobacco Use   Smoking Status Never Smoker   Smokeless Tobacco Never Used     Social History     Substance and Sexual Activity   Drug Use No       Current Outpatient Medications:     amLODIPine (NORVASC) 10 MG tablet, , Disp: , Rfl:     canagliflozin-metformin (INVOKAMET XR) 150-1,000 mg TBph, Take by mouth., Disp: , Rfl:     carvedilol (COREG) 25 MG tablet, , Disp: , Rfl:     clotrimazole-betamethasone 1-0.05% (LOTRISONE) cream, , Disp: , Rfl:     cyanocobalamin (VITAMIN B-12) 1000 MCG tablet, Take 1 tablet (1,000 mcg total) by mouth once daily. This is a prescription for 1 year.  Take 1 tablet Monday, Wednesday, and Friday, Disp: 36 tablet, Rfl: 3    ergocalciferol (ERGOCALCIFEROL) 50,000 unit Cap, , Disp: , Rfl:     estradiol cypionate (DEPO-ESTRADIOL) 5 mg/mL injection, Inject into the muscle every 28 days., Disp: , Rfl:     folic acid (FOLVITE) 1 MG tablet, Take 1 tablet (1 mg total) by mouth once daily., Disp: 30 tablet, Rfl: 4    gabapentin (NEURONTIN) 100 MG capsule, Take 3 capsules (300 mg total) by mouth every  evening., Disp: 90 capsule, Rfl: 3    glimepiride (AMARYL) 4 MG tablet, Take 1 tablet (4 mg total) by mouth before dinner., Disp: 30 tablet, Rfl: 5    hydroxychloroquine (PLAQUENIL) 200 mg tablet, Take 2 tablets (400 mg total) by mouth every evening., Disp: 60 tablet, Rfl: 2    losartan-hydrochlorothiazide 100-12.5 mg (HYZAAR) 100-12.5 mg Tab, , Disp: , Rfl:     ondansetron (ZOFRAN) 4 MG tablet, , Disp: , Rfl:     sulfaSALAzine (AZULFIDINE) 500 mg Tab, Take 2 tablets (1,000 mg total) by mouth 2 (two) times daily., Disp: 120 tablet, Rfl: 2    TOPCARE UNIVERSAL1 LANCET Misc, , Disp: , Rfl:     TRUETRACK TEST Strp, , Disp: , Rfl:     methotrexate 2.5 MG Tab, Take 6 tablets (15 mg total) by mouth every 7 days., Disp: 40 tablet, Rfl: 2     Objective:         Vitals:    08/13/19 0945   BP: 97/70   Pulse: 94     Physical Exam   Nursing note and vitals reviewed.  Constitutional: She is oriented to person, place, and time and well-developed, well-nourished, and in no distress.   Obese BMI 58   HENT:   Head: Normocephalic.   no parotid enlargement.   Eyes: Conjunctivae are normal. Pupils are equal, round, and reactive to light.   Absent Episcleritis/scleritis.   Neck: Normal range of motion.   Cardiovascular: Normal rate and intact distal pulses.    Pulmonary/Chest: Effort normal. No respiratory distress.   Abdominal: Soft. She exhibits no distension.   Neurological: She is alert and oriented to person, place, and time. Gait normal.   absent sensory deficits  muscle strength 5/5 through.   Skin: No erythema.     Lipodermatosclerosis appearance lower extremity, evidence of multiple healed ulcers.  HS axilla   Musculoskeletal: Normal range of motion. She exhibits edema, tenderness (2nd/3rd PIPs and MCP Rt hand Volar aspect MCP 2nd/3rd rt.  ) and deformity ([place]-neck right 5th PIP, reducible [place]niere appearance some PIPs).   :  Limited due to pain on right hand  Swelling +, Warmth, +, Erythema -  Synovitis Rt  PIPs, and wrist.  No synovitis elsewhere.    AROM:  Limited due to pain on right hand, otherwise intact  PROM: intact    Devices used by patient: none    + crepitus knees.       Reviewed old and all outside pertinent medical records available.    All lab results personally reviewed and interpreted by me.  Lab Results   Component Value Date    WBC 16.42 (H) 07/15/2019    HGB 9.8 (L) 07/15/2019    HCT 32.5 (L) 07/15/2019    MCV 84 07/15/2019    MCH 25.4 (L) 07/15/2019    MCHC 30.2 (L) 07/15/2019    RDW 16.3 (H) 07/15/2019     (H) 07/15/2019    MPV 8.0 (L) 07/15/2019    PLTEST Increased (A) 11/12/2018     Lab Results   Component Value Date     07/15/2019    K 3.8 07/15/2019     07/15/2019    CO2 18 (L) 07/15/2019     (H) 07/15/2019    BUN 9 07/15/2019    CALCIUM 9.3 07/15/2019    PROT 9.4 (H) 07/15/2019    ALBUMIN 2.7 (L) 07/15/2019    BILITOT 0.3 07/15/2019    AST 10 07/15/2019    ALKPHOS 106 07/15/2019    ALT 13 07/15/2019     Lab Results   Component Value Date    COLORU Yellow 02/27/2018    APPEARANCEUA Cloudy (A) 02/27/2018    SPECGRAV 1.020 02/27/2018    PHUR 5.0 02/27/2018    PROTEINUA Negative 02/27/2018    KETONESU Negative 02/27/2018    LEUKOCYTESUR 3+ (A) 02/27/2018    NITRITE Negative 02/27/2018    UROBILINOGEN Negative 02/27/2018     Lab Results   Component Value Date    CRP 36.3 (H) 06/11/2019       Lab Results   Component Value Date    SEDRATE 129 (H) 07/15/2019       Lab Results   Component Value Date    RF <10.0 04/03/2019    SEDRATE 129 (H) 07/15/2019       No components found for: 25OHVITDTOT, 84BJPCAD0, 63IBZWXO7, METHODNOTE    No results found for: URICACID    No components found for: TSPOTTB    · HbA1C 8.4% (2016)  · Uric acid 6 October 2013  · SPEP/NAN 2/2018: Increased total protein. Increased gamma globulins, polyclonal pattern.  No discrete monoclonal peaks identified.    Rheum Labs:  MILTON negative  Rheumatoid factor/CCP negative  SSA negative  ANCA screen negative  HLA  B27 negative    Infectious Labs:  HCV antibody nonreactive April 2019   Hepatitis panel nonreactive    Imaging:  All imaging reviewed and independently  interpreted by me.    X-ray hands May 2019  juxta-articular calcification adjacent to the DIP joint index and middle fingers with question of small erosions at the distal radial margin of the middle phalanx index finger.  Mild narrowing of the radial aspect of the radiocarpal articulation is questioned.  Joint spaces appear otherwise well maintained.  Alignment anatomic.    EMG May 2018  normal study, though there are significant differences between the right and left median nerve sensory and motor nerve conduction findings, which may indicate early carpal tunnel pathology on the right.     · Biopsies  Infected skin subcutaneous fascia 2/2018.   Infected skin and subcutaneous fascia:  -Skin and subcutaneous tissue represented  -Subcutaneous soft tissue with necroinflammatory debris  -No atypia or malignancy     ASSESSMENT / PLAN:     Rodney Infante is a 38 y.o. Black or  female with:    1. Seronegative arthritis  - CDAI: 19-> moderate disease activity.  Source of pain multifactorial, still mild synovitis on exam.  - MTX discontinued by different provided to to recurrent skin infections and hydradenitis suppurativa  - unfortunately, inflammatory arthritis is managed with immunosuppressant drugs.  Some relief provided with sulfasalazine and Plaquenil.  - will recommend step up therapy.  Will increase sulfasalazine this visit, and continue Plaquenil unchanged.  - if no significant relief on next visit, will perform MRI right hand and consider for therapy with TNF inhibitor  - clinical significant side effect of therapy discussed with patient in detail, particularly worsening infections while on biologics.  - SSZ 1000bid and HCQ 400mg daily.     2. DMARD use  - CBC, MP  - monitor for toxicities    3. Other specified counseling  - over 10 minutes  spent regarding below topics:  - Immunization counseling done.  - Weight loss counseling done.  - Nutrition and exercise counseling.  - Medication counseling provided.    4. Morbid Obesity  - would benefit from decreasing at least 10% of body weight.  - recommended goal of losing 1 lb per week.  - consider nutritionist evaluation.    Follow up in about 2 months (around 10/13/2019).    Method of contact with patient concerns: [facility]    Disclaimer:  This note is prepared using voice recognition software and as such is likely to have errors and has not been proof read. Please contact me for questions.     Time spent: 25 minutes in face to face discussion concerning diagnosis, prognosis, review of lab and test results, benefits of treatment as well as management of disease, counseling of patient and coordination of care between various health care providers.  Greater than half the time spent was used for coordination of care and counseling of patient.    Ken Alberto M.D.  Rheumatology Department   [facility] [place]

## 2019-08-14 ENCOUNTER — HOSPITAL ENCOUNTER (OUTPATIENT)
Dept: WOUND CARE | Facility: HOSPITAL | Age: 39
Discharge: HOME | End: 2019-08-14
Attending: SURGERY
Payer: COMMERCIAL

## 2019-08-14 VITALS — TEMPERATURE: 98 F | HEART RATE: 80 BPM | DIASTOLIC BLOOD PRESSURE: 68 MMHG | SYSTOLIC BLOOD PRESSURE: 139 MMHG

## 2019-08-14 DIAGNOSIS — E11.622 DIABETES MELLITUS WITH SKIN ULCER: ICD-10-CM

## 2019-08-14 DIAGNOSIS — B96.89 BACTERIAL SKIN INFECTION: Primary | ICD-10-CM

## 2019-08-14 DIAGNOSIS — L98.499 DIABETES MELLITUS WITH SKIN ULCER: ICD-10-CM

## 2019-08-14 DIAGNOSIS — L08.9 BACTERIAL SKIN INFECTION: Primary | ICD-10-CM

## 2019-08-14 DIAGNOSIS — D63.8 ANEMIA OF INFECTION AND CHRONIC DISEASE: ICD-10-CM

## 2019-08-14 DIAGNOSIS — B99.9 ANEMIA OF INFECTION AND CHRONIC DISEASE: ICD-10-CM

## 2019-08-14 DIAGNOSIS — L73.2 LEFT AXILLARY HIDRADENITIS: ICD-10-CM

## 2019-08-14 DIAGNOSIS — M19.90 ARTHRITIS: ICD-10-CM

## 2019-08-14 DIAGNOSIS — E66.01 MORBID OBESITY WITH BMI OF 50.0-59.9, ADULT: ICD-10-CM

## 2019-08-14 DIAGNOSIS — L73.2 HIDRADENITIS SUPPURATIVA OF RIGHT AXILLA: ICD-10-CM

## 2019-08-14 PROCEDURE — 87076 CULTURE ANAEROBE IDENT EACH: CPT

## 2019-08-14 PROCEDURE — 87102 FUNGUS ISOLATION CULTURE: CPT

## 2019-08-14 PROCEDURE — 99213 OFFICE O/P EST LOW 20 MIN: CPT

## 2019-08-14 PROCEDURE — 87075 CULTR BACTERIA EXCEPT BLOOD: CPT

## 2019-08-14 PROCEDURE — 87070 CULTURE OTHR SPECIMN AEROBIC: CPT

## 2019-08-14 RX ORDER — SULFAMETHOXAZOLE AND TRIMETHOPRIM 800; 160 MG/1; MG/1
1 TABLET ORAL 2 TIMES DAILY
Qty: 60 TABLET | Refills: 2 | Status: SHIPPED | OUTPATIENT
Start: 2019-08-14 | End: 2020-05-06 | Stop reason: ALTCHOICE

## 2019-08-14 NOTE — PROGRESS NOTES
Subjective:       Patient ID: Rodney Infante is a 38 y.o. female.    Chief Complaint: Non-healing Wound (left and right axillae)    Client here today with open areas to right and left axilla. Seen by Dr. Pennington, who states sweat glands infected. Rx for clindamycin, bactroban, and tramadol provided. Stop methatrexate ( prescribed for arthritis). Client plans to have abdominal bypass surgery in September. Culture done today of right axilla.  8/7/19: F/U with Dr. Pennington. Culture results negative. Continue POC.  8/14/19: F/U with Dr. Pennington. Clindamycin d/c'd. Bactrim and Diflucan ordered today. Leave left and right axilla open to air. Culture sent to lab today.    Review of Systems   Constitutional: Negative.    HENT: Negative.    Eyes: Negative.    Respiratory: Negative.    Cardiovascular: Negative.    Gastrointestinal: Negative.    Genitourinary: Negative.    Musculoskeletal: Negative.    Skin: Negative.    Neurological: Negative.    Psychiatric/Behavioral: Negative.        Objective:      Physical Exam   Constitutional: She is oriented to person, place, and time. She appears well-developed and well-nourished.   HENT:   Head: Normocephalic.   Eyes: Pupils are equal, round, and reactive to light. Conjunctivae and EOM are normal.   Neck: Normal range of motion. Neck supple.   Cardiovascular: Normal rate, regular rhythm, normal heart sounds and intact distal pulses.   Pulmonary/Chest: Effort normal and breath sounds normal.   Abdominal: Soft. Bowel sounds are normal.   Musculoskeletal: Normal range of motion.   Neurological: She is alert and oriented to person, place, and time. She has normal reflexes.   Skin: Skin is warm and dry.       Assessment:       1. Bacterial skin infection    2. Left axillary hidradenitis    3. Arthritis    4. Hidradenitis suppurativa of right axilla    5. Anemia of infection and chronic disease    6. Morbid obesity with BMI of 50.0-59.9, adult           Wound 07/31/19 1300 Other  (comment) Axilla #2 (Active)   07/31/19 1300    Pre-existing: Yes   Primary Wound Type: Other   Side: Left   Orientation:    Location: Axilla   Wound/PI Number (optional): #2   Ankle-Brachial Index:    Pulses:    Removal Indication and Assessment:    Wound Outcome:    (Retired) Wound Type:    (Retired) Wound Length (cm):    (Retired) Wound Width (cm):    (Retired) Depth (cm):    Wound Description (Comments):    Removal Indications:    Dressing Appearance Open to air 8/14/2019 10:50 AM   Drainage Amount Small 8/14/2019 10:50 AM   Drainage Characteristics/Odor Serosanguineous;Creamy 8/14/2019 10:50 AM   Appearance Red;Moist 8/14/2019 10:50 AM   Tissue loss description Partial thickness 8/14/2019 10:50 AM   Red (%), Wound Tissue Color 100 % 8/14/2019 10:50 AM   Periwound Area Moist 8/14/2019 10:50 AM   Wound Edges Irregular 8/14/2019 10:50 AM   Care Cleansed with:;Sterile normal saline 8/14/2019 10:50 AM   Periwound Care Dry periwound area maintained 8/14/2019 10:50 AM   Dressing Change Due 08/15/19 8/14/2019 10:50 AM            Wound 07/31/19 1300 Other (comment) Axilla #1 (Active)   07/31/19 1300    Pre-existing: Yes   Primary Wound Type: Other   Side: Right   Orientation:    Location: Axilla   Wound/PI Number (optional): #1   Ankle-Brachial Index:    Pulses:    Removal Indication and Assessment:    Wound Outcome:    (Retired) Wound Type:    (Retired) Wound Length (cm):    (Retired) Wound Width (cm):    (Retired) Depth (cm):    Wound Description (Comments):    Removal Indications:    Dressing Appearance Open to air 8/14/2019 10:53 AM   Drainage Amount Small 8/14/2019 10:53 AM   Drainage Characteristics/Odor Creamy;Serosanguineous 8/14/2019 10:53 AM   Appearance Red;Moist 8/14/2019 10:53 AM   Tissue loss description Partial thickness 8/14/2019 10:53 AM   Red (%), Wound Tissue Color 100 % 8/14/2019 10:53 AM   Periwound Area Moist;[place] 8/14/2019 10:53 AM   Wound Edges Irregular 8/14/2019 10:53 AM   Care Cleansed  with:;Sterile normal saline 8/14/2019 10:53 AM   Periwound Care Dry periwound area maintained 8/14/2019 10:53 AM   Dressing Change Due 08/15/19 8/14/2019 10:53 AM      Clean axillae with NS. Leave open to air. Began Bactrim and Diflucan. Culture done today.    Plan:            Dr. Pennington Wednesday 8/21/19.

## 2019-08-15 LAB
M TB IFN-G CD4+ BCKGRND COR BLD-ACNC: 0 IU/ML
MITOGEN IGNF BCKGRD COR BLD-ACNC: 2.95 IU/ML
MITOGEN IGNF BCKGRD COR BLD-ACNC: NEGATIVE [IU]/ML
NIL: 0.02 IU/ML
TB2 - NIL: 0 IU/ML

## 2019-08-16 LAB — BACTERIA SPEC AEROBE CULT: NORMAL

## 2019-08-19 LAB — BACTERIA SPEC ANAEROBE CULT: ABNORMAL

## 2019-08-27 ENCOUNTER — TELEPHONE (OUTPATIENT)
Dept: PHARMACY | Facility: CLINIC | Age: 39
End: 2019-08-27

## 2019-08-27 ENCOUNTER — OFFICE VISIT (OUTPATIENT)
Dept: RHEUMATOLOGY | Facility: CLINIC | Age: 39
End: 2019-08-27
Payer: COMMERCIAL

## 2019-08-27 VITALS
BODY MASS INDEX: 48.82 KG/M2 | SYSTOLIC BLOOD PRESSURE: 129 MMHG | DIASTOLIC BLOOD PRESSURE: 87 MMHG | HEART RATE: 120 BPM | HEIGHT: 65 IN | WEIGHT: 293 LBS

## 2019-08-27 DIAGNOSIS — M13.80 SERONEGATIVE ARTHRITIS: Primary | ICD-10-CM

## 2019-08-27 PROCEDURE — 96372 THER/PROPH/DIAG INJ SC/IM: CPT | Mod: S$GLB,,, | Performed by: INTERNAL MEDICINE

## 2019-08-27 PROCEDURE — 99215 OFFICE O/P EST HI 40 MIN: CPT | Mod: 25,S$GLB,, | Performed by: INTERNAL MEDICINE

## 2019-08-27 PROCEDURE — 3008F PR BODY MASS INDEX (BMI) DOCUMENTED: ICD-10-PCS | Mod: CPTII,S$GLB,, | Performed by: INTERNAL MEDICINE

## 2019-08-27 PROCEDURE — 3008F BODY MASS INDEX DOCD: CPT | Mod: CPTII,S$GLB,, | Performed by: INTERNAL MEDICINE

## 2019-08-27 PROCEDURE — 96372 PR INJECTION,THERAP/PROPH/DIAG2ST, IM OR SUBCUT: ICD-10-PCS | Mod: S$GLB,,, | Performed by: INTERNAL MEDICINE

## 2019-08-27 PROCEDURE — 99999 PR PBB SHADOW E&M-EST. PATIENT-LVL IV: ICD-10-PCS | Mod: PBBFAC,,, | Performed by: INTERNAL MEDICINE

## 2019-08-27 PROCEDURE — 99999 PR PBB SHADOW E&M-EST. PATIENT-LVL IV: CPT | Mod: PBBFAC,,, | Performed by: INTERNAL MEDICINE

## 2019-08-27 PROCEDURE — 99215 PR OFFICE/OUTPT VISIT, EST, LEVL V, 40-54 MIN: ICD-10-PCS | Mod: 25,S$GLB,, | Performed by: INTERNAL MEDICINE

## 2019-08-27 RX ORDER — ADALIMUMAB 40MG/0.8ML
40 KIT SUBCUTANEOUS
Qty: 25 PEN | Refills: 0 | Status: SHIPPED | OUTPATIENT
Start: 2019-08-27 | End: 2020-09-09 | Stop reason: SDUPTHER

## 2019-08-27 RX ORDER — BETAMETHASONE SODIUM PHOSPHATE AND BETAMETHASONE ACETATE 3; 3 MG/ML; MG/ML
6 INJECTION, SUSPENSION INTRA-ARTICULAR; INTRALESIONAL; INTRAMUSCULAR; SOFT TISSUE
Status: COMPLETED | OUTPATIENT
Start: 2019-08-27 | End: 2019-08-27

## 2019-08-27 RX ADMIN — BETAMETHASONE SODIUM PHOSPHATE AND BETAMETHASONE ACETATE 6 MG: 3; 3 INJECTION, SUSPENSION INTRA-ARTICULAR; INTRALESIONAL; INTRAMUSCULAR; SOFT TISSUE at 10:08

## 2019-08-27 NOTE — PROGRESS NOTES
RHEUMATOLOGY OUTPATIENT CLINIC NOTE    8/27/2019    Attending Rheumatologist: Ken Alberto  Primary Care Provider: [facility]   Physician Requesting Consultation: No referring provider defined for this encounter.  Chief Complaint/Reason For Consultation:  Worsening joint pain.    Subjective:       HPI  Rodney Infante is a 38 y.o. Black or  female with hand pain and swelling comes for follow-up.    Today  Last seen 2 weeks ago.  Methotrexate had been discontinued due to worsening skin infections by wound care medicine.  Sulfasalazine was increased, discussion had regarding biologic therapy.  Come sooner them planned due to worsening pain.  Today presents with dactylitis left index finger.  Denies fever or worsening rash.  Main complaint remains hand arthralgias with intermittent swelling.    Review of Systems   Constitutional: Negative for chills and fever.   Eyes: Negative for pain and redness.   Respiratory: Negative for cough and shortness of breath.    Cardiovascular: Negative for chest pain and PND.   Gastrointestinal: Negative for abdominal pain, blood in stool, diarrhea and melena.   Genitourinary: Negative for dysuria and urgency. Hematuria: Chronic, intermittent.   Musculoskeletal: Positive for joint pain (Hands, associated with swelling and stiffness.). Negative for back pain.   Skin: Negative for rash.        + history of ulcers, onset as a teenager.    Denies photosensitivity, denies alopecia, denies sclerodactyly, denies Raynaud's phenomenon,denies digital ulcers, no psoriasis, oral/nasal ulcerations, no purpura, denies nodules.   Neurological: Positive for tingling (Intermittent on hands and feet.). Negative for focal weakness and weakness.   Endo/Heme/Allergies: Does not bruise/bleed easily.   Psychiatric/Behavioral: Negative for substance abuse. The patient does not have insomnia.    All other systems reviewed and are negative.    Chronic comorbid conditions  affecting medical decision making today:  Past Medical History:   Diagnosis Date    Anemia     Arthritis     Diabetes mellitus, type 2     Neuropathy      Past Surgical History:   Procedure Laterality Date    CYST REMOVAL  2014, 2/2018    back    DEBRIDEMENT-WOUND N/A 2/16/2018    Performed by Marcel Pennington [facility] at [facility]    EXCISION-HIDRADENITIS N/A 5/10/2018    Performed by Marcel Pennington MD at [facility]     Family History   Problem Relation Age of Onset    No Known Problems Mother     Drug abuse Father     No Known Problems Sister      Social History     Substance and Sexual Activity   Alcohol Use Yes    Comment: occasionally     Social History     Tobacco Use   Smoking Status Never Smoker   Smokeless Tobacco Never Used     Social History     Substance and Sexual Activity   Drug Use No       Current Outpatient Medications:     amLODIPine (NORVASC) 10 MG tablet, , Disp: , Rfl:     canagliflozin-metformin (INVOKAMET XR) 150-1,000 mg TBph, Take by mouth., Disp: , Rfl:     carvedilol (COREG) 25 MG tablet, , Disp: , Rfl:     clotrimazole-betamethasone 1-0.05% (LOTRISONE) cream, , Disp: , Rfl:     cyanocobalamin (VITAMIN B-12) 1000 MCG tablet, Take 1 tablet (1,000 mcg total) by mouth once daily. This is a prescription for 1 year.  Take 1 tablet Monday, Wednesday, and Friday, Disp: 36 tablet, Rfl: 3    ergocalciferol (ERGOCALCIFEROL) 50,000 unit Cap, , Disp: , Rfl:     estradiol cypionate (DEPO-ESTRADIOL) 5 mg/mL injection, Inject into the muscle every 28 days., Disp: , Rfl:     folic acid (FOLVITE) 1 MG tablet, Take 1 tablet (1 mg total) by mouth once daily., Disp: 30 tablet, Rfl: 4    gabapentin (NEURONTIN) 100 MG capsule, Take 3 capsules (300 mg total) by mouth every evening., Disp: 90 capsule, Rfl: 3    glimepiride (AMARYL) 4 MG tablet, Take 1 tablet (4 mg total) by mouth before dinner., Disp: 30 tablet, Rfl: 5    hydroxychloroquine (PLAQUENIL) 200 mg tablet, Take 2 tablets (400 mg  total) by mouth every evening., Disp: 60 tablet, Rfl: 2    losartan-hydrochlorothiazide 100-12.5 mg (HYZAAR) 100-12.5 mg Tab, , Disp: , Rfl:     ondansetron (ZOFRAN) 4 MG tablet, , Disp: , Rfl:     sulfamethoxazole-trimethoprim 800-160mg (BACTRIM DS) 800-160 mg Tab, Take 1 tablet by mouth 2 (two) times daily., Disp: 60 tablet, Rfl: 2    sulfaSALAzine (AZULFIDINE) 500 mg Tab, Take 2 tablets (1,000 mg total) by mouth 2 (two) times daily., Disp: 120 tablet, Rfl: 2    TOPCARE UNIVERSAL1 LANCET Misc, , Disp: , Rfl:     TRUETRACK TEST Strp, , Disp: , Rfl:     adalimumab (HUMIRA PEN) 40 mg/0.8 mL PnKt, Inject 1 pen (40 mg total) into the skin every 14 (fourteen) days., Disp: 25 pen, Rfl: 0    Current Facility-Administered Medications:     betamethasone acetate-betamethasone sodium phosphate injection 6 mg, 6 mg, Intramuscular, 1 time in Clinic/HOD, Ken Alberto MD     Objective:         Vitals:    08/27/19 1001   BP: 129/87   Pulse: (!) 120     Physical Exam   Nursing note and vitals reviewed.  Constitutional: She is oriented to person, place, and time and well-developed, well-nourished, and in no distress.   Obese BMI 57.9   HENT:   Head: Normocephalic.   no parotid enlargement.   Eyes: Conjunctivae are normal. Pupils are equal, round, and reactive to light.   Absent Episcleritis/scleritis.   Neck: Normal range of motion.   Cardiovascular: Normal rate and intact distal pulses.    Pulmonary/Chest: Effort normal. No respiratory distress.   Abdominal: Soft. She exhibits no distension.   Neurological: She is alert and oriented to person, place, and time. Gait normal.   absent sensory deficits  muscle strength 5/5 through.   Skin: No erythema.     Lipodermatosclerosis appearance lower extremity, evidence of multiple healed ulcers.  HS axilla   Musculoskeletal: Normal range of motion. She exhibits edema, tenderness (2nd/3rd PIPs and MCP Rt hand Volar aspect MCP 2nd/3rd rt.  ) and deformity (Mohall-neck right 5th PIP,  reducible Boutonniere appearance some PIPs).   :  Limited due to pain on left hand  Swelling +, Warmth, +, Erythema -  Synovitis / Dactylitis left 2nd finger    AROM:  Limited due to pain on left hand, otherwise intact  PROM: intact    Devices used by patient: none    + crepitus knees.       Reviewed old and all outside pertinent medical records available.    All lab results personally reviewed and interpreted by me.  Lab Results   Component Value Date    WBC 16.42 (H) 07/15/2019    HGB 9.8 (L) 07/15/2019    HCT 32.5 (L) 07/15/2019    MCV 84 07/15/2019    MCH 25.4 (L) 07/15/2019    MCHC 30.2 (L) 07/15/2019    RDW 16.3 (H) 07/15/2019     (H) 07/15/2019    MPV 8.0 (L) 07/15/2019    PLTEST Increased (A) 11/12/2018     Lab Results   Component Value Date     07/15/2019    K 3.8 07/15/2019     07/15/2019    CO2 18 (L) 07/15/2019     (H) 07/15/2019    BUN 9 07/15/2019    CALCIUM 9.3 07/15/2019    PROT 9.4 (H) 07/15/2019    ALBUMIN 2.7 (L) 07/15/2019    BILITOT 0.3 07/15/2019    AST 10 07/15/2019    ALKPHOS 106 07/15/2019    ALT 13 07/15/2019     Lab Results   Component Value Date    COLORU Yellow 02/27/2018    APPEARANCEUA Cloudy (A) 02/27/2018    SPECGRAV 1.020 02/27/2018    PHUR 5.0 02/27/2018    PROTEINUA Negative 02/27/2018    KETONESU Negative 02/27/2018    LEUKOCYTESUR 3+ (A) 02/27/2018    NITRITE Negative 02/27/2018    UROBILINOGEN Negative 02/27/2018     Lab Results   Component Value Date    CRP 36.3 (H) 06/11/2019       Lab Results   Component Value Date    SEDRATE 129 (H) 07/15/2019       Lab Results   Component Value Date    RF <10.0 04/03/2019    SEDRATE 129 (H) 07/15/2019       No components found for: 25OHVITDTOT, 22OKJZRK2, 31VQJSIQ0, METHODNOTE    No results found for: URICACID    No components found for: TSPOTTB    · HbA1C 8.4% (2016)  · Uric acid 6 October 2013  · SPEP/ANN 2/2018: Increased total protein. Increased gamma globulins, polyclonal pattern.  No discrete monoclonal  peaks identified.    Rheum Labs:  MILTON negative  Rheumatoid factor/CCP negative  SSA negative  ANCA screen negative  HLA B27 negative    Infectious Labs:  HCV antibody nonreactive April 2019   Hepatitis panel nonreactive  QuantiFERON TB negative August 2019    Imaging:  All imaging reviewed and independently  interpreted by me.    X-ray hands May 2019  juxta-articular calcification adjacent to the DIP joint index and middle fingers with question of small erosions at the distal radial margin of the middle phalanx index finger.  Mild narrowing of the radial aspect of the radiocarpal articulation is questioned.  Joint spaces appear otherwise well maintained.  Alignment anatomic.    EMG May 2018  normal study, though there are significant differences between the right and left median nerve sensory and motor nerve conduction findings, which may indicate early carpal tunnel pathology on the right.     · Biopsies  Infected skin subcutaneous fascia 2/2018.   Infected skin and subcutaneous fascia:  -Skin and subcutaneous tissue represented  -Subcutaneous soft tissue with necroinflammatory debris  -No atypia or malignancy     ASSESSMENT / PLAN:     Rodney Infante is a 38 y.o. Black or  female with:    1. Seronegative arthritis  - CDAI: 21-> moderate disease activity.  Dactylitis present today, left index finger.  - MTX discontinued by different provided to to recurrent skin infections and hydradenitis suppurativa  - for active seronegative negative arthritis and HS will recommend TNFi threpay  - Humira order sent to [facility].  To initiate went approved.  Clinical significance side effects of therapy discussed in detail, including severe infections.  - continue with sulfasalazine 1000 BID and  Plaquenil unchanged.  - MRI left hand to assess for active inflammatory arthritis versus differential diagnosis.    2. DMARD use  - CBC, MP  - monitor for toxicities    3. Other specified counseling  - over 10 minutes  spent regarding below topics:  - Immunization counseling done.  - Weight loss counseling done.  - Nutrition and exercise counseling.  - Medication counseling provided.    4. Morbid Obesity  - would benefit from decreasing at least 10% of body weight.  - recommended goal of losing 1 lb per week.  - consider nutritionist evaluation.    RTC 6 weeks    Method of contact with patient concerns: [facility]    Disclaimer:  This note is prepared using voice recognition software and as such is likely to have errors and has not been proof read. Please contact me for questions.     Time spent: 25 minutes in face to face discussion concerning diagnosis, prognosis, review of lab and test results, benefits of treatment as well as management of disease, counseling of patient and coordination of care between various health care providers.  Greater than half the time spent was used for coordination of care and counseling of patient.    Ken Alberto M.D.  Rheumatology Department   [facility]

## 2019-08-27 NOTE — PROGRESS NOTES
Allergies and medications reviewed prior to administration. Administered 1cc Betamethasone 6mg/cc  to right ventro gluteal. Pt tolerated well. No acute reaction noted to site. Pt instructed on S/S to report. Pt verbalized understanding.     Lot:[id number]  Exp:05/20  Manu:[employer] [place]

## 2019-08-30 ENCOUNTER — TELEPHONE (OUTPATIENT)
Dept: RADIOLOGY | Facility: HOSPITAL | Age: 39
End: 2019-08-30

## 2019-08-30 DIAGNOSIS — M13.80 SERONEGATIVE ARTHRITIS: Primary | ICD-10-CM

## 2019-09-03 ENCOUNTER — TELEPHONE (OUTPATIENT)
Dept: RADIOLOGY | Facility: HOSPITAL | Age: 39
End: 2019-09-03

## 2019-09-04 ENCOUNTER — HOSPITAL ENCOUNTER (OUTPATIENT)
Dept: WOUND CARE | Facility: HOSPITAL | Age: 39
Discharge: HOME | End: 2019-09-04
Attending: SURGERY
Payer: COMMERCIAL

## 2019-09-04 VITALS
BODY MASS INDEX: 48.82 KG/M2 | WEIGHT: 293 LBS | TEMPERATURE: 98 F | HEIGHT: 65 IN | DIASTOLIC BLOOD PRESSURE: 80 MMHG | SYSTOLIC BLOOD PRESSURE: 133 MMHG | HEART RATE: 113 BPM

## 2019-09-04 DIAGNOSIS — E66.01 MORBID OBESITY WITH BMI OF 50.0-59.9, ADULT: ICD-10-CM

## 2019-09-04 DIAGNOSIS — B96.89 BACTERIAL SKIN INFECTION: Primary | ICD-10-CM

## 2019-09-04 DIAGNOSIS — E11.622 TYPE 2 DIABETES MELLITUS WITH OTHER SKIN ULCER, WITH LONG-TERM CURRENT USE OF INSULIN: ICD-10-CM

## 2019-09-04 DIAGNOSIS — L73.2 HIDRADENITIS SUPPURATIVA OF RIGHT AXILLA: ICD-10-CM

## 2019-09-04 DIAGNOSIS — L73.2 LEFT AXILLARY HIDRADENITIS: ICD-10-CM

## 2019-09-04 DIAGNOSIS — L08.9 BACTERIAL SKIN INFECTION: Primary | ICD-10-CM

## 2019-09-04 DIAGNOSIS — L98.499 DIABETES MELLITUS WITH SKIN ULCER: ICD-10-CM

## 2019-09-04 DIAGNOSIS — E11.622 DIABETES MELLITUS WITH SKIN ULCER: ICD-10-CM

## 2019-09-04 DIAGNOSIS — Z79.4 TYPE 2 DIABETES MELLITUS WITH OTHER SKIN ULCER, WITH LONG-TERM CURRENT USE OF INSULIN: ICD-10-CM

## 2019-09-04 PROCEDURE — 99213 OFFICE O/P EST LOW 20 MIN: CPT | Performed by: NURSE PRACTITIONER

## 2019-09-04 NOTE — PROGRESS NOTES
"Subjective:       Patient ID: Rodney Infante is a 38 y.o. female.    Chief Complaint: Wound Care    Client here today with open areas to right and left axilla. Seen by Dr. Pennington, who states sweat glands infected. Rx for clindamycin, bactroban, and tramadol provided. Stop methatrexate ( prescribed for arthritis). Client plans to have abdominal bypass surgery in September. Culture done today of right axilla.  8/7/19: F/U with Dr. Pennington. Culture results negative. Continue POC.  8/14/19: F/U with Dr. Pennington. Clindamycin d/c'd. Bactrim and Diflucan ordered today. Leave left and right axilla open to air. Culture sent to lab today.    9/4/19:  F/U with Dr. Pennington.  Wounds to right axilla healing up.  Patient states "right does not drain as much as left".  Patient feels the left axilla is not healing up like the right.  Patient still taking Bactrim PO.  Will continue for now.    Review of Systems   Constitutional: Negative.    HENT: Negative.    Eyes: Negative.    Respiratory: Negative.    Cardiovascular: Negative.    Gastrointestinal: Negative.    Genitourinary: Negative.    Musculoskeletal: Negative.    Skin: Negative.    Neurological: Negative.    Psychiatric/Behavioral: Negative.        Objective:      Physical Exam   Constitutional: She is oriented to person, place, and time. She appears well-developed and well-nourished.   HENT:   Head: Normocephalic.   Eyes: Pupils are equal, round, and reactive to light. Conjunctivae and EOM are normal.   Neck: Normal range of motion. Neck supple.   Cardiovascular: Normal rate, regular rhythm, normal heart sounds and intact distal pulses.   Pulmonary/Chest: Effort normal and breath sounds normal.   Abdominal: Soft. Bowel sounds are normal.   Musculoskeletal: Normal range of motion.   Neurological: She is alert and oriented to person, place, and time. She has normal reflexes.   Skin: Skin is warm and dry.       Assessment:       1. Bacterial skin infection    2. " Hidradenitis suppurativa of right axilla    3. Left axillary hidradenitis           Wound 07/31/19 1300 Other (comment) Axilla #2 (Active)   07/31/19 1300    Pre-existing: Yes   Primary Wound Type: Other   Side: Left   Orientation:    Location: Axilla   Wound/PI Number (optional): #2   Ankle-Brachial Index:    Pulses:    Removal Indication and Assessment:    Wound Outcome:    (Retired) Wound Type:    (Retired) Wound Length (cm):    (Retired) Wound Width (cm):    (Retired) Depth (cm):    Wound Description (Comments):    Removal Indications:    Wound Image   9/4/2019 10:09 AM   Dressing Appearance Open to air 9/4/2019 10:09 AM   Drainage Amount Small 9/4/2019 10:09 AM   Drainage Characteristics/Odor Creamy 9/4/2019 10:09 AM   Appearance Pink;Red 9/4/2019 10:09 AM   Red (%), Wound Tissue Color 100 % 9/4/2019 10:09 AM   Periwound Area Intact;Satellite lesion 9/4/2019 10:09 AM   Wound Edges Open 9/4/2019 10:09 AM   Wound Length (cm) 0.4 cm 9/4/2019 10:09 AM   Wound Width (cm) 0.8 cm 9/4/2019 10:09 AM   Wound Depth (cm) 0.2 cm 9/4/2019 10:09 AM   Wound Volume (cm^3) 0.06 cm^3 9/4/2019 10:09 AM   Wound Surface Area (cm^2) 0.32 cm^2 9/4/2019 10:09 AM   Care Cleansed with:;Sterile normal saline 9/4/2019 10:09 AM   Dressing Applied;Hydrofiber;Island/border 9/4/2019 10:09 AM   Periwound Care Skin barrier film applied 9/4/2019 10:09 AM   Dressing Change Due 09/07/19 9/4/2019 10:09 AM            Wound 07/31/19 1300 Other (comment) Axilla #1 (Active)   07/31/19 1300    Pre-existing: Yes   Primary Wound Type: Other   Side: Right   Orientation:    Location: Axilla   Wound/PI Number (optional): #1   Ankle-Brachial Index:    Pulses:    Removal Indication and Assessment:    Wound Outcome:    (Retired) Wound Type:    (Retired) Wound Length (cm):    (Retired) Wound Width (cm):    (Retired) Depth (cm):    Wound Description (Comments):    Removal Indications:    Wound Image   9/4/2019 10:09 AM   Dressing Appearance Open to air 9/4/2019  10:09 AM   Drainage Amount Small 9/4/2019 10:09 AM   Drainage Characteristics/Odor Creamy 9/4/2019 10:09 AM   Appearance Pink;Red 9/4/2019 10:09 AM   Red (%), Wound Tissue Color 100 % 9/4/2019 10:09 AM   Periwound Area Intact;Satellite lesion 9/4/2019 10:09 AM   Wound Edges Open 9/4/2019 10:09 AM   Wound Length (cm) 0.4 cm 9/4/2019 10:09 AM   Wound Width (cm) 0.8 cm 9/4/2019 10:09 AM   Wound Depth (cm) 0.2 cm 9/4/2019 10:09 AM   Wound Volume (cm^3) 0.06 cm^3 9/4/2019 10:09 AM   Wound Surface Area (cm^2) 0.32 cm^2 9/4/2019 10:09 AM   Care Cleansed with:;Sterile normal saline 9/4/2019 10:09 AM   Dressing Applied;Hydrofiber;Island/border 9/4/2019 10:09 AM   Periwound Care Skin barrier film applied 9/4/2019 10:09 AM   Dressing Change Due 09/07/19 9/4/2019 10:09 AM       Wounds cleaned with normal saline.    To bilateral axilla wounds:  Covered open areas with hydrofera ready and long mepore dressing.  Secured with tape.    Plan:                Follow up in about 1 week (around 9/11/2019) for Dr. Pennington.

## 2019-09-06 ENCOUNTER — TELEPHONE (OUTPATIENT)
Dept: RADIOLOGY | Facility: HOSPITAL | Age: 39
End: 2019-09-06

## 2019-09-07 ENCOUNTER — HOSPITAL ENCOUNTER (OUTPATIENT)
Dept: RADIOLOGY | Facility: HOSPITAL | Age: 39
Discharge: HOME | End: 2019-09-07
Attending: INTERNAL MEDICINE
Payer: COMMERCIAL

## 2019-09-07 ENCOUNTER — LAB VISIT (OUTPATIENT)
Dept: LAB | Facility: HOSPITAL | Age: 39
End: 2019-09-07
Attending: PEDIATRICS
Payer: COMMERCIAL

## 2019-09-07 DIAGNOSIS — M13.80 SERONEGATIVE ARTHRITIS: ICD-10-CM

## 2019-09-07 DIAGNOSIS — D50.0 IRON DEFICIENCY ANEMIA DUE TO CHRONIC BLOOD LOSS: ICD-10-CM

## 2019-09-07 LAB
ALBUMIN SERPL BCP-MCNC: 2.8 G/DL (ref 3.5–5.2)
ALP SERPL-CCNC: 90 U/L (ref 55–135)
ALT SERPL W/O P-5'-P-CCNC: 19 U/L (ref 10–44)
ANION GAP SERPL CALC-SCNC: 10 MMOL/L (ref 8–16)
AST SERPL-CCNC: 16 U/L (ref 10–40)
BILIRUB SERPL-MCNC: 0.2 MG/DL (ref 0.1–1)
BUN SERPL-MCNC: 10 MG/DL (ref 6–20)
CALCIUM SERPL-MCNC: 9.3 MG/DL (ref 8.7–10.5)
CHLORIDE SERPL-SCNC: 110 MMOL/L (ref 95–110)
CO2 SERPL-SCNC: 17 MMOL/L (ref 23–29)
CREAT SERPL-MCNC: 0.8 MG/DL (ref 0.5–1.4)
EST. GFR  (AFRICAN AMERICAN): >60 ML/MIN/1.73 M^2
EST. GFR  (NON AFRICAN AMERICAN): >60 ML/MIN/1.73 M^2
GLUCOSE SERPL-MCNC: 140 MG/DL (ref 70–110)
POTASSIUM SERPL-SCNC: 3.6 MMOL/L (ref 3.5–5.1)
PROT SERPL-MCNC: 9 G/DL (ref 6–8.4)
SODIUM SERPL-SCNC: 137 MMOL/L (ref 136–145)

## 2019-09-07 PROCEDURE — 36415 COLL VENOUS BLD VENIPUNCTURE: CPT

## 2019-09-07 PROCEDURE — 73220 MRI UPPR EXTREMITY W/O&W/DYE: CPT | Mod: TC,LT

## 2019-09-07 PROCEDURE — 80053 COMPREHEN METABOLIC PANEL: CPT

## 2019-09-07 PROCEDURE — A9585 GADOBUTROL INJECTION: HCPCS | Performed by: INTERNAL MEDICINE

## 2019-09-07 PROCEDURE — 25500020 PHARM REV CODE 255: Performed by: INTERNAL MEDICINE

## 2019-09-07 PROCEDURE — 73220 MRI HAND FINGERS W WO CONTRAST LEFT: ICD-10-PCS | Mod: 26,LT,, | Performed by: RADIOLOGY

## 2019-09-07 PROCEDURE — 73220 MRI UPPR EXTREMITY W/O&W/DYE: CPT | Mod: 26,LT,, | Performed by: RADIOLOGY

## 2019-09-07 RX ORDER — GADOBUTROL 604.72 MG/ML
10 INJECTION INTRAVENOUS
Status: COMPLETED | OUTPATIENT
Start: 2019-09-07 | End: 2019-09-07

## 2019-09-07 RX ADMIN — GADOBUTROL 10 ML: 604.72 INJECTION INTRAVENOUS at 09:09

## 2019-09-09 NOTE — TELEPHONE ENCOUNTER
Documentation only:    Prior authorization for Humira has been approved for one year.  (Loading dose only good through 10/09/2019).    Approval dates:  09/09/2019 through 09/08/2020    Authorization number:  [id number] (Maintenance) and [id number] (Loading)    Patient co-pay:  $5.00

## 2019-09-13 ENCOUNTER — TELEPHONE (OUTPATIENT)
Dept: RHEUMATOLOGY | Facility: CLINIC | Age: 39
End: 2019-09-13

## 2019-09-13 NOTE — TELEPHONE ENCOUNTER
----- Message from Fabiana Alexis sent at 9/13/2019  1:18 PM CDT -----  Contact: patient   Would like to know if she will need to go the pharmacy to pickup medication or if they will send it to her. Please call back at [phone].      Thanks,  Fabiana Alexis

## 2019-09-13 NOTE — TELEPHONE ENCOUNTER
Pt wanted to know if she had to  her medication and where. Advised pt it was located at the specialty pharmacy in [place] and gave her the number. Pt states understanding

## 2019-09-16 ENCOUNTER — TELEPHONE (OUTPATIENT)
Dept: RHEUMATOLOGY | Facility: CLINIC | Age: 39
End: 2019-09-16

## 2019-09-16 PROBLEM — D72.829 ELEVATED WBC COUNT: Status: ACTIVE | Noted: 2019-09-16

## 2019-09-16 LAB — FUNGUS SPEC CULT: NORMAL

## 2019-09-16 NOTE — TELEPHONE ENCOUNTER
----- Message from Sheila Sherman PharmD sent at 9/16/2019  2:20 PM CDT -----  Regarding: Humira  Good afternoon Dr. Alberto and staff,    Ms. Infante's Humira has been approved with her insurance however I wanted to confirm she is clear to start given her skin infections. I see noted that MTX was previously discontinued due to infection. Please advise how and when [facility] may proceed with dispensing her Humira.    Thank you,  Sheila Sherman, PharmD  [facility]  [phone]

## 2019-09-18 ENCOUNTER — HOSPITAL ENCOUNTER (OUTPATIENT)
Dept: WOUND CARE | Facility: HOSPITAL | Age: 39
Discharge: HOME | End: 2019-09-18
Attending: SURGERY
Payer: COMMERCIAL

## 2019-09-18 VITALS
WEIGHT: 293 LBS | HEIGHT: 65 IN | SYSTOLIC BLOOD PRESSURE: 131 MMHG | BODY MASS INDEX: 48.82 KG/M2 | HEART RATE: 95 BPM | TEMPERATURE: 98 F | DIASTOLIC BLOOD PRESSURE: 91 MMHG

## 2019-09-18 DIAGNOSIS — L73.2 LEFT AXILLARY HIDRADENITIS: ICD-10-CM

## 2019-09-18 DIAGNOSIS — E66.01 MORBID OBESITY WITH BMI OF 50.0-59.9, ADULT: ICD-10-CM

## 2019-09-18 DIAGNOSIS — E11.622 DIABETES MELLITUS WITH SKIN ULCER: ICD-10-CM

## 2019-09-18 DIAGNOSIS — B96.89 BACTERIAL SKIN INFECTION: Primary | ICD-10-CM

## 2019-09-18 DIAGNOSIS — L73.2 HIDRADENITIS SUPPURATIVA OF RIGHT AXILLA: ICD-10-CM

## 2019-09-18 DIAGNOSIS — L08.9 BACTERIAL SKIN INFECTION: Primary | ICD-10-CM

## 2019-09-18 DIAGNOSIS — L98.499 DIABETES MELLITUS WITH SKIN ULCER: ICD-10-CM

## 2019-09-18 PROCEDURE — 87186 SC STD MICRODIL/AGAR DIL: CPT

## 2019-09-18 PROCEDURE — 99213 OFFICE O/P EST LOW 20 MIN: CPT

## 2019-09-18 PROCEDURE — 87077 CULTURE AEROBIC IDENTIFY: CPT

## 2019-09-18 PROCEDURE — 87070 CULTURE OTHR SPECIMN AEROBIC: CPT

## 2019-09-18 PROCEDURE — 87075 CULTR BACTERIA EXCEPT BLOOD: CPT

## 2019-09-18 NOTE — PROGRESS NOTES
"Subjective:       Patient ID: Rodney Infante is a 38 y.o. female.    Chief Complaint: Non-healing Wound (right and left axilla)    Client here today with open areas to right and left axilla. Seen by Dr. Pennington, who states sweat glands infected. Rx for clindamycin, bactroban, and tramadol provided. Stop methatrexate ( prescribed for arthritis). Client plans to have abdominal bypass surgery in September. Culture done today of right axilla.  8/7/19: F/U with Dr. Pennington. Culture results negative. Continue POC.  8/14/19: F/U with Dr. Pennington. Clindamycin d/c'd. Bactrim and Diflucan ordered today. Leave left and right axilla open to air. Culture sent to lab today.    9/4/19:  F/U with Dr. Pennington.  Wounds to right axilla healing up.  Patient states "right does not drain as much as left".  Patient feels the left axilla is not healing up like the right.  Patient still taking Bactrim PO.  Will continue for now.  9/18/19: F/U with Dr. Peninngton. Both axilla continue to have "sebum - like" exudate. New site noted to left back. Culture done today. RX for lotrisone and Diflucan provided.    Review of Systems   Constitutional: Negative.    HENT: Negative.    Eyes: Negative.    Respiratory: Negative.    Cardiovascular: Negative.    Gastrointestinal: Negative.    Genitourinary: Negative.    Musculoskeletal: Negative.    Skin: Negative.    Neurological: Negative.    Psychiatric/Behavioral: Negative.        Objective:      Physical Exam   Constitutional: She is oriented to person, place, and time. She appears well-developed and well-nourished.   HENT:   Head: Normocephalic.   Eyes: Pupils are equal, round, and reactive to light. Conjunctivae and EOM are normal.   Neck: Normal range of motion. Neck supple.   Cardiovascular: Normal rate, regular rhythm, normal heart sounds and intact distal pulses.   Pulmonary/Chest: Effort normal and breath sounds normal.   Abdominal: Soft. Bowel sounds are normal.   Musculoskeletal: Normal " range of motion.   Neurological: She is alert and oriented to person, place, and time. She has normal reflexes.   Skin: Skin is warm and dry.       Assessment:       1. Bacterial skin infection           Wound 07/31/19 1300 Other (comment) Axilla #2 (Active)   07/31/19 1300    Pre-existing: Yes   Primary Wound Type: Other   Side: Left   Orientation:    Location: Axilla   Wound/PI Number (optional): #2   Ankle-Brachial Index:    Pulses:    Removal Indication and Assessment:    Wound Outcome:    (Retired) Wound Type:    (Retired) Wound Length (cm):    (Retired) Wound Width (cm):    (Retired) Depth (cm):    Wound Description (Comments):    Removal Indications:    Dressing Appearance Open to air 9/18/2019 10:55 AM   Drainage Amount Small 9/18/2019 10:55 AM   Drainage Characteristics/Odor Creamy;Yellow 9/18/2019 10:55 AM   Appearance Red;Pink;Moist 9/18/2019 10:55 AM   Tissue loss description Full thickness 9/18/2019 10:55 AM   Red (%), Wound Tissue Color 100 % 9/18/2019 10:55 AM   Periwound Area Intact;Satellite lesion 9/18/2019 10:55 AM   Wound Edges Open 9/18/2019 10:55 AM   Wound Length (cm) 0.4 cm 9/18/2019 10:55 AM   Wound Width (cm) 0.8 cm 9/18/2019 10:55 AM   Wound Depth (cm) 0.2 cm 9/18/2019 10:55 AM   Wound Volume (cm^3) 0.06 cm^3 9/18/2019 10:55 AM   Wound Surface Area (cm^2) 0.32 cm^2 9/18/2019 10:55 AM   Care Cleansed with:;Sterile normal saline 9/18/2019 10:55 AM            Wound 07/31/19 1300 Other (comment) Axilla #1 (Active)   07/31/19 1300    Pre-existing: Yes   Primary Wound Type: Other   Side: Right   Orientation:    Location: Axilla   Wound/PI Number (optional): #1   Ankle-Brachial Index:    Pulses:    Removal Indication and Assessment:    Wound Outcome:    (Retired) Wound Type:    (Retired) Wound Length (cm):    (Retired) Wound Width (cm):    (Retired) Depth (cm):    Wound Description (Comments):    Removal Indications:    Dressing Appearance Open to air 9/18/2019 10:58 AM   Drainage Amount Small  9/18/2019 10:58 AM   Drainage Characteristics/Odor Creamy;Yellow 9/18/2019 10:58 AM   Appearance Red;Pink;Moist 9/18/2019 10:58 AM   Tissue loss description Full thickness 9/18/2019 10:58 AM   Red (%), Wound Tissue Color 100 % 9/18/2019 10:58 AM   Periwound Area Intact;Satellite lesion 9/18/2019 10:58 AM   Wound Edges Open 9/18/2019 10:58 AM   Wound Length (cm) 0.4 cm 9/18/2019 10:58 AM   Wound Width (cm) 0.8 cm 9/18/2019 10:58 AM   Wound Depth (cm) 0.2 cm 9/18/2019 10:58 AM   Wound Volume (cm^3) 0.06 cm^3 9/18/2019 10:58 AM   Wound Surface Area (cm^2) 0.32 cm^2 9/18/2019 10:58 AM   Care Cleansed with:;Sterile normal saline 9/18/2019 10:58 AM       Wounds cleaned with normal saline.    To bilateral axilla wounds:  Covered open areas with hydrofera ready and long mepore dressing.  Secured with tape.    Plan:                Follow up in about 1 week (around 9/25/2019).

## 2019-09-21 LAB — BACTERIA SPEC AEROBE CULT: ABNORMAL

## 2019-09-23 ENCOUNTER — PATIENT MESSAGE (OUTPATIENT)
Dept: PHARMACY | Facility: CLINIC | Age: 39
End: 2019-09-23

## 2019-09-23 ENCOUNTER — TELEPHONE (OUTPATIENT)
Dept: PHARMACY | Facility: CLINIC | Age: 39
End: 2019-09-23

## 2019-09-23 LAB — BACTERIA SPEC ANAEROBE CULT: NORMAL

## 2019-09-23 NOTE — TELEPHONE ENCOUNTER
Initial Humira 40mg/0.8ml Pen Injection consult completed on .Humira 40mg/0.8ml Pen Injection will be shipped on  to arrive at patient's home on  via [employer]. $5 copay. Patient will start Humira 40mg/0.8ml Pen Injection by , when Humira nurse ambassador can come. Address confirmed, CC on file. Confirmed 2 patient identifiers - name and . Therapy Appropriate.    Counseled patient on administration directions:  - Starter: Inject Humira 160mg (4 pens) into the skin on Day 1, then 2 pens on day 15  - Maintenance: Then, Inject Humira 40mg (1pen) into the skin every 14 days.  - Take out of the refrigerator 30-60 minutes prior to injection.  - Wash hands before and after injection.  - Monthly RX will come with gauze, bandaids, and alcohol swabs.  - Patient may inject in either the tops of the thighs, abdomen- but at least 2 inches away from her belly button, or the outer part of her upper arm.  Patient was instructed to rotate injections sites.  - Patient is to wipe down the injection site with the alcohol pad, wait to dry.  Gently squeeze the area of the cleaned skin and hold it firmly.   Place the pen flat against the raised area of skin that is being squeezed, then push down on the button and hold for 10-15 seconds, until the window has gone from clear to yellow.  - Patient should rotate injection sites.   - Patient will use sharps container; once full, per [place] law, she may lock the sharps container and place in her trash. She can then contact the Pharmacy and we will replace the sharps at no additional charge.    Patient was counseled on possible side effects:  - Injection site reaction: redness, soreness, itching, bruising, which should resolve within 3-5 days.   - Increased risk for infections.  If patient becomes sick, patient is to hold Humira  use until she is better.     DDIs:  Medication list reviewed and potential DDIs addressed.    Patient verbalized understanding. Compliance stressed. Patient  advised to keep a calendar marking dates of injections to ensure better compliance. Patient advised to call myself or provider should any questions arise. Consultation included: indication; goals of treatment; administration; storage and handling; side effects; how to handle side effects; the importance of compliance; how to handle missed doses; the importance of laboratory monitoring; the importance of keeping all follow up appointments.  Patient understands to report any medication changes to [facility] and provider. All questions answered and addressed to patients satisfaction. Rph will f/u with her in 1 week from start, [facility] to contact patient in 3 weeks for refills.

## 2019-09-25 ENCOUNTER — HOSPITAL ENCOUNTER (OUTPATIENT)
Dept: WOUND CARE | Facility: HOSPITAL | Age: 39
Discharge: HOME | End: 2019-09-25
Attending: SURGERY
Payer: COMMERCIAL

## 2019-09-25 VITALS
DIASTOLIC BLOOD PRESSURE: 88 MMHG | HEIGHT: 65 IN | HEART RATE: 107 BPM | WEIGHT: 293 LBS | BODY MASS INDEX: 48.82 KG/M2 | TEMPERATURE: 98 F | SYSTOLIC BLOOD PRESSURE: 135 MMHG

## 2019-09-25 DIAGNOSIS — L73.2 LEFT AXILLARY HIDRADENITIS: ICD-10-CM

## 2019-09-25 DIAGNOSIS — Z79.4 TYPE 2 DIABETES MELLITUS WITH OTHER SKIN ULCER, WITH LONG-TERM CURRENT USE OF INSULIN: ICD-10-CM

## 2019-09-25 DIAGNOSIS — L08.9 BACTERIAL SKIN INFECTION: Primary | ICD-10-CM

## 2019-09-25 DIAGNOSIS — B96.89 BACTERIAL SKIN INFECTION: Primary | ICD-10-CM

## 2019-09-25 DIAGNOSIS — L73.2 HIDRADENITIS SUPPURATIVA OF RIGHT AXILLA: ICD-10-CM

## 2019-09-25 DIAGNOSIS — E66.01 MORBID OBESITY WITH BMI OF 50.0-59.9, ADULT: ICD-10-CM

## 2019-09-25 DIAGNOSIS — E11.622 TYPE 2 DIABETES MELLITUS WITH OTHER SKIN ULCER, WITH LONG-TERM CURRENT USE OF INSULIN: ICD-10-CM

## 2019-09-25 PROCEDURE — 99213 OFFICE O/P EST LOW 20 MIN: CPT

## 2019-09-25 RX ORDER — FLUCONAZOLE 100 MG/1
150 TABLET ORAL DAILY
Qty: 11 TABLET | Refills: 1 | Status: SHIPPED | OUTPATIENT
Start: 2019-09-25 | End: 2019-10-14

## 2019-09-25 NOTE — PROGRESS NOTES
"Subjective:       Patient ID: Rodney Infante is a 38 y.o. female.    Chief Complaint: Non-healing Wound (right and left axilla)    Client here today with open areas to right and left axilla. Seen by Dr. Pennington, who states sweat glands infected. Rx for clindamycin, bactroban, and tramadol provided. Stop methatrexate ( prescribed for arthritis). Client plans to have abdominal bypass surgery in September. Culture done today of right axilla.  8/7/19: F/U with Dr. Pennington. Culture results negative. Continue POC.  8/14/19: F/U with Dr. Pennington. Clindamycin d/c'd. Bactrim and Diflucan ordered today. Leave left and right axilla open to air. Culture sent to lab today.    9/4/19:  F/U with Dr. Pennington.  Wounds to right axilla healing up.  Patient states "right does not drain as much as left".  Patient feels the left axilla is not healing up like the right.  Patient still taking Bactrim PO.  Will continue for now.  9/18/19: F/U with Dr. Pennington. Both axilla continue to have "sebum - like" exudate. New site noted to left back. Culture done today. RX for lotrisone and Diflucan provided.  9/25/19: F/U with Dr. Pennington. Axillae improved. Client was not able to get Diflucan. Reordered today. Continues lotrisone and clindamycin. Culture results reviewed. Referred to ID for appt. On 10/7/19. Next visit with Dr. Pennington 10/16/19.    Review of Systems   Constitutional: Negative.    HENT: Negative.    Eyes: Negative.    Respiratory: Negative.    Cardiovascular: Negative.    Gastrointestinal: Negative.    Genitourinary: Negative.    Musculoskeletal: Negative.    Skin: Negative.    Neurological: Negative.    Psychiatric/Behavioral: Negative.        Objective:      Physical Exam   Constitutional: She is oriented to person, place, and time. She appears well-developed and well-nourished.   HENT:   Head: Normocephalic.   Eyes: Pupils are equal, round, and reactive to light. Conjunctivae and EOM are normal.   Neck: Normal range of " motion. Neck supple.   Cardiovascular: Normal rate, regular rhythm, normal heart sounds and intact distal pulses.   Pulmonary/Chest: Effort normal and breath sounds normal.   Abdominal: Soft. Bowel sounds are normal.   Musculoskeletal: Normal range of motion.   Neurological: She is alert and oriented to person, place, and time. She has normal reflexes.   Skin: Skin is warm and dry.       Assessment:       No diagnosis found.       Wound 07/31/19 1300 Other (comment) Axilla #2 (Active)   07/31/19 1300    Pre-existing: Yes   Primary Wound Type: Other   Side: Left   Orientation:    Location: Axilla   Wound/PI Number (optional): #2   Ankle-Brachial Index:    Pulses:    Removal Indication and Assessment:    Wound Outcome:    (Retired) Wound Type:    (Retired) Wound Length (cm):    (Retired) Wound Width (cm):    (Retired) Depth (cm):    Wound Description (Comments):    Removal Indications:    Dressing Appearance Open to air 9/25/2019 10:05 AM   Drainage Amount Moderate 9/25/2019 10:05 AM   Drainage Characteristics/Odor Creamy;Yellow 9/25/2019 10:05 AM   Appearance Red;Pink;Moist 9/25/2019 10:05 AM   Tissue loss description Full thickness 9/25/2019 10:05 AM   Red (%), Wound Tissue Color 100 % 9/25/2019 10:05 AM   Periwound Area Intact;Satellite lesion 9/25/2019 10:05 AM   Wound Edges Open 9/25/2019 10:05 AM   Wound Length (cm) 0.4 cm 9/25/2019 10:05 AM   Wound Width (cm) 0.8 cm 9/25/2019 10:05 AM   Wound Depth (cm) 0.2 cm 9/25/2019 10:05 AM   Wound Volume (cm^3) 0.06 cm^3 9/25/2019 10:05 AM   Wound Surface Area (cm^2) 0.32 cm^2 9/25/2019 10:05 AM   Care Cleansed with:;Soap and water 9/25/2019 10:05 AM   Dressing Changed;Hydrofiber;Island/border 9/25/2019 10:05 AM   Periwound Care Absorptive dressing applied 9/25/2019 10:05 AM   Dressing Change Due 09/26/19 9/25/2019 10:05 AM            Wound 07/31/19 1300 Other (comment) Axilla #1 (Active)   07/31/19 1300    Pre-existing: Yes   Primary Wound Type: Other   Side: Right    Orientation:    Location: Axilla   Wound/PI Number (optional): #1   Ankle-Brachial Index:    Pulses:    Removal Indication and Assessment:    Wound Outcome:    (Retired) Wound Type:    (Retired) Wound Length (cm):    (Retired) Wound Width (cm):    (Retired) Depth (cm):    Wound Description (Comments):    Removal Indications:    Dressing Appearance Open to air;Moist drainage 9/25/2019 10:02 AM   Drainage Amount Moderate 9/25/2019 10:02 AM   Drainage Characteristics/Odor Creamy;Yellow 9/25/2019 10:02 AM   Appearance Red;Moist 9/25/2019 10:02 AM   Tissue loss description Full thickness 9/25/2019 10:02 AM   Red (%), Wound Tissue Color 100 % 9/25/2019 10:02 AM   Periwound Area Intact;Satellite lesion 9/25/2019 10:02 AM   Wound Edges Open 9/25/2019 10:02 AM   Wound Length (cm) 0.4 cm 9/25/2019 10:02 AM   Wound Width (cm) 0.8 cm 9/25/2019 10:02 AM   Wound Depth (cm) 0.2 cm 9/25/2019 10:02 AM   Wound Volume (cm^3) 0.06 cm^3 9/25/2019 10:02 AM   Wound Surface Area (cm^2) 0.32 cm^2 9/25/2019 10:02 AM   Care Cleansed with:;Soap and water 9/25/2019 10:02 AM   Dressing Changed;Hydrofiber;Island/border 9/25/2019 10:02 AM   Periwound Care Dry periwound area maintained;Absorptive dressing applied 9/25/2019 10:02 AM   Dressing Change Due 09/26/19 9/25/2019 10:02 AM       Wounds cleaned with normal saline.    To bilateral axilla wounds:  Covered open areas with hydrofera ready and long mepore dressing.  Secured with tape.    Plan:       ID Monday 10/7/19. Dr. Pennington Wednesday 10/16/19.         No follow-ups on file.

## 2019-10-07 ENCOUNTER — OFFICE VISIT (OUTPATIENT)
Dept: INFECTIOUS DISEASES | Facility: CLINIC | Age: 39
End: 2019-10-07
Payer: COMMERCIAL

## 2019-10-07 ENCOUNTER — HOSPITAL ENCOUNTER (OUTPATIENT)
Dept: WOUND CARE | Facility: HOSPITAL | Age: 39
Discharge: HOME | End: 2019-10-07
Attending: SURGERY
Payer: COMMERCIAL

## 2019-10-07 VITALS — TEMPERATURE: 99 F | DIASTOLIC BLOOD PRESSURE: 93 MMHG | HEART RATE: 100 BPM | SYSTOLIC BLOOD PRESSURE: 156 MMHG

## 2019-10-07 DIAGNOSIS — L08.9 BACTERIAL SKIN INFECTION: Primary | ICD-10-CM

## 2019-10-07 DIAGNOSIS — L73.2 HIDRADENITIS SUPPURATIVA OF RIGHT AXILLA: ICD-10-CM

## 2019-10-07 DIAGNOSIS — A49.8 PROTEUS MIRABILIS INFECTION: ICD-10-CM

## 2019-10-07 DIAGNOSIS — L73.2 LEFT AXILLARY HIDRADENITIS: ICD-10-CM

## 2019-10-07 DIAGNOSIS — B96.89 BACTERIAL SKIN INFECTION: Primary | ICD-10-CM

## 2019-10-07 PROCEDURE — 99999 PR PBB SHADOW E&M-EST. PATIENT-LVL III: ICD-10-PCS | Mod: PBBFAC,,,

## 2019-10-07 PROCEDURE — 99203 PR OFFICE/OUTPT VISIT, NEW, LEVL III, 30-44 MIN: ICD-10-PCS | Mod: S$GLB,,, | Performed by: PHYSICIAN ASSISTANT

## 2019-10-07 PROCEDURE — 99203 OFFICE O/P NEW LOW 30 MIN: CPT | Mod: S$GLB,,, | Performed by: PHYSICIAN ASSISTANT

## 2019-10-07 PROCEDURE — 99213 OFFICE O/P EST LOW 20 MIN: CPT

## 2019-10-07 PROCEDURE — 99999 PR PBB SHADOW E&M-EST. PATIENT-LVL III: CPT | Mod: PBBFAC,,,

## 2019-10-07 RX ORDER — AMOXICILLIN AND CLAVULANATE POTASSIUM 875; 125 MG/1; MG/1
1 TABLET, FILM COATED ORAL 2 TIMES DAILY
Qty: 28 TABLET | Refills: 0 | Status: SHIPPED | OUTPATIENT
Start: 2019-10-07 | End: 2019-10-21

## 2019-10-07 NOTE — PROGRESS NOTES
"Subjective:       Patient ID: Rodney Infante is a 38 y.o. female.    Chief Complaint: No chief complaint on file.    Client here today with open areas to right and left axilla. Seen by Dr. Pennington, who states sweat glands infected. Rx for clindamycin, bactroban, and tramadol provided. Stop methatrexate ( prescribed for arthritis). Client plans to have abdominal bypass surgery in September. Culture done today of right axilla.  8/7/19: F/U with Dr. Pennington. Culture results negative. Continue POC.  8/14/19: F/U with Dr. Pennington. Clindamycin d/c'd. Bactrim and Diflucan ordered today. Leave left and right axilla open to air. Culture sent to lab today.    9/4/19:  F/U with Dr. Pennington.  Wounds to right axilla healing up.  Patient states "right does not drain as much as left".  Patient feels the left axilla is not healing up like the right.  Patient still taking Bactrim PO.  Will continue for now.  9/18/19: F/U with Dr. Pennington. Both axilla continue to have "sebum - like" exudate. New site noted to left back. Culture done today. RX for lotrisone and Diflucan provided.  9/25/19: F/U with Dr. Pennington. Axillae improved. Client was not able to get Diflucan. Reordered today. Continues lotrisone and clindamycin. Culture results reviewed. Referred to ID for appt. On 10/7/19. Next visit with Dr. Pennington 10/16/19.  10/7/19: Vist today with ID. Augmentin ordered. Clindamycin d/c'd. Complete Diflucan.    Review of Systems   Constitutional: Negative.    HENT: Negative.    Eyes: Negative.    Respiratory: Negative.    Cardiovascular: Negative.    Gastrointestinal: Negative.    Genitourinary: Negative.    Musculoskeletal: Negative.    Skin: Negative.    Neurological: Negative.    Psychiatric/Behavioral: Negative.        Objective:      Physical Exam   Constitutional: She is oriented to person, place, and time. She appears well-developed and well-nourished.   HENT:   Head: Normocephalic.   Eyes: Pupils are equal, round, and " reactive to light. Conjunctivae and EOM are normal.   Neck: Normal range of motion. Neck supple.   Cardiovascular: Normal rate, regular rhythm, normal heart sounds and intact distal pulses.   Pulmonary/Chest: Effort normal and breath sounds normal.   Abdominal: Soft. Bowel sounds are normal.   Musculoskeletal: Normal range of motion.   Neurological: She is alert and oriented to person, place, and time. She has normal reflexes.   Skin: Skin is warm and dry.       Assessment:       1. Bacterial skin infection           Wound 07/31/19 1300 Other (comment) Axilla #2 (Active)   07/31/19 1300    Pre-existing: Yes   Primary Wound Type: Other   Side: Left   Orientation:    Location: Axilla   Wound/PI Number (optional): #2   Ankle-Brachial Index:    Pulses:    Removal Indication and Assessment:    Wound Outcome:    (Retired) Wound Type:    (Retired) Wound Length (cm):    (Retired) Wound Width (cm):    (Retired) Depth (cm):    Wound Description (Comments):    Removal Indications:    Wound Image   10/7/2019  1:55 PM   Dressing Appearance Open to air 10/7/2019  1:55 PM   Drainage Amount Moderate 10/7/2019  1:55 PM   Drainage Characteristics/Odor Creamy;Green;Yellow 10/7/2019  1:55 PM   Appearance Red 10/7/2019  1:55 PM   Tissue loss description Full thickness 10/7/2019  1:55 PM   Red (%), Wound Tissue Color 100 % 10/7/2019  1:55 PM   Periwound Area Dry;[place] 10/7/2019  1:55 PM   Wound Edges Irregular 10/7/2019  1:55 PM   Care Cleansed with:;Soap and water 10/7/2019  1:55 PM            Wound 07/31/19 1300 Other (comment) Axilla #1 (Active)   07/31/19 1300    Pre-existing: Yes   Primary Wound Type: Other   Side: Right   Orientation:    Location: Axilla   Wound/PI Number (optional): #1   Ankle-Brachial Index:    Pulses:    Removal Indication and Assessment:    Wound Outcome:    (Retired) Wound Type:    (Retired) Wound Length (cm):    (Retired) Wound Width (cm):    (Retired) Depth (cm):    Wound Description (Comments):    Removal  Indications:    Wound Image   10/7/2019  1:52 PM   Dressing Appearance Moist drainage;Open to air 10/7/2019  1:52 PM   Drainage Amount Moderate 10/7/2019  1:52 PM   Drainage Characteristics/Odor Creamy 10/7/2019  1:52 PM   Appearance Red;Moist 10/7/2019  1:52 PM   Tissue loss description Full thickness 10/7/2019  1:52 PM   Red (%), Wound Tissue Color 100 % 10/7/2019  1:52 PM   Periwound Area Scar tissue;Satellite lesion 10/7/2019  1:52 PM   Wound Edges Jagged 10/7/2019  1:52 PM   Care Cleansed with:;Soap and water 10/7/2019  1:52 PM       Wounds cleaned with normal saline.    To bilateral axilla wounds:  Covered open areas with hydrofera ready and long mepore dressing.  Secured with tape.    Plan:        Dr. Pennington Wednesday 10/16/19.         No follow-ups on file.

## 2019-10-07 NOTE — PROGRESS NOTES
Subjective:      Patient ID: Rodney Infante is a 38 y.o. female.    Chief Complaint:No chief complaint on file.      History of Present Illness    Ms. Infante is a 37 yo female is seen today at the request of Dr. Pennington for evaluation of hidradenitis with a recent culture positive for Proteus M.  Patient has been treated in the recent past with Bactrim and recently Clindamycin and fluconazole.  She and wound care nurse have noted some improvement of her left axilla on fluconazole though there are no recent culture with fungal growth.  Cultures on 8/17 showed prevotella.  She was on methotrexate in the past but is worsened her hidradenitis so it was stopped.  At some point she is to undergo surgery for her hidradenitis but there was concern that she had active infection in her left axilla that required treatment.  She is seen at this time.    Patient says she is still with waxing/waning axillary pain.  The left axilla is still the worse of the two.  It is still draining. The patient denies any recent fever, chills, or sweats.       Review of Systems   Constitution: Negative for chills, fever, malaise/fatigue and night sweats.   Cardiovascular: Negative for chest pain.   Respiratory: Negative for cough, hemoptysis, shortness of breath, sputum production and wheezing.    Skin: Positive for poor wound healing and suspicious lesions. Negative for rash.        bl axillary lesions with sinus tracts     Gastrointestinal: Negative for abdominal pain, constipation, diarrhea, heartburn, nausea and vomiting.   Genitourinary: Negative for dysuria and hematuria.     Objective:   Physical Exam   Constitutional: She is oriented to person, place, and time. She appears well-developed and well-nourished. No distress.       HENT:   Head: Normocephalic and atraumatic.   Cardiovascular: Normal rate, regular rhythm and normal heart sounds. Exam reveals no gallop and no friction rub.   No murmur heard.  Pulmonary/Chest: Effort  normal and breath sounds normal. No respiratory distress. She has no wheezes. She has no rales.   Abdominal: Soft. Bowel sounds are normal. She exhibits no distension and no mass. There is no tenderness. There is no rebound and no guarding.   Musculoskeletal: She exhibits no edema.   Neurological: She is alert and oriented to person, place, and time.   Skin: Skin is warm and dry. She is not diaphoretic.   Psychiatric: She has a normal mood and affect. Her behavior is normal.     .            Assessment:       1. Bacterial skin infection    2. Proteus mirabilis infection    3. Hidradenitis suppurativa of right axilla    4. Left axillary hidradenitis        Left axilla with purulent drainage typical of hidradenitis with some improvement on Clindamycin/Fluconazole  Last cultures   8/14 - [facility]  9/18 - Proteus M  No active signs of systemic infection  Patient reports tolerating Amoxacillin in past  (4 years  Ago) and PCN reaction was as a child   Plan:   1. Will give limited trial of Augmentin 875mg po bid x 14 d (which will cover for all recent cultured organisms) - if has any type of reaction she is to stop an notify clinic - if severe (reviwed in detail with patient - ie facial, tongue, lip swelling of SOB) to got ED though not suspected to happen given tolerated Amoxacillin in past  2.  Rec surgery for definitive treatment as abx essentially only symptomatic treatement - Dr. Pennington managing  3. FU 2 weeks to monitor symptoms an further plan of care

## 2019-10-08 ENCOUNTER — OFFICE VISIT (OUTPATIENT)
Dept: RHEUMATOLOGY | Facility: CLINIC | Age: 39
End: 2019-10-08
Payer: COMMERCIAL

## 2019-10-08 VITALS — WEIGHT: 293 LBS | BODY MASS INDEX: 48.82 KG/M2 | HEIGHT: 65 IN

## 2019-10-08 DIAGNOSIS — M13.80 SERONEGATIVE ARTHRITIS: Primary | ICD-10-CM

## 2019-10-08 DIAGNOSIS — G56.03 BILATERAL CARPAL TUNNEL SYNDROME: ICD-10-CM

## 2019-10-08 DIAGNOSIS — Z71.89 COUNSELING ON HEALTH PROMOTION AND DISEASE PREVENTION: ICD-10-CM

## 2019-10-08 DIAGNOSIS — Z79.60 LONG-TERM USE OF IMMUNOSUPPRESSANT MEDICATION: ICD-10-CM

## 2019-10-08 DIAGNOSIS — E66.01 MORBID OBESITY: ICD-10-CM

## 2019-10-08 PROCEDURE — 99214 OFFICE O/P EST MOD 30 MIN: CPT | Mod: 25,S$GLB,, | Performed by: INTERNAL MEDICINE

## 2019-10-08 PROCEDURE — 3008F BODY MASS INDEX DOCD: CPT | Mod: CPTII,S$GLB,, | Performed by: INTERNAL MEDICINE

## 2019-10-08 PROCEDURE — 99214 PR OFFICE/OUTPT VISIT, EST, LEVL IV, 30-39 MIN: ICD-10-PCS | Mod: 25,S$GLB,, | Performed by: INTERNAL MEDICINE

## 2019-10-08 PROCEDURE — 99999 PR PBB SHADOW E&M-EST. PATIENT-LVL III: CPT | Mod: PBBFAC,,, | Performed by: INTERNAL MEDICINE

## 2019-10-08 PROCEDURE — 90471 FLU VACCINE - HIGH DOSE (65+) PRESERVATIVE FREE IM: ICD-10-PCS | Mod: S$GLB,,, | Performed by: INTERNAL MEDICINE

## 2019-10-08 PROCEDURE — 90662 IIV NO PRSV INCREASED AG IM: CPT | Mod: S$GLB,,, | Performed by: INTERNAL MEDICINE

## 2019-10-08 PROCEDURE — 3008F PR BODY MASS INDEX (BMI) DOCUMENTED: ICD-10-PCS | Mod: CPTII,S$GLB,, | Performed by: INTERNAL MEDICINE

## 2019-10-08 PROCEDURE — 90471 IMMUNIZATION ADMIN: CPT | Mod: S$GLB,,, | Performed by: INTERNAL MEDICINE

## 2019-10-08 PROCEDURE — 90662 FLU VACCINE - HIGH DOSE (65+) PRESERVATIVE FREE IM: ICD-10-PCS | Mod: S$GLB,,, | Performed by: INTERNAL MEDICINE

## 2019-10-08 PROCEDURE — 99999 PR PBB SHADOW E&M-EST. PATIENT-LVL III: ICD-10-PCS | Mod: PBBFAC,,, | Performed by: INTERNAL MEDICINE

## 2019-10-08 NOTE — PROGRESS NOTES
RHEUMATOLOGY OUTPATIENT CLINIC NOTE    10/8/2019    Attending Rheumatologist: Ken Alberto  Primary Care Provider: [facility]   Physician Requesting Consultation: No referring provider defined for this encounter.  Chief Complaint/Reason For Consultation:  Seronegative arthritis.    Subjective:       HPI  Rodney Infante is a 38 y.o. Black or  female with seronegative arthritis comes for follow-up.    Today  Last seen on late August.  Dactylitis on exam, recommended for MRI and to initiate Humira.  Total medication on approximately late September and tolerating well.  Being managed by ID for HS with oral antibiotic therapy.  Denies having any joint pain or swelling today.  Does not have prolonged morning stiffness.  Refers intermittent paresthesias on median nerve distribution right hand.  Denies fever, swollen glands,  or GI complaints.    Addendum 3/24: Communicated with patient.  She reported inability to initiate video conference call.  Currently on sulfasalazine 1000 mg b.i.d. and Humira.  Main concern is right shoulder pain.  Onset approximately 3 months ago without any particular precipitating events.  Denies swelling or redness.  Does not have any other joint involvement.  No particular difficulty reported with activities of daily living.  Will continue with current therapy unchanged.  Recommend range of motion, flexibility, and strengthening exercises for likely shoulder tendinopathy.  Consider for corticosteroid injection and PT referral next visit if refractory.    Review of Systems   Constitutional: Negative for chills, fever and malaise/fatigue.   Eyes: Negative for pain and redness.   Respiratory: Negative for cough, hemoptysis and shortness of breath.    Cardiovascular: Negative for chest pain and leg swelling.   Gastrointestinal: Negative for abdominal pain, blood in stool and melena.   Genitourinary: Negative for dysuria and hematuria.   Musculoskeletal: Negative  for falls and joint pain.   Skin: Negative for rash.        +HS.  Denies worsening compared to last visit.   Neurological: Positive for tingling (History of carpal tunnel syndrome). Negative for focal weakness.   Psychiatric/Behavioral: Negative for memory loss. The patient does not have insomnia.      Chronic comorbid conditions affecting medical decision making today:  Past Medical History:   Diagnosis Date    Anemia     Arthritis     Diabetes mellitus, type 2     Neuropathy      Past Surgical History:   Procedure Laterality Date    CYST REMOVAL  2014, 2/2018    back     Family History   Problem Relation Age of Onset    No Known Problems Mother     Drug abuse Father     No Known Problems Sister      Social History     Substance and Sexual Activity   Alcohol Use Yes    Comment: occasionally     Social History     Tobacco Use   Smoking Status Never Smoker   Smokeless Tobacco Never Used     Social History     Substance and Sexual Activity   Drug Use No       Current Outpatient Medications:     adalimumab (HUMIRA PEN) 40 mg/0.8 mL PnKt, Inject 1 pen (40 mg total) into the skin every 14 (fourteen) days., Disp: 25 pen, Rfl: 0    adalimumab (HUMIRA) PnKt injection, Inject 4 pens (160 mg) into the skin on day 1, then 2 pens (80 mg) into the skin on day 15, then 1 pen (40 mg) every 14 days beginning on day 29., Disp: 6 pen, Rfl: 0    amLODIPine (NORVASC) 10 MG tablet, , Disp: , Rfl:     amoxicillin-clavulanate 875-125mg (AUGMENTIN) 875-125 mg per tablet, Take 1 tablet by mouth 2 (two) times daily. for 14 days, Disp: 28 tablet, Rfl: 0    canagliflozin-metformin (INVOKAMET XR) 150-1,000 mg TBph, Take by mouth., Disp: , Rfl:     carvedilol (COREG) 25 MG tablet, , Disp: , Rfl:     clotrimazole-betamethasone 1-0.05% (LOTRISONE) cream, , Disp: , Rfl:     cyanocobalamin (VITAMIN B-12) 1000 MCG tablet, Take 1 tablet (1,000 mcg total) by mouth once daily. This is a prescription for 1 year.  Take 1 tablet Monday,  Wednesday, and Friday, Disp: 36 tablet, Rfl: 3    ergocalciferol (ERGOCALCIFEROL) 50,000 unit Cap, , Disp: , Rfl:     estradiol cypionate (DEPO-ESTRADIOL) 5 mg/mL injection, Inject into the muscle every 28 days., Disp: , Rfl:     fluconazole (DIFLUCAN) 100 MG tablet, Take 1.5 tablets (150 mg total) by mouth once daily. for 7 day, Disp: 11 tablet, Rfl: 1    gabapentin (NEURONTIN) 100 MG capsule, Take 3 capsules (300 mg total) by mouth every evening., Disp: 90 capsule, Rfl: 3    glimepiride (AMARYL) 4 MG tablet, Take 1 tablet (4 mg total) by mouth before dinner., Disp: 30 tablet, Rfl: 5    losartan-hydrochlorothiazide 100-12.5 mg (HYZAAR) 100-12.5 mg Tab, , Disp: , Rfl:     ondansetron (ZOFRAN) 4 MG tablet, , Disp: , Rfl:     TOPCARE UNIVERSAL1 LANCET Mis, , Disp: , Rfl:     TRUETRACK TEST Strp, , Disp: , Rfl:     sulfamethoxazole-trimethoprim 800-160mg (BACTRIM DS) 800-160 mg Tab, Take 1 tablet by mouth 2 (two) times daily. (Patient not taking: Reported on 10/8/2019), Disp: 60 tablet, Rfl: 2     Objective:         There were no vitals filed for this visit.  Physical Exam   Nursing note and vitals reviewed.  Constitutional: She is oriented to person, place, and time and well-developed, well-nourished, and in no distress.   Obese BMI 56.3   HENT:   Head: Normocephalic.   no parotid enlargement.   Eyes: Conjunctivae are normal. Pupils are equal, round, and reactive to light.   Absent Episcleritis/scleritis.   Neck: Normal range of motion.   Cardiovascular: Normal rate and intact distal pulses.    Pulmonary/Chest: Effort normal. No respiratory distress.   Abdominal: Soft. She exhibits no distension.   Neurological: She is alert and oriented to person, place, and time. Gait normal.   absent sensory deficits  muscle strength 5/5 through.   Skin: No erythema.     Lipodermatosclerosis appearance lower extremity, evidence of multiple healed ulcers.  HS axilla   Musculoskeletal: Normal range of motion. She exhibits  deformity ([place]-neck right 5th PIP, reducible [place]out[place]niere appearance some PIPs). She exhibits no edema or tenderness.   : good  No synovitis on exam.  No significant squeeze tenderness.    AROM: intact  PROM: intact    Devices used by patient: none       Reviewed old and all outside pertinent medical records available.    All lab results personally reviewed and interpreted by me.  Lab Results   Component Value Date    WBC 10.36 09/13/2019    HGB 9.8 (L) 09/13/2019    HCT 33.5 (L) 09/13/2019    MCV 87 09/13/2019    MCH 25.5 (L) 09/13/2019    MCHC 29.3 (L) 09/13/2019    RDW 18.3 (H) 09/13/2019     (H) 09/13/2019    MPV 8.1 (L) 09/13/2019    PLTEST Increased (A) 11/12/2018     Lab Results   Component Value Date     09/13/2019    K 3.4 (L) 09/13/2019     09/13/2019    CO2 21 (L) 09/13/2019     09/13/2019    BUN 6 09/13/2019    CALCIUM 9.1 09/13/2019    PROT 9.0 (H) 09/13/2019    ALBUMIN 2.9 (L) 09/13/2019    BILITOT 0.2 09/13/2019    AST 11 09/13/2019    ALKPHOS 92 09/13/2019    ALT 13 09/13/2019     Lab Results   Component Value Date    COLORU Yellow 02/27/2018    APPEARANCEUA Cloudy (A) 02/27/2018    SPECGRAV 1.020 02/27/2018    PHUR 5.0 02/27/2018    PROTEINUA Negative 02/27/2018    KETONESU Negative 02/27/2018    LEUKOCYTESUR 3+ (A) 02/27/2018    NITRITE Negative 02/27/2018    UROBILINOGEN Negative 02/27/2018     Lab Results   Component Value Date    CRP 36.3 (H) 06/11/2019       Lab Results   Component Value Date    SEDRATE 100 (H) 09/13/2019       Lab Results   Component Value Date    RF <10.0 04/03/2019    SEDRATE 100 (H) 09/13/2019       No components found for: 25OHVITDTOT, 88JLMLLP2, 75SHQUNJ0, METHODNOTE    No results found for: URICACID    No components found for: TSPOTTB    · HbA1C 8.4% (2016)  · Uric acid 6 October 2013  · SPEP/NAN 2/2018: Increased total protein. Increased gamma globulins, polyclonal pattern.  No discrete monoclonal peaks identified.    Rheum Labs:  MILTON  negative  Rheumatoid factor/CCP negative  SSA negative  ANCA screen negative  HLA B27 negative    Infectious Labs:  HCV antibody nonreactive April 2019   Hepatitis panel nonreactive  QuantiFERON TB negative August 2019    Imaging:  All imaging reviewed and independently  interpreted by me.    X-ray hands May 2019  juxta-articular calcification adjacent to the DIP joint index and middle fingers with question of small erosions at the distal radial margin of the middle phalanx index finger.  Mild narrowing of the radial aspect of the radiocarpal articulation is questioned.  Joint spaces appear otherwise well maintained.  Alignment anatomic.    EMG May 2018  normal study, though there are significant differences between the right and left median nerve sensory and motor nerve conduction findings, which may indicate early carpal tunnel pathology on the right.     MRI hands August 2019  Periarticular enhancement and synovial thickening at the 1st metacarpophalangeal joint and proximal interphalangeal joint of the 2nd digit.  No erosions are visualized.    · Biopsies  Infected skin subcutaneous fascia 2/2018.   Infected skin and subcutaneous fascia:  -Skin and subcutaneous tissue represented  -Subcutaneous soft tissue with necroinflammatory debris  -No atypia or malignancy     ASSESSMENT / PLAN:     Rodney Infante is a 38 y.o. Black or  female with:    1. Seronegative arthritis  - CDAI: 6-> no disease activity.  - drastic improvement compared to last visit.  Initiated Humira 9/23.    - Still early to assess for response of TNF inhibitor therapy  - re-discussed clinical significant side effects of therapy, with special attention to severe infections.  - continue with sulfasalazine 1000 BID.  Will discontinue Plaquenil   - ESR, CRP prior next visit    2. Immunosuppressed status   - Compromised immune system secondary to autoimmune disease and use of immunosuppressive drugs.   - Monitor carefully for  infections and toxicities.   - Advised to get immediate medical care if any infection.   - Also advised strict adherence to age appropriate vaccinations and cancer screenings with PCP.  - CBC, CMP    3. Other specified counseling  - over 10 minutes spent regarding below topics:  - Immunization counseling done. Flu shot today.  - Weight loss counseling done.  - Nutrition and exercise counseling.  - Medication counseling provided.    4. Morbid Obesity  - would benefit from decreasing at least 10% of body weight.  - recommended goal of losing 1 lb per week.  - consider nutritionist evaluation.    RTC 8 weeks    Method of contact with patient concerns: [facility]    Disclaimer:  This note is prepared using voice recognition software and as such is likely to have errors and has not been proof read. Please contact me for questions.     Time spent: 25 minutes in face to face discussion concerning diagnosis, prognosis, review of lab and test results, benefits of treatment as well as management of disease, counseling of patient and coordination of care between various health care providers.  Greater than half the time spent was used for coordination of care and counseling of patient.    Ken Alberto M.D.  Rheumatology Department   [facility]

## 2019-10-16 ENCOUNTER — HOSPITAL ENCOUNTER (OUTPATIENT)
Dept: WOUND CARE | Facility: HOSPITAL | Age: 39
Discharge: HOME | End: 2019-10-16
Attending: SURGERY
Payer: COMMERCIAL

## 2019-10-16 VITALS
WEIGHT: 293 LBS | DIASTOLIC BLOOD PRESSURE: 98 MMHG | HEART RATE: 109 BPM | BODY MASS INDEX: 48.82 KG/M2 | TEMPERATURE: 98 F | SYSTOLIC BLOOD PRESSURE: 134 MMHG | HEIGHT: 65 IN

## 2019-10-16 DIAGNOSIS — L08.9 BACTERIAL SKIN INFECTION: ICD-10-CM

## 2019-10-16 DIAGNOSIS — B96.89 BACTERIAL SKIN INFECTION: ICD-10-CM

## 2019-10-16 DIAGNOSIS — Z79.4 TYPE 2 DIABETES MELLITUS WITH OTHER SKIN ULCER, WITH LONG-TERM CURRENT USE OF INSULIN: Primary | ICD-10-CM

## 2019-10-16 DIAGNOSIS — L73.2 HIDRADENITIS SUPPURATIVA OF RIGHT AXILLA: ICD-10-CM

## 2019-10-16 DIAGNOSIS — L73.2 LEFT AXILLARY HIDRADENITIS: ICD-10-CM

## 2019-10-16 DIAGNOSIS — E66.01 MORBID OBESITY WITH BMI OF 50.0-59.9, ADULT: ICD-10-CM

## 2019-10-16 DIAGNOSIS — E11.622 TYPE 2 DIABETES MELLITUS WITH OTHER SKIN ULCER, WITH LONG-TERM CURRENT USE OF INSULIN: Primary | ICD-10-CM

## 2019-10-16 PROCEDURE — 99213 OFFICE O/P EST LOW 20 MIN: CPT

## 2019-10-16 NOTE — PROGRESS NOTES
"[facility]                Progress Note    Subjective:       Patient ID: Rodney Infante is a 38 y.o. female.    Chief Complaint: No chief complaint on file.    F/u for bilateral axillary hidradenitis    Client here today with open areas to right and left axilla. Seen by Dr. Pennington, who states sweat glands infected. Rx for clindamycin, bactroban, and tramadol provided. Stop methatrexate ( prescribed for arthritis). Client plans to have abdominal bypass surgery in September. Culture done today of right axilla.  8/7/19: F/U with Dr. Pennington. Culture results negative. Continue POC.  8/14/19: F/U with Dr. Pennington. Clindamycin d/c'd. Bactrim and Diflucan ordered today. Leave left and right axilla open to air. Culture sent to lab today.     9/4/19:  F/U with Dr. Pennington.  Wounds to right axilla healing up.  Patient states "right does not drain as much as left".  Patient feels the left axilla is not healing up like the right.  Patient still taking Bactrim PO.  Will continue for now.  9/18/19: F/U with Dr. Pennington. Both axilla continue to have "sebum - like" exudate. New site noted to left back. Culture done today. RX for lotrisone and Diflucan provided.  9/25/19: F/U with Dr. Pennington. Axillae improved. Client was not able to get Diflucan. Reordered today. Continues lotrisone and clindamycin. Culture results reviewed. Referred to ID for appt. On 10/7/19. Next visit with Dr. Pennington 10/16/19.  10/7/19: Vist today with ID. Augmentin ordered. Clindamycin d/c'd. Complete Diflucan.  10/16/19 F/U with Dr. Pennington.  Start cleansing wounds with Dakins solution.  Continue antibiotics until completely gone. Supplies ordered thru [facility]     Review of Systems   Constitutional: Negative.    HENT: Negative.    Eyes: Negative.    Respiratory: Negative.    Cardiovascular: Negative.    Gastrointestinal: Negative.    Genitourinary: Negative.    Musculoskeletal: Negative.  "   Skin: Negative.    Neurological: Negative.    Psychiatric/Behavioral: Negative.          Objective:        Physical Exam   Constitutional: She is oriented to person, place, and time. She appears well-developed and well-nourished.   HENT:   Head: Normocephalic.   Eyes: Pupils are equal, round, and reactive to light. Conjunctivae and EOM are normal.   Neck: Normal range of motion. Neck supple.   Cardiovascular: Normal rate, regular rhythm, normal heart sounds and intact distal pulses.   Pulmonary/Chest: Effort normal and breath sounds normal.   Abdominal: Soft. Bowel sounds are normal.   Musculoskeletal: Normal range of motion.   Neurological: She is alert and oriented to person, place, and time. She has normal reflexes.   Skin: Skin is warm and dry.       Vitals:    10/16/19 0954   BP: (!) 134/98   Pulse: 109   Temp: 98.4 °F (36.9 °C)       Assessment:         No diagnosis found.         Wound 07/31/19 1300 Other (comment) Axilla #2 (Active)   07/31/19 1300    Pre-existing: Yes   Primary Wound Type: Other   Side: Left   Orientation:    Location: Axilla   Wound/PI Number (optional): #2   Ankle-Brachial Index:    Pulses:    Removal Indication and Assessment:    Wound Outcome:    (Retired) Wound Type:    (Retired) Wound Length (cm):    (Retired) Wound Width (cm):    (Retired) Depth (cm):    Wound Description (Comments):    Removal Indications:    Dressing Appearance Open to air 10/16/2019 10:16 AM   Drainage Amount Moderate 10/16/2019 10:16 AM   Drainage Characteristics/Odor Creamy;Yellow 10/16/2019 10:16 AM   Appearance Yellow;Pink 10/16/2019 10:16 AM   Tissue loss description Partial thickness 10/16/2019 10:16 AM   Periwound Area Pustules 10/16/2019 10:16 AM   Care Cleansed with: 10/16/2019 10:16 AM   Dressing Non-adherent;Abd pad 10/16/2019 10:16 AM   Dressing Change Due 10/23/19 10/16/2019 10:16 AM            Wound 07/31/19 1300 Other (comment) Axilla #1 (Active)   07/31/19 1300    Pre-existing: Yes   Primary  Wound Type: Other   Side: Right   Orientation:    Location: Axilla   Wound/PI Number (optional): #1   Ankle-Brachial Index:    Pulses:    Removal Indication and Assessment:    Wound Outcome:    (Retired) Wound Type:    (Retired) Wound Length (cm):    (Retired) Wound Width (cm):    (Retired) Depth (cm):    Wound Description (Comments):    Removal Indications:    Dressing Appearance Open to air 10/16/2019 10:16 AM   Drainage Amount Moderate 10/16/2019 10:16 AM   Drainage Characteristics/Odor Creamy 10/16/2019 10:16 AM   Appearance Pink;Yellow 10/16/2019 10:16 AM   Tissue loss description Partial thickness 10/16/2019 10:16 AM   Red (%), Wound Tissue Color 90 % 10/16/2019 10:16 AM   Yellow (%), Wound Tissue Color 10 % 10/16/2019 10:16 AM   Periwound Area Pustules 10/16/2019 10:16 AM   Care Cleansed with: 10/16/2019 10:16 AM   Dressing Applied;Abd pad;Non-adherent 10/16/2019 10:16 AM   Periwound Care Moisture barrier applied 10/16/2019 10:16 AM   Dressing Change Due 10/23/19 10/16/2019 10:16 AM         Right axilla and Left axilla  Cleanse all affected areas with dakins solution  Apply xeroform with ABD pad to cover  Patients bra used to secure dressing  Continue antibiotics until finished  Patient to perform dressing every other day.    Dressing to be ordered through [facility]:     4X4 gauze #30 packs  Xeroform gauze 5X9  Every other day #30  ABD pad Every other day #30  Gloves size L  Cotton tip applicators    Plan:                  [unfilled]

## 2019-10-17 ENCOUNTER — TELEPHONE (OUTPATIENT)
Dept: PHARMACY | Facility: CLINIC | Age: 39
End: 2019-10-17

## 2019-11-06 ENCOUNTER — HOSPITAL ENCOUNTER (OUTPATIENT)
Dept: WOUND CARE | Facility: HOSPITAL | Age: 39
Discharge: HOME | End: 2019-11-06
Attending: SURGERY
Payer: COMMERCIAL

## 2019-11-06 VITALS
BODY MASS INDEX: 48.82 KG/M2 | DIASTOLIC BLOOD PRESSURE: 89 MMHG | WEIGHT: 293 LBS | HEIGHT: 65 IN | TEMPERATURE: 98 F | HEART RATE: 99 BPM | SYSTOLIC BLOOD PRESSURE: 122 MMHG

## 2019-11-06 DIAGNOSIS — L73.2 LEFT AXILLARY HIDRADENITIS: ICD-10-CM

## 2019-11-06 DIAGNOSIS — L08.9 BACTERIAL SKIN INFECTION: ICD-10-CM

## 2019-11-06 DIAGNOSIS — E11.622 TYPE 2 DIABETES MELLITUS WITH OTHER SKIN ULCER, WITH LONG-TERM CURRENT USE OF INSULIN: ICD-10-CM

## 2019-11-06 DIAGNOSIS — E66.01 MORBID OBESITY WITH BMI OF 50.0-59.9, ADULT: ICD-10-CM

## 2019-11-06 DIAGNOSIS — B96.89 BACTERIAL SKIN INFECTION: ICD-10-CM

## 2019-11-06 DIAGNOSIS — Z79.4 TYPE 2 DIABETES MELLITUS WITH OTHER SKIN ULCER, WITH LONG-TERM CURRENT USE OF INSULIN: ICD-10-CM

## 2019-11-06 DIAGNOSIS — M19.90 ARTHRITIS: ICD-10-CM

## 2019-11-06 DIAGNOSIS — L73.2 HIDRADENITIS SUPPURATIVA OF RIGHT AXILLA: Primary | ICD-10-CM

## 2019-11-06 PROCEDURE — 99213 OFFICE O/P EST LOW 20 MIN: CPT

## 2019-11-06 NOTE — PROGRESS NOTES
"Subjective:       Patient ID: Rodney Infante is a 39 y.o. female.    Chief Complaint: Wound Care    Client here today with open areas to right and left axilla. Seen by Dr. Pennington, who states sweat glands infected. Rx for clindamycin, bactroban, and tramadol provided. Stop methatrexate ( prescribed for arthritis). Client plans to have abdominal bypass surgery in September. Culture done today of right axilla.  8/7/19: F/U with Dr. Pennington. Culture results negative. Continue POC.  8/14/19: F/U with Dr. Pennington. Clindamycin d/c'd. Bactrim and Diflucan ordered today. Leave left and right axilla open to air. Culture sent to lab today.    9/4/19:  F/U with Dr. Pennington.  Wounds to right axilla healing up.  Patient states "right does not drain as much as left".  Patient feels the left axilla is not healing up like the right.  Patient still taking Bactrim PO.  Will continue for now.  9/18/19: F/U with Dr. Pennington. Both axilla continue to have "sebum - like" exudate. New site noted to left back. Culture done today. RX for lotrisone and Diflucan provided.  9/25/19: F/U with Dr. Pennington. Axillae improved. Client was not able to get Diflucan. Reordered today. Continues lotrisone and clindamycin. Culture results reviewed. Referred to ID for appt. On 10/7/19. Next visit with Dr. Pennington 10/16/19.  10/7/19: Vist today with ID. Augmentin ordered. Clindamycin d/c'd. Complete Diflucan.  11/6/19: Dr Pennington assessed patient.Wounds slowly improving. Patient states that she has had gastric sleeve procedure last week. No complaints voiced. Continuing with present plan of care. Follow up in 2 weeks.    Review of Systems   Constitutional: Negative.    HENT: Negative.    Eyes: Negative.    Respiratory: Negative.    Cardiovascular: Negative.    Gastrointestinal: Negative.    Genitourinary: Negative.    Musculoskeletal: Negative.    Skin: Negative.    Neurological: Negative.    Psychiatric/Behavioral: Negative.        Objective:    "   Physical Exam   Constitutional: She is oriented to person, place, and time. She appears well-developed and well-nourished.   HENT:   Head: Normocephalic.   Eyes: Pupils are equal, round, and reactive to light. Conjunctivae and EOM are normal.   Neck: Normal range of motion. Neck supple.   Cardiovascular: Normal rate, regular rhythm, normal heart sounds and intact distal pulses.   Pulmonary/Chest: Effort normal and breath sounds normal.   Abdominal: Soft. Bowel sounds are normal.   Musculoskeletal: Normal range of motion.   Neurological: She is alert and oriented to person, place, and time. She has normal reflexes.   Skin: Skin is warm and dry.       Assessment:       1. Hidradenitis suppurativa of right axilla    2. Type 2 diabetes mellitus with other skin ulcer, with long-term current use of insulin    3. Bacterial skin infection    4. Left axillary hidradenitis           Wound 07/31/19 1300 Other (comment) Axilla #2 (Active)   07/31/19 1300    Pre-existing: Yes   Primary Wound Type: Other   Side: Left   Orientation:    Location: Axilla   Wound/PI Number (optional): #2   Ankle-Brachial Index:    Pulses:    Removal Indication and Assessment:    Wound Outcome:    (Retired) Wound Type:    (Retired) Wound Length (cm):    (Retired) Wound Width (cm):    (Retired) Depth (cm):    Wound Description (Comments):    Removal Indications:    Wound Image   11/6/2019 11:00 AM   Dressing Appearance Open to air 11/6/2019 11:00 AM   Drainage Amount Scant 11/6/2019 11:00 AM   Drainage Characteristics/Odor Serosanguineous 11/6/2019 11:00 AM   Tissue loss description Partial thickness 11/6/2019 11:00 AM   Red (%), Wound Tissue Color 100 % 11/6/2019 11:00 AM   Periwound Area Scar tissue;Intact 11/6/2019 11:00 AM   Wound Edges Irregular;Jagged 11/6/2019 11:00 AM   Wound Length (cm) 1 cm 11/6/2019 11:00 AM   Wound Width (cm) 1.5 cm 11/6/2019 11:00 AM   Wound Depth (cm) 0.1 cm 11/6/2019 11:00 AM   Wound Volume (cm^3) 0.15 cm^3 11/6/2019  11:00 AM   Wound Surface Area (cm^2) 1.5 cm^2 11/6/2019 11:00 AM   Care Cleansed with:;Antimicrobial agent;Sterile normal saline 11/6/2019 11:00 AM   Dressing Applied;Abd pad;Other (see comments) 11/6/2019 11:00 AM   Dressing Change Due 11/07/19 11/6/2019 11:00 AM            Wound 07/31/19 1300 Other (comment) Axilla #1 (Active)   07/31/19 1300    Pre-existing: Yes   Primary Wound Type: Other   Side: Right   Orientation:    Location: Axilla   Wound/PI Number (optional): #1   Ankle-Brachial Index:    Pulses:    Removal Indication and Assessment:    Wound Outcome:    (Retired) Wound Type:    (Retired) Wound Length (cm):    (Retired) Wound Width (cm):    (Retired) Depth (cm):    Wound Description (Comments):    Removal Indications:    Wound Image   11/6/2019 11:00 AM   Dressing Appearance Open to air 11/6/2019 11:00 AM   Drainage Amount Scant 11/6/2019 11:00 AM   Drainage Characteristics/Odor Serosanguineous 11/6/2019 11:00 AM   Appearance Pink 11/6/2019 11:00 AM   Tissue loss description Partial thickness 11/6/2019 11:00 AM   Red (%), Wound Tissue Color 100 % 11/6/2019 11:00 AM   Periwound Area Intact;Scar tissue 11/6/2019 11:00 AM   Wound Edges Irregular;Jagged 11/6/2019 11:00 AM   Wound Length (cm) 1.5 cm 11/6/2019 11:00 AM   Wound Width (cm) 0.8 cm 11/6/2019 11:00 AM   Wound Depth (cm) 0.1 cm 11/6/2019 11:00 AM   Wound Volume (cm^3) 0.12 cm^3 11/6/2019 11:00 AM   Wound Surface Area (cm^2) 1.2 cm^2 11/6/2019 11:00 AM   Care Cleansed with:;Antimicrobial agent;Sterile normal saline 11/6/2019 11:00 AM   Dressing Applied;Abd pad;Other (see comments) 11/6/2019 11:00 AM   Dressing Change Due 11/07/19 11/6/2019 11:00 AM       Right axilla and Left axilla  Cleanse all affected areas with dakins solution  Apply xeroform with ABD pad to cover  Patients bra used to secure dressing  Continue antibiotics until finished  Patient to perform dressing every other day.    Dressing to be ordered through [facility]:     4X4 gauze  #30 packs  Xeroform gauze 5X9  Every other day #30  ABD pad Every other day #30  Gloves size L  Cotton tip applicators    Dr Pennington assessed patient.Wounds slowly improving. Patient states that she has had gastric sleeve procedure last week. No complaints voiced. Continuing with present plan of care. Follow up in 2 weeks.    Plan:                Follow up in about 2 weeks (around 11/20/2019).

## 2019-11-14 PROBLEM — Z98.84 S/P BARIATRIC SURGERY: Status: ACTIVE | Noted: 2019-11-14

## 2019-11-19 ENCOUNTER — TELEPHONE (OUTPATIENT)
Dept: PHARMACY | Facility: CLINIC | Age: 39
End: 2019-11-19

## 2019-11-20 ENCOUNTER — HOSPITAL ENCOUNTER (OUTPATIENT)
Dept: WOUND CARE | Facility: HOSPITAL | Age: 39
Discharge: HOME | End: 2019-11-20
Attending: SURGERY
Payer: COMMERCIAL

## 2019-11-20 VITALS
SYSTOLIC BLOOD PRESSURE: 120 MMHG | BODY MASS INDEX: 48.82 KG/M2 | DIASTOLIC BLOOD PRESSURE: 85 MMHG | HEIGHT: 65 IN | TEMPERATURE: 98 F | WEIGHT: 293 LBS | HEART RATE: 77 BPM

## 2019-11-20 DIAGNOSIS — L98.499 DIABETES MELLITUS WITH SKIN ULCER: ICD-10-CM

## 2019-11-20 DIAGNOSIS — L73.2 HIDRADENITIS SUPPURATIVA OF RIGHT AXILLA: Primary | ICD-10-CM

## 2019-11-20 DIAGNOSIS — Z98.84 S/P BARIATRIC SURGERY: ICD-10-CM

## 2019-11-20 DIAGNOSIS — B99.9 ANEMIA OF INFECTION AND CHRONIC DISEASE: ICD-10-CM

## 2019-11-20 DIAGNOSIS — E11.622 DIABETES MELLITUS WITH SKIN ULCER: ICD-10-CM

## 2019-11-20 DIAGNOSIS — B96.89 BACTERIAL SKIN INFECTION: ICD-10-CM

## 2019-11-20 DIAGNOSIS — D63.8 ANEMIA OF INFECTION AND CHRONIC DISEASE: ICD-10-CM

## 2019-11-20 DIAGNOSIS — L08.9 BACTERIAL SKIN INFECTION: ICD-10-CM

## 2019-11-20 PROCEDURE — 99213 OFFICE O/P EST LOW 20 MIN: CPT

## 2019-11-20 NOTE — PROGRESS NOTES
"Subjective:       Patient ID: Rodney Infante is a 39 y.o. female.    Chief Complaint: Non-healing Wound (both axilla)    Client here today with open areas to right and left axilla. Seen by Dr. Pennington, who states sweat glands infected. Rx for clindamycin, bactroban, and tramadol provided. Stop methatrexate ( prescribed for arthritis). Client plans to have abdominal bypass surgery in September. Culture done today of right axilla.  8/7/19: F/U with Dr. Pennington. Culture results negative. Continue POC.  8/14/19: F/U with Dr. Pennington. Clindamycin d/c'd. Bactrim and Diflucan ordered today. Leave left and right axilla open to air. Culture sent to lab today.    9/4/19:  F/U with Dr. Pennington.  Wounds to right axilla healing up.  Patient states "right does not drain as much as left".  Patient feels the left axilla is not healing up like the right.  Patient still taking Bactrim PO.  Will continue for now.  9/18/19: F/U with Dr. Pennington. Both axilla continue to have "sebum - like" exudate. New site noted to left back. Culture done today. RX for lotrisone and Diflucan provided.  9/25/19: F/U with Dr. Pennington. Axillae improved. Client was not able to get Diflucan. Reordered today. Continues lotrisone and clindamycin. Culture results reviewed. Referred to ID for appt. On 10/7/19. Next visit with Dr. Pennington 10/16/19.  10/7/19: Vist today with ID. Augmentin ordered. Clindamycin d/c'd. Complete Diflucan.  11/6/19: Dr Pennington assessed patient.Wounds slowly improving. Patient states that she has had gastric sleeve procedure last week. No complaints voiced. Continuing with present plan of care. Follow up in 2 weeks.  11/20/19: F/U with Dr. Pennington. Wounds continue to improve. Continue POC. Return in 2 weeks.    Review of Systems   Constitutional: Negative.    HENT: Negative.    Eyes: Negative.    Respiratory: Negative.    Cardiovascular: Negative.    Gastrointestinal: Negative.    Genitourinary: Negative.    Musculoskeletal: " Negative.    Skin: Negative.    Neurological: Negative.    Psychiatric/Behavioral: Negative.        Objective:      Physical Exam   Constitutional: She is oriented to person, place, and time. She appears well-developed and well-nourished.   HENT:   Head: Normocephalic.   Eyes: Pupils are equal, round, and reactive to light. Conjunctivae and EOM are normal.   Neck: Normal range of motion. Neck supple.   Cardiovascular: Normal rate, regular rhythm, normal heart sounds and intact distal pulses.   Pulmonary/Chest: Effort normal and breath sounds normal.   Abdominal: Soft. Bowel sounds are normal.   Musculoskeletal: Normal range of motion.   Neurological: She is alert and oriented to person, place, and time. She has normal reflexes.   Skin: Skin is warm and dry.       Assessment:       No diagnosis found.       Wound 07/31/19 1300 Other (comment) Axilla #1 (Active)   07/31/19 1300    Pre-existing: Yes   Primary Wound Type: Other   Side: Right   Orientation:    Location: Axilla   Wound/PI Number (optional): #1   Ankle-Brachial Index:    Pulses:    Removal Indication and Assessment:    Wound Outcome:    (Retired) Wound Type:    (Retired) Wound Length (cm):    (Retired) Wound Width (cm):    (Retired) Depth (cm):    Wound Description (Comments):    Removal Indications:    Dressing Appearance Open to air 11/20/2019 10:42 AM   Drainage Amount Small 11/20/2019 10:42 AM   Drainage Characteristics/Odor Serosanguineous 11/20/2019 10:42 AM   Appearance Pink 11/20/2019 10:42 AM   Tissue loss description Partial thickness 11/20/2019 10:42 AM   Red (%), Wound Tissue Color 100 % 11/20/2019 10:42 AM   Periwound Area Intact;Scar tissue 11/20/2019 10:42 AM   Wound Edges Irregular 11/20/2019 10:42 AM   Care Cleansed with:;Antimicrobial agent;Sterile normal saline 11/20/2019 10:42 AM   Dressing Changed;Non-adherent;Abd pad 11/20/2019 10:42 AM   Periwound Care Absorptive dressing applied 11/20/2019 10:42 AM   Dressing Change Due 11/22/19  11/20/2019 10:42 AM       Right axilla and Left axilla  Cleanse all affected areas with dakins solution  Apply xeroform with ABD pad to cover  Patients bra used to secure dressing  Continue antibiotics until finished  Patient to perform dressing every other day.    Dressing to be ordered through [facility]:     4X4 gauze #30 packs  Xeroform gauze 5X9  Every other day #30  ABD pad Every other day #30  Gloves size L  Cotton tip applicators        Plan:       Dr. Pennington. Wednesday 12/4/19         No follow-ups on file.

## 2019-12-04 ENCOUNTER — HOSPITAL ENCOUNTER (OUTPATIENT)
Dept: WOUND CARE | Facility: HOSPITAL | Age: 39
Discharge: HOME | End: 2019-12-04
Attending: SURGERY
Payer: COMMERCIAL

## 2019-12-04 VITALS
BODY MASS INDEX: 48.82 KG/M2 | HEIGHT: 65 IN | DIASTOLIC BLOOD PRESSURE: 75 MMHG | TEMPERATURE: 98 F | SYSTOLIC BLOOD PRESSURE: 140 MMHG | HEART RATE: 78 BPM | WEIGHT: 293 LBS

## 2019-12-04 DIAGNOSIS — L73.2 LEFT AXILLARY HIDRADENITIS: Primary | ICD-10-CM

## 2019-12-04 DIAGNOSIS — L08.9 BACTERIAL SKIN INFECTION: ICD-10-CM

## 2019-12-04 DIAGNOSIS — Z79.4 TYPE 2 DIABETES MELLITUS WITH OTHER SKIN ULCER, WITH LONG-TERM CURRENT USE OF INSULIN: ICD-10-CM

## 2019-12-04 DIAGNOSIS — L73.2 HIDRADENITIS SUPPURATIVA OF RIGHT AXILLA: ICD-10-CM

## 2019-12-04 DIAGNOSIS — E66.01 MORBID OBESITY WITH BMI OF 50.0-59.9, ADULT: ICD-10-CM

## 2019-12-04 DIAGNOSIS — E11.622 TYPE 2 DIABETES MELLITUS WITH OTHER SKIN ULCER, WITH LONG-TERM CURRENT USE OF INSULIN: ICD-10-CM

## 2019-12-04 DIAGNOSIS — B96.89 BACTERIAL SKIN INFECTION: ICD-10-CM

## 2019-12-04 PROCEDURE — 99213 OFFICE O/P EST LOW 20 MIN: CPT

## 2019-12-10 ENCOUNTER — LAB VISIT (OUTPATIENT)
Dept: LAB | Facility: HOSPITAL | Age: 39
End: 2019-12-10
Attending: INTERNAL MEDICINE
Payer: COMMERCIAL

## 2019-12-10 ENCOUNTER — OFFICE VISIT (OUTPATIENT)
Dept: RHEUMATOLOGY | Facility: CLINIC | Age: 39
End: 2019-12-10
Payer: COMMERCIAL

## 2019-12-10 VITALS — BODY MASS INDEX: 48.82 KG/M2 | HEART RATE: 83 BPM | HEIGHT: 65 IN | WEIGHT: 293 LBS

## 2019-12-10 DIAGNOSIS — M25.541 ARTHRALGIA OF BOTH HANDS: ICD-10-CM

## 2019-12-10 DIAGNOSIS — Z71.89 COUNSELING ON HEALTH PROMOTION AND DISEASE PREVENTION: ICD-10-CM

## 2019-12-10 DIAGNOSIS — M13.80 SERONEGATIVE ARTHRITIS: Primary | ICD-10-CM

## 2019-12-10 DIAGNOSIS — M13.80 SERONEGATIVE ARTHRITIS: ICD-10-CM

## 2019-12-10 DIAGNOSIS — Z79.60 LONG-TERM USE OF IMMUNOSUPPRESSANT MEDICATION: ICD-10-CM

## 2019-12-10 DIAGNOSIS — M25.542 ARTHRALGIA OF BOTH HANDS: ICD-10-CM

## 2019-12-10 LAB
ALBUMIN SERPL BCP-MCNC: 2.9 G/DL (ref 3.5–5.2)
ALP SERPL-CCNC: 80 U/L (ref 55–135)
ALT SERPL W/O P-5'-P-CCNC: 10 U/L (ref 10–44)
ANION GAP SERPL CALC-SCNC: 8 MMOL/L (ref 8–16)
AST SERPL-CCNC: 12 U/L (ref 10–40)
BASOPHILS # BLD AUTO: 0.02 K/UL (ref 0–0.2)
BASOPHILS NFR BLD: 0.2 % (ref 0–1.9)
BILIRUB SERPL-MCNC: 0.3 MG/DL (ref 0.1–1)
BUN SERPL-MCNC: 5 MG/DL (ref 6–20)
CALCIUM SERPL-MCNC: 8.8 MG/DL (ref 8.7–10.5)
CHLORIDE SERPL-SCNC: 111 MMOL/L (ref 95–110)
CO2 SERPL-SCNC: 23 MMOL/L (ref 23–29)
CREAT SERPL-MCNC: 0.8 MG/DL (ref 0.5–1.4)
CRP SERPL-MCNC: 15.9 MG/L (ref 0–8.2)
DIFFERENTIAL METHOD: ABNORMAL
EOSINOPHIL # BLD AUTO: 0.2 K/UL (ref 0–0.5)
EOSINOPHIL NFR BLD: 2.7 % (ref 0–8)
ERYTHROCYTE [DISTWIDTH] IN BLOOD BY AUTOMATED COUNT: 17.7 % (ref 11.5–14.5)
ERYTHROCYTE [SEDIMENTATION RATE] IN BLOOD BY WESTERGREN METHOD: 52 MM/HR (ref 0–36)
EST. GFR  (AFRICAN AMERICAN): >60 ML/MIN/1.73 M^2
EST. GFR  (NON AFRICAN AMERICAN): >60 ML/MIN/1.73 M^2
GLUCOSE SERPL-MCNC: 90 MG/DL (ref 70–110)
HCT VFR BLD AUTO: 38.8 % (ref 37–48.5)
HGB BLD-MCNC: 12.2 G/DL (ref 12–16)
IMM GRANULOCYTES # BLD AUTO: 0.04 K/UL (ref 0–0.04)
IMM GRANULOCYTES NFR BLD AUTO: 0.5 % (ref 0–0.5)
LYMPHOCYTES # BLD AUTO: 3.6 K/UL (ref 1–4.8)
LYMPHOCYTES NFR BLD: 41.4 % (ref 18–48)
MCH RBC QN AUTO: 28.5 PG (ref 27–31)
MCHC RBC AUTO-ENTMCNC: 31.4 G/DL (ref 32–36)
MCV RBC AUTO: 91 FL (ref 82–98)
MONOCYTES # BLD AUTO: 0.7 K/UL (ref 0.3–1)
MONOCYTES NFR BLD: 8.3 % (ref 4–15)
NEUTROPHILS # BLD AUTO: 4.2 K/UL (ref 1.8–7.7)
NEUTROPHILS NFR BLD: 47.4 % (ref 38–73)
NRBC BLD-RTO: 0 /100 WBC
PLATELET # BLD AUTO: 395 K/UL (ref 150–350)
PMV BLD AUTO: 8.8 FL (ref 9.2–12.9)
POTASSIUM SERPL-SCNC: 3.2 MMOL/L (ref 3.5–5.1)
PROT SERPL-MCNC: 8.7 G/DL (ref 6–8.4)
RBC # BLD AUTO: 4.28 M/UL (ref 4–5.4)
SODIUM SERPL-SCNC: 142 MMOL/L (ref 136–145)
WBC # BLD AUTO: 8.77 K/UL (ref 3.9–12.7)

## 2019-12-10 PROCEDURE — 99999 PR PBB SHADOW E&M-EST. PATIENT-LVL IV: ICD-10-PCS | Mod: PBBFAC,,, | Performed by: INTERNAL MEDICINE

## 2019-12-10 PROCEDURE — 85652 RBC SED RATE AUTOMATED: CPT

## 2019-12-10 PROCEDURE — 3008F PR BODY MASS INDEX (BMI) DOCUMENTED: ICD-10-PCS | Mod: CPTII,S$GLB,, | Performed by: INTERNAL MEDICINE

## 2019-12-10 PROCEDURE — 86140 C-REACTIVE PROTEIN: CPT

## 2019-12-10 PROCEDURE — 36415 COLL VENOUS BLD VENIPUNCTURE: CPT

## 2019-12-10 PROCEDURE — 85025 COMPLETE CBC W/AUTO DIFF WBC: CPT

## 2019-12-10 PROCEDURE — 99999 PR PBB SHADOW E&M-EST. PATIENT-LVL IV: CPT | Mod: PBBFAC,,, | Performed by: INTERNAL MEDICINE

## 2019-12-10 PROCEDURE — 99214 OFFICE O/P EST MOD 30 MIN: CPT | Mod: S$GLB,,, | Performed by: INTERNAL MEDICINE

## 2019-12-10 PROCEDURE — 99214 PR OFFICE/OUTPT VISIT, EST, LEVL IV, 30-39 MIN: ICD-10-PCS | Mod: S$GLB,,, | Performed by: INTERNAL MEDICINE

## 2019-12-10 PROCEDURE — 3008F BODY MASS INDEX DOCD: CPT | Mod: CPTII,S$GLB,, | Performed by: INTERNAL MEDICINE

## 2019-12-10 PROCEDURE — 80053 COMPREHEN METABOLIC PANEL: CPT

## 2019-12-10 RX ORDER — DICLOFENAC SODIUM 10 MG/G
2 GEL TOPICAL DAILY
Qty: 1 TUBE | Refills: 2 | Status: SHIPPED | OUTPATIENT
Start: 2019-12-10 | End: 2020-05-06

## 2019-12-10 RX ORDER — ERGOCALCIFEROL 1.25 MG/1
CAPSULE ORAL
COMMUNITY
End: 2020-09-22 | Stop reason: SDUPTHER

## 2019-12-10 NOTE — PROGRESS NOTES
RHEUMATOLOGY OUTPATIENT CLINIC NOTE    12/10/2019    Attending Rheumatologist: Ken Alberto  Primary Care Provider: [facility]   Physician Requesting Consultation: No referring provider defined for this encounter.  Chief Complaint/Reason For Consultation:  Seronegative arthritis.    Subjective:       HPI  Rodney Infante is a 39 y.o. Black or  female with seronegative arthritis comes for follow-up.    Today  Last seen on October.  Was still early to assess for response to TNF, significant improvement referred by patient.  Plaquenil was discontinued, infection precautions in setting of biology therapy provided.  No acute complaints.  Reports episodic hand arthralgias with mostly mechanical pattern.  Denies new joint pain or swelling.  Does not have prolonged morning stiffness.  Denies new episodes of wound infections since last visit.  Denies fever, rash,  or GI complaints.    Review of Systems   Constitutional: Negative for chills, fever and malaise/fatigue.   Eyes: Negative for pain and redness.   Respiratory: Negative for cough, hemoptysis and shortness of breath.    Cardiovascular: Negative for chest pain and leg swelling.   Gastrointestinal: Negative for abdominal pain, blood in stool and melena.   Genitourinary: Negative for dysuria and hematuria.   Musculoskeletal: Negative for falls and joint pain.   Skin: Negative for rash.        +HS.  Denies worsening compared to last visit.   Neurological: Negative for tingling and focal weakness.   Psychiatric/Behavioral: Negative for memory loss. The patient does not have insomnia.      Chronic comorbid conditions affecting medical decision making today:  Past Medical History:   Diagnosis Date    Anemia     Arthritis     Diabetes mellitus, type 2     Neuropathy      Past Surgical History:   Procedure Laterality Date    CYST REMOVAL  2014, 2/2018    back     Family History   Problem Relation Age of Onset    No Known Problems  Mother     Drug abuse Father     No Known Problems Sister      Social History     Substance and Sexual Activity   Alcohol Use Yes    Comment: occasionally     Social History     Tobacco Use   Smoking Status Never Smoker   Smokeless Tobacco Never Used     Social History     Substance and Sexual Activity   Drug Use No       Current Outpatient Medications:     adalimumab (HUMIRA PEN) 40 mg/0.8 mL PnKt, Inject 1 pen (40 mg total) into the skin every 14 (fourteen) days., Disp: 25 pen, Rfl: 0    amLODIPine (NORVASC) 10 MG tablet, , Disp: , Rfl:     carvedilol (COREG) 25 MG tablet, , Disp: , Rfl:     clotrimazole-betamethasone 1-0.05% (LOTRISONE) cream, , Disp: , Rfl:     cyanocobalamin (VITAMIN B-12) 1000 MCG tablet, Take 1 tablet (1,000 mcg total) by mouth once daily. This is a prescription for 1 year.  Take 1 tablet Monday, Wednesday, and Friday, Disp: 36 tablet, Rfl: 3    ergocalciferol (ERGOCALCIFEROL) 50,000 unit Cap, , Disp: , Rfl:     ergocalciferol (ERGOCALCIFEROL) 50,000 unit Cap, Vitamin D2 1,250 mcg (50,000 unit) capsule, Disp: , Rfl:     estradiol cypionate (DEPO-ESTRADIOL) 5 mg/mL injection, Inject into the muscle every 28 days., Disp: , Rfl:     gabapentin (NEURONTIN) 100 MG capsule, Take 3 capsules (300 mg total) by mouth every evening., Disp: 90 capsule, Rfl: 3    glimepiride (AMARYL) 4 MG tablet, Take 1 tablet (4 mg total) by mouth before dinner., Disp: 30 tablet, Rfl: 5    losartan-hydrochlorothiazide 100-12.5 mg (HYZAAR) 100-12.5 mg Tab, , Disp: , Rfl:     ondansetron (ZOFRAN) 4 MG tablet, , Disp: , Rfl:     sulfamethoxazole-trimethoprim 800-160mg (BACTRIM DS) 800-160 mg Tab, Take 1 tablet by mouth 2 (two) times daily., Disp: 60 tablet, Rfl: 2    TOPCARE UNIVERSAL1 LANCET Misc, , Disp: , Rfl:     TRUETRACK TEST Strp, , Disp: , Rfl:     canagliflozin-metformin (INVOKAMET XR) 150-1,000 mg TBph, Take by mouth., Disp: , Rfl:     diclofenac sodium (VOLTAREN) 1 % Gel, Apply 2 g topically  once daily., Disp: 1 Tube, Rfl: 2     Objective:         Vitals:    12/10/19 0957   Pulse: 83     Physical Exam   Nursing note and vitals reviewed.  Constitutional: She is oriented to person, place, and time and well-developed, well-nourished, and in no distress.   Obese BMI 51.4   HENT:   Head: Normocephalic.   no parotid enlargement.   Eyes: Conjunctivae are normal. Pupils are equal, round, and reactive to light.   Absent Episcleritis/scleritis.   Neck: Normal range of motion.   Cardiovascular: Normal rate and intact distal pulses.    Pulmonary/Chest: Effort normal. No respiratory distress.   Abdominal: Soft. She exhibits no distension.   Neurological: She is alert and oriented to person, place, and time. Gait normal.   absent sensory deficits  muscle strength 5/5 through.   Skin: No erythema.     Lipodermatosclerosis appearance lower extremity, evidence of multiple healed ulcers.  HS axilla   Musculoskeletal: Normal range of motion. She exhibits no edema or tenderness.   : good  No synovitis or significant squeeze tenderness.    AROM: intact  PROM: intact    Devices used by patient: none       Reviewed old and all outside pertinent medical records available.    All lab results personally reviewed and interpreted by me.  Lab Results   Component Value Date    WBC 8.77 12/10/2019    HGB 12.2 12/10/2019    HCT 38.8 12/10/2019    MCV 91 12/10/2019    MCH 28.5 12/10/2019    MCHC 31.4 (L) 12/10/2019    RDW 17.7 (H) 12/10/2019     (H) 12/10/2019    MPV 8.8 (L) 12/10/2019    PLTEST Increased (A) 11/12/2018     Lab Results   Component Value Date     12/10/2019    K 3.2 (L) 12/10/2019     (H) 12/10/2019    CO2 23 12/10/2019    GLU 90 12/10/2019    BUN 5 (L) 12/10/2019    CALCIUM 8.8 12/10/2019    PROT 8.7 (H) 12/10/2019    ALBUMIN 2.9 (L) 12/10/2019    BILITOT 0.3 12/10/2019    AST 12 12/10/2019    ALKPHOS 80 12/10/2019    ALT 10 12/10/2019     Lab Results   Component Value Date    COLORU Yellow  02/27/2018    APPEARANCEUA Cloudy (A) 02/27/2018    SPECGRAV 1.020 02/27/2018    PHUR 5.0 02/27/2018    PROTEINUA Negative 02/27/2018    KETONESU Negative 02/27/2018    LEUKOCYTESUR 3+ (A) 02/27/2018    NITRITE Negative 02/27/2018    UROBILINOGEN Negative 02/27/2018     Lab Results   Component Value Date    CRP 15.9 (H) 12/10/2019       Lab Results   Component Value Date    SEDRATE 59 (H) 11/13/2019       Lab Results   Component Value Date    RF <10.0 04/03/2019    SEDRATE 59 (H) 11/13/2019       No components found for: 25OHVITDTOT, 38HIWSQO0, 34JVKDBR4, METHODNOTE    No results found for: URICACID    No components found for: TSPOTTB    Rheum Labs:  MILTON negative  Rheumatoid factor/CCP negative  SSA negative  ANCA screen negative  HLA B27 negative    Infectious Labs:  HCV antibody nonreactive April 2019   Hepatitis panel nonreactive  QuantiFERON TB negative August 2019    Imaging:  All imaging reviewed and independently  interpreted by me.    MRI hands August 2019  Periarticular enhancement and synovial thickening at the 1st metacarpophalangeal joint and proximal interphalangeal joint of the 2nd digit.  No erosions are visualized.     ASSESSMENT / PLAN:     Rodney Infante is a 39 y.o. Black or  female with:    1. Seronegative arthritis  - CDAI: -> no disease activity.  - excellent response to Humira initiated on 9/23.  Will continue unchanged.  - continue with sulfasalazine 1000 BID for now.  If on remission by next visit, will decrease dosage  - NSAIDs p.r.n. for breakthrough pain.  Has tried over two different oral agents.  Topical therapy, Voltaren gel prescribed.  - ESR, CRP prior next visit    2. Immunosuppressed status   - Compromised immune system secondary to autoimmune disease and use of immunosuppressive drugs.   - Monitor carefully for infections and toxicities.   - Advised to get immediate medical care if any infection.   - Also advised strict adherence to age appropriate  vaccinations and cancer screenings with PCP.  - CBC, CMP before next visit    3. Other specified counseling  - over 10 minutes spent regarding below topics:  - Immunization counseling done. Flu shot today.  - Weight loss counseling done.  - Nutrition and exercise counseling.  - Medication counseling provided.    RTC 3 months    Method of contact with patient concerns: [facility]    Disclaimer:  This note is prepared using voice recognition software and as such is likely to have errors and has not been proof read. Please contact me for questions.     Time spent: 25 minutes in face to face discussion concerning diagnosis, prognosis, review of lab and test results, benefits of treatment as well as management of disease, counseling of patient and coordination of care between various health care providers.  Greater than half the time spent was used for coordination of care and counseling of patient.    Ken Alberto M.D.  Rheumatology Department   [facility] [place]

## 2019-12-10 NOTE — PATIENT INSTRUCTIONS
Hylan G-F 20 intra-articular injection  What is this medicine?  HYLAN G-F 20 (HI john G F 20) is used to treat osteoarthritis of the knee. It lubricates and cushions the joint, reducing pain in the knee.  How should I use this medicine?  This medicine is for injection into the knee joint. It is given by a health care professional in a hospital or clinic setting.  Talk to your pediatrician regarding the use of this medicine in children. This medicine is not approved for use in children.  What side effects may I notice from receiving this medicine?  Side effects that you should report to your doctor or health care professional as soon as possible:  · allergic reactions like skin rash, itching or hives, swelling of the face, lips, or tongue  · difficulty breathing  · fever or chills  · severe joint pain or swelling  · unusual bleeding or bruising  Side effects that usually do not require medical attention (Report these to your doctor or health care professional if they continue or are bothersome.):  · dizziness  · flushing  · general ill feeling or flu-like symptoms  · headache  · minor joint pain or swelling  · muscle pain or cramps  · pain, redness, irritation or bruising at site of injection  What may interact with this medicine?  Do not take this medicine with any of the following medications:  · other injections for the joint like steroids or anesthetics  · certain skin disinfectants like benzalkonium chloride  What if I miss a dose?  Keep appointments for follow-up doses as directed. For Synvisc, you will need weekly injections for 3 doses. It is important not to miss your dose. If you will receive Synvisc-One, then only 1 injection will be needed. Call your doctor or health care professional if you are unable to keep an appointment.  Where should I keep my medicine?  This drug is given in a hospital or clinic and will not be stored at home.  What should I tell my health care provider before I take this  medicine?  They need to know if you have any of these conditions:  · severe knee inflammation  · skin conditions or sensitivity  · skin or joint infection  · venous stasis  · an unusual or allergic reaction to hylan G-F 20, hyaluronan (sodium hyaluronate), eggs, other medicines, foods, dyes, or preservatives  · pregnant or trying to get pregnant  · breast-feeding  What should I watch for while using this medicine?  Tell your doctor or healthcare professional if your symptoms do not start to get better or if they get worse. Your condition will be monitored carefully while you are receiving this medicine. Most persons get pain relief for up to 6 months after treatment.  Avoid strenuous activities (high-impact sports, jogging) or major weight-bearing activities for 48 hours after the injection.  NOTE:This sheet is a summary. It may not cover all possible information. If you have questions about this medicine, talk to your doctor, pharmacist, or health care provider. Copyright© 2017 [employer]

## 2019-12-18 ENCOUNTER — HOSPITAL ENCOUNTER (OUTPATIENT)
Dept: WOUND CARE | Facility: HOSPITAL | Age: 39
Discharge: HOME | End: 2019-12-18
Attending: SURGERY
Payer: COMMERCIAL

## 2019-12-18 VITALS
WEIGHT: 293 LBS | HEIGHT: 65 IN | HEART RATE: 97 BPM | BODY MASS INDEX: 48.82 KG/M2 | TEMPERATURE: 99 F | DIASTOLIC BLOOD PRESSURE: 77 MMHG | SYSTOLIC BLOOD PRESSURE: 130 MMHG

## 2019-12-18 DIAGNOSIS — B96.89 BACTERIAL SKIN INFECTION: ICD-10-CM

## 2019-12-18 DIAGNOSIS — L73.2 LEFT AXILLARY HIDRADENITIS: ICD-10-CM

## 2019-12-18 DIAGNOSIS — L98.499 DIABETES MELLITUS WITH SKIN ULCER: ICD-10-CM

## 2019-12-18 DIAGNOSIS — L08.9 BACTERIAL SKIN INFECTION: ICD-10-CM

## 2019-12-18 DIAGNOSIS — M79.641 RIGHT HAND PAIN: ICD-10-CM

## 2019-12-18 DIAGNOSIS — L73.2 HIDRADENITIS SUPPURATIVA OF RIGHT AXILLA: Primary | ICD-10-CM

## 2019-12-18 DIAGNOSIS — E11.622 DIABETES MELLITUS WITH SKIN ULCER: ICD-10-CM

## 2019-12-18 PROCEDURE — 99213 OFFICE O/P EST LOW 20 MIN: CPT

## 2019-12-18 NOTE — PROGRESS NOTES
"Subjective:       Patient ID: Rodney Infante is a 39 y.o. female.    Chief Complaint: Non-healing Wound (right and left axilla)    Client here today with open areas to right and left axilla. Seen by Dr. Pennington, who states sweat glands infected. Rx for clindamycin, bactroban, and tramadol provided. Stop methatrexate ( prescribed for arthritis). Client plans to have abdominal bypass surgery in September. Culture done today of right axilla.  8/7/19: F/U with Dr. Pennington. Culture results negative. Continue POC.  8/14/19: F/U with Dr. Pennington. Clindamycin d/c'd. Bactrim and Diflucan ordered today. Leave left and right axilla open to air. Culture sent to lab today.    9/4/19:  F/U with Dr. Pennington.  Wounds to right axilla healing up.  Patient states "right does not drain as much as left".  Patient feels the left axilla is not healing up like the right.  Patient still taking Bactrim PO.  Will continue for now.  9/18/19: F/U with Dr. Pennington. Both axilla continue to have "sebum - like" exudate. New site noted to left back. Culture done today. RX for lotrisone and Diflucan provided.  9/25/19: F/U with Dr. Pennington. Axillae improved. Client was not able to get Diflucan. Reordered today. Continues lotrisone and clindamycin. Culture results reviewed. Referred to ID for appt. On 10/7/19. Next visit with Dr. Pennington 10/16/19.  10/7/19: Vist today with ID. Augmentin ordered. Clindamycin d/c'd. Complete Diflucan.  11/6/19: Dr Pennington assessed patient.Wounds slowly improving. Patient states that she has had gastric sleeve procedure last week. No complaints voiced. Continuing with present plan of care. Follow up in 2 weeks.  11/20/19: F/U with Dr. Pennington. Wounds continue to improve. Continue POC. Return in 2 weeks.  12/4/19: F/U with Dr. Pennington. Axillae drainage decreased. Client has lost 97 Lbs. As of today. Surgery planned for 12/20/19. Next visit 12/18/19, and consents to be signed.  12/18/19: F/U with Dr. Pennington. " Surgery to axillae scheduled for Monday 12/23/19. Consents signed. Return 1/2/20.    Review of Systems   Constitutional: Negative.    HENT: Negative.    Eyes: Negative.    Respiratory: Negative.    Cardiovascular: Negative.    Gastrointestinal: Negative.    Genitourinary: Negative.    Musculoskeletal: Negative.    Skin: Negative.    Neurological: Negative.    Psychiatric/Behavioral: Negative.        Objective:      Physical Exam   Constitutional: She is oriented to person, place, and time. She appears well-developed and well-nourished.   HENT:   Head: Normocephalic.   Eyes: Pupils are equal, round, and reactive to light. Conjunctivae and EOM are normal.   Neck: Normal range of motion. Neck supple.   Cardiovascular: Normal rate, regular rhythm, normal heart sounds and intact distal pulses.   Pulmonary/Chest: Effort normal and breath sounds normal.   Abdominal: Soft. Bowel sounds are normal.   Musculoskeletal: Normal range of motion.   Neurological: She is alert and oriented to person, place, and time. She has normal reflexes.   Skin: Skin is warm and dry.       Assessment:       1. Hidradenitis suppurativa of right axilla    2. Left axillary hidradenitis           Wound 07/31/19 1300 Other (comment) Axilla #2 (Active)   07/31/19 1300    Pre-existing: Yes   Primary Wound Type: Other   Side: Left   Orientation:    Location: Axilla   Wound/PI Number (optional): #2   Ankle-Brachial Index:    Pulses:    Removal Indication and Assessment:    Wound Outcome:    (Retired) Wound Type:    (Retired) Wound Length (cm):    (Retired) Wound Width (cm):    (Retired) Depth (cm):    Wound Description (Comments):    Removal Indications:    Dressing Appearance Open to air 12/18/2019 11:04 AM   Drainage Amount None 12/18/2019 11:04 AM   Appearance Red 12/18/2019 11:04 AM   Tissue loss description Full thickness 12/18/2019 11:04 AM   Red (%), Wound Tissue Color 100 % 12/18/2019 11:04 AM   Periwound Area Intact 12/18/2019 11:04 AM   Care  Cleansed with:;Soap and water 12/18/2019 11:04 AM   Dressing Changed;Abd pad;Non-adherent 12/18/2019 11:04 AM   Periwound Care Absorptive dressing applied 12/18/2019 11:04 AM   Dressing Change Due 12/20/19 12/18/2019 11:04 AM            Wound 07/31/19 1300 Other (comment) Axilla #1 (Active)   07/31/19 1300    Pre-existing: Yes   Primary Wound Type: Other   Side: Right   Orientation:    Location: Axilla   Wound/PI Number (optional): #1   Ankle-Brachial Index:    Pulses:    Removal Indication and Assessment:    Wound Outcome:    (Retired) Wound Type:    (Retired) Wound Length (cm):    (Retired) Wound Width (cm):    (Retired) Depth (cm):    Wound Description (Comments):    Removal Indications:    Dressing Appearance Open to air 12/18/2019 11:06 AM   Drainage Amount None 12/18/2019 11:06 AM   Appearance Red 12/18/2019 11:06 AM   Tissue loss description Full thickness 12/18/2019 11:06 AM   Red (%), Wound Tissue Color 100 % 12/18/2019 11:06 AM   Periwound Area Intact;Dry;Pink 12/18/2019 11:06 AM   Wound Edges Irregular 12/18/2019 11:06 AM   Care Cleansed with:;Soap and water 12/18/2019 11:06 AM   Dressing Changed;Abd pad;Non-adherent 12/18/2019 11:06 AM   Periwound Care Absorptive dressing applied 12/18/2019 11:06 AM   Dressing Change Due 12/20/19 12/18/2019 11:06 AM       Right axilla and Left axilla  Cleanse all affected areas with dakins solution  Apply xeroform with ABD pad to cover  Patients bra used to secure dressing  Continue antibiotics until finished  Patient to perform dressing every other day.    Dressing to be ordered through [facility]:     4X4 gauze #30 packs  Xeroform gauze 5X9  Every other day #30  ABD pad Every other day #30  Gloves size L  Cotton tip applicators        Plan:       Dr. Pennington. Wednesday 1/2/20         No follow-ups on file.

## 2019-12-18 NOTE — PROGRESS NOTES
Subjective:       Patient ID: Rodney Infante is a 39 y.o. female.    Chief Complaint: Non-healing Wound (right and left axilla)    F/u for bilateral axillary hideradenitis ,     Review of Systems   Constitutional: Negative.    HENT: Negative.    Eyes: Negative.    Respiratory: Negative.    Cardiovascular: Negative.    Gastrointestinal: Negative.    Genitourinary: Negative.    Musculoskeletal: Negative.    Skin: Negative.    Neurological: Negative.    Psychiatric/Behavioral: Negative.        Objective:      Physical Exam   Constitutional: She is oriented to person, place, and time. She appears well-developed and well-nourished.   HENT:   Head: Normocephalic.   Eyes: Pupils are equal, round, and reactive to light. Conjunctivae and EOM are normal.   Neck: Normal range of motion. Neck supple.   Cardiovascular: Normal rate, regular rhythm, normal heart sounds and intact distal pulses.   Pulmonary/Chest: Effort normal and breath sounds normal.   Abdominal: Soft. Bowel sounds are normal.   Musculoskeletal: Normal range of motion.   Neurological: She is alert and oriented to person, place, and time. She has normal reflexes.   Skin: Skin is warm and dry.       Assessment:       1. Hidradenitis suppurativa of right axilla    2. Left axillary hidradenitis    3. Right hand pain    4. Diabetes mellitus with skin ulcer    5. Bacterial skin infection        Plan:       Excision bilateral hidradenitis and skin graft on Monday.

## 2019-12-22 ENCOUNTER — NURSE TRIAGE (OUTPATIENT)
Dept: ADMINISTRATIVE | Facility: CLINIC | Age: 39
End: 2019-12-22

## 2019-12-22 NOTE — TELEPHONE ENCOUNTER
Reason for Disposition   Question about upcoming scheduled test, no triage required and triager able to answer question    Protocols used: INFORMATION ONLY CALL-A-AH    Patient called and confirmed her surgery for tomorrow, and she was informed no surgery on schedule. Called [place] to ask for Dr. Pennington.     Dr. Pennington states he will have to schedule the patient for Tuesday because they did not schedule her for Monday 12/23.Dr. Pennington agrees to have his nurse to reach out to the patient on tomorrow to confirm the procedure for 12/24.     Ms. Infante verbalized understanding.

## 2019-12-23 ENCOUNTER — TELEPHONE (OUTPATIENT)
Dept: PHARMACY | Facility: CLINIC | Age: 39
End: 2019-12-23

## 2019-12-24 ENCOUNTER — ANESTHESIA EVENT (OUTPATIENT)
Dept: SURGERY | Facility: HOSPITAL | Age: 39
End: 2019-12-24
Payer: COMMERCIAL

## 2019-12-24 ENCOUNTER — HOSPITAL ENCOUNTER (OUTPATIENT)
Facility: HOSPITAL | Age: 39
Discharge: HOME | End: 2019-12-24
Attending: SURGERY | Admitting: SURGERY
Payer: COMMERCIAL

## 2019-12-24 ENCOUNTER — ANESTHESIA (OUTPATIENT)
Dept: SURGERY | Facility: HOSPITAL | Age: 39
End: 2019-12-24
Payer: COMMERCIAL

## 2019-12-24 VITALS
HEIGHT: 65 IN | DIASTOLIC BLOOD PRESSURE: 75 MMHG | BODY MASS INDEX: 48.82 KG/M2 | OXYGEN SATURATION: 100 % | WEIGHT: 293 LBS | HEART RATE: 88 BPM | RESPIRATION RATE: 16 BRPM | TEMPERATURE: 98 F | SYSTOLIC BLOOD PRESSURE: 135 MMHG

## 2019-12-24 DIAGNOSIS — D50.0 IRON DEFICIENCY ANEMIA DUE TO CHRONIC BLOOD LOSS: Primary | ICD-10-CM

## 2019-12-24 DIAGNOSIS — L73.2 HIDRADENITIS: ICD-10-CM

## 2019-12-24 DIAGNOSIS — L73.2 AXILLARY HIDRADENITIS SUPPURATIVA: ICD-10-CM

## 2019-12-24 DIAGNOSIS — Z79.4 TYPE 2 DIABETES MELLITUS WITH OTHER SKIN ULCER, WITH LONG-TERM CURRENT USE OF INSULIN: ICD-10-CM

## 2019-12-24 DIAGNOSIS — L73.2 HIDRADENITIS AXILLARIS: ICD-10-CM

## 2019-12-24 DIAGNOSIS — E11.622 DIABETES MELLITUS WITH SKIN ULCER: ICD-10-CM

## 2019-12-24 DIAGNOSIS — E66.01 MORBID OBESITY WITH BMI OF 50.0-59.9, ADULT: ICD-10-CM

## 2019-12-24 DIAGNOSIS — L98.499 DIABETES MELLITUS WITH SKIN ULCER: ICD-10-CM

## 2019-12-24 DIAGNOSIS — L73.2 LEFT AXILLARY HIDRADENITIS: ICD-10-CM

## 2019-12-24 DIAGNOSIS — E11.622 TYPE 2 DIABETES MELLITUS WITH OTHER SKIN ULCER, WITH LONG-TERM CURRENT USE OF INSULIN: ICD-10-CM

## 2019-12-24 LAB
B-HCG UR QL: NEGATIVE
CTP QC/QA: YES
GRAM STN SPEC: NORMAL
GRAM STN SPEC: NORMAL
POCT GLUCOSE: 117 MG/DL (ref 70–110)

## 2019-12-24 PROCEDURE — 88305 TISSUE EXAM BY PATHOLOGIST: ICD-10-PCS | Mod: 26,,, | Performed by: PATHOLOGY

## 2019-12-24 PROCEDURE — 25000003 PHARM REV CODE 250: Performed by: SURGERY

## 2019-12-24 PROCEDURE — 36000706: Performed by: SURGERY

## 2019-12-24 PROCEDURE — 37000009 HC ANESTHESIA EA ADD 15 MINS: Performed by: SURGERY

## 2019-12-24 PROCEDURE — 25000003 PHARM REV CODE 250: Performed by: NURSE ANESTHETIST, CERTIFIED REGISTERED

## 2019-12-24 PROCEDURE — 36000707: Performed by: SURGERY

## 2019-12-24 PROCEDURE — 00400 ANES INTEGUMENTARY SYS NOS: CPT | Performed by: SURGERY

## 2019-12-24 PROCEDURE — 88305 TISSUE EXAM BY PATHOLOGIST: CPT | Mod: 26,,, | Performed by: PATHOLOGY

## 2019-12-24 PROCEDURE — 87076 CULTURE ANAEROBE IDENT EACH: CPT

## 2019-12-24 PROCEDURE — 87116 MYCOBACTERIA CULTURE: CPT

## 2019-12-24 PROCEDURE — 88305 TISSUE EXAM BY PATHOLOGIST: CPT | Performed by: PATHOLOGY

## 2019-12-24 PROCEDURE — 81025 URINE PREGNANCY TEST: CPT | Performed by: SURGERY

## 2019-12-24 PROCEDURE — 37000008 HC ANESTHESIA 1ST 15 MINUTES: Performed by: SURGERY

## 2019-12-24 PROCEDURE — 87206 SMEAR FLUORESCENT/ACID STAI: CPT

## 2019-12-24 PROCEDURE — S0077 INJECTION, CLINDAMYCIN PHOSP: HCPCS | Performed by: NURSE ANESTHETIST, CERTIFIED REGISTERED

## 2019-12-24 PROCEDURE — 87070 CULTURE OTHR SPECIMN AEROBIC: CPT

## 2019-12-24 PROCEDURE — 87205 SMEAR GRAM STAIN: CPT

## 2019-12-24 PROCEDURE — 63600175 PHARM REV CODE 636 W HCPCS: Performed by: NURSE ANESTHETIST, CERTIFIED REGISTERED

## 2019-12-24 PROCEDURE — 63600175 PHARM REV CODE 636 W HCPCS: Performed by: SURGERY

## 2019-12-24 PROCEDURE — 25000003 PHARM REV CODE 250: Performed by: ANESTHESIOLOGY

## 2019-12-24 PROCEDURE — 87075 CULTR BACTERIA EXCEPT BLOOD: CPT

## 2019-12-24 PROCEDURE — 87015 SPECIMEN INFECT AGNT CONCNTJ: CPT

## 2019-12-24 PROCEDURE — 71000016 HC POSTOP RECOV ADDL HR: Performed by: SURGERY

## 2019-12-24 PROCEDURE — 87102 FUNGUS ISOLATION CULTURE: CPT

## 2019-12-24 PROCEDURE — 71000033 HC RECOVERY, INTIAL HOUR: Performed by: SURGERY

## 2019-12-24 PROCEDURE — 71000015 HC POSTOP RECOV 1ST HR: Performed by: SURGERY

## 2019-12-24 RX ORDER — LIDOCAINE HCL/PF 100 MG/5ML
SYRINGE (ML) INTRAVENOUS
Status: DISCONTINUED | OUTPATIENT
Start: 2019-12-24 | End: 2019-12-24

## 2019-12-24 RX ORDER — SODIUM CHLORIDE 9 MG/ML
INJECTION, SOLUTION INTRAVENOUS CONTINUOUS
Status: DISCONTINUED | OUTPATIENT
Start: 2019-12-24 | End: 2019-12-24 | Stop reason: HOSPADM

## 2019-12-24 RX ORDER — OXYCODONE HYDROCHLORIDE 5 MG/1
5 TABLET ORAL
Status: DISCONTINUED | OUTPATIENT
Start: 2019-12-24 | End: 2019-12-24 | Stop reason: HOSPADM

## 2019-12-24 RX ORDER — FENTANYL CITRATE 50 UG/ML
INJECTION, SOLUTION INTRAMUSCULAR; INTRAVENOUS
Status: DISCONTINUED | OUTPATIENT
Start: 2019-12-24 | End: 2019-12-24

## 2019-12-24 RX ORDER — ONDANSETRON 2 MG/ML
INJECTION INTRAMUSCULAR; INTRAVENOUS
Status: DISCONTINUED | OUTPATIENT
Start: 2019-12-24 | End: 2019-12-24

## 2019-12-24 RX ORDER — PROPOFOL 10 MG/ML
VIAL (ML) INTRAVENOUS CONTINUOUS PRN
Status: DISCONTINUED | OUTPATIENT
Start: 2019-12-24 | End: 2019-12-24

## 2019-12-24 RX ORDER — HYDROMORPHONE HYDROCHLORIDE 2 MG/ML
INJECTION, SOLUTION INTRAMUSCULAR; INTRAVENOUS; SUBCUTANEOUS
Status: DISCONTINUED | OUTPATIENT
Start: 2019-12-24 | End: 2019-12-24

## 2019-12-24 RX ORDER — BACITRACIN 50000 [IU]/1
INJECTION, POWDER, FOR SOLUTION INTRAMUSCULAR
Status: DISCONTINUED | OUTPATIENT
Start: 2019-12-24 | End: 2019-12-24 | Stop reason: HOSPADM

## 2019-12-24 RX ORDER — MUPIROCIN 20 MG/G
OINTMENT TOPICAL
Status: DISCONTINUED | OUTPATIENT
Start: 2019-12-24 | End: 2019-12-24 | Stop reason: HOSPADM

## 2019-12-24 RX ORDER — PROPOFOL 10 MG/ML
VIAL (ML) INTRAVENOUS
Status: DISCONTINUED | OUTPATIENT
Start: 2019-12-24 | End: 2019-12-24

## 2019-12-24 RX ORDER — SUCCINYLCHOLINE CHLORIDE 20 MG/ML
INJECTION INTRAMUSCULAR; INTRAVENOUS
Status: DISCONTINUED | OUTPATIENT
Start: 2019-12-24 | End: 2019-12-24

## 2019-12-24 RX ORDER — CLINDAMYCIN PHOSPHATE 900 MG/50ML
INJECTION, SOLUTION INTRAVENOUS
Status: DISCONTINUED | OUTPATIENT
Start: 2019-12-24 | End: 2019-12-24

## 2019-12-24 RX ORDER — HYDROMORPHONE HYDROCHLORIDE 2 MG/ML
0.5 INJECTION, SOLUTION INTRAMUSCULAR; INTRAVENOUS; SUBCUTANEOUS EVERY 5 MIN PRN
Status: DISCONTINUED | OUTPATIENT
Start: 2019-12-24 | End: 2019-12-24 | Stop reason: HOSPADM

## 2019-12-24 RX ORDER — ACETAMINOPHEN 325 MG/1
650 TABLET ORAL EVERY 4 HOURS PRN
Status: DISCONTINUED | OUTPATIENT
Start: 2019-12-24 | End: 2019-12-24 | Stop reason: HOSPADM

## 2019-12-24 RX ORDER — HYDROCODONE BITARTRATE AND ACETAMINOPHEN 5; 325 MG/1; MG/1
1 TABLET ORAL EVERY 4 HOURS PRN
Status: DISCONTINUED | OUTPATIENT
Start: 2019-12-24 | End: 2019-12-24 | Stop reason: HOSPADM

## 2019-12-24 RX ORDER — ONDANSETRON 2 MG/ML
4 INJECTION INTRAMUSCULAR; INTRAVENOUS DAILY PRN
Status: DISCONTINUED | OUTPATIENT
Start: 2019-12-24 | End: 2019-12-24 | Stop reason: HOSPADM

## 2019-12-24 RX ORDER — DEXAMETHASONE SODIUM PHOSPHATE 4 MG/ML
INJECTION, SOLUTION INTRA-ARTICULAR; INTRALESIONAL; INTRAMUSCULAR; INTRAVENOUS; SOFT TISSUE
Status: DISCONTINUED | OUTPATIENT
Start: 2019-12-24 | End: 2019-12-24

## 2019-12-24 RX ORDER — HYDROCODONE BITARTRATE AND ACETAMINOPHEN 10; 325 MG/1; MG/1
1 TABLET ORAL EVERY 4 HOURS PRN
Status: DISCONTINUED | OUTPATIENT
Start: 2019-12-24 | End: 2019-12-24 | Stop reason: HOSPADM

## 2019-12-24 RX ORDER — HYDROCODONE BITARTRATE AND ACETAMINOPHEN 5; 325 MG/1; MG/1
1 TABLET ORAL EVERY 6 HOURS PRN
Qty: 24 TABLET | Refills: 0 | Status: SHIPPED | OUTPATIENT
Start: 2019-12-24 | End: 2020-01-02 | Stop reason: SDUPTHER

## 2019-12-24 RX ORDER — MIDAZOLAM HYDROCHLORIDE 1 MG/ML
INJECTION, SOLUTION INTRAMUSCULAR; INTRAVENOUS
Status: DISCONTINUED | OUTPATIENT
Start: 2019-12-24 | End: 2019-12-24

## 2019-12-24 RX ADMIN — FENTANYL CITRATE 50 MCG: 50 INJECTION, SOLUTION INTRAMUSCULAR; INTRAVENOUS at 10:12

## 2019-12-24 RX ADMIN — FENTANYL CITRATE 50 MCG: 50 INJECTION, SOLUTION INTRAMUSCULAR; INTRAVENOUS at 09:12

## 2019-12-24 RX ADMIN — LIDOCAINE HYDROCHLORIDE 100 MG: 20 INJECTION, SOLUTION INTRAVENOUS at 09:12

## 2019-12-24 RX ADMIN — SODIUM CHLORIDE: 0.9 INJECTION, SOLUTION INTRAVENOUS at 06:12

## 2019-12-24 RX ADMIN — HYDROMORPHONE HYDROCHLORIDE 0.5 MG: 2 INJECTION INTRAMUSCULAR; INTRAVENOUS; SUBCUTANEOUS at 10:12

## 2019-12-24 RX ADMIN — ONDANSETRON 8 MG: 2 INJECTION, SOLUTION INTRAMUSCULAR; INTRAVENOUS at 09:12

## 2019-12-24 RX ADMIN — DEXAMETHASONE SODIUM PHOSPHATE 8 MG: 4 INJECTION, SOLUTION INTRAMUSCULAR; INTRAVENOUS at 09:12

## 2019-12-24 RX ADMIN — PROPOFOL 120 MG: 10 INJECTION, EMULSION INTRAVENOUS at 09:12

## 2019-12-24 RX ADMIN — MIDAZOLAM 2 MG: 1 INJECTION INTRAMUSCULAR; INTRAVENOUS at 08:12

## 2019-12-24 RX ADMIN — CLINDAMYCIN IN 5 PERCENT DEXTROSE 900 MG: 18 INJECTION, SOLUTION INTRAVENOUS at 09:12

## 2019-12-24 RX ADMIN — SUCCINYLCHOLINE CHLORIDE 120 MG: 20 INJECTION, SOLUTION INTRAMUSCULAR; INTRAVENOUS at 09:12

## 2019-12-24 RX ADMIN — SODIUM CHLORIDE: 0.9 INJECTION, SOLUTION INTRAVENOUS at 08:12

## 2019-12-24 RX ADMIN — PROPOFOL 150 MCG/KG/MIN: 10 INJECTION, EMULSION INTRAVENOUS at 09:12

## 2019-12-24 RX ADMIN — PROPOFOL 20 MG: 10 INJECTION, EMULSION INTRAVENOUS at 09:12

## 2019-12-24 RX ADMIN — OXYCODONE HYDROCHLORIDE 5 MG: 5 TABLET ORAL at 11:12

## 2019-12-24 RX ADMIN — HYDROMORPHONE HYDROCHLORIDE 1 MG: 2 INJECTION INTRAMUSCULAR; INTRAVENOUS; SUBCUTANEOUS at 09:12

## 2019-12-24 NOTE — TRANSFER OF CARE
"Anesthesia Transfer of Care Note    Patient: Rodney Infante    Procedure(s) Performed: Procedure(s) (LRB):  EXCISION, HIDRADENITIS (Bilateral)    Patient location: PACU    Anesthesia Type: general    Transport from OR: Transported from OR on 6-10 L/min O2 by face mask with adequate spontaneous ventilation    Post pain: adequate analgesia    Post assessment: no apparent anesthetic complications and tolerated procedure well    Post vital signs: stable    Level of consciousness: awake    Nausea/Vomiting: no nausea/vomiting    Complications: none    Transfer of care protocol was followed      Last vitals:   Visit Vitals  BP (!) 100/57   Pulse 88   Temp 36.4 °C (97.5 °F) (Temporal)   Resp 10   Ht 5' 5" (1.651 m)   Wt (!) 139.3 kg (307 lb)   SpO2 100%   Breastfeeding? No   BMI 51.09 kg/m²     "

## 2019-12-24 NOTE — DISCHARGE INSTRUCTIONS
Leave dressing in place until seen in the clinic by Dr. Pennington.  Elevate your arms as instructed.  No heavy lifting or strenuous activity until Dr. Pennington releases you to do so.      ANESTHESIA  -For the first 24 hours after surgery:  Do not drive, use heavy equipment, make important decisions, or drink alcohol  -It is normal to feel sleepy for several hours.  Rest until you are more awake.  -Have someone stay with you, if needed.  They can watch for problems and help keep you safe.  -Some possible post anesthesia side effects include: nausea and vomiting, sore throat and hoarseness, sleepiness, and dizziness.    PAIN  -If you have pain after surgery, pain medicine will help you feel better.  Take it as directed, before pain becomes severe.  Most pain relievers taken by mouth need at least 20-30 minutes to start working.  -Do not drive or drink alcohol while taking pain medicine.  -Pain medication can upset your stomach.  Taking them with a little food may help.  -Other ways to help control pain: elevation, ice, and relaxation  -Call your surgeon if still having unmanageable pain an hour after taking pain medicine.  -Pain medicine can cause constipation.  Taking an over-the counter stool softener while on prescription pain medicine and drinking plenty of fluids can prevent this side effect.  -Call your surgeon if you have severe side effects like: breathing problems, trouble waking up, dizziness, confusion, or severe constipation.    NAUSEA  -Some people have nausea after surgery.  This is often because of anesthesia, pain, pain medicine, or the stress of surgery.  -Do not push yourself to eat.  Start off with clear liquids and soup.  Slowly move to solid foods.  Don't eat fatty, rich, spicy foods at first.  Eat smaller amounts.  -If you develop persistent nausea and vomiting please notify your surgeon immediately.    BLEEDING  -Different types of surgery require different types of care and dressing changes.  It  is important to follow all instructions and advice from your surgeon.  Change dressing as directed.  Call your surgeon for any concerns regarding postop bleeding.    SIGNS OF INFECTION  -Signs of infection include: fever, swelling, drainage, and redness  -Notify your surgeon if you have a fever of 100.4 F (38.0 C) or higher.  -Notify your surgeon if you notice redness, swelling, increased pain, pus, or a foul smell at the incision site.      Hidradenitis Suppurativa, Incision and Drainage  Hidradenitis suppurativa is chronic inflammation of the sweat glands. It is a severe form of acne. Firm, red, painful bumps called nodules form. They are often filled with pus. They often get larger and may break open and drain. Common affected areas are the armpits, groin, and anal area.  You have 1 or more pockets of infection (abscesses). Your healthcare provider needed to make a cut (incision) into the abscess to drain the pus. If the abscess is large, the healthcare provider may have put gauze packing into it. The packing will need to be removed and possibly replaced on your next visit. It will take 1 to 2 weeks for the wound to heal, depending on how large the abscess is.  You may be given antibiotics to help treat infection. If your condition is severe, you may need to have the sweat gland removed.  Home care  Follow these tips when caring for yourself at home:  · The wound may drain for the first 2 days. Cover it with a clean, dry bandage. If the dressing becomes soaked with blood or pus, change it.  · Make a warm compress by running hot water over a face cloth. Put it on the sore area until the compress cools off. Repeat over a 15-minute period. Apply the hot compress 3 times a day for the first 3 days. As an alternative, you can  the shower and direct the warm spray onto the area. After each treatment, replace any dressing that had been on it.  · If you were prescribed antibiotics, take them as directed until  they are gone.  · You may use over-the-counter medicine for pain relief and swelling, unless another pain medicine was prescribed. Note: If you have chronic kidney or liver disease, talk with your healthcare provider before using this medicine. Also talk with your healthcare provider if youve had a stomach ulcer or GI bleeding.  Prevention tips  · Avoid antiperspirants and deodorants.  · Avoid heat and sweating as much as possible.  · Avoid shaving the affected area.  · If you smoke, consider quitting.  · Lose excess weight.  · Wear loose clothing.  Follow-up care  Follow up with your healthcare provider as advised. If a gauze packing was put in your wound, remove it in 1 to 2 days, or as directed by your healthcare provider. Check your wound every day for the signs listed below.  When to seek medical advice  Call your healthcare provider right away if any of these occur:  · Fever of 100.4°F (38°C) or higher, or as directed by your healthcare provider  · Pain gets worse  · Pus continues to drain from the wound for more than 2 days after treatment  · Redness or swelling gets worse  · Red tracks in the skin around the wound  Date Last Reviewed: 7/1/2016  © 2980-3219 [employer]. [address] [place] [address], [place] [address]. All rights reserved. This information is not intended as a substitute for professional medical care. Always follow your healthcare professional's instructions.

## 2019-12-24 NOTE — ANESTHESIA PREPROCEDURE EVALUATION
12/24/2019  Rodney Infante is a 39 y.o., female here for hidradenitis excision    Past Medical History:   Diagnosis Date    Anemia     Arthritis     Diabetes mellitus, type 2     Neuropathy      Past Surgical History:   Procedure Laterality Date    CYST REMOVAL  2014, 2/2018    back             Anesthesia Evaluation    I have reviewed the Patient Summary Reports.    I have reviewed the Nursing Notes.   I have reviewed the Medications.     Review of Systems  Anesthesia Hx:  History of prior surgery of interest to airway management or planning:  Denies Personal Hx of Anesthesia complications.   Hematology/Oncology:         -- Anemia:   Cardiovascular:   Exercise tolerance: good    Pulmonary:  Pulmonary Normal    Neurological:   Neuromuscular Disease,    Endocrine:   Diabetes        Physical Exam  General:  Well nourished    Airway/Jaw/Neck:  Airway Findings: Mouth Opening: Normal Tongue: Normal  General Airway Assessment: Adult  Mallampati: II  TM Distance: Normal, at least 6 cm     Eyes/Ears/Nose:  EYES/EARS/NOSE FINDINGS: Normal    Chest/Lungs:  Chest/Lungs Clear    Heart/Vascular:  Heart Findings: Normal       Mental Status:  Mental Status Findings: Normal        Anesthesia Plan  Type of Anesthesia, risks & benefits discussed:  Anesthesia Type:  MAC  Patient's Preference:   Intra-op Monitoring Plan:   Intra-op Monitoring Plan Comments:   Post Op Pain Control Plan:   Post Op Pain Control Plan Comments:   Induction:    Beta Blocker:         Informed Consent: Patient understands risks and agrees with Anesthesia plan.  Questions answered. Anesthesia consent signed with patient.  ASA Score: 3     Day of Surgery Review of History & Physical:            Ready For Surgery From Anesthesia Perspective.

## 2019-12-24 NOTE — H&P
Today`s Date: 12/24/2019     Admit Date: 12/24/2019    Admitting Physician: Marcel Pennington MD    Patient`s Name: Rodney Infante , 39 y.o. female    HISTORY AND CHIEF COMPLAINT  Here for excision bilateral axillary hideradneitis  Patient Active Problem List   Diagnosis    Iron deficiency anemia due to chronic blood loss    Type 2 diabetes mellitus with other skin ulcer    Venous stasis ulcer of left calf with muscle involvement without evidence of necrosis without varicose veins    Morbid obesity with BMI of 50.0-59.9, adult    Diabetes mellitus with skin ulcer    Stiffness of hand joint    Wound of right leg, initial encounter    Right hand pain    Joint stiffness of hand, right    Muscle weakness    Incoordination    Reactive thrombocytosis    Elevated total protein    Anemia of infection and chronic disease    Bacterial skin infection    Hidradenitis suppurativa of right axilla    Left axillary hidradenitis    Arthritis    Elevated WBC count    Proteus mirabilis infection    S/P bariatric surgery    Hidradenitis axillaris       Past Medical History:   Diagnosis Date    Anemia     Arthritis     Diabetes mellitus, type 2     Neuropathy        Past Surgical History:   Procedure Laterality Date    CYST REMOVAL  2014, 2/2018    back       Prior to Admission medications    Medication Sig Start Date End Date Taking? Authorizing Provider   adalimumab (HUMIRA PEN) 40 mg/0.8 mL PnKt Inject 1 pen (40 mg total) into the skin every 14 (fourteen) days. 8/27/19 8/26/20 Yes Ken Alberto MD   amLODIPine (NORVASC) 10 MG tablet  6/11/19  Yes Historical Provider, MD   canagliflozin-metformin (INVOKAMET XR) 150-1,000 mg TBph Take by mouth.   Yes Historical Provider, MD   carvedilol (COREG) 25 MG tablet  1/24/19  Yes Historical Provider, MD   clotrimazole-betamethasone 1-0.05% (LOTRISONE) cream  3/28/19  Yes Historical Provider, MD   cyanocobalamin (VITAMIN B-12) 1000 MCG tablet Take 1 tablet  (1,000 mcg total) by mouth once daily. This is a prescription for 1 year.  Take 1 tablet Monday, Wednesday, and Friday 4/9/15  Yes Harjeet Zaidi MD   diclofenac sodium (VOLTAREN) 1 % Gel Apply 2 g topically once daily. 12/10/19  Yes Ken Alberto MD   ergocalciferol (ERGOCALCIFEROL) 50,000 unit Cap  10/30/18  Yes Historical Provider, MD   ergocalciferol (ERGOCALCIFEROL) 50,000 unit Cap Vitamin D2 1,250 mcg (50,000 unit) capsule   Yes Historical Provider, MD   estradiol cypionate (DEPO-ESTRADIOL) 5 mg/mL injection Inject into the muscle every 28 days.   Yes Historical Provider, MD   gabapentin (NEURONTIN) 100 MG capsule Take 3 capsules (300 mg total) by mouth every evening. 6/11/19 6/10/20 Yes Ken Alberto MD   glimepiride (AMARYL) 4 MG tablet Take 1 tablet (4 mg total) by mouth before dinner. 5/16/16  Yes Harjeet Zaidi MD   losartan-hydrochlorothiazide 100-12.5 mg (HYZAAR) 100-12.5 mg Tab  1/24/19  Yes Historical Provider, MD   TOPCARE UNIVERSAL1 LANCET Misc  10/24/17  Yes Historical Provider, MD   TRUETRACK TEST Strp  10/24/17  Yes Historical Provider, MD   ondansetron (ZOFRAN) 4 MG tablet  1/25/19   Historical Provider, MD   sulfamethoxazole-trimethoprim 800-160mg (BACTRIM DS) 800-160 mg Tab Take 1 tablet by mouth 2 (two) times daily. 8/14/19   Marcel Pennington MD     No current facility-administered medications on file prior to encounter.      Current Outpatient Medications on File Prior to Encounter   Medication Sig Dispense Refill    adalimumab (HUMIRA PEN) 40 mg/0.8 mL PnKt Inject 1 pen (40 mg total) into the skin every 14 (fourteen) days. 25 pen 0    amLODIPine (NORVASC) 10 MG tablet       canagliflozin-metformin (INVOKAMET XR) 150-1,000 mg TBph Take by mouth.      carvedilol (COREG) 25 MG tablet       clotrimazole-betamethasone 1-0.05% (LOTRISONE) cream       cyanocobalamin (VITAMIN B-12) 1000 MCG tablet Take 1 tablet (1,000 mcg total) by mouth once daily. This is a prescription  for 1 year.  Take 1 tablet Monday, Wednesday, and Friday 36 tablet 3    diclofenac sodium (VOLTAREN) 1 % Gel Apply 2 g topically once daily. 1 Tube 2    ergocalciferol (ERGOCALCIFEROL) 50,000 unit Cap       ergocalciferol (ERGOCALCIFEROL) 50,000 unit Cap Vitamin D2 1,250 mcg (50,000 unit) capsule      estradiol cypionate (DEPO-ESTRADIOL) 5 mg/mL injection Inject into the muscle every 28 days.      gabapentin (NEURONTIN) 100 MG capsule Take 3 capsules (300 mg total) by mouth every evening. 90 capsule 3    glimepiride (AMARYL) 4 MG tablet Take 1 tablet (4 mg total) by mouth before dinner. 30 tablet 5    losartan-hydrochlorothiazide 100-12.5 mg (HYZAAR) 100-12.5 mg Tab       TOPCARE UNIVERSAL1 LANCET Misc       TRUETRACK TEST Strp       ondansetron (ZOFRAN) 4 MG tablet       sulfamethoxazole-trimethoprim 800-160mg (BACTRIM DS) 800-160 mg Tab Take 1 tablet by mouth 2 (two) times daily. 60 tablet 2        Review of patient's allergies indicates:   Allergen Reactions    Pcn [penicillins] Hives and Swelling     Patient reports allergic reaction to liquid PCN as a child.  She reports tolerating Amoxacillin 4 years ago without reaction.       Social History:   reports that she has never smoked. She has never used smokeless tobacco. She reports that she drinks alcohol. She reports that she does not use drugs.     Family History   Problem Relation Age of Onset    No Known Problems Mother     Drug abuse Father     No Known Problems Sister        PHYSICAL EXAMINATION  Temp:  [97.7 °F (36.5 °C)] 97.7 °F (36.5 °C)  Pulse:  [109] 109  Resp:  [19] 19  SpO2:  [97 %] 97 %  BP: (125)/(75) 125/75    General Condition:   alert x 3     Head & Neck  Anemia: None  Jaundice: None  Neck vein: Not distended  Carotid Bruits: none  Lymph nodes: none palpable  Thyroid: normal    Chest: normal    Heart: normal    Rt. Breast: not examined  Lt. Breast: not examined  Axillary lymph nodes: none    Abdomen: Soft,  None tender with no  palpable mass or organ  Hernia: none    Rectal: Defered    Extremities: bilateral axillary hidradenitis,    Vascular: normal    Specific focus Examination    Imp: bilateral axillary hidradenitis , obesity , dm , htn , s/p gastric sleeve    Plan: Exicison and skin graft bilateral axillary hider adenitis..  c

## 2019-12-24 NOTE — BRIEF OP NOTE
Operative Note       Surgery Date: 12/24/2019     Surgeon(s) and Role:     * Marcel Pennington MD - Primary  Assist: Dr Spain  Pre-op Diagnosis:  Hidradenitis [L73.2]  Axillary hidradenitis suppurativa [L73.2]    Post-op Diagnosis: Post-Op Diagnosis Codes:     * Hidradenitis [L73.2]     * Axillary hidradenitis suppurativa [L73.2]    Procedure(s) (LRB):  EXCISION, HIDRADENITIS (Bilateral)  Extensive with skin graft Thera skin 8 x 9 and 7 x 8 x cm closure with  Anesthesia: General    Procedure in Detail/Findings:  After satisfactory general endotracheal anesthesia with patient in supine position the patient was properly recognized site was confirmed both axillary area were prepped and draped normal sterile manner using Betadine scrub and solution elliptical incision was made 1st in the left axillary area and a normal healthy looking scan including the safe margin around the infected axillary hidradenitis incision was 7 x 9 cm extending into left upper arm area was taken down to the deep subcutaneous tissue subcutaneous bleeders were clamped and bovied with the help of electrocautery complete excision of infected sweat glands and scanned was performed to deep subcutaneous tissue bleeders were clamped and bovied some of the bleeders were clamped and tied using 3 0 silk suture ligatures wound was thoroughly irrigated antibiotic solution hemostasis was perfectly maintained partial closure of the wound was performed using a running 3 zero nylon on suture closure of the wound was performed using Thera skai meshed to 1-1/2 and was sewed in place using skin staple Xeroform 4 x 4 ABD pressure bandage was applied attention diverted to the right axillary area where similar incision was made into the normal healthy skin 1/2 cm beyond the infected cyst skin glands was taken down to the deep subcutaneous tissue subcutaneous bleeders were clamped and bovied some of the bleeders were clamped and tied using 2 silk suture ligatures  hemostasis was perfectly maintained wound was thoroughly irrigated and antibiotic solution partial closure of the wound was performed using 2 nylon suture wound was then closed using appropriate size theraskin 3 x 6 cm which was sewed in place using skin staple Xeroform 4 x 4 and ABD pad dressing was applied instrument counts sponge, needle count was correct patient tolerated well no intraop complication estimated blood loss 40 cc patient was sent to recovery room in stable condition.        Estimated Blood Loss: 40 cc         Specimens (From admission, onward)    Infected bilateral axillary hideradenitis        Implants:   Implant Name Type Inv. Item Serial No.  Lot No. LRB No. Used   [id number]   [id number]    1   [id number]   [id number]    1              Disposition: PACU - hemodynamically stable.           Condition: Good    Attestation:  I performed the procedure.           Discharge Note    Admit Date: 12/24/2019    Attending Physician: Marcel Pennington MD     Discharge Physician: Marcel Pennington MD    Final Diagnosis: Post-Op Diagnosis Codes:     * Hidradenitis [L73.2]     * Axillary hidradenitis suppurativa [L73.2]    Disposition: Home or Self Care    Patient Instructions:   Current Discharge Medication List      START taking these medications    Details   HYDROcodone-acetaminophen (NORCO) 5-325 mg per tablet Take 1 tablet by mouth every 6 (six) hours as needed for Pain.  Qty: 24 tablet, Refills: 0    Comments: Quantity prescribed more than 7 day supply? Yes, quantity medically necessary         CONTINUE these medications which have NOT CHANGED    Details   adalimumab (HUMIRA PEN) 40 mg/0.8 mL PnKt Inject 1 pen (40 mg total) into the skin every 14 (fourteen) days.  Qty: 25 pen, Refills: 0    Comments: verified q14day maintenance dose per Dr. Albetro  Associated Diagnoses: Seronegative arthritis      amLODIPine (NORVASC) 10 MG tablet       canagliflozin-metformin (INVOKAMET XR)  150-1,000 mg TBph Take by mouth.      carvedilol (COREG) 25 MG tablet       clotrimazole-betamethasone 1-0.05% (LOTRISONE) cream       cyanocobalamin (VITAMIN B-12) 1000 MCG tablet Take 1 tablet (1,000 mcg total) by mouth once daily. This is a prescription for 1 year.  Take 1 tablet Monday, Wednesday, and Friday  Qty: 36 tablet, Refills: 3    Associated Diagnoses: Iron deficiency anemia      diclofenac sodium (VOLTAREN) 1 % Gel Apply 2 g topically once daily.  Qty: 1 Tube, Refills: 2    Associated Diagnoses: Arthralgia of both hands      !! ergocalciferol (ERGOCALCIFEROL) 50,000 unit Cap       !! ergocalciferol (ERGOCALCIFEROL) 50,000 unit Cap Vitamin D2 1,250 mcg (50,000 unit) capsule      estradiol cypionate (DEPO-ESTRADIOL) 5 mg/mL injection Inject into the muscle every 28 days.      gabapentin (NEURONTIN) 100 MG capsule Take 3 capsules (300 mg total) by mouth every evening.  Qty: 90 capsule, Refills: 3    Associated Diagnoses: Bilateral carpal tunnel syndrome      glimepiride (AMARYL) 4 MG tablet Take 1 tablet (4 mg total) by mouth before dinner.  Qty: 30 tablet, Refills: 5    Associated Diagnoses: Type 2 diabetes mellitus without complication      losartan-hydrochlorothiazide 100-12.5 mg (HYZAAR) 100-12.5 mg Tab       TOPCARE UNIVERSAL1 LANCET Misc       TRUETRACK TEST Strp       ondansetron (ZOFRAN) 4 MG tablet       sulfamethoxazole-trimethoprim 800-160mg (BACTRIM DS) 800-160 mg Tab Take 1 tablet by mouth 2 (two) times daily.  Qty: 60 tablet, Refills: 2       !! - Potential duplicate medications found. Please discuss with provider.          Discharge Procedure Orders (diabetic , no heavy lifting keep dressing dry rtc wound center 2 days   Discharge Procedure Orders (must include Diet, Follow-up, Activity)   Diet general     Keep surgical extremity elevated     Lifting restrictions     Call MD for:  temperature >100.4     Call MD for:  persistent nausea and vomiting     Call MD for:  severe uncontrolled pain      Call MD for:  redness, tenderness, or signs of infection (pain, swelling, redness, odor or green/yellow discharge around incision site)     Leave dressing on - Keep it clean, dry, and intact until clinic visit        Discharge Date: No discharge date for patient encounter.

## 2019-12-24 NOTE — PLAN OF CARE
Vss, Nad noted, pt denies pain and sob at this time.  Dressings remain c/d/i.  All criteria met for discharge.  Pt states readiness to be discharged.  Discharge instructions given, pt verbalized understanding.

## 2019-12-24 NOTE — PLAN OF CARE
Criteria met for discharge, IV removed, instructions explained,copy given. Denies pain, tolerating po well, voiding without difficulty. Pt and family all verbalize understanding of instructions, have no further questions. Discharged home with family in good condition.

## 2019-12-24 NOTE — ANESTHESIA POSTPROCEDURE EVALUATION
Anesthesia Post Evaluation    Patient: Rodney Infante    Procedure(s) Performed: Procedure(s) (LRB):  EXCISION, HIDRADENITIS (Bilateral)    Final Anesthesia Type: general    Patient location during evaluation: PACU  Patient participation: Yes- Able to Participate  Level of consciousness: awake and alert and oriented  Post-procedure vital signs: reviewed and stable  Pain management: adequate  Airway patency: patent    PONV status at discharge: No PONV  Anesthetic complications: no      Cardiovascular status: blood pressure returned to baseline and hemodynamically stable  Respiratory status: unassisted, spontaneous ventilation and room air  Hydration status: euvolemic  Follow-up not needed.          Vitals Value Taken Time   /87 12/24/2019 11:37 AM   Temp 36.4 °C (97.5 °F) 12/24/2019 11:10 AM   Pulse 104 12/24/2019 11:41 AM   Resp 11 12/24/2019 11:41 AM   SpO2 100 % 12/24/2019 11:41 AM   Vitals shown include unvalidated device data.      No case tracking events are documented in the log.      Pain/Milagros Score: Pain Rating Prior to Med Admin: 3 (12/24/2019 11:33 AM)  Milagros Score: 8 (12/24/2019 11:10 AM)

## 2019-12-26 ENCOUNTER — HOSPITAL ENCOUNTER (OUTPATIENT)
Dept: WOUND CARE | Facility: HOSPITAL | Age: 39
Discharge: HOME | End: 2019-12-26
Attending: SURGERY
Payer: COMMERCIAL

## 2019-12-26 ENCOUNTER — TELEPHONE (OUTPATIENT)
Dept: PHARMACY | Facility: CLINIC | Age: 39
End: 2019-12-26

## 2019-12-26 DIAGNOSIS — E11.9 DIABETES MELLITUS WITHOUT COMPLICATION: ICD-10-CM

## 2019-12-26 LAB — BACTERIA SPEC AEROBE CULT: ABNORMAL

## 2019-12-26 PROCEDURE — 99213 OFFICE O/P EST LOW 20 MIN: CPT

## 2019-12-26 NOTE — TELEPHONE ENCOUNTER
Pt confirmed shipping of Humira on  to arrive on 1/3. Address and  verified. $5 copay in 004, [facility]. Pt is not in need of a new sharps container. Pt has 0 doses on hand, next dose due . Pt reported no missed doses. Pt did not start any new medications. Pt had no further questions or concerns.

## 2019-12-26 NOTE — PROGRESS NOTES
"[facility]                Progress Note    Subjective:       Patient ID: Rodney Infante is a 39 y.o. female.    Chief Complaint: Wound Care    F/u for excision bilateral axillary hider adneitis    Client here today with open areas to right and left axilla. Seen by Dr. Pennington, who states sweat glands infected. Rx for clindamycin, bactroban, and tramadol provided. Stop methatrexate ( prescribed for arthritis). Client plans to have abdominal bypass surgery in September. Culture done today of right axilla.  8/7/19: F/U with Dr. Pennington. Culture results negative. Continue POC.  8/14/19: F/U with Dr. Pennington. Clindamycin d/c'd. Bactrim and Diflucan ordered today. Leave left and right axilla open to air. Culture sent to lab today.     9/4/19:  F/U with Dr. Pennington.  Wounds to right axilla healing up.  Patient states "right does not drain as much as left".  Patient feels the left axilla is not healing up like the right.  Patient still taking Bactrim PO.  Will continue for now.  9/18/19: F/U with Dr. Pennington. Both axilla continue to have "sebum - like" exudate. New site noted to left back. Culture done today. RX for lotrisone and Diflucan provided.  9/25/19: F/U with Dr. Pennington. Axillae improved. Client was not able to get Diflucan. Reordered today. Continues lotrisone and clindamycin. Culture results reviewed. Referred to ID for appt. On 10/7/19. Next visit with Dr. Pennington 10/16/19.  10/7/19: Vist today with ID. Augmentin ordered. Clindamycin d/c'd. Complete Diflucan.  11/6/19: Dr Pennington assessed patient.Wounds slowly improving. Patient states that she has had gastric sleeve procedure last week. No complaints voiced. Continuing with present plan of care. Follow up in 2 weeks.  11/20/19: F/U with Dr. Pennington. Wounds continue to improve. Continue POC. Return in 2 weeks.  12/4/19: F/U with Dr. Pennington. Axillae drainage decreased. Client has lost 97 Lbs. As of " today. Surgery planned for 12/20/19. Next visit 12/18/19, and consents to be signed.  12/18/19: F/U with Dr. Pennington. Surgery to axillae scheduled for Monday 12/23/19. Consents signed. Return 1/2/20.   12/26/19  F/U with Dr. pennington for s/p surgery to bilateral axilla.  Removal of glands and placement of theraskin.  Staples intact.  Theraskin not completely  Adhered on both areas.   Start bolster dressing to assist tin the adherence of the graft. Patient to return to clinic in 1 week.  Order homehealth for twice weekly    Review of Systems   Constitutional: Negative.    HENT: Negative.    Eyes: Negative.    Respiratory: Negative.    Cardiovascular: Negative.    Gastrointestinal: Negative.    Genitourinary: Negative.    Musculoskeletal: Negative.    Skin: Negative.    Neurological: Negative.    Psychiatric/Behavioral: Negative.          Objective:        Physical Exam   Constitutional: She is oriented to person, place, and time. She appears well-developed and well-nourished.   HENT:   Head: Normocephalic.   Eyes: Pupils are equal, round, and reactive to light. Conjunctivae and EOM are normal.   Neck: Normal range of motion. Neck supple.   Cardiovascular: Normal rate, regular rhythm, normal heart sounds and intact distal pulses.   Pulmonary/Chest: Effort normal and breath sounds normal.   Abdominal: Soft. Bowel sounds are normal.   Musculoskeletal: Normal range of motion.   Neurological: She is alert and oriented to person, place, and time. She has normal reflexes.   Skin: Skin is warm and dry.       There were no vitals filed for this visit.    Assessment:         No diagnosis found.         Wound 07/31/19 1300 Other (comment) Axilla #2 (Active)   07/31/19 1300    Pre-existing: Yes   Primary Wound Type: Other   Side: Left   Orientation:    Location: Axilla   Wound/PI Number (optional): #2   Ankle-Brachial Index:    Pulses:    Removal Indication and Assessment:    Wound Outcome:    (Retired) Wound Type:    (Retired)  Wound Length (cm):    (Retired) Wound Width (cm):    (Retired) Depth (cm):    Wound Description (Comments):    Removal Indications:    Wound Image   12/26/2019  3:17 PM   Dressing Appearance Moist drainage 12/26/2019  3:17 PM   Drainage Amount Moderate 12/26/2019  3:17 PM   Drainage Characteristics/Odor Serosanguineous 12/26/2019  3:17 PM   Appearance Staples intact 12/26/2019  3:17 PM   Tissue loss description Full thickness 12/26/2019  3:17 PM   Red (%), Wound Tissue Color 100 % 12/26/2019  3:17 PM   Periwound Area Intact 12/26/2019  3:17 PM   Wound Length (cm) 5.5 cm 12/26/2019  3:17 PM   Wound Width (cm) 12 cm 12/26/2019  3:17 PM   Wound Depth (cm) 4 cm 12/26/2019  3:17 PM   Wound Volume (cm^3) 264 cm^3 12/26/2019  3:17 PM   Wound Surface Area (cm^2) 66 cm^2 12/26/2019  3:17 PM   Care Cleansed with:;Skin Barrier 12/26/2019  3:17 PM   Dressing Non-adherent;Elastic bandage;Rolled gauze 12/26/2019  3:17 PM   Periwound Care Skin barrier film applied 12/26/2019  3:17 PM   Dressing Change Due 01/02/20 12/26/2019  3:17 PM            Wound 07/31/19 1300 Other (comment) Axilla #1 (Active)   07/31/19 1300    Pre-existing: Yes   Primary Wound Type: Other   Side: Right   Orientation:    Location: Axilla   Wound/PI Number (optional): #1   Ankle-Brachial Index:    Pulses:    Removal Indication and Assessment:    Wound Outcome:    (Retired) Wound Type:    (Retired) Wound Length (cm):    (Retired) Wound Width (cm):    (Retired) Depth (cm):    Wound Description (Comments):    Removal Indications:    Wound Image    12/26/2019  3:17 PM   Dressing Appearance Intact 12/26/2019  3:17 PM   Drainage Amount Moderate 12/26/2019  3:17 PM   Drainage Characteristics/Odor Serosanguineous 12/26/2019  3:17 PM   Appearance Staples intact;Red 12/26/2019  3:17 PM   Tissue loss description Full thickness 12/26/2019  3:17 PM   Red (%), Wound Tissue Color 100 % 12/26/2019  3:17 PM   Periwound Area Intact 12/26/2019  3:17 PM   Wound Length (cm) 4.5  cm 12/26/2019  3:17 PM   Wound Width (cm) 11 cm 12/26/2019  3:17 PM   Wound Depth (cm) 0.7 cm 12/26/2019  3:17 PM   Wound Volume (cm^3) 34.65 cm^3 12/26/2019  3:17 PM   Wound Surface Area (cm^2) 49.5 cm^2 12/26/2019  3:17 PM   Care Cleansed with:;Sterile normal saline 12/26/2019  3:17 PM   Dressing Non-adherent;Elastic bandage;Rolled gauze 12/26/2019  3:17 PM   Periwound Care Skin barrier film applied 12/26/2019  3:17 PM   Dressing Change Due 01/02/20 12/26/2019  3:17 PM         Left and Right Axilla  Cleanse/Irrigate with Normal Saline  Apply bolster dressing using calcium alginate and xeroform(xeroform next to woundbed)  Cover with ABD pad and conforming roll gauze.  Tape to secure  Ace wrap and taped to secure  Patient to follow up with Dr. Pennington in 1 week(Q Thursday)  Initiate homehealth today.  [place] [facility]:  Skilled nurse to perform dressing Every Tuesday and Saturday  Plan:              Patient to follow up with Dr Pennington every Thursday

## 2019-12-30 LAB
BACTERIA SPEC ANAEROBE CULT: ABNORMAL
BACTERIA SPEC ANAEROBE CULT: ABNORMAL

## 2020-01-02 ENCOUNTER — HOSPITAL ENCOUNTER (OUTPATIENT)
Dept: WOUND CARE | Facility: HOSPITAL | Age: 40
Discharge: HOME | End: 2020-01-02
Attending: SURGERY
Payer: COMMERCIAL

## 2020-01-02 VITALS
HEART RATE: 114 BPM | TEMPERATURE: 98 F | WEIGHT: 293 LBS | DIASTOLIC BLOOD PRESSURE: 90 MMHG | SYSTOLIC BLOOD PRESSURE: 118 MMHG | HEIGHT: 65 IN | BODY MASS INDEX: 48.82 KG/M2

## 2020-01-02 DIAGNOSIS — L73.2 HIDRADENITIS SUPPURATIVA OF RIGHT AXILLA: ICD-10-CM

## 2020-01-02 DIAGNOSIS — L73.2 HIDRADENITIS AXILLARIS: Primary | ICD-10-CM

## 2020-01-02 DIAGNOSIS — L73.2 LEFT AXILLARY HIDRADENITIS: ICD-10-CM

## 2020-01-02 PROCEDURE — 99214 OFFICE O/P EST MOD 30 MIN: CPT

## 2020-01-02 NOTE — PROGRESS NOTES
"Subjective:       Patient ID: Rodney Infante is a 39 y.o. female.    Chief Complaint: Non-healing Wound    F/u for excision bilateral axillary hider adneitis    Client here today with open areas to right and left axilla. Seen by Dr. Pennington, who states sweat glands infected. Rx for clindamycin, bactroban, and tramadol provided. Stop methatrexate ( prescribed for arthritis). Client plans to have abdominal bypass surgery in September. Culture done today of right axilla.  8/7/19: F/U with Dr. Pennington. Culture results negative. Continue POC.  8/14/19: F/U with Dr. Pennington. Clindamycin d/c'd. Bactrim and Diflucan ordered today. Leave left and right axilla open to air. Culture sent to lab today.     9/4/19:  F/U with Dr. Pennington.  Wounds to right axilla healing up.  Patient states "right does not drain as much as left".  Patient feels the left axilla is not healing up like the right.  Patient still taking Bactrim PO.  Will continue for now.  9/18/19: F/U with Dr. Pennington. Both axilla continue to have "sebum - like" exudate. New site noted to left back. Culture done today. RX for lotrisone and Diflucan provided.  9/25/19: F/U with Dr. Pennington. Axillae improved. Client was not able to get Diflucan. Reordered today. Continues lotrisone and clindamycin. Culture results reviewed. Referred to ID for appt. On 10/7/19. Next visit with Dr. Pennington 10/16/19.  10/7/19: Vist today with ID. Augmentin ordered. Clindamycin d/c'd. Complete Diflucan.  11/6/19: Dr Pennington assessed patient.Wounds slowly improving. Patient states that she has had gastric sleeve procedure last week. No complaints voiced. Continuing with present plan of care. Follow up in 2 weeks.  11/20/19: F/U with Dr. Pennington. Wounds continue to improve. Continue POC. Return in 2 weeks.  12/4/19: F/U with Dr. Gorge. Axillae drainage decreased. Client has lost 97 Lbs. As of today. Surgery planned for 12/20/19. Next visit 12/18/19, and consents to be " signed.  12/18/19: F/U with Dr. Pennington. Surgery to axillae scheduled for Monday 12/23/19. Consents signed. Return 1/2/20.   12/26/19  F/U with Dr. pennington for s/p surgery to bilateral axilla.  Removal of glands and placement of theraskin.  Staples intact.  Theraskin not completely  Adhered on both areas.   Start bolster dressing to assist tin the adherence of the graft. Patient to return to clinic in 1 week.  Order homehealth for twice weekly    01/02/20: F/u with Dr. Pennington. Moderate to large amount of drainage from wounds. Staples and sutures removed from both wounds today in clinic per Dr. Pennington. New wound care orders given and routed to [facility]. Rx given for 5 mg norco every 4 hours. Patient given a note to not return to work until further notice.    Review of Systems   Constitutional: Negative.    HENT: Negative.    Eyes: Negative.    Respiratory: Negative.    Cardiovascular: Negative.    Gastrointestinal: Negative.    Genitourinary: Negative.    Musculoskeletal: Negative.    Skin: Negative.    Neurological: Negative.    Psychiatric/Behavioral: Negative.        Objective:      Physical Exam   Constitutional: She is oriented to person, place, and time. She appears well-developed and well-nourished.   HENT:   Head: Normocephalic.   Eyes: Pupils are equal, round, and reactive to light. Conjunctivae and EOM are normal.   Neck: Normal range of motion. Neck supple.   Cardiovascular: Normal rate, regular rhythm, normal heart sounds and intact distal pulses.   Pulmonary/Chest: Effort normal and breath sounds normal.   Abdominal: Soft. Bowel sounds are normal.   Musculoskeletal: Normal range of motion.   Neurological: She is alert and oriented to person, place, and time. She has normal reflexes.   Skin: Skin is warm and dry.       Assessment:       1. Hidradenitis axillaris    2. Hidradenitis suppurativa of right axilla    3. Left axillary hidradenitis           Wound 07/31/19 1300 Other (comment) Axilla #2  (Active)   07/31/19 1300    Pre-existing: Yes   Primary Wound Type: Other   Side: Left   Orientation:    Location: Axilla   Wound/PI Number (optional): #2   Ankle-Brachial Index:    Pulses:    Removal Indication and Assessment:    Wound Outcome:    (Retired) Wound Type:    (Retired) Wound Length (cm):    (Retired) Wound Width (cm):    (Retired) Depth (cm):    Wound Description (Comments):    Removal Indications:    Dressing Appearance Moist drainage 1/2/2020  1:00 PM   Drainage Amount Moderate 1/2/2020  1:00 PM   Drainage Characteristics/Odor Serosanguineous 1/2/2020  1:00 PM   Appearance Pink;Red;Moist;Yellow 1/2/2020  1:00 PM   Tissue loss description Full thickness 1/2/2020  1:00 PM   Red (%), Wound Tissue Color 75 % 1/2/2020  1:00 PM   Yellow (%), Wound Tissue Color 25 % 1/2/2020  1:00 PM   Periwound Area Moist;Intact 1/2/2020  1:00 PM   Wound Edges Irregular 1/2/2020  1:00 PM   Wound Length (cm) 13.5 cm 1/2/2020  1:00 PM   Wound Width (cm) 7 cm 1/2/2020  1:00 PM   Wound Depth (cm) 3.2 cm 1/2/2020  1:00 PM   Wound Volume (cm^3) 302.4 cm^3 1/2/2020  1:00 PM   Wound Surface Area (cm^2) 94.5 cm^2 1/2/2020  1:00 PM   Care Cleansed with:;Antimicrobial agent;Sterile normal saline;Removed:;Sutures removed;Staples 1/2/2020  1:00 PM   Dressing Applied;Gauze;Abd pad;Other (see comments) 1/2/2020  1:00 PM   Packing Incision packed with;other (see comments) 1/2/2020  1:00 PM   Periwound Care Skin barrier film applied 1/2/2020  1:00 PM   Dressing Change Due 01/03/20 1/2/2020  1:00 PM            Wound 07/31/19 1300 Other (comment) Axilla #1 (Active)   07/31/19 1300    Pre-existing: Yes   Primary Wound Type: Other   Side: Right   Orientation:    Location: Axilla   Wound/PI Number (optional): #1   Ankle-Brachial Index:    Pulses:    Removal Indication and Assessment:    Wound Outcome:    (Retired) Wound Type:    (Retired) Wound Length (cm):    (Retired) Wound Width (cm):    (Retired) Depth (cm):    Wound Description (Comments):     Removal Indications:    Dressing Appearance Moist drainage 1/2/2020  1:00 PM   Drainage Amount Moderate 1/2/2020  1:00 PM   Drainage Characteristics/Odor Serosanguineous 1/2/2020  1:00 PM   Appearance Pink;Maroon;Moist;Yellow 1/2/2020  1:00 PM   Tissue loss description Full thickness 1/2/2020  1:00 PM   Red (%), Wound Tissue Color 75 % 1/2/2020  1:00 PM   Yellow (%), Wound Tissue Color 25 % 1/2/2020  1:00 PM   Periwound Area Moist;Intact 1/2/2020  1:00 PM   Wound Edges Irregular 1/2/2020  1:00 PM   Wound Length (cm) 16.3 cm 1/2/2020  1:00 PM   Wound Width (cm) 6 cm 1/2/2020  1:00 PM   Wound Depth (cm) 2.8 cm 1/2/2020  1:00 PM   Wound Volume (cm^3) 273.84 cm^3 1/2/2020  1:00 PM   Wound Surface Area (cm^2) 97.8 cm^2 1/2/2020  1:00 PM   Care Cleansed with:;Antimicrobial agent;Sterile normal saline;Removed:;Staples;Sutures removed 1/2/2020  1:00 PM   Dressing Applied;Abd pad;Gauze;Other (see comments) 1/2/2020  1:00 PM   Packing Incision packed with;other (see comments) 1/2/2020  1:00 PM   Periwound Care Skin barrier film applied 1/2/2020  1:00 PM   Dressing Change Due 01/03/20 1/2/2020  1:00 PM       Left and Right Axilla  Cleanse/Irrigate with 0.25% dakins solution. Rinse with normal saline  Primary dressing: Pack 0.25% dakins gauze wet to dry to wound  Secondary dressing: cover with 4x4 gauze and lg abd pad, secure with mefix tape and large flexinet  Frequency: daily    [facility]:  New patient to be admitted to home health services for wound care. Skilled nurse to perform dressing changes every Monday, Wednesday, and Friday. Patient's mother can perform dressing changes on days patient is not seen by home health nurse or scheduled in clinic. Please provide patient's caregiver with necessary supplies to do wound care: lg abd pads, 4x4 gauze, mefix tape. Orders routed to home health      Plan:                Follow up in about 1 week (around 1/9/2020) for Dr. Pennington.

## 2020-01-07 LAB
FINAL PATHOLOGIC DIAGNOSIS: NORMAL
GROSS: NORMAL
MICROSCOPIC EXAM: NORMAL

## 2020-01-09 ENCOUNTER — HOSPITAL ENCOUNTER (OUTPATIENT)
Dept: WOUND CARE | Facility: HOSPITAL | Age: 40
Discharge: HOME | End: 2020-01-09
Attending: SURGERY
Payer: COMMERCIAL

## 2020-01-09 VITALS — TEMPERATURE: 99 F | DIASTOLIC BLOOD PRESSURE: 88 MMHG | HEART RATE: 101 BPM | SYSTOLIC BLOOD PRESSURE: 123 MMHG

## 2020-01-09 DIAGNOSIS — B96.89 BACTERIAL SKIN INFECTION: ICD-10-CM

## 2020-01-09 DIAGNOSIS — Z98.84 S/P BARIATRIC SURGERY: ICD-10-CM

## 2020-01-09 DIAGNOSIS — L73.2 HIDRADENITIS SUPPURATIVA OF RIGHT AXILLA: Primary | ICD-10-CM

## 2020-01-09 DIAGNOSIS — L98.499 DIABETES MELLITUS WITH SKIN ULCER: ICD-10-CM

## 2020-01-09 DIAGNOSIS — L73.2 LEFT AXILLARY HIDRADENITIS: ICD-10-CM

## 2020-01-09 DIAGNOSIS — L08.9 BACTERIAL SKIN INFECTION: ICD-10-CM

## 2020-01-09 DIAGNOSIS — E11.622 DIABETES MELLITUS WITH SKIN ULCER: ICD-10-CM

## 2020-01-09 PROCEDURE — 99213 OFFICE O/P EST LOW 20 MIN: CPT

## 2020-01-09 NOTE — PROGRESS NOTES
"Subjective:       Patient ID: Rodney Infante is a 39 y.o. female.    Chief Complaint: Non-healing Wound (left and right axilla)    F/u for excision bilateral axillary hider adneitis    Client here today with open areas to right and left axilla. Seen by Dr. Pennington, who states sweat glands infected. Rx for clindamycin, bactroban, and tramadol provided. Stop methatrexate ( prescribed for arthritis). Client plans to have abdominal bypass surgery in September. Culture done today of right axilla.  8/7/19: F/U with Dr. Pennington. Culture results negative. Continue POC.  8/14/19: F/U with Dr. ePnnington. Clindamycin d/c'd. Bactrim and Diflucan ordered today. Leave left and right axilla open to air. Culture sent to lab today.     9/4/19:  F/U with Dr. Pennington.  Wounds to right axilla healing up.  Patient states "right does not drain as much as left".  Patient feels the left axilla is not healing up like the right.  Patient still taking Bactrim PO.  Will continue for now.  9/18/19: F/U with Dr. Pennington. Both axilla continue to have "sebum - like" exudate. New site noted to left back. Culture done today. RX for lotrisone and Diflucan provided.  9/25/19: F/U with Dr. Pennington. Axillae improved. Client was not able to get Diflucan. Reordered today. Continues lotrisone and clindamycin. Culture results reviewed. Referred to ID for appt. On 10/7/19. Next visit with Dr. Pennington 10/16/19.  10/7/19: Vist today with ID. Augmentin ordered. Clindamycin d/c'd. Complete Diflucan.  11/6/19: Dr Pennington assessed patient.Wounds slowly improving. Patient states that she has had gastric sleeve procedure last week. No complaints voiced. Continuing with present plan of care. Follow up in 2 weeks.  11/20/19: F/U with Dr. Pennington. Wounds continue to improve. Continue POC. Return in 2 weeks.  12/4/19: F/U with Dr. Pennington. Axillae drainage decreased. Client has lost 97 Lbs. As of today. Surgery planned for 12/20/19. Next visit 12/18/19, and " consents to be signed.  12/18/19: F/U with Dr. Pennington. Surgery to axillae scheduled for Monday 12/23/19. Consents signed. Return 1/2/20.   12/26/19  F/U with Dr. pennington for s/p surgery to bilateral axilla.  Removal of glands and placement of theraskin.  Staples intact.  Theraskin not completely  Adhered on both areas.   Start bolster dressing to assist tin the adherence of the graft. Patient to return to clinic in 1 week.  Order homehealth for twice weekly    01/02/20: F/u with Dr. Pennington. Moderate to large amount of drainage from wounds. Staples and sutures removed from both wounds today in clinic per Dr. Pennington. New wound care orders given and routed to [facility]. Rx given for 5 mg norco every 4 hours. Patient given a note to not return to work until further notice.  1/9/20: F/U with Dr. Pennington. Removed 2 remaining staples. Discussed with patient future grafting of sites. Cont. POC. Return in 1 week.    Review of Systems   Constitutional: Negative.    HENT: Negative.    Eyes: Negative.    Respiratory: Negative.    Cardiovascular: Negative.    Gastrointestinal: Negative.    Genitourinary: Negative.    Musculoskeletal: Negative.    Skin: Negative.    Neurological: Negative.    Psychiatric/Behavioral: Negative.        Objective:      Physical Exam   Constitutional: She is oriented to person, place, and time. She appears well-developed and well-nourished.   HENT:   Head: Normocephalic.   Eyes: Pupils are equal, round, and reactive to light. Conjunctivae and EOM are normal.   Neck: Normal range of motion. Neck supple.   Cardiovascular: Normal rate, regular rhythm, normal heart sounds and intact distal pulses.   Pulmonary/Chest: Effort normal and breath sounds normal.   Abdominal: Soft. Bowel sounds are normal.   Musculoskeletal: Normal range of motion.   Neurological: She is alert and oriented to person, place, and time. She has normal reflexes.   Skin: Skin is warm and dry.       Assessment:       1.  Hidradenitis suppurativa of right axilla    2. Left axillary hidradenitis           Wound 07/31/19 1300 Other (comment) Axilla #2 (Active)   07/31/19 1300    Pre-existing: Yes   Primary Wound Type: Other   Side: Left   Orientation:    Location: Axilla   Wound/PI Number (optional): #2   Ankle-Brachial Index:    Pulses:    Removal Indication and Assessment:    Wound Outcome:    (Retired) Wound Type:    (Retired) Wound Length (cm):    (Retired) Wound Width (cm):    (Retired) Depth (cm):    Wound Description (Comments):    Removal Indications:    Dressing Appearance Moist drainage 1/9/2020 11:53 AM   Drainage Amount Moderate 1/9/2020 11:53 AM   Drainage Characteristics/Odor Serosanguineous 1/9/2020 11:53 AM   Appearance Red;Moist 1/9/2020 11:53 AM   Tissue loss description Full thickness 1/9/2020 11:53 AM   Red (%), Wound Tissue Color 100 % 1/9/2020 11:53 AM   Periwound Area Intact 1/9/2020 11:53 AM   Wound Edges Irregular 1/9/2020 11:53 AM   Wound Length (cm) 13.5 cm 1/9/2020 11:53 AM   Wound Width (cm) 7 cm 1/9/2020 11:53 AM   Wound Depth (cm) 3.2 cm 1/9/2020 11:53 AM   Wound Volume (cm^3) 302.4 cm^3 1/9/2020 11:53 AM   Wound Surface Area (cm^2) 94.5 cm^2 1/9/2020 11:53 AM   Care Cleansed with:;Antimicrobial agent;Sterile normal saline 1/9/2020 11:53 AM   Dressing Changed;Gauze;Abd pad 1/9/2020 11:53 AM   Packing Incision packed with;other (see comments) 1/9/2020 11:53 AM   Periwound Care Dry periwound area maintained 1/9/2020 11:53 AM   Dressing Change Due 01/10/20 1/9/2020 11:53 AM            Wound 07/31/19 1300 Other (comment) Axilla #1 (Active)   07/31/19 1300    Pre-existing: Yes   Primary Wound Type: Other   Side: Right   Orientation:    Location: Axilla   Wound/PI Number (optional): #1   Ankle-Brachial Index:    Pulses:    Removal Indication and Assessment:    Wound Outcome:    (Retired) Wound Type:    (Retired) Wound Length (cm):    (Retired) Wound Width (cm):    (Retired) Depth (cm):    Wound Description  (Comments):    Removal Indications:    Dressing Appearance Moist drainage 1/9/2020 11:56 AM   Drainage Amount Moderate 1/9/2020 11:56 AM   Drainage Characteristics/Odor Serosanguineous 1/9/2020 11:56 AM   Appearance Red;Moist 1/9/2020 11:56 AM   Tissue loss description Full thickness 1/9/2020 11:56 AM   Red (%), Wound Tissue Color 100 % 1/9/2020 11:56 AM   Periwound Area Intact 1/9/2020 11:56 AM   Wound Edges Irregular 1/9/2020 11:56 AM   Wound Length (cm) 16.3 cm 1/9/2020 11:56 AM   Wound Width (cm) 6 cm 1/9/2020 11:56 AM   Wound Depth (cm) 2.8 cm 1/9/2020 11:56 AM   Wound Volume (cm^3) 273.84 cm^3 1/9/2020 11:56 AM   Wound Surface Area (cm^2) 97.8 cm^2 1/9/2020 11:56 AM   Care Cleansed with:;Antimicrobial agent;Sterile normal saline;Removed:;Other (see comments) 1/9/2020 11:56 AM   Dressing Changed;Gauze;Abd pad;Other (see comments) 1/9/2020 11:56 AM   Packing Incision packed with;other (see comments) 1/9/2020 11:56 AM   Periwound Care Skin barrier film applied 1/9/2020 11:56 AM   Dressing Change Due 01/10/20 1/9/2020 11:56 AM       Left and Right Axilla  Cleanse/Irrigate with 0.25% dakins solution. Rinse with normal saline  Primary dressing: Pack 0.25% dakins gauze wet to dry to wound  Secondary dressing: cover with 4x4 gauze and lg abd pad, secure with mefix tape and large flexinet  Frequency: daily    [facility]:  New patient to be admitted to home health services for wound care. Skilled nurse to perform dressing changes every Monday, Wednesday, and Friday. Patient's mother can perform dressing changes on days patient is not seen by home health nurse or scheduled in clinic. Please provide patient's caregiver with necessary supplies to do wound care: lg abd pads, 4x4 gauze, mefix tape. Orders routed to home health      Plan:       Dr. Pennington 1/16/20         No follow-ups on file.

## 2020-01-16 ENCOUNTER — HOSPITAL ENCOUNTER (OUTPATIENT)
Dept: WOUND CARE | Facility: HOSPITAL | Age: 40
Discharge: HOME | End: 2020-01-16
Attending: SURGERY
Payer: COMMERCIAL

## 2020-01-16 VITALS
WEIGHT: 293 LBS | BODY MASS INDEX: 48.82 KG/M2 | DIASTOLIC BLOOD PRESSURE: 83 MMHG | TEMPERATURE: 97 F | HEART RATE: 98 BPM | SYSTOLIC BLOOD PRESSURE: 127 MMHG | HEIGHT: 65 IN

## 2020-01-16 DIAGNOSIS — L73.2 LEFT AXILLARY HIDRADENITIS: ICD-10-CM

## 2020-01-16 DIAGNOSIS — Z79.4 TYPE 2 DIABETES MELLITUS WITH OTHER SKIN ULCER, WITH LONG-TERM CURRENT USE OF INSULIN: ICD-10-CM

## 2020-01-16 DIAGNOSIS — E11.622 TYPE 2 DIABETES MELLITUS WITH OTHER SKIN ULCER, WITH LONG-TERM CURRENT USE OF INSULIN: ICD-10-CM

## 2020-01-16 DIAGNOSIS — L73.2 HIDRADENITIS AXILLARIS: Primary | ICD-10-CM

## 2020-01-16 DIAGNOSIS — L98.499 DIABETES MELLITUS WITH SKIN ULCER: ICD-10-CM

## 2020-01-16 DIAGNOSIS — E11.622 DIABETES MELLITUS WITH SKIN ULCER: ICD-10-CM

## 2020-01-16 DIAGNOSIS — E66.01 MORBID OBESITY WITH BMI OF 50.0-59.9, ADULT: ICD-10-CM

## 2020-01-16 PROCEDURE — 99213 OFFICE O/P EST LOW 20 MIN: CPT

## 2020-01-16 NOTE — PROGRESS NOTES
"Subjective:       Patient ID: Rodney Infante is a 39 y.o. female.    Chief Complaint: Non-healing Wound (right and left axilla)    F/u for excision bilateral axillary hider adneitis    Client here today with open areas to right and left axilla. Seen by Dr. Pennington, who states sweat glands infected. Rx for clindamycin, bactroban, and tramadol provided. Stop methatrexate ( prescribed for arthritis). Client plans to have abdominal bypass surgery in September. Culture done today of right axilla.  8/7/19: F/U with Dr. Pennington. Culture results negative. Continue POC.  8/14/19: F/U with Dr. Pennington. Clindamycin d/c'd. Bactrim and Diflucan ordered today. Leave left and right axilla open to air. Culture sent to lab today.     9/4/19:  F/U with Dr. Pennington.  Wounds to right axilla healing up.  Patient states "right does not drain as much as left".  Patient feels the left axilla is not healing up like the right.  Patient still taking Bactrim PO.  Will continue for now.  9/18/19: F/U with Dr. Pennignton. Both axilla continue to have "sebum - like" exudate. New site noted to left back. Culture done today. RX for lotrisone and Diflucan provided.  9/25/19: F/U with Dr. Pennington. Axillae improved. Client was not able to get Diflucan. Reordered today. Continues lotrisone and clindamycin. Culture results reviewed. Referred to ID for appt. On 10/7/19. Next visit with Dr. Pennington 10/16/19.  10/7/19: Vist today with ID. Augmentin ordered. Clindamycin d/c'd. Complete Diflucan.  11/6/19: Dr Pennington assessed patient.Wounds slowly improving. Patient states that she has had gastric sleeve procedure last week. No complaints voiced. Continuing with present plan of care. Follow up in 2 weeks.  11/20/19: F/U with Dr. Pennington. Wounds continue to improve. Continue POC. Return in 2 weeks.  12/4/19: F/U with Dr. Pennington. Axillae drainage decreased. Client has lost 97 Lbs. As of today. Surgery planned for 12/20/19. Next visit 12/18/19, and " consents to be signed.  12/18/19: F/U with Dr. Pennington. Surgery to axillae scheduled for Monday 12/23/19. Consents signed. Return 1/2/20.   12/26/19  F/U with Dr. pennington for s/p surgery to bilateral axilla.  Removal of glands and placement of theraskin.  Staples intact.  Theraskin not completely  Adhered on both areas.   Start bolster dressing to assist tin the adherence of the graft. Patient to return to clinic in 1 week.  Order homehealth for twice weekly    01/02/20: F/u with Dr. Pennington. Moderate to large amount of drainage from wounds. Staples and sutures removed from both wounds today in clinic per Dr. Pennington. New wound care orders given and routed to [facility]. Rx given for 5 mg norco every 4 hours. Patient given a note to not return to work until further notice.  1/9/20: F/U with Dr. Pennington. Removed 2 remaining staples. Discussed with patient future grafting of sites. Cont. POC. Return in 1 week.  1/16/20: F/U with Dr. Pennington. Client reports decreased drainage today. Rx for Clindamycin and Norco provided. Client prefers to wait a while before having grafting performed. Return in 1 week.    Review of Systems   Constitutional: Negative.    HENT: Negative.    Eyes: Negative.    Respiratory: Negative.    Cardiovascular: Negative.    Gastrointestinal: Negative.    Genitourinary: Negative.    Musculoskeletal: Negative.    Skin: Negative.    Neurological: Negative.    Psychiatric/Behavioral: Negative.        Objective:      Physical Exam   Constitutional: She is oriented to person, place, and time. She appears well-developed and well-nourished.   HENT:   Head: Normocephalic.   Eyes: Pupils are equal, round, and reactive to light. Conjunctivae and EOM are normal.   Neck: Normal range of motion. Neck supple.   Cardiovascular: Normal rate, regular rhythm, normal heart sounds and intact distal pulses.   Pulmonary/Chest: Effort normal and breath sounds normal.   Abdominal: Soft. Bowel sounds are normal.    Musculoskeletal: Normal range of motion.   Neurological: She is alert and oriented to person, place, and time. She has normal reflexes.   Skin: Skin is warm and dry.       Assessment:       1. Hidradenitis axillaris           Wound 07/31/19 1300 Other (comment) Axilla #2 (Active)   07/31/19 1300    Pre-existing: Yes   Primary Wound Type: Other   Side: Left   Orientation:    Location: Axilla   Wound/PI Number (optional): #2   Ankle-Brachial Index:    Pulses:    Removal Indication and Assessment:    Wound Outcome:    (Retired) Wound Type:    (Retired) Wound Length (cm):    (Retired) Wound Width (cm):    (Retired) Depth (cm):    Wound Description (Comments):    Removal Indications:    Dressing Appearance Moist drainage 1/16/2020 11:38 AM   Drainage Amount Moderate 1/16/2020 11:38 AM   Drainage Characteristics/Odor Serosanguineous 1/16/2020 11:38 AM   Appearance Moist;Red;Granulating 1/16/2020 11:38 AM   Tissue loss description Full thickness 1/16/2020 11:38 AM   Red (%), Wound Tissue Color 100 % 1/16/2020 11:38 AM   Periwound Area Intact 1/16/2020 11:38 AM   Wound Edges Irregular 1/16/2020 11:38 AM   Wound Length (cm) 13.5 cm 1/16/2020 11:38 AM   Wound Width (cm) 7 cm 1/16/2020 11:38 AM   Wound Depth (cm) 3.2 cm 1/16/2020 11:38 AM   Wound Volume (cm^3) 302.4 cm^3 1/16/2020 11:38 AM   Wound Surface Area (cm^2) 94.5 cm^2 1/16/2020 11:38 AM   Care Cleansed with:;Antimicrobial agent;Sterile normal saline 1/16/2020 11:38 AM   Dressing Changed;Gauze;Abd pad 1/16/2020 11:38 AM   Packing Incision packed with;other (see comments) 1/16/2020 11:38 AM   Periwound Care Dry periwound area maintained 1/16/2020 11:38 AM   Dressing Change Due 01/17/20 1/16/2020 11:38 AM            Wound 07/31/19 1300 Other (comment) Axilla #1 (Active)   07/31/19 1300    Pre-existing: Yes   Primary Wound Type: Other   Side: Right   Orientation:    Location: Axilla   Wound/PI Number (optional): #1   Ankle-Brachial Index:    Pulses:    Removal  Indication and Assessment:    Wound Outcome:    (Retired) Wound Type:    (Retired) Wound Length (cm):    (Retired) Wound Width (cm):    (Retired) Depth (cm):    Wound Description (Comments):    Removal Indications:    Dressing Appearance Moist drainage 1/16/2020 11:40 AM   Drainage Amount Moderate 1/16/2020 11:40 AM   Drainage Characteristics/Odor Serosanguineous 1/16/2020 11:40 AM   Appearance Red;Moist;Granulating 1/16/2020 11:40 AM   Tissue loss description Full thickness 1/16/2020 11:40 AM   Red (%), Wound Tissue Color 100 % 1/16/2020 11:40 AM   Periwound Area Intact 1/16/2020 11:40 AM   Wound Edges Irregular 1/16/2020 11:40 AM   Wound Length (cm) 16.3 cm 1/16/2020 11:40 AM   Wound Width (cm) 6 cm 1/16/2020 11:40 AM   Wound Depth (cm) 2.8 cm 1/16/2020 11:40 AM   Wound Volume (cm^3) 273.84 cm^3 1/16/2020 11:40 AM   Wound Surface Area (cm^2) 97.8 cm^2 1/16/2020 11:40 AM   Care Cleansed with:;Antimicrobial agent;Sterile normal saline 1/16/2020 11:40 AM   Dressing Changed;Gauze;Abd pad;Other (see comments) 1/16/2020 11:40 AM   Packing Incision packed with;other (see comments) 1/16/2020 11:40 AM   Periwound Care Dry periwound area maintained 1/16/2020 11:40 AM   Dressing Change Due 01/17/20 1/16/2020 11:40 AM       Left and Right Axilla  Cleanse/Irrigate with 0.25% dakins solution. Rinse with normal saline  Primary dressing: Pack 0.25% dakins gauze wet to dry to wound  Secondary dressing: cover with 4x4 gauze and lg abd pad, secure with mefix tape and large flexinet  Frequency: daily  Other: Rx for Clindamycin and Norco provided today.    [facility]:  New patient to be admitted to home health services for wound care. Skilled nurse to perform dressing changes every Monday, Wednesday, and Friday. Patient's mother can perform dressing changes on days patient is not seen by home health nurse or scheduled in clinic. Please provide patient's caregiver with necessary supplies to do wound care: lg abd pads, 4x4 gauze,  mefix tape. Orders routed to home health      Plan:       Dr. Pennington 1/23/20         Follow up in about 1 week (around 1/23/2020).

## 2020-01-23 ENCOUNTER — HOSPITAL ENCOUNTER (OUTPATIENT)
Dept: WOUND CARE | Facility: HOSPITAL | Age: 40
Discharge: HOME | End: 2020-01-23
Attending: SURGERY
Payer: COMMERCIAL

## 2020-01-23 DIAGNOSIS — L73.2 LEFT AXILLARY HIDRADENITIS: ICD-10-CM

## 2020-01-23 DIAGNOSIS — L98.499 DIABETES MELLITUS WITH SKIN ULCER: ICD-10-CM

## 2020-01-23 DIAGNOSIS — E66.01 MORBID OBESITY WITH BMI OF 50.0-59.9, ADULT: ICD-10-CM

## 2020-01-23 DIAGNOSIS — L73.2 HIDRADENITIS AXILLARIS: Primary | ICD-10-CM

## 2020-01-23 DIAGNOSIS — E11.622 TYPE 2 DIABETES MELLITUS WITH OTHER SKIN ULCER, WITH LONG-TERM CURRENT USE OF INSULIN: ICD-10-CM

## 2020-01-23 DIAGNOSIS — Z79.4 TYPE 2 DIABETES MELLITUS WITH OTHER SKIN ULCER, WITH LONG-TERM CURRENT USE OF INSULIN: ICD-10-CM

## 2020-01-23 DIAGNOSIS — E11.622 DIABETES MELLITUS WITH SKIN ULCER: ICD-10-CM

## 2020-01-23 PROCEDURE — 99213 OFFICE O/P EST LOW 20 MIN: CPT

## 2020-01-23 RX ORDER — CLINDAMYCIN HYDROCHLORIDE 300 MG/1
CAPSULE ORAL
Qty: 30 CAPSULE | Refills: 2 | Status: SHIPPED | OUTPATIENT
Start: 2020-01-23 | End: 2020-05-06 | Stop reason: ALTCHOICE

## 2020-01-23 NOTE — PROGRESS NOTES
"Subjective:       Patient ID: Rodney Infante is a 39 y.o. female.    Chief Complaint: Non-healing Wound (right and left axilla)    F/u for excision bilateral axillary hider adneitis    Client here today with open areas to right and left axilla. Seen by Dr. Pennington, who states sweat glands infected. Rx for clindamycin, bactroban, and tramadol provided. Stop methatrexate ( prescribed for arthritis). Client plans to have abdominal bypass surgery in September. Culture done today of right axilla.  8/7/19: F/U with Dr. Pennington. Culture results negative. Continue POC.  8/14/19: F/U with Dr. Pennington. Clindamycin d/c'd. Bactrim and Diflucan ordered today. Leave left and right axilla open to air. Culture sent to lab today.     9/4/19:  F/U with Dr. Pennington.  Wounds to right axilla healing up.  Patient states "right does not drain as much as left".  Patient feels the left axilla is not healing up like the right.  Patient still taking Bactrim PO.  Will continue for now.  9/18/19: F/U with Dr. Pennington. Both axilla continue to have "sebum - like" exudate. New site noted to left back. Culture done today. RX for lotrisone and Diflucan provided.  9/25/19: F/U with Dr. Pennington. Axillae improved. Client was not able to get Diflucan. Reordered today. Continues lotrisone and clindamycin. Culture results reviewed. Referred to ID for appt. On 10/7/19. Next visit with Dr. Pennington 10/16/19.  10/7/19: Vist today with ID. Augmentin ordered. Clindamycin d/c'd. Complete Diflucan.  11/6/19: Dr Pennington assessed patient.Wounds slowly improving. Patient states that she has had gastric sleeve procedure last week. No complaints voiced. Continuing with present plan of care. Follow up in 2 weeks.  11/20/19: F/U with Dr. Pennington. Wounds continue to improve. Continue POC. Return in 2 weeks.  12/4/19: F/U with Dr. Pennington. Axillae drainage decreased. Client has lost 97 Lbs. As of today. Surgery planned for 12/20/19. Next visit 12/18/19, and " consents to be signed.  12/18/19: F/U with Dr. Pennington. Surgery to axillae scheduled for Monday 12/23/19. Consents signed. Return 1/2/20.   12/26/19  F/U with Dr. pennington for s/p surgery to bilateral axilla.  Removal of glands and placement of theraskin.  Staples intact.  Theraskin not completely  Adhered on both areas.   Start bolster dressing to assist tin the adherence of the graft. Patient to return to clinic in 1 week.  Order homehealth for twice weekly    01/02/20: F/u with Dr. Pennington. Moderate to large amount of drainage from wounds. Staples and sutures removed from both wounds today in clinic per Dr. Pennington. New wound care orders given and routed to [facility]. Rx given for 5 mg norco every 4 hours. Patient given a note to not return to work until further notice.  1/9/20: F/U with Dr. Pennington. Removed 2 remaining staples. Discussed with patient future grafting of sites. Cont. POC. Return in 1 week.  1/16/20: F/U with Dr. Pennington. Client reports decreased drainage today. Rx for Clindamycin and Norco provided. Client prefers to wait a while before having grafting performed. Return in 1 week.  1/23/20: F/U with Dr. Pennington. Right axilla measurements improved. Continue Clindamycin. Return in 1 week.    Review of Systems   Constitutional: Negative.    HENT: Negative.    Eyes: Negative.    Respiratory: Negative.    Cardiovascular: Negative.    Gastrointestinal: Negative.    Genitourinary: Negative.    Musculoskeletal: Negative.    Skin: Negative.    Neurological: Negative.    Psychiatric/Behavioral: Negative.        Objective:      Physical Exam   Constitutional: She is oriented to person, place, and time. She appears well-developed and well-nourished.   HENT:   Head: Normocephalic.   Eyes: Pupils are equal, round, and reactive to light. Conjunctivae and EOM are normal.   Neck: Normal range of motion. Neck supple.   Cardiovascular: Normal rate, regular rhythm, normal heart sounds and intact distal pulses.    Pulmonary/Chest: Effort normal and breath sounds normal.   Abdominal: Soft. Bowel sounds are normal.   Musculoskeletal: Normal range of motion.   Neurological: She is alert and oriented to person, place, and time. She has normal reflexes.   Skin: Skin is warm and dry.       Assessment:       1. Hidradenitis axillaris           Wound 07/31/19 1300 Other (comment) Axilla #2 (Active)   07/31/19 1300    Pre-existing: Yes   Primary Wound Type: Other   Side: Left   Orientation:    Location: Axilla   Wound/PI Number (optional): #2   Ankle-Brachial Index:    Pulses:    Removal Indication and Assessment:    Wound Outcome:    (Retired) Wound Type:    (Retired) Wound Length (cm):    (Retired) Wound Width (cm):    (Retired) Depth (cm):    Wound Description (Comments):    Removal Indications:    Dressing Appearance Moist drainage 1/23/2020 11:05 AM   Drainage Amount Moderate 1/23/2020 11:05 AM   Drainage Characteristics/Odor Serosanguineous 1/23/2020 11:05 AM   Appearance Red;Moist;Granulating 1/23/2020 11:05 AM   Tissue loss description Full thickness 1/23/2020 11:05 AM   Red (%), Wound Tissue Color 100 % 1/23/2020 11:05 AM   Periwound Area Intact 1/23/2020 11:05 AM   Wound Edges Irregular 1/23/2020 11:05 AM   Wound Length (cm) 13.5 cm 1/23/2020 11:05 AM   Wound Width (cm) 7 cm 1/23/2020 11:05 AM   Wound Depth (cm) 3.2 cm 1/23/2020 11:05 AM   Wound Volume (cm^3) 302.4 cm^3 1/23/2020 11:05 AM   Wound Surface Area (cm^2) 94.5 cm^2 1/23/2020 11:05 AM   Care Cleansed with:;Antimicrobial agent;Sterile normal saline 1/23/2020 11:05 AM   Dressing Changed;Gauze;Abd pad 1/23/2020 11:05 AM   Packing Incision packed with;other (see comments) 1/23/2020 11:05 AM   Periwound Care Dry periwound area maintained 1/23/2020 11:05 AM   Dressing Change Due 01/24/20 1/23/2020 11:05 AM            Wound 07/31/19 1300 Other (comment) Axilla #1 (Active)   07/31/19 1300    Pre-existing: Yes   Primary Wound Type: Other   Side: Right   Orientation:     Location: Axilla   Wound/PI Number (optional): #1   Ankle-Brachial Index:    Pulses:    Removal Indication and Assessment:    Wound Outcome:    (Retired) Wound Type:    (Retired) Wound Length (cm):    (Retired) Wound Width (cm):    (Retired) Depth (cm):    Wound Description (Comments):    Removal Indications:    Dressing Appearance Moist drainage 1/23/2020 11:05 AM   Drainage Amount Moderate 1/23/2020 11:05 AM   Drainage Characteristics/Odor Serosanguineous 1/23/2020 11:05 AM   Appearance Red;Moist;Granulating 1/23/2020 11:05 AM   Tissue loss description Full thickness 1/23/2020 11:05 AM   Red (%), Wound Tissue Color 100 % 1/23/2020 11:05 AM   Periwound Area Intact 1/23/2020 11:05 AM   Wound Edges Irregular 1/23/2020 11:05 AM   Wound Length (cm) 14 cm 1/23/2020 11:05 AM   Wound Width (cm) 4 cm 1/23/2020 11:05 AM   Wound Depth (cm) 2.8 cm 1/23/2020 11:05 AM   Wound Volume (cm^3) 156.8 cm^3 1/23/2020 11:05 AM   Wound Surface Area (cm^2) 56 cm^2 1/23/2020 11:05 AM   Care Cleansed with:;Antimicrobial agent;Sterile normal saline 1/23/2020 11:05 AM   Dressing Changed;Gauze;Abd pad 1/23/2020 11:05 AM   Packing Incision packed with;other (see comments) 1/23/2020 11:05 AM   Periwound Care Dry periwound area maintained 1/23/2020 11:05 AM   Dressing Change Due 01/24/20 1/23/2020 11:05 AM       Left and Right Axilla  Cleanse/Irrigate with 0.25% dakins solution. Rinse with normal saline  Primary dressing: Pack 0.25% dakins gauze wet to dry to wound  Secondary dressing: cover with 4x4 gauze and lg abd pad, secure with mefix tape and large flexinet  Frequency: daily      [facility]:  Skilled nurse to perform dressing changes every Monday, Wednesday, and Friday. Patient's mother can perform dressing changes on days patient is not seen by home health nurse or scheduled in clinic. Please provide patient's caregiver with necessary supplies to do wound care: lg abd pads, 4x4 gauze, mefix tape. Orders routed to home  health      Plan:       Dr. Pennington 1/30/20         No follow-ups on file.

## 2020-01-28 LAB — FUNGUS SPEC CULT: NORMAL

## 2020-01-30 ENCOUNTER — TELEPHONE (OUTPATIENT)
Dept: PHARMACY | Facility: CLINIC | Age: 40
End: 2020-01-30

## 2020-02-06 ENCOUNTER — HOSPITAL ENCOUNTER (OUTPATIENT)
Dept: WOUND CARE | Facility: HOSPITAL | Age: 40
Discharge: HOME | End: 2020-02-06
Attending: SURGERY
Payer: COMMERCIAL

## 2020-02-06 VITALS
HEIGHT: 60 IN | WEIGHT: 293 LBS | HEART RATE: 90 BPM | TEMPERATURE: 97 F | DIASTOLIC BLOOD PRESSURE: 91 MMHG | BODY MASS INDEX: 57.52 KG/M2 | SYSTOLIC BLOOD PRESSURE: 125 MMHG

## 2020-02-06 DIAGNOSIS — E66.01 MORBID OBESITY WITH BMI OF 50.0-59.9, ADULT: ICD-10-CM

## 2020-02-06 DIAGNOSIS — L73.2 HIDRADENITIS AXILLARIS: Primary | ICD-10-CM

## 2020-02-06 DIAGNOSIS — L08.9 BACTERIAL SKIN INFECTION: ICD-10-CM

## 2020-02-06 DIAGNOSIS — Z98.84 S/P BARIATRIC SURGERY: ICD-10-CM

## 2020-02-06 DIAGNOSIS — Z79.4 TYPE 2 DIABETES MELLITUS WITH OTHER SKIN ULCER, WITH LONG-TERM CURRENT USE OF INSULIN: ICD-10-CM

## 2020-02-06 DIAGNOSIS — L73.2 HIDRADENITIS SUPPURATIVA OF RIGHT AXILLA: ICD-10-CM

## 2020-02-06 DIAGNOSIS — L73.2 LEFT AXILLARY HIDRADENITIS: ICD-10-CM

## 2020-02-06 DIAGNOSIS — E11.622 TYPE 2 DIABETES MELLITUS WITH OTHER SKIN ULCER, WITH LONG-TERM CURRENT USE OF INSULIN: ICD-10-CM

## 2020-02-06 DIAGNOSIS — B96.89 BACTERIAL SKIN INFECTION: ICD-10-CM

## 2020-02-06 PROCEDURE — 87186 SC STD MICRODIL/AGAR DIL: CPT | Mod: 59

## 2020-02-06 PROCEDURE — 87077 CULTURE AEROBIC IDENTIFY: CPT | Mod: 59

## 2020-02-06 PROCEDURE — 99213 OFFICE O/P EST LOW 20 MIN: CPT

## 2020-02-06 PROCEDURE — 87070 CULTURE OTHR SPECIMN AEROBIC: CPT

## 2020-02-06 PROCEDURE — 87076 CULTURE ANAEROBE IDENT EACH: CPT

## 2020-02-06 PROCEDURE — 87075 CULTR BACTERIA EXCEPT BLOOD: CPT

## 2020-02-06 NOTE — PROGRESS NOTES
"Subjective:       Patient ID: Rodney Infante is a 39 y.o. female.    Chief Complaint: Wound Care    F/u for excision bilateral axillary hider adneitis    Client here today with open areas to right and left axilla. Seen by Dr. Pennington, who states sweat glands infected. Rx for clindamycin, bactroban, and tramadol provided. Stop methatrexate ( prescribed for arthritis). Client plans to have abdominal bypass surgery in September. Culture done today of right axilla.  8/7/19: F/U with Dr. Pennington. Culture results negative. Continue POC.  8/14/19: F/U with Dr. Pennington. Clindamycin d/c'd. Bactrim and Diflucan ordered today. Leave left and right axilla open to air. Culture sent to lab today.     9/4/19:  F/U with Dr. Pennington.  Wounds to right axilla healing up.  Patient states "right does not drain as much as left".  Patient feels the left axilla is not healing up like the right.  Patient still taking Bactrim PO.  Will continue for now.  9/18/19: F/U with Dr. Pennington. Both axilla continue to have "sebum - like" exudate. New site noted to left back. Culture done today. RX for lotrisone and Diflucan provided.  9/25/19: F/U with Dr. Pennington. Axillae improved. Client was not able to get Diflucan. Reordered today. Continues lotrisone and clindamycin. Culture results reviewed. Referred to ID for appt. On 10/7/19. Next visit with Dr. Pennington 10/16/19.  10/7/19: Vist today with ID. Augmentin ordered. Clindamycin d/c'd. Complete Diflucan.  11/6/19: Dr Pennington assessed patient.Wounds slowly improving. Patient states that she has had gastric sleeve procedure last week. No complaints voiced. Continuing with present plan of care. Follow up in 2 weeks.  11/20/19: F/U with Dr. Pennington. Wounds continue to improve. Continue POC. Return in 2 weeks.  12/4/19: F/U with Dr. Pennington. Axillae drainage decreased. Client has lost 97 Lbs. As of today. Surgery planned for 12/20/19. Next visit 12/18/19, and consents to be signed.  12/18/19: F/U " with Dr. Pennington. Surgery to axillae scheduled for Monday 12/23/19. Consents signed. Return 1/2/20.   12/26/19  F/U with Dr. pennington for s/p surgery to bilateral axilla.  Removal of glands and placement of theraskin.  Staples intact.  Theraskin not completely  Adhered on both areas.   Start bolster dressing to assist tin the adherence of the graft. Patient to return to clinic in 1 week.  Order homehealth for twice weekly    01/02/20: F/u with Dr. Pennington. Moderate to large amount of drainage from wounds. Staples and sutures removed from both wounds today in clinic per Dr. Pennington. New wound care orders given and routed to [facility]. Rx given for 5 mg norco every 4 hours. Patient given a note to not return to work until further notice.  1/9/20: F/U with Dr. Pnenington. Removed 2 remaining staples. Discussed with patient future grafting of sites. Cont. POC. Return in 1 week.  1/16/20: F/U with Dr. Pennington. Client reports decreased drainage today. Rx for Clindamycin and Norco provided. Client prefers to wait a while before having grafting performed. Return in 1 week.  1/23/20: F/U with Dr. Pennington. Right axilla measurements improved. Continue Clindamycin. Return in 1 week.  2/6/20:Patient presents with a new wound to right lateral breast. Dr Pennington assessed patient. Continuing with same wound care orders. Rx for Norco 5/325mg given to patient for pain. F/U in 2 weeks.     Review of Systems   Constitutional: Negative.    HENT: Negative.    Eyes: Negative.    Respiratory: Negative.    Cardiovascular: Negative.    Gastrointestinal: Negative.    Genitourinary: Negative.    Musculoskeletal: Negative.    Skin: Negative.    Neurological: Negative.    Psychiatric/Behavioral: Negative.        Objective:      Physical Exam   Constitutional: She is oriented to person, place, and time. She appears well-developed and well-nourished.   HENT:   Head: Normocephalic.   Eyes: Pupils are equal, round, and reactive to light.  Conjunctivae and EOM are normal.   Neck: Normal range of motion. Neck supple.   Cardiovascular: Normal rate, regular rhythm, normal heart sounds and intact distal pulses.   Pulmonary/Chest: Effort normal and breath sounds normal.   Abdominal: Soft. Bowel sounds are normal.   Musculoskeletal: Normal range of motion.   Neurological: She is alert and oriented to person, place, and time. She has normal reflexes.   Skin: Skin is warm and dry.       Assessment:       1. Hidradenitis axillaris    2. Hidradenitis suppurativa of right axilla    3. Left axillary hidradenitis           Wound 07/31/19 1300 Other (comment) Axilla #2 (Active)   07/31/19 1300    Pre-existing: Yes   Primary Wound Type: Other   Side: Left   Orientation:    Location: Axilla   Wound/PI Number (optional): #2   Ankle-Brachial Index:    Pulses:    Removal Indication and Assessment:    Wound Outcome:    (Retired) Wound Type:    (Retired) Wound Length (cm):    (Retired) Wound Width (cm):    (Retired) Depth (cm):    Wound Description (Comments):    Removal Indications:    Wound Image   2/6/2020  9:00 AM   Dressing Appearance Intact;Moist drainage 2/6/2020  9:00 AM   Drainage Amount Moderate 2/6/2020  9:00 AM   Drainage Characteristics/Odor Serosanguineous 2/6/2020  9:00 AM   Appearance Red 2/6/2020  9:00 AM   Tissue loss description Full thickness 2/6/2020  9:00 AM   Red (%), Wound Tissue Color 100 % 2/6/2020  9:00 AM   Periwound Area Dry;Intact 2/6/2020  9:00 AM   Wound Edges Defined 2/6/2020  9:00 AM   Wound Length (cm) 12.8 cm 2/6/2020  9:00 AM   Wound Width (cm) 6 cm 2/6/2020  9:00 AM   Wound Depth (cm) 0.1 cm 2/6/2020  9:00 AM   Wound Volume (cm^3) 7.68 cm^3 2/6/2020  9:00 AM   Wound Surface Area (cm^2) 76.8 cm^2 2/6/2020  9:00 AM   Care Cleansed with:;Sterile normal saline 2/6/2020  9:00 AM   Dressing Gauze;Abd pad 2/6/2020  9:00 AM   Periwound Care Skin barrier film applied 2/6/2020  9:00 AM   Dressing Change Due 02/08/20 2/6/2020  9:00 AM             Wound 07/31/19 1300 Other (comment) Axilla #1 (Active)   07/31/19 1300    Pre-existing: Yes   Primary Wound Type: Other   Side: Right   Orientation:    Location: Axilla   Wound/PI Number (optional): #1   Ankle-Brachial Index:    Pulses:    Removal Indication and Assessment:    Wound Outcome:    (Retired) Wound Type:    (Retired) Wound Length (cm):    (Retired) Wound Width (cm):    (Retired) Depth (cm):    Wound Description (Comments):    Removal Indications:    Wound Image   2/6/2020  9:00 AM   Dressing Appearance Dry;Moist drainage 2/6/2020  9:00 AM   Drainage Amount Moderate 2/6/2020  9:00 AM   Drainage Characteristics/Odor Serosanguineous 2/6/2020  9:00 AM   Appearance Red 2/6/2020  9:00 AM   Tissue loss description Full thickness 2/6/2020  9:00 AM   Red (%), Wound Tissue Color 100 % 2/6/2020  9:00 AM   Periwound Area Dry;Intact 2/6/2020  9:00 AM   Wound Edges Defined 2/6/2020  9:00 AM   Wound Length (cm) 14 cm 2/6/2020  9:00 AM   Wound Width (cm) 4 cm 2/6/2020  9:00 AM   Wound Depth (cm) 0.1 cm 2/6/2020  9:00 AM   Wound Volume (cm^3) 5.6 cm^3 2/6/2020  9:00 AM   Wound Surface Area (cm^2) 56 cm^2 2/6/2020  9:00 AM   Care Cleansed with:;Sterile normal saline 2/6/2020  9:00 AM   Dressing Applied;Gauze;Abd pad 2/6/2020  9:00 AM   Periwound Care Skin barrier film applied 2/6/2020  9:00 AM   Dressing Change Due 02/08/20 2/6/2020  9:00 AM            Wound 02/06/20 1000 Other (comment) Breast #3 (Active)   02/06/20 1000    Pre-existing: Yes   Primary Wound Type: Other   Side: Right   Orientation:    Location: Breast   Wound/PI Number (optional): #3   Ankle-Brachial Index:    Pulses:    Removal Indication and Assessment:    Wound Outcome:    (Retired) Wound Type:    (Retired) Wound Length (cm):    (Retired) Wound Width (cm):    (Retired) Depth (cm):    Wound Description (Comments):    Removal Indications:    Wound Image   2/6/2020  9:00 AM   Dressing Appearance Dry;Moist drainage 2/6/2020  9:00 AM   Drainage Amount  Moderate 2/6/2020  9:00 AM   Drainage Characteristics/Odor Serosanguineous 2/6/2020  9:00 AM   Appearance Red 2/6/2020  9:00 AM   Tissue loss description Full thickness 2/6/2020  9:00 AM   Red (%), Wound Tissue Color 100 % 2/6/2020  9:00 AM   Periwound Area Intact;Dry 2/6/2020  9:00 AM   Wound Edges Defined 2/6/2020  9:00 AM   Wound Length (cm) 4 cm 2/6/2020  9:00 AM   Wound Width (cm) 5.5 cm 2/6/2020  9:00 AM   Wound Depth (cm) 0.1 cm 2/6/2020  9:00 AM   Wound Volume (cm^3) 2.2 cm^3 2/6/2020  9:00 AM   Wound Surface Area (cm^2) 22 cm^2 2/6/2020  9:00 AM   Care Cleansed with:;Sterile normal saline 2/6/2020  9:00 AM   Dressing Applied;Gauze;Abd pad 2/6/2020  9:00 AM   Dressing Change Due 02/08/20 2/6/2020  9:00 AM   Left and Right Axilla, and Right lateral breast  Cleanse/Irrigate with 0.25% dakins solution. Rinse with normal saline  Primary dressing: Pack 0.25% dakins gauze wet to dry to wound  Secondary dressing: cover with 4x4 gauze and lg abd pad, secure with mefix tape and large flexinet  Frequency: daily    Follow up with Dr. Pennington in 2 weeks on Thursday 2/20/20    [facility]:   Skilled nurse to perform dressing changes every Monday, Wednesday, and Friday. Patient's mother can perform dressing changes on days patient is not seen by home health nurse or scheduled in clinic. Please provide patient's caregiver with necessary supplies to do wound care: lg abd pads, 4x4 gauze, mefix tape.  Patient presents with a new wound to right lateral breast. Dr Pennington assessed patient. Continuing with same wound care orders. Rx for Norco 5/325mg given to patient for pain. F/U in 2 weeks.        Plan:              Follow up in about 2 weeks (around 2/20/2020).

## 2020-02-08 LAB — BACTERIA SPEC AEROBE CULT: ABNORMAL

## 2020-02-10 LAB — BACTERIA SPEC ANAEROBE CULT: NORMAL

## 2020-02-18 ENCOUNTER — TELEPHONE (OUTPATIENT)
Dept: RHEUMATOLOGY | Facility: CLINIC | Age: 40
End: 2020-02-18

## 2020-02-18 NOTE — TELEPHONE ENCOUNTER
----- Message from Dionna Sparks sent at 2/18/2020  1:47 PM CST -----  Contact: home health nurse  Caller needs call back to consult with nurse rg what all medications patient is taking [id number]

## 2020-02-20 ENCOUNTER — HOSPITAL ENCOUNTER (OUTPATIENT)
Dept: WOUND CARE | Facility: HOSPITAL | Age: 40
Discharge: HOME | End: 2020-02-20
Attending: SURGERY
Payer: COMMERCIAL

## 2020-02-20 VITALS
SYSTOLIC BLOOD PRESSURE: 131 MMHG | BODY MASS INDEX: 57.52 KG/M2 | DIASTOLIC BLOOD PRESSURE: 85 MMHG | WEIGHT: 293 LBS | TEMPERATURE: 98 F | HEIGHT: 60 IN | HEART RATE: 71 BPM

## 2020-02-20 DIAGNOSIS — L73.2 HIDRADENITIS AXILLARIS: Primary | ICD-10-CM

## 2020-02-20 DIAGNOSIS — E11.622 TYPE 2 DIABETES MELLITUS WITH OTHER SKIN ULCER, WITH LONG-TERM CURRENT USE OF INSULIN: ICD-10-CM

## 2020-02-20 DIAGNOSIS — L08.9 BACTERIAL SKIN INFECTION: ICD-10-CM

## 2020-02-20 DIAGNOSIS — E66.01 MORBID OBESITY WITH BMI OF 50.0-59.9, ADULT: ICD-10-CM

## 2020-02-20 DIAGNOSIS — B96.89 BACTERIAL SKIN INFECTION: ICD-10-CM

## 2020-02-20 DIAGNOSIS — Z79.4 TYPE 2 DIABETES MELLITUS WITH OTHER SKIN ULCER, WITH LONG-TERM CURRENT USE OF INSULIN: ICD-10-CM

## 2020-02-20 DIAGNOSIS — Z98.84 S/P BARIATRIC SURGERY: ICD-10-CM

## 2020-02-20 PROCEDURE — 99213 OFFICE O/P EST LOW 20 MIN: CPT

## 2020-02-20 RX ORDER — AMPICILLIN 500 MG/1
500 CAPSULE ORAL 4 TIMES DAILY
Qty: 60 CAPSULE | Refills: 1 | Status: SHIPPED | OUTPATIENT
Start: 2020-02-20 | End: 2020-05-06 | Stop reason: ALTCHOICE

## 2020-02-20 NOTE — PROGRESS NOTES
"Subjective:       Patient ID: Rodney Infante is a 39 y.o. female.    Chief Complaint: Non-healing Wound (right and left axilla)    F/u for excision bilateral axillary hider adneitis    Client here today with open areas to right and left axilla. Seen by Dr. Pennington, who states sweat glands infected. Rx for clindamycin, bactroban, and tramadol provided. Stop methatrexate ( prescribed for arthritis). Client plans to have abdominal bypass surgery in September. Culture done today of right axilla.  8/7/19: F/U with Dr. Pennington. Culture results negative. Continue POC.  8/14/19: F/U with Dr. Pennington. Clindamycin d/c'd. Bactrim and Diflucan ordered today. Leave left and right axilla open to air. Culture sent to lab today.     9/4/19:  F/U with Dr. Pennington.  Wounds to right axilla healing up.  Patient states "right does not drain as much as left".  Patient feels the left axilla is not healing up like the right.  Patient still taking Bactrim PO.  Will continue for now.  9/18/19: F/U with Dr. Pennington. Both axilla continue to have "sebum - like" exudate. New site noted to left back. Culture done today. RX for lotrisone and Diflucan provided.  9/25/19: F/U with Dr. Pennington. Axillae improved. Client was not able to get Diflucan. Reordered today. Continues lotrisone and clindamycin. Culture results reviewed. Referred to ID for appt. On 10/7/19. Next visit with Dr. Pennington 10/16/19.  10/7/19: Vist today with ID. Augmentin ordered. Clindamycin d/c'd. Complete Diflucan.  11/6/19: Dr Pennington assessed patient.Wounds slowly improving. Patient states that she has had gastric sleeve procedure last week. No complaints voiced. Continuing with present plan of care. Follow up in 2 weeks.  11/20/19: F/U with Dr. Pennington. Wounds continue to improve. Continue POC. Return in 2 weeks.  12/4/19: F/U with Dr. Pennington. Axillae drainage decreased. Client has lost 97 Lbs. As of today. Surgery planned for 12/20/19. Next visit 12/18/19, and " consents to be signed.  12/18/19: F/U with Dr. Pennington. Surgery to axillae scheduled for Monday 12/23/19. Consents signed. Return 1/2/20.   12/26/19  F/U with Dr. pennington for s/p surgery to bilateral axilla.  Removal of glands and placement of theraskin.  Staples intact.  Theraskin not completely  Adhered on both areas.   Start bolster dressing to assist tin the adherence of the graft. Patient to return to clinic in 1 week.  Order homehealth for twice weekly    01/02/20: F/u with Dr. Pennington. Moderate to large amount of drainage from wounds. Staples and sutures removed from both wounds today in clinic per Dr. Pennington. New wound care orders given and routed to [facility]. Rx given for 5 mg norco every 4 hours. Patient given a note to not return to work until further notice.  1/9/20: F/U with Dr. Pennington. Removed 2 remaining staples. Discussed with patient future grafting of sites. Cont. POC. Return in 1 week.  1/16/20: F/U with Dr. Pennington. Client reports decreased drainage today. Rx for Clindamycin and Norco provided. Client prefers to wait a while before having grafting performed. Return in 1 week.  1/23/20: F/U with Dr. Pennington. Right axilla measurements improved. Continue Clindamycin. Return in 1 week.  2/6/20:Patient presents with a new wound to right lateral breast. Dr Pennington assessed patient. Continuing with same wound care orders. Rx for Norco 5/325mg given to patient for pain. F/U in 2 weeks.   2/20/20: F/U with Dr. Pennington. Sites continue to improve. Measurements decreased. Culture reviewed. Rx for Ampicillin provided. Client has taken PCN in past w/o reaction. Rx for Norco provided. Return in 2 weeks.    Review of Systems   Constitutional: Negative.    HENT: Negative.    Eyes: Negative.    Respiratory: Negative.    Cardiovascular: Negative.    Gastrointestinal: Negative.    Genitourinary: Negative.    Musculoskeletal: Negative.    Skin: Negative.    Neurological: Negative.    Psychiatric/Behavioral:  Negative.        Objective:      Physical Exam   Constitutional: She is oriented to person, place, and time. She appears well-developed and well-nourished.   HENT:   Head: Normocephalic.   Eyes: Pupils are equal, round, and reactive to light. Conjunctivae and EOM are normal.   Neck: Normal range of motion. Neck supple.   Cardiovascular: Normal rate, regular rhythm, normal heart sounds and intact distal pulses.   Pulmonary/Chest: Effort normal and breath sounds normal.   Abdominal: Soft. Bowel sounds are normal.   Musculoskeletal: Normal range of motion.   Neurological: She is alert and oriented to person, place, and time. She has normal reflexes.   Skin: Skin is warm and dry.       Assessment:       No diagnosis found.       Wound 07/31/19 1300 Other (comment) Axilla #2 (Active)   07/31/19 1300    Pre-existing: Yes   Primary Wound Type: Other   Side: Left   Orientation:    Location: Axilla   Wound/PI Number (optional): #2   Ankle-Brachial Index:    Pulses:    Removal Indication and Assessment:    Wound Outcome:    (Retired) Wound Type:    (Retired) Wound Length (cm):    (Retired) Wound Width (cm):    (Retired) Depth (cm):    Wound Description (Comments):    Removal Indications:    Dressing Appearance Moist drainage 2/20/2020 11:00 AM   Drainage Amount Moderate 2/20/2020 11:00 AM   Drainage Characteristics/Odor Serosanguineous 2/20/2020 11:00 AM   Appearance Red;Moist 2/20/2020 11:00 AM   Tissue loss description Full thickness 2/20/2020 11:00 AM   Red (%), Wound Tissue Color 100 % 2/20/2020 11:00 AM   Periwound Area Dry;Intact 2/20/2020 11:00 AM   Wound Edges Irregular 2/20/2020 11:00 AM   Wound Length (cm) 12 cm 2/20/2020 11:00 AM   Wound Width (cm) 6 cm 2/20/2020 11:00 AM   Wound Depth (cm) 0.1 cm 2/20/2020 11:00 AM   Wound Volume (cm^3) 7.2 cm^3 2/20/2020 11:00 AM   Wound Surface Area (cm^2) 72 cm^2 2/20/2020 11:00 AM   Care Cleansed with:;Antimicrobial agent;Sterile normal saline 2/20/2020 11:00 AM   Dressing  Gauze, wet to moist;Abd pad 2/20/2020 11:00 AM   Periwound Care Dry periwound area maintained 2/20/2020 11:00 AM   Dressing Change Due 02/21/20 2/20/2020 11:00 AM            Wound 07/31/19 1300 Other (comment) Axilla #1 (Active)   07/31/19 1300    Pre-existing: Yes   Primary Wound Type: Other   Side: Right   Orientation:    Location: Axilla   Wound/PI Number (optional): #1   Ankle-Brachial Index:    Pulses:    Removal Indication and Assessment:    Wound Outcome:    (Retired) Wound Type:    (Retired) Wound Length (cm):    (Retired) Wound Width (cm):    (Retired) Depth (cm):    Wound Description (Comments):    Removal Indications:    Dressing Appearance Moist drainage 2/20/2020 11:00 AM   Drainage Amount Moderate 2/20/2020 11:00 AM   Drainage Characteristics/Odor Serosanguineous 2/20/2020 11:00 AM   Appearance Red;Moist 2/20/2020 11:00 AM   Tissue loss description Full thickness 2/20/2020 11:00 AM   Red (%), Wound Tissue Color 100 % 2/20/2020 11:00 AM   Periwound Area Intact;Dry 2/20/2020 11:00 AM   Wound Edges Irregular 2/20/2020 11:00 AM   Wound Length (cm) 14 cm 2/20/2020 11:00 AM   Wound Width (cm) 4 cm 2/20/2020 11:00 AM   Wound Depth (cm) 0.1 cm 2/20/2020 11:00 AM   Wound Volume (cm^3) 5.6 cm^3 2/20/2020 11:00 AM   Wound Surface Area (cm^2) 56 cm^2 2/20/2020 11:00 AM   Care Cleansed with:;Antimicrobial agent;Sterile normal saline 2/20/2020 11:00 AM   Dressing Changed;Gauze, wet to moist;Abd pad 2/20/2020 11:00 AM   Periwound Care Dry periwound area maintained 2/20/2020 11:00 AM   Dressing Change Due 02/21/20 2/20/2020 11:00 AM            Wound 02/06/20 1000 Other (comment) Breast #3 (Active)   02/06/20 1000    Pre-existing: Yes   Primary Wound Type: Other   Side: Right   Orientation:    Location: Breast   Wound/PI Number (optional): #3   Ankle-Brachial Index:    Pulses:    Removal Indication and Assessment:    Wound Outcome:    (Retired) Wound Type:    (Retired) Wound Length (cm):    (Retired) Wound Width (cm):     (Retired) Depth (cm):    Wound Description (Comments):    Removal Indications:    Dressing Appearance Moist drainage 2/20/2020 11:00 AM   Drainage Amount Small 2/20/2020 11:00 AM   Drainage Characteristics/Odor Serosanguineous 2/20/2020 11:00 AM   Appearance Red;Moist 2/20/2020 11:00 AM   Tissue loss description Full thickness 2/20/2020 11:00 AM   Red (%), Wound Tissue Color 100 % 2/20/2020 11:00 AM   Periwound Area Intact;Dry 2/20/2020 11:00 AM   Wound Edges Defined 2/20/2020 11:00 AM   Wound Length (cm) 4 cm 2/20/2020 11:00 AM   Wound Width (cm) 5 cm 2/20/2020 11:00 AM   Wound Depth (cm) 0.1 cm 2/20/2020 11:00 AM   Wound Volume (cm^3) 2 cm^3 2/20/2020 11:00 AM   Wound Surface Area (cm^2) 20 cm^2 2/20/2020 11:00 AM   Care Cleansed with:;Antimicrobial agent;Sterile normal saline 2/20/2020 11:00 AM   Dressing Changed;Island/border 2/20/2020 11:00 AM   Periwound Care Dry periwound area maintained 2/20/2020 11:00 AM   Dressing Change Due 02/21/20 2/20/2020 11:00 AM   Left and Right Axilla, and Right lateral breast  Cleanse/Irrigate with 0.25% dakins solution. Rinse with normal saline  Primary dressing: Pack 0.25% dakins gauze wet to dry to wound  Secondary dressing: cover with 4x4 gauze and lg abd pad, secure with mefix tape and large flexinet  Frequency: daily    Follow up with Dr. Pennington in 2 weeks on Thursday 3/5/20  Other: Rx for Ampicillin given.    [facility]:   Skilled nurse to perform dressing changes every Monday, Wednesday, and Friday. Patient's mother can perform dressing changes on days patient is not seen by home health nurse or scheduled in clinic. Please provide patient's caregiver with necessary supplies to do wound care: lg abd pads, 4x4 gauze, mefix tape.  Patient presents with a new wound to right lateral breast. Dr Pennington assessed patient. Continuing with same wound care orders. Rx for Norco 5/325mg given to patient for pain. F/U in 2 weeks.        Plan:       Dr. Pennington 3/5/20.       No  follow-ups on file.

## 2020-02-25 LAB
ACID FAST MOD KINY STN SPEC: NORMAL
MYCOBACTERIUM SPEC QL CULT: NORMAL

## 2020-02-26 ENCOUNTER — TELEPHONE (OUTPATIENT)
Dept: PHARMACY | Facility: CLINIC | Age: 40
End: 2020-02-26

## 2020-03-05 ENCOUNTER — HOSPITAL ENCOUNTER (OUTPATIENT)
Dept: WOUND CARE | Facility: HOSPITAL | Age: 40
Discharge: HOME | End: 2020-03-05
Attending: SURGERY
Payer: COMMERCIAL

## 2020-03-05 VITALS
HEIGHT: 65 IN | SYSTOLIC BLOOD PRESSURE: 121 MMHG | HEART RATE: 88 BPM | TEMPERATURE: 98 F | DIASTOLIC BLOOD PRESSURE: 76 MMHG | WEIGHT: 277 LBS | BODY MASS INDEX: 46.15 KG/M2

## 2020-03-05 DIAGNOSIS — E66.01 MORBID OBESITY WITH BMI OF 50.0-59.9, ADULT: ICD-10-CM

## 2020-03-05 DIAGNOSIS — Z98.84 S/P BARIATRIC SURGERY: ICD-10-CM

## 2020-03-05 DIAGNOSIS — B99.9 ANEMIA OF INFECTION AND CHRONIC DISEASE: ICD-10-CM

## 2020-03-05 DIAGNOSIS — M19.90 ARTHRITIS: ICD-10-CM

## 2020-03-05 DIAGNOSIS — D63.8 ANEMIA OF INFECTION AND CHRONIC DISEASE: ICD-10-CM

## 2020-03-05 DIAGNOSIS — L73.2 HIDRADENITIS AXILLARIS: Primary | ICD-10-CM

## 2020-03-05 DIAGNOSIS — L73.2 LEFT AXILLARY HIDRADENITIS: ICD-10-CM

## 2020-03-05 DIAGNOSIS — R27.9 INCOORDINATION: ICD-10-CM

## 2020-03-05 PROCEDURE — 99213 OFFICE O/P EST LOW 20 MIN: CPT

## 2020-03-05 NOTE — PROGRESS NOTES
"Subjective:       Patient ID: Rodney Infante is a 39 y.o. female.    Chief Complaint: Non-healing Wound (right and left axilla)    F/u for excision bilateral axillary hider adneitis    Client here today with open areas to right and left axilla. Seen by Dr. Pennington, who states sweat glands infected. Rx for clindamycin, bactroban, and tramadol provided. Stop methatrexate ( prescribed for arthritis). Client plans to have abdominal bypass surgery in September. Culture done today of right axilla.  8/7/19: F/U with Dr. Pennington. Culture results negative. Continue POC.  8/14/19: F/U with Dr. Pennington. Clindamycin d/c'd. Bactrim and Diflucan ordered today. Leave left and right axilla open to air. Culture sent to lab today.     9/4/19:  F/U with Dr. Pennington.  Wounds to right axilla healing up.  Patient states "right does not drain as much as left".  Patient feels the left axilla is not healing up like the right.  Patient still taking Bactrim PO.  Will continue for now.  9/18/19: F/U with Dr. Pennington. Both axilla continue to have "sebum - like" exudate. New site noted to left back. Culture done today. RX for lotrisone and Diflucan provided.  9/25/19: F/U with Dr. Pennington. Axillae improved. Client was not able to get Diflucan. Reordered today. Continues lotrisone and clindamycin. Culture results reviewed. Referred to ID for appt. On 10/7/19. Next visit with Dr. Pennington 10/16/19.  10/7/19: Vist today with ID. Augmentin ordered. Clindamycin d/c'd. Complete Diflucan.  11/6/19: Dr Pennington assessed patient.Wounds slowly improving. Patient states that she has had gastric sleeve procedure last week. No complaints voiced. Continuing with present plan of care. Follow up in 2 weeks.  11/20/19: F/U with Dr. Pennington. Wounds continue to improve. Continue POC. Return in 2 weeks.  12/4/19: F/U with Dr. Pennington. Axillae drainage decreased. Client has lost 97 Lbs. As of today. Surgery planned for 12/20/19. Next visit 12/18/19, and " consents to be signed.  12/18/19: F/U with Dr. Pennington. Surgery to axillae scheduled for Monday 12/23/19. Consents signed. Return 1/2/20.   12/26/19  F/U with Dr. pennington for s/p surgery to bilateral axilla.  Removal of glands and placement of theraskin.  Staples intact.  Theraskin not completely  Adhered on both areas.   Start bolster dressing to assist tin the adherence of the graft. Patient to return to clinic in 1 week.  Order homehealth for twice weekly    01/02/20: F/u with Dr. Pennington. Moderate to large amount of drainage from wounds. Staples and sutures removed from both wounds today in clinic per Dr. Pennington. New wound care orders given and routed to [facility]. Rx given for 5 mg norco every 4 hours. Patient given a note to not return to work until further notice.  1/9/20: F/U with Dr. Pennington. Removed 2 remaining staples. Discussed with patient future grafting of sites. Cont. POC. Return in 1 week.  1/16/20: F/U with Dr. Pennington. Client reports decreased drainage today. Rx for Clindamycin and Norco provided. Client prefers to wait a while before having grafting performed. Return in 1 week.  1/23/20: F/U with Dr. Pennington. Right axilla measurements improved. Continue Clindamycin. Return in 1 week.  2/6/20:Patient presents with a new wound to right lateral breast. Dr Pennington assessed patient. Continuing with same wound care orders. Rx for Norco 5/325mg given to patient for pain. F/U in 2 weeks.   2/20/20: F/U with Dr. Pennington. Sites continue to improve. Measurements decreased. Culture reviewed. Rx for Ampicillin provided. Client has taken PCN in past w/o reaction. Rx for Norco provided. Return in 2 weeks.  3/5/20: F/U with Dr. Pennington. Sites decreased in size. Cont. Ampicillin. Cont. POC. Return in 2 weeks.    Review of Systems   Constitutional: Negative.    HENT: Negative.    Eyes: Negative.    Respiratory: Negative.    Cardiovascular: Negative.    Gastrointestinal: Negative.    Genitourinary:  Negative.    Musculoskeletal: Negative.    Skin: Negative.    Neurological: Negative.    Psychiatric/Behavioral: Negative.        Objective:      Physical Exam   Constitutional: She is oriented to person, place, and time. She appears well-developed and well-nourished.   HENT:   Head: Normocephalic.   Eyes: Pupils are equal, round, and reactive to light. Conjunctivae and EOM are normal.   Neck: Normal range of motion. Neck supple.   Cardiovascular: Normal rate, regular rhythm, normal heart sounds and intact distal pulses.   Pulmonary/Chest: Effort normal and breath sounds normal.   Abdominal: Soft. Bowel sounds are normal.   Musculoskeletal: Normal range of motion.   Neurological: She is alert and oriented to person, place, and time. She has normal reflexes.   Skin: Skin is warm and dry.       Assessment:       1. Hidradenitis axillaris           Wound 07/31/19 1300 Other (comment) Axilla #2 (Active)   07/31/19 1300    Pre-existing: Yes   Primary Wound Type: Other   Side: Left   Orientation:    Location: Axilla   Wound/PI Number (optional): #2   Ankle-Brachial Index:    Pulses:    Removal Indication and Assessment:    Wound Outcome:    (Retired) Wound Type:    (Retired) Wound Length (cm):    (Retired) Wound Width (cm):    (Retired) Depth (cm):    Wound Description (Comments):    Removal Indications:    Wound Image   3/5/2020 10:47 AM   Dressing Appearance Moist drainage 3/5/2020 10:47 AM   Drainage Amount Moderate 3/5/2020 10:47 AM   Drainage Characteristics/Odor Serosanguineous 3/5/2020 10:47 AM   Appearance Red;Moist;Granulating 3/5/2020 10:47 AM   Tissue loss description Full thickness 3/5/2020 10:47 AM   Red (%), Wound Tissue Color 100 % 3/5/2020 10:47 AM   Periwound Area Intact;Dry 3/5/2020 10:47 AM   Wound Edges Irregular 3/5/2020 10:47 AM   Wound Length (cm) 6 cm 3/5/2020 10:47 AM   Wound Width (cm) 5 cm 3/5/2020 10:47 AM   Wound Depth (cm) 0.1 cm 3/5/2020 10:47 AM   Wound Volume (cm^3) 3 cm^3 3/5/2020 10:47  AM   Wound Surface Area (cm^2) 30 cm^2 3/5/2020 10:47 AM   Care Cleansed with:;Antimicrobial agent;Sterile normal saline 3/5/2020 10:47 AM   Dressing Changed;Gauze;Abd pad;Other (see comments) 3/5/2020 10:47 AM   Periwound Care Dry periwound area maintained 3/5/2020 10:47 AM   Dressing Change Due 03/06/20 3/5/2020 10:47 AM            Wound 07/31/19 1300 Other (comment) Axilla #1 (Active)   07/31/19 1300    Pre-existing: Yes   Primary Wound Type: Other   Side: Right   Orientation:    Location: Axilla   Wound/PI Number (optional): #1   Ankle-Brachial Index:    Pulses:    Removal Indication and Assessment:    Wound Outcome:    (Retired) Wound Type:    (Retired) Wound Length (cm):    (Retired) Wound Width (cm):    (Retired) Depth (cm):    Wound Description (Comments):    Removal Indications:    Wound Image   3/5/2020 10:51 AM   Dressing Appearance Moist drainage 3/5/2020 10:51 AM   Drainage Amount Moderate 3/5/2020 10:51 AM   Drainage Characteristics/Odor Serosanguineous 3/5/2020 10:51 AM   Appearance Red;Moist;Granulating 3/5/2020 10:51 AM   Tissue loss description Full thickness 3/5/2020 10:51 AM   Red (%), Wound Tissue Color 100 % 3/5/2020 10:51 AM   Periwound Area Intact;Dry 3/5/2020 10:51 AM   Wound Edges Irregular 3/5/2020 10:51 AM   Wound Length (cm) 10 cm 3/5/2020 10:51 AM   Wound Width (cm) 3 cm 3/5/2020 10:51 AM   Wound Depth (cm) 0.1 cm 3/5/2020 10:51 AM   Wound Volume (cm^3) 3 cm^3 3/5/2020 10:51 AM   Wound Surface Area (cm^2) 30 cm^2 3/5/2020 10:51 AM   Care Cleansed with:;Antimicrobial agent;Sterile normal saline 3/5/2020 10:51 AM   Dressing Changed;Gauze;Abd pad;Other (see comments) 3/5/2020 10:51 AM   Periwound Care Dry periwound area maintained 3/5/2020 10:51 AM   Dressing Change Due 03/06/20 3/5/2020 10:51 AM            Wound 02/06/20 1000 Other (comment) Breast #3 (Active)   02/06/20 1000    Pre-existing: Yes   Primary Wound Type: Other   Side: Right   Orientation:    Location: Breast   Wound/PI  Number (optional): #3   Ankle-Brachial Index:    Pulses:    Removal Indication and Assessment:    Wound Outcome:    (Retired) Wound Type:    (Retired) Wound Length (cm):    (Retired) Wound Width (cm):    (Retired) Depth (cm):    Wound Description (Comments):    Removal Indications:    Wound Image   3/5/2020 10:43 AM   Dressing Appearance Moist drainage 3/5/2020 10:43 AM   Drainage Amount Moderate 3/5/2020 10:43 AM   Drainage Characteristics/Odor Serosanguineous 3/5/2020 10:43 AM   Appearance Red;Moist;Granulating 3/5/2020 10:43 AM   Tissue loss description Partial thickness 3/5/2020 10:43 AM   Red (%), Wound Tissue Color 100 % 3/5/2020 10:43 AM   Periwound Area Intact;Dry 3/5/2020 10:43 AM   Wound Edges Defined 3/5/2020 10:43 AM   Wound Length (cm) 2.5 cm 3/5/2020 10:43 AM   Wound Width (cm) 6 cm 3/5/2020 10:43 AM   Wound Depth (cm) 0.1 cm 3/5/2020 10:43 AM   Wound Volume (cm^3) 1.5 cm^3 3/5/2020 10:43 AM   Wound Surface Area (cm^2) 15 cm^2 3/5/2020 10:43 AM   Care Cleansed with:;Antimicrobial agent;Sterile normal saline 3/5/2020 10:43 AM   Dressing Changed;Island/border 3/5/2020 10:43 AM   Periwound Care Dry periwound area maintained 3/5/2020 10:43 AM   Dressing Change Due 03/06/20 3/5/2020 10:43 AM   Left and Right Axilla, and Right lateral breast  Cleanse/Irrigate with 0.25% dakins solution. Rinse with normal saline  Primary dressing: Pack 0.25% dakins gauze wet to dry to wound  Secondary dressing: cover with 4x4 gauze and lg abd pad, secure with mefix tape and large flexinet  Frequency: daily    Follow up with Dr. Pennington in 2 weeks on Thursday 3/19/20  Other: Cont. Ampicillin.    [facility]:   Skilled nurse to perform dressing changes every Monday, Wednesday, and Friday. Patient's mother can perform dressing changes on days patient is not seen by home health nurse or scheduled in clinic. Please provide patient's caregiver with necessary supplies to do wound care: lg abd pads, 4x4 gauze, mefix tape.   F/U in  2 weeks.        Plan:       Dr. Pennington 3/19/20.       No follow-ups on file.

## 2020-03-16 ENCOUNTER — EXTERNAL HOME HEALTH (OUTPATIENT)
Dept: HOME HEALTH SERVICES | Facility: HOSPITAL | Age: 40
End: 2020-03-16

## 2020-03-19 ENCOUNTER — HOSPITAL ENCOUNTER (OUTPATIENT)
Dept: WOUND CARE | Facility: HOSPITAL | Age: 40
Discharge: HOME | End: 2020-03-19
Attending: SURGERY
Payer: COMMERCIAL

## 2020-03-19 VITALS
WEIGHT: 277 LBS | TEMPERATURE: 99 F | HEIGHT: 65 IN | DIASTOLIC BLOOD PRESSURE: 83 MMHG | SYSTOLIC BLOOD PRESSURE: 120 MMHG | BODY MASS INDEX: 46.15 KG/M2 | HEART RATE: 89 BPM

## 2020-03-19 DIAGNOSIS — L73.2 HIDRADENITIS AXILLARIS: ICD-10-CM

## 2020-03-19 DIAGNOSIS — L73.2 HIDRADENITIS SUPPURATIVA OF RIGHT AXILLA: Primary | ICD-10-CM

## 2020-03-19 DIAGNOSIS — M79.641 RIGHT HAND PAIN: ICD-10-CM

## 2020-03-19 DIAGNOSIS — L73.2 LEFT AXILLARY HIDRADENITIS: ICD-10-CM

## 2020-03-19 DIAGNOSIS — Z98.84 S/P BARIATRIC SURGERY: ICD-10-CM

## 2020-03-19 DIAGNOSIS — E66.01 MORBID OBESITY WITH BMI OF 45.0-49.9, ADULT: ICD-10-CM

## 2020-03-19 PROCEDURE — 99213 OFFICE O/P EST LOW 20 MIN: CPT

## 2020-03-19 NOTE — PROGRESS NOTES
"Subjective:       Patient ID: Rodney Infante is a 39 y.o. female.    Chief Complaint: Non-healing Wound (right and left axilla)    F/u for excision bilateral axillary hider adneitis    Client here today with open areas to right and left axilla. Seen by Dr. Pennington, who states sweat glands infected. Rx for clindamycin, bactroban, and tramadol provided. Stop methatrexate ( prescribed for arthritis). Client plans to have abdominal bypass surgery in September. Culture done today of right axilla.  8/7/19: F/U with Dr. Pennington. Culture results negative. Continue POC.  8/14/19: F/U with Dr. Pennington. Clindamycin d/c'd. Bactrim and Diflucan ordered today. Leave left and right axilla open to air. Culture sent to lab today.     9/4/19:  F/U with Dr. Pennington.  Wounds to right axilla healing up.  Patient states "right does not drain as much as left".  Patient feels the left axilla is not healing up like the right.  Patient still taking Bactrim PO.  Will continue for now.  9/18/19: F/U with Dr. Pennington. Both axilla continue to have "sebum - like" exudate. New site noted to left back. Culture done today. RX for lotrisone and Diflucan provided.  9/25/19: F/U with Dr. Pennington. Axillae improved. Client was not able to get Diflucan. Reordered today. Continues lotrisone and clindamycin. Culture results reviewed. Referred to ID for appt. On 10/7/19. Next visit with Dr. Pennington 10/16/19.  10/7/19: Vist today with ID. Augmentin ordered. Clindamycin d/c'd. Complete Diflucan.  11/6/19: Dr Pennington assessed patient.Wounds slowly improving. Patient states that she has had gastric sleeve procedure last week. No complaints voiced. Continuing with present plan of care. Follow up in 2 weeks.  11/20/19: F/U with Dr. Pennington. Wounds continue to improve. Continue POC. Return in 2 weeks.  12/4/19: F/U with Dr. Pennington. Axillae drainage decreased. Client has lost 97 Lbs. As of today. Surgery planned for 12/20/19. Next visit 12/18/19, and " consents to be signed.  12/18/19: F/U with Dr. Pennington. Surgery to axillae scheduled for Monday 12/23/19. Consents signed. Return 1/2/20.   12/26/19  F/U with Dr. pennington for s/p surgery to bilateral axilla.  Removal of glands and placement of theraskin.  Staples intact.  Theraskin not completely  Adhered on both areas.   Start bolster dressing to assist tin the adherence of the graft. Patient to return to clinic in 1 week.  Order homehealth for twice weekly    01/02/20: F/u with Dr. Pennington. Moderate to large amount of drainage from wounds. Staples and sutures removed from both wounds today in clinic per Dr. Pennington. New wound care orders given and routed to [facility]. Rx given for 5 mg norco every 4 hours. Patient given a note to not return to work until further notice.  1/9/20: F/U with Dr. Pennington. Removed 2 remaining staples. Discussed with patient future grafting of sites. Cont. POC. Return in 1 week.  1/16/20: F/U with Dr. Pennington. Client reports decreased drainage today. Rx for Clindamycin and Norco provided. Client prefers to wait a while before having grafting performed. Return in 1 week.  1/23/20: F/U with Dr. Pennington. Right axilla measurements improved. Continue Clindamycin. Return in 1 week.  2/6/20:Patient presents with a new wound to right lateral breast. Dr Pennington assessed patient. Continuing with same wound care orders. Rx for Norco 5/325mg given to patient for pain. F/U in 2 weeks.   2/20/20: F/U with Dr. Pennington. Sites continue to improve. Measurements decreased. Culture reviewed. Rx for Ampicillin provided. Client has taken PCN in past w/o reaction. Rx for Norco provided. Return in 2 weeks.  3/5/20: F/U with Dr. Pennington. Sites decreased in size. Cont. Ampicillin. Cont. POC. Return in 2 weeks.  3/19/20: F/U with Dr. Pennington. Sites improving. Cont. POC. Return in 2 weeks.    Review of Systems   Constitutional: Negative.    HENT: Negative.    Eyes: Negative.    Respiratory: Negative.     Cardiovascular: Negative.    Gastrointestinal: Negative.    Genitourinary: Negative.    Musculoskeletal: Negative.    Skin: Negative.    Neurological: Negative.    Psychiatric/Behavioral: Negative.        Objective:      Physical Exam   Constitutional: She is oriented to person, place, and time. She appears well-developed and well-nourished.   HENT:   Head: Normocephalic.   Eyes: Pupils are equal, round, and reactive to light. Conjunctivae and EOM are normal.   Neck: Normal range of motion. Neck supple.   Cardiovascular: Normal rate, regular rhythm, normal heart sounds and intact distal pulses.   Pulmonary/Chest: Effort normal and breath sounds normal.   Abdominal: Soft. Bowel sounds are normal.   Musculoskeletal: Normal range of motion.   Neurological: She is alert and oriented to person, place, and time. She has normal reflexes.   Skin: Skin is warm and dry.       Assessment:       1. Hidradenitis suppurativa of right axilla    2. Left axillary hidradenitis           Wound 07/31/19 1300 Other (comment) Axilla #2 (Active)   07/31/19 1300    Pre-existing: Yes   Primary Wound Type: Other   Side: Left   Orientation:    Location: Axilla   Wound/PI Number (optional): #2   Ankle-Brachial Index:    Pulses:    Removal Indication and Assessment:    Wound Outcome:    (Retired) Wound Type:    (Retired) Wound Length (cm):    (Retired) Wound Width (cm):    (Retired) Depth (cm):    Wound Description (Comments):    Removal Indications:    Dressing Appearance Moist drainage 3/19/2020 11:00 AM   Drainage Amount Moderate 3/19/2020 11:00 AM   Drainage Characteristics/Odor Serosanguineous 3/19/2020 11:00 AM   Appearance Red;Moist;Granulating 3/19/2020 11:00 AM   Tissue loss description Full thickness 3/19/2020 11:00 AM   Red (%), Wound Tissue Color 100 % 3/19/2020 11:00 AM   Periwound Area Intact;Dry 3/19/2020 11:00 AM   Wound Edges Irregular 3/19/2020 11:00 AM   Wound Length (cm) 6 cm 3/19/2020 11:00 AM   Wound Width (cm) 3.5 cm  3/19/2020 11:00 AM   Wound Depth (cm) 0.1 cm 3/19/2020 11:00 AM   Wound Volume (cm^3) 2.1 cm^3 3/19/2020 11:00 AM   Wound Surface Area (cm^2) 21 cm^2 3/19/2020 11:00 AM   Care Cleansed with:;Antimicrobial agent;Sterile normal saline 3/19/2020 11:00 AM   Dressing Changed;Gauze, wet to moist;Abd pad 3/19/2020 11:00 AM   Periwound Care Dry periwound area maintained 3/19/2020 11:00 AM   Dressing Change Due 03/20/20 3/19/2020 11:00 AM            Wound 07/31/19 1300 Other (comment) Axilla #1 (Active)   07/31/19 1300    Pre-existing: Yes   Primary Wound Type: Other   Side: Right   Orientation:    Location: Axilla   Wound/PI Number (optional): #1   Ankle-Brachial Index:    Pulses:    Removal Indication and Assessment:    Wound Outcome:    (Retired) Wound Type:    (Retired) Wound Length (cm):    (Retired) Wound Width (cm):    (Retired) Depth (cm):    Wound Description (Comments):    Removal Indications:    Dressing Appearance Moist drainage 3/19/2020 11:00 AM   Drainage Amount Moderate 3/19/2020 11:00 AM   Drainage Characteristics/Odor Serosanguineous 3/19/2020 11:00 AM   Appearance Red;Moist 3/19/2020 11:00 AM   Tissue loss description Full thickness 3/19/2020 11:00 AM   Red (%), Wound Tissue Color 100 % 3/19/2020 11:00 AM   Periwound Area Intact;Dry 3/19/2020 11:00 AM   Wound Edges Irregular 3/19/2020 11:00 AM   Wound Length (cm) 10 cm 3/19/2020 11:00 AM   Wound Width (cm) 3 cm 3/19/2020 11:00 AM   Wound Depth (cm) 0.1 cm 3/19/2020 11:00 AM   Wound Volume (cm^3) 3 cm^3 3/19/2020 11:00 AM   Wound Surface Area (cm^2) 30 cm^2 3/19/2020 11:00 AM   Care Cleansed with:;Antimicrobial agent;Sterile normal saline 3/19/2020 11:00 AM   Dressing Changed;Gauze, wet to moist;Abd pad 3/19/2020 11:00 AM   Periwound Care Dry periwound area maintained 3/19/2020 11:00 AM   Dressing Change Due 03/20/20 3/19/2020 11:00 AM            Wound 02/06/20 1000 Other (comment) Breast #3 (Active)   02/06/20 1000    Pre-existing: Yes   Primary Wound  Type: Other   Side: Right   Orientation:    Location: Breast   Wound/PI Number (optional): #3   Ankle-Brachial Index:    Pulses:    Removal Indication and Assessment:    Wound Outcome:    (Retired) Wound Type:    (Retired) Wound Length (cm):    (Retired) Wound Width (cm):    (Retired) Depth (cm):    Wound Description (Comments):    Removal Indications:    Dressing Appearance Moist drainage 3/19/2020 11:00 AM   Drainage Amount Small 3/19/2020 11:00 AM   Drainage Characteristics/Odor Serosanguineous 3/19/2020 11:00 AM   Appearance Red;Moist 3/19/2020 11:00 AM   Tissue loss description Partial thickness 3/19/2020 11:00 AM   Red (%), Wound Tissue Color 100 % 3/19/2020 11:00 AM   Periwound Area Intact;Dry 3/19/2020 11:00 AM   Wound Edges Defined 3/19/2020 11:00 AM   Wound Length (cm) 2.5 cm 3/19/2020 11:00 AM   Wound Width (cm) 5.7 cm 3/19/2020 11:00 AM   Wound Depth (cm) 0.1 cm 3/19/2020 11:00 AM   Wound Volume (cm^3) 1.42 cm^3 3/19/2020 11:00 AM   Wound Surface Area (cm^2) 14.25 cm^2 3/19/2020 11:00 AM   Care Cleansed with:;Antimicrobial agent;Sterile normal saline 3/19/2020 11:00 AM   Dressing Changed;Gauze, wet to moist;Island/border 3/19/2020 11:00 AM   Periwound Care Dry periwound area maintained 3/19/2020 11:00 AM   Dressing Change Due 03/20/20 3/19/2020 11:00 AM   Left and Right Axilla, and Right lateral breast  Cleanse/Irrigate with 0.25% dakins solution. Rinse with normal saline  Primary dressing: Pack 0.25% dakins gauze wet to dry to wound  Secondary dressing: cover with 4x4 gauze and lg abd pad, secure with mefix tape and large flexinet  Frequency: daily    Follow up with Dr. Pennington in 2 weeks on Thursday 4/2/20  Other: Cont. Ampicillin.    [facility]:   Skilled nurse to perform dressing changes every Monday, Wednesday, and Friday. Patient's mother can perform dressing changes on days patient is not seen by home health nurse or scheduled in clinic. Please provide patient's caregiver with necessary  supplies to do wound care: lg abd pads, 4x4 gauze, mefix tape.   F/U in 2 weeks.        Plan:       Dr. Pennington 4/2/20.       No follow-ups on file.

## 2020-03-20 ENCOUNTER — TELEPHONE (OUTPATIENT)
Dept: RHEUMATOLOGY | Facility: CLINIC | Age: 40
End: 2020-03-20

## 2020-03-24 ENCOUNTER — PATIENT MESSAGE (OUTPATIENT)
Dept: RHEUMATOLOGY | Facility: CLINIC | Age: 40
End: 2020-03-24

## 2020-03-24 ENCOUNTER — OFFICE VISIT (OUTPATIENT)
Dept: RHEUMATOLOGY | Facility: CLINIC | Age: 40
End: 2020-03-24
Payer: COMMERCIAL

## 2020-03-24 DIAGNOSIS — M13.80 SERONEGATIVE ARTHRITIS: Primary | ICD-10-CM

## 2020-03-24 DIAGNOSIS — M67.90 TENDINOPATHY: ICD-10-CM

## 2020-03-24 PROCEDURE — 99212 PR OFFICE/OUTPT VISIT, EST, LEVL II, 10-19 MIN: ICD-10-PCS | Mod: 95,,, | Performed by: INTERNAL MEDICINE

## 2020-03-24 PROCEDURE — 99212 OFFICE O/P EST SF 10 MIN: CPT | Mod: 95,,, | Performed by: INTERNAL MEDICINE

## 2020-03-24 NOTE — PROGRESS NOTES
Communicated with patient.  She reported inability to initiate video conference call.  Currently on sulfasalazine 1000 mg b.i.d. and Humira.  Main concern is right shoulder pain.  Onset approximately 3 months ago without any particular precipitating events.  Denies swelling or redness.  Does not have any other joint involvement.  No particular difficulty reported with activities of daily living.  Will continue with current therapy unchanged.  Recommend range of motion, flexibility, and strengthening exercises for likely shoulder tendinopathy.  Consider for corticosteroid injection and PT referral next visit if refractory.    Consult Start Time: 03/24/2020 09:40  Consult End Time: 03/24/2020 09:50

## 2020-03-25 ENCOUNTER — TELEPHONE (OUTPATIENT)
Dept: RHEUMATOLOGY | Facility: CLINIC | Age: 40
End: 2020-03-25

## 2020-03-25 NOTE — TELEPHONE ENCOUNTER
----- Message from Celia Palma sent at 3/25/2020 10:01 AM CDT -----  Contact: Patient   Type:  Patient Returning Call    Who Called: Rodney  Who Left Message for Patient: Ayla  Does the patient know what this is regarding?: Appointment  Would the patient rather a call back or a response via [facility] [employer]? Call back  Best Call Back Number: Please call her at [phone]  Additional Information: n/a

## 2020-04-08 ENCOUNTER — TELEPHONE (OUTPATIENT)
Dept: PHARMACY | Facility: CLINIC | Age: 40
End: 2020-04-08

## 2020-04-13 NOTE — TELEPHONE ENCOUNTER
Humira refill confirmed. Humira will ship 4/14 for delivery on 4/15 $5 -004. Patient reports last day of quarantine 4/12. Patient reports she feels much better and is ready to resume Humria administration. Patient denies any signs/symptoms of infections and reports she never had a fever. Patient will inject 4/15, then return to every other Tuesday for subsequent injections. Patient reports some morning stiffness until she gets up and moving. Patient no questions or concerns.

## 2020-05-06 ENCOUNTER — TELEPHONE (OUTPATIENT)
Dept: PHARMACY | Facility: CLINIC | Age: 40
End: 2020-05-06

## 2020-05-06 NOTE — TELEPHONE ENCOUNTER
Humira refill confirmed. We will ship Humira refill on  via fedex to arrive on . $5.00 copay- 004. Confirmed 2 patient identifiers - name and . Therapy appropriate.     Patient has 0 doses of Humira remaining and injects every other Tuesday. Patient reports they are not having any side effects so far. No missed doses, no new medications, no new allergies or health conditions reported at this time. Patient had no questions or concerns. Advised to call [facility] and provider if any issues arise.  Patient verbalized understanding.

## 2020-05-07 ENCOUNTER — HOSPITAL ENCOUNTER (OUTPATIENT)
Dept: WOUND CARE | Facility: HOSPITAL | Age: 40
Discharge: HOME | End: 2020-05-07
Attending: SURGERY
Payer: COMMERCIAL

## 2020-05-07 VITALS
DIASTOLIC BLOOD PRESSURE: 87 MMHG | SYSTOLIC BLOOD PRESSURE: 118 MMHG | HEIGHT: 60 IN | WEIGHT: 260 LBS | BODY MASS INDEX: 51.04 KG/M2 | TEMPERATURE: 98 F | HEART RATE: 87 BPM

## 2020-05-07 DIAGNOSIS — L73.2 LEFT AXILLARY HIDRADENITIS: ICD-10-CM

## 2020-05-07 DIAGNOSIS — Z79.4 TYPE 2 DIABETES MELLITUS WITH OTHER SKIN ULCER, WITH LONG-TERM CURRENT USE OF INSULIN: ICD-10-CM

## 2020-05-07 DIAGNOSIS — E11.622 TYPE 2 DIABETES MELLITUS WITH OTHER SKIN ULCER, WITH LONG-TERM CURRENT USE OF INSULIN: ICD-10-CM

## 2020-05-07 DIAGNOSIS — L73.2 HIDRADENITIS AXILLARIS: ICD-10-CM

## 2020-05-07 DIAGNOSIS — Z98.84 S/P BARIATRIC SURGERY: ICD-10-CM

## 2020-05-07 DIAGNOSIS — L73.2 HIDRADENITIS SUPPURATIVA OF RIGHT AXILLA: Primary | ICD-10-CM

## 2020-05-07 DIAGNOSIS — G56.03 BILATERAL CARPAL TUNNEL SYNDROME: ICD-10-CM

## 2020-05-07 PROCEDURE — 99213 OFFICE O/P EST LOW 20 MIN: CPT

## 2020-05-07 PROCEDURE — 87077 CULTURE AEROBIC IDENTIFY: CPT

## 2020-05-07 PROCEDURE — 87070 CULTURE OTHR SPECIMN AEROBIC: CPT

## 2020-05-07 PROCEDURE — 87075 CULTR BACTERIA EXCEPT BLOOD: CPT

## 2020-05-07 PROCEDURE — 87186 SC STD MICRODIL/AGAR DIL: CPT

## 2020-05-07 NOTE — PROGRESS NOTES
"Subjective:       Patient ID: Rodney Infante is a 39 y.o. female.    Chief Complaint: Non-healing Wound (right and left axilla)    F/u for excision bilateral axillary hider adneitis    Client here today with open areas to right and left axilla. Seen by Dr. Pennington, who states sweat glands infected. Rx for clindamycin, bactroban, and tramadol provided. Stop methatrexate ( prescribed for arthritis). Client plans to have abdominal bypass surgery in September. Culture done today of right axilla.  8/7/19: F/U with Dr. Pennington. Culture results negative. Continue POC.  8/14/19: F/U with Dr. Pennington. Clindamycin d/c'd. Bactrim and Diflucan ordered today. Leave left and right axilla open to air. Culture sent to lab today.     9/4/19:  F/U with Dr. Pennington.  Wounds to right axilla healing up.  Patient states "right does not drain as much as left".  Patient feels the left axilla is not healing up like the right.  Patient still taking Bactrim PO.  Will continue for now.  9/18/19: F/U with Dr. Pennington. Both axilla continue to have "sebum - like" exudate. New site noted to left back. Culture done today. RX for lotrisone and Diflucan provided.  9/25/19: F/U with Dr. Pennington. Axillae improved. Client was not able to get Diflucan. Reordered today. Continues lotrisone and clindamycin. Culture results reviewed. Referred to ID for appt. On 10/7/19. Next visit with Dr. Pennington 10/16/19.  10/7/19: Vist today with ID. Augmentin ordered. Clindamycin d/c'd. Complete Diflucan.  11/6/19: Dr Pennington assessed patient.Wounds slowly improving. Patient states that she has had gastric sleeve procedure last week. No complaints voiced. Continuing with present plan of care. Follow up in 2 weeks.  11/20/19: F/U with Dr. Pennington. Wounds continue to improve. Continue POC. Return in 2 weeks.  12/4/19: F/U with Dr. Pennington. Axillae drainage decreased. Client has lost 97 Lbs. As of today. Surgery planned for 12/20/19. Next visit 12/18/19, and " consents to be signed.  12/18/19: F/U with Dr. Pennington. Surgery to axillae scheduled for Monday 12/23/19. Consents signed. Return 1/2/20.   12/26/19  F/U with Dr. pennington for s/p surgery to bilateral axilla.  Removal of glands and placement of theraskin.  Staples intact.  Theraskin not completely  Adhered on both areas.   Start bolster dressing to assist tin the adherence of the graft. Patient to return to clinic in 1 week.  Order homehealth for twice weekly    01/02/20: F/u with Dr. Pennington. Moderate to large amount of drainage from wounds. Staples and sutures removed from both wounds today in clinic per Dr. Pennington. New wound care orders given and routed to [facility]. Rx given for 5 mg norco every 4 hours. Patient given a note to not return to work until further notice.  1/9/20: F/U with Dr. Pennington. Removed 2 remaining staples. Discussed with patient future grafting of sites. Cont. POC. Return in 1 week.  1/16/20: F/U with Dr. Pennington. Client reports decreased drainage today. Rx for Clindamycin and Norco provided. Client prefers to wait a while before having grafting performed. Return in 1 week.  1/23/20: F/U with Dr. Pennington. Right axilla measurements improved. Continue Clindamycin. Return in 1 week.  2/6/20:Patient presents with a new wound to right lateral breast. Dr Pennington assessed patient. Continuing with same wound care orders. Rx for Norco 5/325mg given to patient for pain. F/U in 2 weeks.   2/20/20: F/U with Dr. Pennington. Sites continue to improve. Measurements decreased. Culture reviewed. Rx for Ampicillin provided. Client has taken PCN in past w/o reaction. Rx for Norco provided. Return in 2 weeks.  3/5/20: F/U with Dr. Pennington. Sites decreased in size. Cont. Ampicillin. Cont. POC. Return in 2 weeks.  3/19/20: F/U with Dr. Pennington. Sites improving. Cont. POC. Return in 2 weeks.  5/7/20: F/U with Dr. Pennington. Measurements improved all sites. Culture of left axilla done today. Cont. POC. Return in  2 weeks.    Review of Systems   Constitutional: Negative.    HENT: Negative.    Eyes: Negative.    Respiratory: Negative.    Cardiovascular: Negative.    Gastrointestinal: Negative.    Genitourinary: Negative.    Musculoskeletal: Negative.    Skin: Negative.    Neurological: Negative.    Psychiatric/Behavioral: Negative.        Objective:      Physical Exam   Constitutional: She is oriented to person, place, and time. She appears well-developed and well-nourished.   HENT:   Head: Normocephalic.   Eyes: Pupils are equal, round, and reactive to light. Conjunctivae and EOM are normal.   Neck: Normal range of motion. Neck supple.   Cardiovascular: Normal rate, regular rhythm, normal heart sounds and intact distal pulses.   Pulmonary/Chest: Effort normal and breath sounds normal.   Abdominal: Soft. Bowel sounds are normal.   Musculoskeletal: Normal range of motion.   Neurological: She is alert and oriented to person, place, and time. She has normal reflexes.   Skin: Skin is warm and dry.       Assessment:       1. Hidradenitis suppurativa of right axilla    2. Left axillary hidradenitis           Wound 07/31/19 1300 Other (comment) Axilla #2 (Active)   07/31/19 1300    Pre-existing: Yes   Primary Wound Type: Other   Side: Left   Orientation:    Location: Axilla   Wound/PI Number (optional): #2   Ankle-Brachial Index:    Pulses:    Removal Indication and Assessment:    Wound Outcome:    (Retired) Wound Type:    (Retired) Wound Length (cm):    (Retired) Wound Width (cm):    (Retired) Depth (cm):    Wound Description (Comments):    Removal Indications:    Wound Image   5/7/2020 11:00 AM   Dressing Appearance Intact;Moist drainage 5/7/2020 11:00 AM   Drainage Amount Moderate 5/7/2020 11:00 AM   Drainage Characteristics/Odor Serosanguineous 5/7/2020 11:00 AM   Appearance Red;Moist 5/7/2020 11:00 AM   Tissue loss description Full thickness 5/7/2020 11:00 AM   Red (%), Wound Tissue Color 100 % 5/7/2020 11:00 AM   Periwound Area  Intact;Dry;Satellite lesion 5/7/2020 11:00 AM   Wound Edges Irregular 5/7/2020 11:00 AM   Wound Length (cm) 7 cm 5/7/2020 11:00 AM   Wound Width (cm) 3 cm 5/7/2020 11:00 AM   Wound Depth (cm) 0.1 cm 5/7/2020 11:00 AM   Wound Volume (cm^3) 2.1 cm^3 5/7/2020 11:00 AM   Wound Surface Area (cm^2) 21 cm^2 5/7/2020 11:00 AM   Care Cleansed with:;Antimicrobial agent;Sterile normal saline 5/7/2020 11:00 AM   Dressing Changed;Gauze, wet to moist;Abd pad 5/7/2020 11:00 AM   Periwound Care Dry periwound area maintained 5/7/2020 11:00 AM   Dressing Change Due 05/08/20 5/7/2020 11:00 AM            Wound 07/31/19 1300 Other (comment) Axilla #1 (Active)   07/31/19 1300    Pre-existing: Yes   Primary Wound Type: Other   Side: Right   Orientation:    Location: Axilla   Wound/PI Number (optional): #1   Ankle-Brachial Index:    Pulses:    Removal Indication and Assessment:    Wound Outcome:    (Retired) Wound Type:    (Retired) Wound Length (cm):    (Retired) Wound Width (cm):    (Retired) Depth (cm):    Wound Description (Comments):    Removal Indications:    Wound Image   5/7/2020 11:00 AM   Dressing Appearance Moist drainage 5/7/2020 11:00 AM   Drainage Amount Moderate 5/7/2020 11:00 AM   Drainage Characteristics/Odor Serosanguineous 5/7/2020 11:00 AM   Appearance Red;Moist 5/7/2020 11:00 AM   Tissue loss description Full thickness 5/7/2020 11:00 AM   Red (%), Wound Tissue Color 100 % 5/7/2020 11:00 AM   Periwound Area Intact;Dry 5/7/2020 11:00 AM   Wound Edges Irregular 5/7/2020 11:00 AM   Wound Length (cm) 4.5 cm 5/7/2020 11:00 AM   Wound Width (cm) 2.5 cm 5/7/2020 11:00 AM   Wound Depth (cm) 0.1 cm 5/7/2020 11:00 AM   Wound Volume (cm^3) 1.12 cm^3 5/7/2020 11:00 AM   Wound Surface Area (cm^2) 11.25 cm^2 5/7/2020 11:00 AM   Care Cleansed with:;Antimicrobial agent;Sterile normal saline 5/7/2020 11:00 AM   Dressing Changed;Gauze, wet to moist;Abd pad 5/7/2020 11:00 AM   Periwound Care Dry periwound area maintained 5/7/2020 11:00 AM    Dressing Change Due 05/08/20 5/7/2020 11:00 AM            Wound 02/06/20 1000 Other (comment) Breast #3 (Active)   02/06/20 1000    Pre-existing: Yes   Primary Wound Type: Other   Side: Right   Orientation:    Location: Breast   Wound/PI Number (optional): #3   Ankle-Brachial Index:    Pulses:    Removal Indication and Assessment:    Wound Outcome:    (Retired) Wound Type:    (Retired) Wound Length (cm):    (Retired) Wound Width (cm):    (Retired) Depth (cm):    Wound Description (Comments):    Removal Indications:    Wound Image    5/7/2020 11:00 AM   Dressing Appearance Intact;Moist drainage 5/7/2020 11:00 AM   Drainage Amount Small 5/7/2020 11:00 AM   Drainage Characteristics/Odor Serosanguineous 5/7/2020 11:00 AM   Appearance Red;Moist;Granulating 5/7/2020 11:00 AM   Tissue loss description Full thickness 5/7/2020 11:00 AM   Red (%), Wound Tissue Color 100 % 5/7/2020 11:00 AM   Periwound Area Intact;Dry 5/7/2020 11:00 AM   Wound Edges Defined 5/7/2020 11:00 AM   Wound Length (cm) 2 cm 5/7/2020 11:00 AM   Wound Width (cm) 3 cm 5/7/2020 11:00 AM   Wound Depth (cm) 0.1 cm 5/7/2020 11:00 AM   Wound Volume (cm^3) 0.6 cm^3 5/7/2020 11:00 AM   Wound Surface Area (cm^2) 6 cm^2 5/7/2020 11:00 AM   Care Cleansed with:;Antimicrobial agent;Sterile normal saline 5/7/2020 11:00 AM   Dressing Changed;Gauze, wet to moist;Island/border 5/7/2020 11:00 AM   Periwound Care Dry periwound area maintained 5/7/2020 11:00 AM   Dressing Change Due 05/08/20 5/7/2020 11:00 AM   Left and Right Axilla, and Right lateral breast  Cleanse/Irrigate with 0.25% dakins solution. Rinse with normal saline  Primary dressing: Pack 0.25% dakins gauze wet to dry to wound  Secondary dressing: cover with 4x4 gauze and lg abd pad, secure with mefix tape and large flexinet  Frequency: daily    Follow up with Dr. Pennington in 2 weeks on Thursday 5/21/20  Other: Culture done today.    [facility]:   Skilled nurse to perform dressing changes every Monday,  Wednesday, and Friday.  Please provide patient's caregiver with necessary supplies to do wound care: lg abd pads, 4x4 gauze, mefix tape.   F/U in 2 weeks.        Plan:       Dr. Pennington 5/21/20       No follow-ups on file.

## 2020-05-08 RX ORDER — GABAPENTIN 100 MG/1
300 CAPSULE ORAL NIGHTLY
Qty: 90 CAPSULE | Refills: 3 | Status: SHIPPED | OUTPATIENT
Start: 2020-05-08 | End: 2020-08-25 | Stop reason: SDUPTHER

## 2020-05-11 LAB — BACTERIA SPEC AEROBE CULT: ABNORMAL

## 2020-05-11 RX ORDER — AMPICILLIN 500 MG/1
500 CAPSULE ORAL EVERY 8 HOURS
COMMUNITY
Start: 2020-05-11 | End: 2020-05-25

## 2020-05-12 LAB — BACTERIA SPEC ANAEROBE CULT: NORMAL

## 2020-06-03 ENCOUNTER — TELEPHONE (OUTPATIENT)
Dept: PHARMACY | Facility: CLINIC | Age: 40
End: 2020-06-03

## 2020-06-04 ENCOUNTER — HOSPITAL ENCOUNTER (OUTPATIENT)
Dept: WOUND CARE | Facility: HOSPITAL | Age: 40
Discharge: HOME | End: 2020-06-04
Attending: SURGERY
Payer: COMMERCIAL

## 2020-06-04 VITALS
TEMPERATURE: 99 F | DIASTOLIC BLOOD PRESSURE: 79 MMHG | WEIGHT: 260 LBS | HEART RATE: 85 BPM | SYSTOLIC BLOOD PRESSURE: 122 MMHG | HEIGHT: 60 IN | BODY MASS INDEX: 51.04 KG/M2

## 2020-06-04 DIAGNOSIS — L73.2 LEFT AXILLARY HIDRADENITIS: Primary | ICD-10-CM

## 2020-06-04 DIAGNOSIS — L73.2 HIDRADENITIS SUPPURATIVA OF RIGHT AXILLA: ICD-10-CM

## 2020-06-04 DIAGNOSIS — Z79.4 TYPE 2 DIABETES MELLITUS WITH OTHER SKIN ULCER, WITH LONG-TERM CURRENT USE OF INSULIN: ICD-10-CM

## 2020-06-04 DIAGNOSIS — E11.622 DIABETES MELLITUS WITH SKIN ULCER: ICD-10-CM

## 2020-06-04 DIAGNOSIS — E11.622 TYPE 2 DIABETES MELLITUS WITH OTHER SKIN ULCER, WITH LONG-TERM CURRENT USE OF INSULIN: ICD-10-CM

## 2020-06-04 DIAGNOSIS — L98.499 DIABETES MELLITUS WITH SKIN ULCER: ICD-10-CM

## 2020-06-04 PROCEDURE — 99213 OFFICE O/P EST LOW 20 MIN: CPT

## 2020-06-04 RX ORDER — CLINDAMYCIN HYDROCHLORIDE 300 MG/1
300 CAPSULE ORAL EVERY 8 HOURS
Qty: 60 CAPSULE | Refills: 1 | Status: SHIPPED | OUTPATIENT
Start: 2020-06-04 | End: 2020-07-15 | Stop reason: SDUPTHER

## 2020-06-04 NOTE — PROGRESS NOTES
"Subjective:       Patient ID: Rodney Infante is a 39 y.o. female.    Chief Complaint: Non-healing Wound (right and left axilla)    F/u for excision bilateral axillary hider adneitis    Client here today with open areas to right and left axilla. Seen by Dr. Pennington, who states sweat glands infected. Rx for clindamycin, bactroban, and tramadol provided. Stop methatrexate ( prescribed for arthritis). Client plans to have abdominal bypass surgery in September. Culture done today of right axilla.  8/7/19: F/U with Dr. Pennington. Culture results negative. Continue POC.  8/14/19: F/U with Dr. Pennington. Clindamycin d/c'd. Bactrim and Diflucan ordered today. Leave left and right axilla open to air. Culture sent to lab today.     9/4/19:  F/U with Dr. Pennington.  Wounds to right axilla healing up.  Patient states "right does not drain as much as left".  Patient feels the left axilla is not healing up like the right.  Patient still taking Bactrim PO.  Will continue for now.  9/18/19: F/U with Dr. Pennington. Both axilla continue to have "sebum - like" exudate. New site noted to left back. Culture done today. RX for lotrisone and Diflucan provided.  9/25/19: F/U with Dr. Pennington. Axillae improved. Client was not able to get Diflucan. Reordered today. Continues lotrisone and clindamycin. Culture results reviewed. Referred to ID for appt. On 10/7/19. Next visit with Dr. Pennington 10/16/19.  10/7/19: Vist today with ID. Augmentin ordered. Clindamycin d/c'd. Complete Diflucan.  11/6/19: Dr Pennington assessed patient.Wounds slowly improving. Patient states that she has had gastric sleeve procedure last week. No complaints voiced. Continuing with present plan of care. Follow up in 2 weeks.  11/20/19: F/U with Dr. Pennington. Wounds continue to improve. Continue POC. Return in 2 weeks.  12/4/19: F/U with Dr. Pennington. Axillae drainage decreased. Client has lost 97 Lbs. As of today. Surgery planned for 12/20/19. Next visit 12/18/19, and " consents to be signed.  12/18/19: F/U with Dr. Pennington. Surgery to axillae scheduled for Monday 12/23/19. Consents signed. Return 1/2/20.   12/26/19  F/U with Dr. pennington for s/p surgery to bilateral axilla.  Removal of glands and placement of theraskin.  Staples intact.  Theraskin not completely  Adhered on both areas.   Start bolster dressing to assist tin the adherence of the graft. Patient to return to clinic in 1 week.  Order homehealth for twice weekly    01/02/20: F/u with Dr. Pennington. Moderate to large amount of drainage from wounds. Staples and sutures removed from both wounds today in clinic per Dr. Pennington. New wound care orders given and routed to [facility]. Rx given for 5 mg norco every 4 hours. Patient given a note to not return to work until further notice.  1/9/20: F/U with Dr. Pennington. Removed 2 remaining staples. Discussed with patient future grafting of sites. Cont. POC. Return in 1 week.  1/16/20: F/U with Dr. Pennington. Client reports decreased drainage today. Rx for Clindamycin and Norco provided. Client prefers to wait a while before having grafting performed. Return in 1 week.  1/23/20: F/U with Dr. Pennington. Right axilla measurements improved. Continue Clindamycin. Return in 1 week.  2/6/20:Patient presents with a new wound to right lateral breast. Dr Pennington assessed patient. Continuing with same wound care orders. Rx for Norco 5/325mg given to patient for pain. F/U in 2 weeks.   2/20/20: F/U with Dr. Pennington. Sites continue to improve. Measurements decreased. Culture reviewed. Rx for Ampicillin provided. Client has taken PCN in past w/o reaction. Rx for Norco provided. Return in 2 weeks.  3/5/20: F/U with Dr. Pennington. Sites decreased in size. Cont. Ampicillin. Cont. POC. Return in 2 weeks.  3/19/20: F/U with Dr. Pennington. Sites improving. Cont. POC. Return in 2 weeks.  5/7/20: F/U with Dr. Pennington. Measurements improved all sites. Culture of left axilla done today. Cont. POC. Return in  2 weeks.  6/4/20: F/U with Dr. Pennington. All sites improved. Cont. Clindamycin. Return in 2 weeks.    Review of Systems   Constitutional: Negative.    HENT: Negative.    Eyes: Negative.    Respiratory: Negative.    Cardiovascular: Negative.    Gastrointestinal: Negative.    Genitourinary: Negative.    Musculoskeletal: Negative.    Skin: Negative.    Neurological: Negative.    Psychiatric/Behavioral: Negative.        Objective:      Physical Exam   Constitutional: She is oriented to person, place, and time. She appears well-developed and well-nourished.   HENT:   Head: Normocephalic.   Eyes: Pupils are equal, round, and reactive to light. Conjunctivae and EOM are normal.   Neck: Normal range of motion. Neck supple.   Cardiovascular: Normal rate, regular rhythm, normal heart sounds and intact distal pulses.   Pulmonary/Chest: Effort normal and breath sounds normal.   Abdominal: Soft. Bowel sounds are normal.   Musculoskeletal: Normal range of motion.   Neurological: She is alert and oriented to person, place, and time. She has normal reflexes.   Skin: Skin is warm and dry.       Assessment:       1. Left axillary hidradenitis    2. Hidradenitis suppurativa of right axilla    3. Diabetes mellitus with skin ulcer    4. Type 2 diabetes mellitus with other skin ulcer, with long-term current use of insulin           Wound 07/31/19 1300 Other (comment) Axilla #2 (Active)   07/31/19 1300    Pre-existing: Yes   Primary Wound Type: Other   Side: Left   Orientation:    Location: Axilla   Wound/PI Number (optional): #2   Ankle-Brachial Index:    Pulses:    Removal Indication and Assessment:    Wound Outcome:    (Retired) Wound Type:    (Retired) Wound Length (cm):    (Retired) Wound Width (cm):    (Retired) Depth (cm):    Wound Description (Comments):    Removal Indications:    Wound Image   6/4/2020  9:00 AM   Dressing Appearance Moist drainage;Intact 6/4/2020  9:00 AM   Drainage Amount Small 6/4/2020  9:00 AM   Drainage  Characteristics/Odor Serosanguineous 6/4/2020  9:00 AM   Appearance Red;Moist 6/4/2020  9:00 AM   Tissue loss description Full thickness 6/4/2020  9:00 AM   Red (%), Wound Tissue Color 100 % 6/4/2020  9:00 AM   Periwound Area Intact;Dry 6/4/2020  9:00 AM   Wound Edges Irregular 6/4/2020  9:00 AM   Wound Length (cm) 4 cm 6/4/2020  9:00 AM   Wound Width (cm) 2 cm 6/4/2020  9:00 AM   Wound Depth (cm) 0.1 cm 6/4/2020  9:00 AM   Wound Volume (cm^3) 0.8 cm^3 6/4/2020  9:00 AM   Wound Surface Area (cm^2) 8 cm^2 6/4/2020  9:00 AM   Care Cleansed with:;Antimicrobial agent 6/4/2020  9:00 AM   Dressing Changed;Gauze, wet to moist;Abd pad 6/4/2020  9:00 AM   Periwound Care Dry periwound area maintained 6/4/2020  9:00 AM   Dressing Change Due 06/05/20 6/4/2020  9:00 AM            Wound 07/31/19 1300 Other (comment) Axilla #1 (Active)   07/31/19 1300    Pre-existing: Yes   Primary Wound Type: Other   Side: Right   Orientation:    Location: Axilla   Wound/PI Number (optional): #1   Ankle-Brachial Index:    Pulses:    Removal Indication and Assessment:    Wound Outcome:    (Retired) Wound Type:    (Retired) Wound Length (cm):    (Retired) Wound Width (cm):    (Retired) Depth (cm):    Wound Description (Comments):    Removal Indications:    Wound Image   6/4/2020  9:00 AM   Dressing Appearance Moist drainage 6/4/2020  9:00 AM   Drainage Amount Small 6/4/2020  9:00 AM   Drainage Characteristics/Odor Serosanguineous 6/4/2020  9:00 AM   Appearance Red;Moist 6/4/2020  9:00 AM   Tissue loss description Full thickness 6/4/2020  9:00 AM   Red (%), Wound Tissue Color 100 % 6/4/2020  9:00 AM   Periwound Area Intact;Dry 6/4/2020  9:00 AM   Wound Edges Irregular 6/4/2020  9:00 AM   Wound Length (cm) 3 cm 6/4/2020  9:00 AM   Wound Width (cm) 2 cm 6/4/2020  9:00 AM   Wound Depth (cm) 0.1 cm 6/4/2020  9:00 AM   Wound Volume (cm^3) 0.6 cm^3 6/4/2020  9:00 AM   Wound Surface Area (cm^2) 6 cm^2 6/4/2020  9:00 AM   Care Cleansed with:;Antimicrobial  agent 6/4/2020  9:00 AM   Dressing Changed;Gauze, wet to moist;Abd pad 6/4/2020  9:00 AM   Periwound Care Dry periwound area maintained 6/4/2020  9:00 AM   Dressing Change Due 06/05/20 6/4/2020  9:00 AM            Wound 02/06/20 1000 Other (comment) Breast #3 (Active)   02/06/20 1000    Pre-existing: Yes   Primary Wound Type: Other   Side: Right   Orientation:    Location: Breast   Wound/PI Number (optional): #3   Ankle-Brachial Index:    Pulses:    Removal Indication and Assessment:    Wound Outcome:    (Retired) Wound Type:    (Retired) Wound Length (cm):    (Retired) Wound Width (cm):    (Retired) Depth (cm):    Wound Description (Comments):    Removal Indications:    Wound Image   6/4/2020  9:00 AM   Dressing Appearance Moist drainage;Intact 6/4/2020  9:00 AM   Drainage Amount Small 6/4/2020  9:00 AM   Drainage Characteristics/Odor Serosanguineous 6/4/2020  9:00 AM   Appearance Red;Moist 6/4/2020  9:00 AM   Tissue loss description Full thickness 6/4/2020  9:00 AM   Red (%), Wound Tissue Color 100 % 6/4/2020  9:00 AM   Periwound Area Intact;Dry 6/4/2020  9:00 AM   Wound Edges Irregular 6/4/2020  9:00 AM   Wound Length (cm) 2 cm 6/4/2020  9:00 AM   Wound Width (cm) 3 cm 6/4/2020  9:00 AM   Wound Depth (cm) 0.1 cm 6/4/2020  9:00 AM   Wound Volume (cm^3) 0.6 cm^3 6/4/2020  9:00 AM   Wound Surface Area (cm^2) 6 cm^2 6/4/2020  9:00 AM   Care Cleansed with:;Sterile normal saline 6/4/2020  9:00 AM   Dressing Changed;Gauze, wet to moist;Island/border 6/4/2020  9:00 AM   Periwound Care Dry periwound area maintained 6/4/2020  9:00 AM   Dressing Change Due 06/05/20 6/4/2020  9:00 AM   Left and Right Axilla, and Right lateral breast  Cleanse/Irrigate with 0.25% dakins solution. Rinse with normal saline  Primary dressing: Pack 0.25% dakins gauze wet to dry to wound  Secondary dressing: cover with 4x4 gauze and lg abd pad, secure with mefix tape and large flexinet  Frequency: daily    Follow up with Dr. Pennington in 2 weeks on  Thursday 6/18/20  Other: Cont. Clindamycin    [facility]:   Skilled nurse to perform dressing changes every Monday, Wednesday, and Friday.  Please provide patient's caregiver with necessary supplies to do wound care: lg abd pads, 4x4 gauze, mefix tape.   F/U in 2 weeks.        Plan:              Follow up in about 2 weeks (around 6/18/2020).

## 2020-06-18 ENCOUNTER — HOSPITAL ENCOUNTER (OUTPATIENT)
Dept: WOUND CARE | Facility: HOSPITAL | Age: 40
Discharge: HOME | End: 2020-06-18
Attending: SURGERY
Payer: COMMERCIAL

## 2020-06-18 VITALS
HEIGHT: 60 IN | SYSTOLIC BLOOD PRESSURE: 120 MMHG | HEART RATE: 96 BPM | BODY MASS INDEX: 51.04 KG/M2 | TEMPERATURE: 99 F | WEIGHT: 260 LBS | DIASTOLIC BLOOD PRESSURE: 78 MMHG

## 2020-06-18 DIAGNOSIS — Z98.84 S/P BARIATRIC SURGERY: ICD-10-CM

## 2020-06-18 DIAGNOSIS — E66.01 MORBID OBESITY WITH BMI OF 40.0-44.9, ADULT: ICD-10-CM

## 2020-06-18 DIAGNOSIS — L73.2 HIDRADENITIS SUPPURATIVA OF RIGHT AXILLA: Primary | ICD-10-CM

## 2020-06-18 DIAGNOSIS — L73.2 HIDRADENITIS AXILLARIS: ICD-10-CM

## 2020-06-18 DIAGNOSIS — L73.2 LEFT AXILLARY HIDRADENITIS: ICD-10-CM

## 2020-06-18 PROCEDURE — 17250 CHEM CAUT OF GRANLTJ TISSUE: CPT

## 2020-06-18 PROCEDURE — 99213 OFFICE O/P EST LOW 20 MIN: CPT

## 2020-06-18 NOTE — PROGRESS NOTES
"Subjective:       Patient ID: Rodney Infante is a 39 y.o. female.    Chief Complaint: Non-healing Wound (right and left axilla)    F/u for excision bilateral axillary hider adneitis    Client here today with open areas to right and left axilla. Seen by Dr. Pennington, who states sweat glands infected. Rx for clindamycin, bactroban, and tramadol provided. Stop methatrexate ( prescribed for arthritis). Client plans to have abdominal bypass surgery in September. Culture done today of right axilla.  8/7/19: F/U with Dr. Pennington. Culture results negative. Continue POC.  8/14/19: F/U with Dr. Pennington. Clindamycin d/c'd. Bactrim and Diflucan ordered today. Leave left and right axilla open to air. Culture sent to lab today.     9/4/19:  F/U with Dr. Pennington.  Wounds to right axilla healing up.  Patient states "right does not drain as much as left".  Patient feels the left axilla is not healing up like the right.  Patient still taking Bactrim PO.  Will continue for now.  9/18/19: F/U with Dr. Pennington. Both axilla continue to have "sebum - like" exudate. New site noted to left back. Culture done today. RX for lotrisone and Diflucan provided.  9/25/19: F/U with Dr. Pennington. Axillae improved. Client was not able to get Diflucan. Reordered today. Continues lotrisone and clindamycin. Culture results reviewed. Referred to ID for appt. On 10/7/19. Next visit with Dr. Pennington 10/16/19.  10/7/19: Vist today with ID. Augmentin ordered. Clindamycin d/c'd. Complete Diflucan.  11/6/19: Dr Pennington assessed patient.Wounds slowly improving. Patient states that she has had gastric sleeve procedure last week. No complaints voiced. Continuing with present plan of care. Follow up in 2 weeks.  11/20/19: F/U with Dr. Pennington. Wounds continue to improve. Continue POC. Return in 2 weeks.  12/4/19: F/U with Dr. Pennington. Axillae drainage decreased. Client has lost 97 Lbs. As of today. Surgery planned for 12/20/19. Next visit 12/18/19, and " consents to be signed.  12/18/19: F/U with Dr. Pennington. Surgery to axillae scheduled for Monday 12/23/19. Consents signed. Return 1/2/20.   12/26/19  F/U with Dr. pennington for s/p surgery to bilateral axilla.  Removal of glands and placement of theraskin.  Staples intact.  Theraskin not completely  Adhered on both areas.   Start bolster dressing to assist tin the adherence of the graft. Patient to return to clinic in 1 week.  Order homehealth for twice weekly    01/02/20: F/u with Dr. Pennington. Moderate to large amount of drainage from wounds. Staples and sutures removed from both wounds today in clinic per Dr. Pennington. New wound care orders given and routed to [facility]. Rx given for 5 mg norco every 4 hours. Patient given a note to not return to work until further notice.  1/9/20: F/U with Dr. Pennington. Removed 2 remaining staples. Discussed with patient future grafting of sites. Cont. POC. Return in 1 week.  1/16/20: F/U with Dr. Pennington. Client reports decreased drainage today. Rx for Clindamycin and Norco provided. Client prefers to wait a while before having grafting performed. Return in 1 week.  1/23/20: F/U with Dr. Pennington. Right axilla measurements improved. Continue Clindamycin. Return in 1 week.  2/6/20:Patient presents with a new wound to right lateral breast. Dr Pennington assessed patient. Continuing with same wound care orders. Rx for Norco 5/325mg given to patient for pain. F/U in 2 weeks.   2/20/20: F/U with Dr. Pennington. Sites continue to improve. Measurements decreased. Culture reviewed. Rx for Ampicillin provided. Client has taken PCN in past w/o reaction. Rx for Norco provided. Return in 2 weeks.  3/5/20: F/U with Dr. Pennington. Sites decreased in size. Cont. Ampicillin. Cont. POC. Return in 2 weeks.  3/19/20: F/U with Dr. Pennington. Sites improving. Cont. POC. Return in 2 weeks.  5/7/20: F/U with Dr. Pennington. Measurements improved all sites. Culture of left axilla done today. Cont. POC. Return in  2 weeks.  6/4/20: F/U with Dr. Pennington. All sites improved. Cont. Clindamycin. Return in 2 weeks.  6/18/20: F/U with Dr. Pennington. Measurements improved. Silver nitrate x 1 to right and left axilla hypergranulation tissue. Cont. POC. Next visit 7/2/20. Client will be out of time 7/6/20 - 7/11/20. Will notify [facility].    Review of Systems   Constitutional: Negative.    HENT: Negative.    Eyes: Negative.    Respiratory: Negative.    Cardiovascular: Negative.    Gastrointestinal: Negative.    Genitourinary: Negative.    Musculoskeletal: Negative.    Skin: Negative.    Neurological: Negative.    Psychiatric/Behavioral: Negative.        Objective:      Physical Exam  Constitutional:       Appearance: She is well-developed.   HENT:      Head: Normocephalic.   Eyes:      Conjunctiva/sclera: Conjunctivae normal.      Pupils: Pupils are equal, round, and reactive to light.   Neck:      Musculoskeletal: Normal range of motion and neck supple.   Cardiovascular:      Rate and Rhythm: Normal rate and regular rhythm.      Heart sounds: Normal heart sounds.   Pulmonary:      Effort: Pulmonary effort is normal.      Breath sounds: Normal breath sounds.   Abdominal:      General: Bowel sounds are normal.      Palpations: Abdomen is soft.   Musculoskeletal: Normal range of motion.   Skin:     General: Skin is warm and dry.   Neurological:      Mental Status: She is alert and oriented to person, place, and time.      Deep Tendon Reflexes: Reflexes are normal and symmetric.         Assessment:       1. Hidradenitis suppurativa of right axilla    2. Left axillary hidradenitis           Wound 07/31/19 1300 Other (comment) Axilla #2 (Active)   07/31/19 1300    Pre-existing: Yes   Primary Wound Type: Other   Side: Left   Orientation:    Location: Axilla   Wound/PI Number (optional): #2   Ankle-Brachial Index:    Pulses:    Removal Indication and Assessment:    Wound Outcome:    (Retired) Wound Type:    (Retired) Wound Length (cm):     (Retired) Wound Width (cm):    (Retired) Depth (cm):    Wound Description (Comments):    Removal Indications:    Wound Image   06/18/20 1000   Dressing Appearance Moist drainage 06/18/20 1000   Drainage Amount Small 06/18/20 1000   Drainage Characteristics/Odor Serosanguineous 06/18/20 1000   Appearance Red;Moist;Hypergranulation 06/18/20 1000   Tissue loss description Full thickness 06/18/20 1000   Red (%), Wound Tissue Color 100 % 06/18/20 1000   Periwound Area Intact;Dry 06/18/20 1000   Wound Edges Irregular 06/18/20 1000   Wound Length (cm) 5 cm 06/18/20 1000   Wound Width (cm) 3 cm 06/18/20 1000   Wound Depth (cm) 0.1 cm 06/18/20 1000   Wound Volume (cm^3) 1.5 cm^3 06/18/20 1000   Wound Surface Area (cm^2) 15 cm^2 06/18/20 1000   Care Cleansed with:;Antimicrobial agent;Sterile normal saline 06/18/20 1000   Dressing Changed;Gauze, wet to moist;Abd pad 06/18/20 1000   Periwound Care Dry periwound area maintained 06/18/20 1000   Dressing Change Due 06/19/20 06/18/20 1000            Wound 07/31/19 1300 Other (comment) Axilla #1 (Active)   07/31/19 1300    Pre-existing: Yes   Primary Wound Type: Other   Side: Right   Orientation:    Location: Axilla   Wound/PI Number (optional): #1   Ankle-Brachial Index:    Pulses:    Removal Indication and Assessment:    Wound Outcome:    (Retired) Wound Type:    (Retired) Wound Length (cm):    (Retired) Wound Width (cm):    (Retired) Depth (cm):    Wound Description (Comments):    Removal Indications:    Wound Image   06/18/20 1000   Dressing Appearance Moist drainage 06/18/20 1000   Drainage Amount Small 06/18/20 1000   Drainage Characteristics/Odor Serosanguineous 06/18/20 1000   Appearance Red;Moist;Hypergranulation 06/18/20 1000   Tissue loss description Full thickness 06/18/20 1000   Red (%), Wound Tissue Color 100 % 06/18/20 1000   Periwound Area Intact;Dry 06/18/20 1000   Wound Edges Irregular 06/18/20 1000   Wound Length (cm) 3 cm 06/18/20 1000   Wound Width (cm) 1.3 cm  06/18/20 1000   Wound Depth (cm) 0.1 cm 06/18/20 1000   Wound Volume (cm^3) 0.39 cm^3 06/18/20 1000   Wound Surface Area (cm^2) 3.9 cm^2 06/18/20 1000   Care Cleansed with:;Antimicrobial agent;Sterile normal saline 06/18/20 1000   Dressing Changed;Gauze, wet to moist;Abd pad;Gauze 06/18/20 1000   Periwound Care Dry periwound area maintained 06/18/20 1000   Dressing Change Due 06/19/20 06/18/20 1000            Wound 02/06/20 1000 Other (comment) Breast #3 (Active)   02/06/20 1000    Pre-existing: Yes   Primary Wound Type: Other   Side: Right   Orientation:    Location: Breast   Wound/PI Number (optional): #3   Ankle-Brachial Index:    Pulses:    Removal Indication and Assessment:    Wound Outcome:    (Retired) Wound Type:    (Retired) Wound Length (cm):    (Retired) Wound Width (cm):    (Retired) Depth (cm):    Wound Description (Comments):    Removal Indications:    Wound Image    06/18/20 1000   Dressing Appearance Moist drainage 06/18/20 1000   Drainage Amount Small 06/18/20 1000   Drainage Characteristics/Odor Serosanguineous 06/18/20 1000   Appearance Red;Moist;Hypergranulation 06/18/20 1000   Tissue loss description Full thickness 06/18/20 1000   Red (%), Wound Tissue Color 100 % 06/18/20 1000   Periwound Area Intact;Dry 06/18/20 1000   Wound Edges Irregular 06/18/20 1000   Wound Length (cm) 1.5 cm 06/18/20 1000   Wound Width (cm) 3 cm 06/18/20 1000   Wound Depth (cm) 0.1 cm 06/18/20 1000   Wound Volume (cm^3) 0.45 cm^3 06/18/20 1000   Wound Surface Area (cm^2) 4.5 cm^2 06/18/20 1000   Care Cleansed with:;Antimicrobial agent;Sterile normal saline 06/18/20 1000   Dressing Changed;Island/border;Gauze, wet to moist 06/18/20 1000   Periwound Care Dry periwound area maintained 06/18/20 1000   Dressing Change Due 06/19/20 06/18/20 1000   Left and Right Axilla, and Right lateral breast  Cleanse/Irrigate with 0.25% dakins solution. Rinse with normal saline  Primary dressing: Pack 0.25% dakins gauze wet to dry to  wound  Secondary dressing: cover with 4x4 gauze and lg abd pad, secure with mefix tape and large flexinet  Frequency: daily    Follow up with Dr. Pennington in 2 weeks on Thursday 7/2/20  Other: Cont. Clindamycin    [facility]:   Skilled nurse to perform dressing changes every Monday, Wednesday, and Friday.  Please provide patient's caregiver with necessary supplies to do wound care: lg abd pads, 4x4 gauze, mefix tape.   F/U in 2 weeks.        Plan:              Follow up in about 2 weeks (around 7/2/2020).

## 2020-07-14 ENCOUNTER — TELEPHONE (OUTPATIENT)
Dept: PHARMACY | Facility: CLINIC | Age: 40
End: 2020-07-14

## 2020-07-15 ENCOUNTER — HOSPITAL ENCOUNTER (OUTPATIENT)
Dept: WOUND CARE | Facility: HOSPITAL | Age: 40
Discharge: HOME | End: 2020-07-15
Attending: SURGERY
Payer: COMMERCIAL

## 2020-07-15 VITALS
SYSTOLIC BLOOD PRESSURE: 119 MMHG | HEART RATE: 86 BPM | TEMPERATURE: 98 F | HEIGHT: 60 IN | DIASTOLIC BLOOD PRESSURE: 83 MMHG | WEIGHT: 260 LBS | BODY MASS INDEX: 51.04 KG/M2

## 2020-07-15 DIAGNOSIS — L73.2 HIDRADENITIS SUPPURATIVA OF RIGHT AXILLA: Primary | ICD-10-CM

## 2020-07-15 DIAGNOSIS — L02.213 ABSCESS OF CHEST WALL: Primary | ICD-10-CM

## 2020-07-15 PROCEDURE — 99213 OFFICE O/P EST LOW 20 MIN: CPT

## 2020-07-15 RX ORDER — MUPIROCIN 20 MG/G
OINTMENT TOPICAL
Status: CANCELLED | OUTPATIENT
Start: 2020-07-15

## 2020-07-15 RX ORDER — CLINDAMYCIN HYDROCHLORIDE 300 MG/1
300 CAPSULE ORAL EVERY 8 HOURS
Qty: 60 CAPSULE | Refills: 1 | Status: SHIPPED | OUTPATIENT
Start: 2020-07-15 | End: 2020-09-22

## 2020-07-15 NOTE — PROGRESS NOTES
Subjective:       Patient ID: Rodney Infante is a 39 y.o. female.    Chief Complaint: No chief complaint on file.    C/o painful tender swelling right chest wll getting worse for last 3 days    Review of Systems   Constitutional: Negative.    HENT: Negative.    Eyes: Negative.    Respiratory: Negative.    Cardiovascular: Negative.    Gastrointestinal: Negative.    Genitourinary: Negative.    Musculoskeletal: Negative.    Integumentary:  Negative.   Neurological: Negative.    Psychiatric/Behavioral: Negative.          Objective:      Physical Exam  Constitutional:       Appearance: She is well-developed.   HENT:      Head: Normocephalic.   Eyes:      Conjunctiva/sclera: Conjunctivae normal.      Pupils: Pupils are equal, round, and reactive to light.   Neck:      Musculoskeletal: Normal range of motion and neck supple.   Cardiovascular:      Rate and Rhythm: Normal rate and regular rhythm.      Heart sounds: Normal heart sounds.   Pulmonary:      Effort: Pulmonary effort is normal.      Breath sounds: Normal breath sounds.   Chest:       Abdominal:      General: Bowel sounds are normal.      Palpations: Abdomen is soft.   Musculoskeletal: Normal range of motion.   Skin:     General: Skin is warm and dry.   Neurological:      Mental Status: She is alert and oriented to person, place, and time.      Deep Tendon Reflexes: Reflexes are normal and symmetric.         Assessment:     Abdominal wall  Abscess  Obesity  htn  Recurrent hidradenitis  Plan:       Excision on friday

## 2020-07-15 NOTE — PROGRESS NOTES
"Subjective:       Patient ID: Rodney Infante is a 39 y.o. female.    Chief Complaint: Wound Care    F/u for excision bilateral axillary hider adneitis    Client here today with open areas to right and left axilla. Seen by Dr. Pennington, who states sweat glands infected. Rx for clindamycin, bactroban, and tramadol provided. Stop methatrexate ( prescribed for arthritis). Client plans to have abdominal bypass surgery in September. Culture done today of right axilla.  8/7/19: F/U with Dr. Pennington. Culture results negative. Continue POC.  8/14/19: F/U with Dr. Pennington. Clindamycin d/c'd. Bactrim and Diflucan ordered today. Leave left and right axilla open to air. Culture sent to lab today.     9/4/19:  F/U with Dr. Pennington.  Wounds to right axilla healing up.  Patient states "right does not drain as much as left".  Patient feels the left axilla is not healing up like the right.  Patient still taking Bactrim PO.  Will continue for now.  9/18/19: F/U with Dr. Pennington. Both axilla continue to have "sebum - like" exudate. New site noted to left back. Culture done today. RX for lotrisone and Diflucan provided.  9/25/19: F/U with Dr. Pennington. Axillae improved. Client was not able to get Diflucan. Reordered today. Continues lotrisone and clindamycin. Culture results reviewed. Referred to ID for appt. On 10/7/19. Next visit with Dr. Pennington 10/16/19.  10/7/19: Vist today with ID. Augmentin ordered. Clindamycin d/c'd. Complete Diflucan.  11/6/19: Dr Pennington assessed patient.Wounds slowly improving. Patient states that she has had gastric sleeve procedure last week. No complaints voiced. Continuing with present plan of care. Follow up in 2 weeks.  11/20/19: F/U with Dr. Pennington. Wounds continue to improve. Continue POC. Return in 2 weeks.  12/4/19: F/U with Dr. Pennington. Axillae drainage decreased. Client has lost 97 Lbs. As of today. Surgery planned for 12/20/19. Next visit 12/18/19, and consents to be signed.  12/18/19: F/U " with Dr. Pennington. Surgery to axillae scheduled for Monday 12/23/19. Consents signed. Return 1/2/20.   12/26/19  F/U with Dr. pennington for s/p surgery to bilateral axilla.  Removal of glands and placement of theraskin.  Staples intact.  Theraskin not completely  Adhered on both areas.   Start bolster dressing to assist tin the adherence of the graft. Patient to return to clinic in 1 week.  Order homehealth for twice weekly    01/02/20: F/u with Dr. Pennington. Moderate to large amount of drainage from wounds. Staples and sutures removed from both wounds today in clinic per Dr. Pennington. New wound care orders given and routed to [facility]. Rx given for 5 mg norco every 4 hours. Patient given a note to not return to work until further notice.  1/9/20: F/U with Dr. Pennington. Removed 2 remaining staples. Discussed with patient future grafting of sites. Cont. POC. Return in 1 week.  1/16/20: F/U with Dr. Pennington. Client reports decreased drainage today. Rx for Clindamycin and Norco provided. Client prefers to wait a while before having grafting performed. Return in 1 week.  1/23/20: F/U with Dr. Pennington. Right axilla measurements improved. Continue Clindamycin. Return in 1 week.  2/6/20:Patient presents with a new wound to right lateral breast. Dr Pennington assessed patient. Continuing with same wound care orders. Rx for Norco 5/325mg given to patient for pain. F/U in 2 weeks.   2/20/20: F/U with Dr. Pennington. Sites continue to improve. Measurements decreased. Culture reviewed. Rx for Ampicillin provided. Client has taken PCN in past w/o reaction. Rx for Norco provided. Return in 2 weeks.  3/5/20: F/U with Dr. Pennington. Sites decreased in size. Cont. Ampicillin. Cont. POC. Return in 2 weeks.  3/19/20: F/U with Dr. Pennington. Sites improving. Cont. POC. Return in 2 weeks.  5/7/20: F/U with Dr. Pennington. Measurements improved all sites. Culture of left axilla done today. Cont. POC. Return in 2 weeks.  6/4/20: F/U with   [facility]. All sites improved. Cont. Clindamycin. Return in 2 weeks.  6/18/20: F/U with Dr. Pennington. Measurements improved. Silver nitrate x 1 to right and left axilla hypergranulation tissue. Cont. POC. Next visit 7/2/20. Client will be out of time 7/6/20 - 7/11/20. Will notify [employer].  07/15/2020- f/u with Dr. Pennington. Pt presents with new abscess to upper right abd. Pt consented for OR this Friday 07/17/2020 for I&D to abscess. Rx for clindamycin sent to pharmacy. Dakins dc'd and xeroform started to wounds. Aquacel ag extra, mextra and large abd pad to right upper abd. Pt to f/u with Dr. Pennington in 1 week 07/23/2020.    Review of Systems   Constitutional: Negative.    HENT: Negative.    Eyes: Negative.    Respiratory: Negative.    Cardiovascular: Negative.    Gastrointestinal: Negative.    Genitourinary: Negative.    Musculoskeletal: Negative.    Skin: Negative.    Neurological: Negative.    Psychiatric/Behavioral: Negative.        Objective:      Physical Exam  Constitutional:       Appearance: She is well-developed.   HENT:      Head: Normocephalic.   Eyes:      Conjunctiva/sclera: Conjunctivae normal.      Pupils: Pupils are equal, round, and reactive to light.   Neck:      Musculoskeletal: Normal range of motion and neck supple.   Cardiovascular:      Rate and Rhythm: Normal rate and regular rhythm.      Heart sounds: Normal heart sounds.   Pulmonary:      Effort: Pulmonary effort is normal.      Breath sounds: Normal breath sounds.   Abdominal:      General: Bowel sounds are normal.      Palpations: Abdomen is soft.   Musculoskeletal: Normal range of motion.   Skin:     General: Skin is warm and dry.   Neurological:      Mental Status: She is alert and oriented to person, place, and time.      Deep Tendon Reflexes: Reflexes are normal and symmetric.         Assessment:       1. Hidradenitis suppurativa of right axilla           Wound 07/31/19 1300 Other (comment) Axilla #2 (Active)   07/31/19 1300     Pre-existing: Yes   Primary Wound Type: Other   Side: Left   Orientation:    Location: Axilla   Wound Number (optional): #2   Ankle-Brachial Index:    Pulses:    Removal Indication and Assessment:    Wound Outcome:    (Retired) Wound Type:    (Retired) Wound Length (cm):    (Retired) Wound Width (cm):    (Retired) Depth (cm):    Wound Description (Comments):    Removal Indications:    Wound Image   07/15/20 1600   Dressing Appearance Intact;Moist drainage 07/15/20 1600   Drainage Amount Small 07/15/20 1600   Drainage Characteristics/Odor Serosanguineous 07/15/20 1600   Appearance Red;Moist 07/15/20 1600   Tissue loss description Full thickness 07/15/20 1600   Red (%), Wound Tissue Color 100 % 07/15/20 1600   Periwound Area Intact;Dry 07/15/20 1600   Wound Edges Irregular 07/15/20 1600   Wound Length (cm) 63.2 cm 07/15/20 1600   Wound Width (cm) 6.5 cm 07/15/20 1600   Wound Depth (cm) 0.1 cm 07/15/20 1600   Wound Volume (cm^3) 41.08 cm^3 07/15/20 1600   Wound Surface Area (cm^2) 410.8 cm^2 07/15/20 1600   Care Cleansed with:;Sterile normal saline 07/15/20 1600   Dressing Abd pad 07/15/20 1600   Periwound Care Absorptive dressing applied 07/15/20 1600   Dressing Change Due 07/17/20 07/15/20 1600            Wound 07/31/19 1300 Other (comment) Axilla #1 (Active)   07/31/19 1300    Pre-existing: Yes   Primary Wound Type: Other   Side: Right   Orientation:    Location: Axilla   Wound Number (optional): #1   Ankle-Brachial Index:    Pulses:    Removal Indication and Assessment:    Wound Outcome:    (Retired) Wound Type:    (Retired) Wound Length (cm):    (Retired) Wound Width (cm):    (Retired) Depth (cm):    Wound Description (Comments):    Removal Indications:    Wound Image   07/15/20 1600   Dressing Appearance Intact;Moist drainage 07/15/20 1600   Drainage Amount Small 07/15/20 1600   Drainage Characteristics/Odor Serosanguineous 07/15/20 1600   Appearance Red 07/15/20 1600   Tissue loss description Full thickness  07/15/20 1600   Red (%), Wound Tissue Color 100 % 07/15/20 1600   Periwound Area Intact;Dry 07/15/20 1600   Wound Edges Irregular 07/15/20 1600   Wound Length (cm) 3 cm 07/15/20 1600   Wound Width (cm) 1.9 cm 07/15/20 1600   Wound Depth (cm) 0.1 cm 07/15/20 1600   Wound Volume (cm^3) 0.57 cm^3 07/15/20 1600   Wound Surface Area (cm^2) 5.7 cm^2 07/15/20 1600   Care Cleansed with:;Sterile normal saline 07/15/20 1600   Dressing Abd pad 07/15/20 1600   Periwound Care Absorptive dressing applied 07/15/20 1600   Dressing Change Due 07/17/20 07/15/20 1600            Wound 02/06/20 1000 Other (comment) Breast #3 (Active)   02/06/20 1000    Pre-existing: Yes   Primary Wound Type: Other   Side: Right   Orientation:    Location: Breast   Wound Number (optional): #3   Ankle-Brachial Index:    Pulses:    Removal Indication and Assessment:    Wound Outcome:    (Retired) Wound Type:    (Retired) Wound Length (cm):    (Retired) Wound Width (cm):    (Retired) Depth (cm):    Wound Description (Comments):    Removal Indications:    Wound Image   07/15/20 1600   Dressing Appearance Moist drainage 07/15/20 1600   Drainage Amount Small 07/15/20 1600   Drainage Characteristics/Odor Serosanguineous 07/15/20 1600   Appearance Red 07/15/20 1600   Tissue loss description Full thickness 07/15/20 1600   Red (%), Wound Tissue Color 100 % 07/15/20 1600   Periwound Area Intact;Dry 07/15/20 1600   Wound Edges Irregular 07/15/20 1600   Wound Length (cm) 0.7 cm 07/15/20 1600   Wound Width (cm) 2.9 cm 07/15/20 1600   Wound Depth (cm) 0.1 cm 07/15/20 1600   Wound Volume (cm^3) 0.2 cm^3 07/15/20 1600   Wound Surface Area (cm^2) 2.03 cm^2 07/15/20 1600   Care Cleansed with:;Sterile normal saline 07/15/20 1600   Dressing Island/border 07/15/20 1600   Periwound Care Skin barrier film applied 07/15/20 1600   Dressing Change Due 07/18/20 07/15/20 1600            Wound 07/15/20 1600 Abscess Right upper Abdomen #4 (Active)   07/15/20 1600    Pre-existing: Yes    Primary Wound Type: Abscess   Side: Right   Orientation: upper   Location: Abdomen   Wound Number (optional): #4   Ankle-Brachial Index:    Pulses:    Removal Indication and Assessment:    Wound Outcome:    (Retired) Wound Type:    (Retired) Wound Length (cm):    (Retired) Wound Width (cm):    (Retired) Depth (cm):    Wound Description (Comments):    Removal Indications:    Wound Image   07/15/20 1600   Dressing Appearance Intact;Moist drainage 07/15/20 1600   Drainage Amount Large 07/15/20 1600   Drainage Characteristics/Odor Serosanguineous;Malodorous 07/15/20 1600   Appearance Closed/resurfaced 07/15/20 1600   Tissue loss description Not applicable 07/15/20 1600   Red (%), Wound Tissue Color 100 % 07/15/20 1600   Periwound Area Redness;Swelling 07/15/20 1600   Wound Edges Rolled/closed 07/15/20 1600   Wound Length (cm) 0 cm 07/15/20 1600   Wound Width (cm) 0 cm 07/15/20 1600   Wound Depth (cm) 0 cm 07/15/20 1600   Wound Volume (cm^3) 0 cm^3 07/15/20 1600   Wound Surface Area (cm^2) 0 cm^2 07/15/20 1600   Care Cleansed with:;Sterile normal saline 07/15/20 1600   Dressing Silver;Calcium alginate;Abd pad 07/15/20 1600   Periwound Care Absorptive dressing applied 07/15/20 1600   Dressing Change Due 07/17/20 07/15/20 1600       Left and Right Axilla  Cleanse with: Rinse with normal saline   Primary dressing: xeroform  Secondary dressing: cover with 4x4 gauze and small abd pad, secure with mefix tape   Frequency: daily     Right lateral breast   Cleanse with: Rinse with normal saline   Periwound: cavilon  Primary dressing: xeroform  Secondary dressing: 3x3 mepore dressing   Frequency: daily     Right upper abd  Cleanse with: Rinse with normal saline   Primary dressing: aquacel ag extra  Secondary dressing: small mextra, large abd pad, secure with mefix tape   Frequency: daily       Follow up with Dr. Pennington in 1 week on Thursday 7/23/20     Other orders: Rx for Clindamycin sent to pharmacy. Pt to have OR procedure  07/17/2020, please visit pt 07/18/2020 to perform dressing change.      Home Health: [facility]:  Fax # [phone].  Skilled nurse to perform dressing changes every Monday, Wednesday, and Friday.  Please provide patient's caregiver with necessary supplies to do wound care: small abd pads, 4x4 gauze, mefix tape.       Plan:                Follow up in about 8 days (around 7/23/2020).

## 2020-07-17 ENCOUNTER — HOSPITAL ENCOUNTER (OUTPATIENT)
Facility: HOSPITAL | Age: 40
Discharge: HOME | End: 2020-07-17
Attending: SURGERY | Admitting: SURGERY
Payer: COMMERCIAL

## 2020-07-17 ENCOUNTER — ANESTHESIA (OUTPATIENT)
Dept: SURGERY | Facility: HOSPITAL | Age: 40
End: 2020-07-17
Payer: COMMERCIAL

## 2020-07-17 ENCOUNTER — ANESTHESIA EVENT (OUTPATIENT)
Dept: SURGERY | Facility: HOSPITAL | Age: 40
End: 2020-07-17
Payer: COMMERCIAL

## 2020-07-17 VITALS
DIASTOLIC BLOOD PRESSURE: 85 MMHG | HEIGHT: 65 IN | TEMPERATURE: 98 F | RESPIRATION RATE: 17 BRPM | HEART RATE: 77 BPM | SYSTOLIC BLOOD PRESSURE: 129 MMHG | OXYGEN SATURATION: 77 % | BODY MASS INDEX: 42.82 KG/M2 | WEIGHT: 257 LBS

## 2020-07-17 DIAGNOSIS — L73.2 HIDRADENITIS SUPPURATIVA OF RIGHT AXILLA: ICD-10-CM

## 2020-07-17 DIAGNOSIS — E66.01 MORBID OBESITY WITH BMI OF 40.0-44.9, ADULT: ICD-10-CM

## 2020-07-17 DIAGNOSIS — L02.213 ABSCESS OF CHEST WALL: Primary | ICD-10-CM

## 2020-07-17 DIAGNOSIS — Z79.4 TYPE 2 DIABETES MELLITUS WITH OTHER SKIN ULCER, WITH LONG-TERM CURRENT USE OF INSULIN: ICD-10-CM

## 2020-07-17 DIAGNOSIS — E11.622 TYPE 2 DIABETES MELLITUS WITH OTHER SKIN ULCER, WITH LONG-TERM CURRENT USE OF INSULIN: ICD-10-CM

## 2020-07-17 LAB
B-HCG UR QL: NEGATIVE
CTP QC/QA: YES
GRAM STN SPEC: NORMAL
GRAM STN SPEC: NORMAL
SARS-COV-2 RDRP RESP QL NAA+PROBE: NEGATIVE

## 2020-07-17 PROCEDURE — 36000707: Performed by: SURGERY

## 2020-07-17 PROCEDURE — 87205 SMEAR GRAM STAIN: CPT

## 2020-07-17 PROCEDURE — 37000008 HC ANESTHESIA 1ST 15 MINUTES: Performed by: SURGERY

## 2020-07-17 PROCEDURE — U0002 COVID-19 LAB TEST NON-CDC: HCPCS

## 2020-07-17 PROCEDURE — 87070 CULTURE OTHR SPECIMN AEROBIC: CPT

## 2020-07-17 PROCEDURE — 87077 CULTURE AEROBIC IDENTIFY: CPT

## 2020-07-17 PROCEDURE — 87206 SMEAR FLUORESCENT/ACID STAI: CPT

## 2020-07-17 PROCEDURE — 87116 MYCOBACTERIA CULTURE: CPT

## 2020-07-17 PROCEDURE — 63600175 PHARM REV CODE 636 W HCPCS: Performed by: NURSE ANESTHETIST, CERTIFIED REGISTERED

## 2020-07-17 PROCEDURE — 88305 TISSUE EXAM BY PATHOLOGIST: CPT | Mod: 26,,, | Performed by: PATHOLOGY

## 2020-07-17 PROCEDURE — 88305 TISSUE EXAM BY PATHOLOGIST: ICD-10-PCS | Mod: 26,,, | Performed by: PATHOLOGY

## 2020-07-17 PROCEDURE — 25000003 PHARM REV CODE 250: Performed by: NURSE ANESTHETIST, CERTIFIED REGISTERED

## 2020-07-17 PROCEDURE — 36000706: Performed by: SURGERY

## 2020-07-17 PROCEDURE — 25000003 PHARM REV CODE 250: Performed by: SURGERY

## 2020-07-17 PROCEDURE — 87186 SC STD MICRODIL/AGAR DIL: CPT

## 2020-07-17 PROCEDURE — 63600175 PHARM REV CODE 636 W HCPCS: Performed by: ANESTHESIOLOGY

## 2020-07-17 PROCEDURE — 87075 CULTR BACTERIA EXCEPT BLOOD: CPT

## 2020-07-17 PROCEDURE — 87015 SPECIMEN INFECT AGNT CONCNTJ: CPT

## 2020-07-17 PROCEDURE — 71000016 HC POSTOP RECOV ADDL HR: Performed by: SURGERY

## 2020-07-17 PROCEDURE — 87102 FUNGUS ISOLATION CULTURE: CPT

## 2020-07-17 PROCEDURE — 81025 URINE PREGNANCY TEST: CPT | Performed by: SURGERY

## 2020-07-17 PROCEDURE — 88305 TISSUE EXAM BY PATHOLOGIST: CPT | Performed by: PATHOLOGY

## 2020-07-17 PROCEDURE — 37000009 HC ANESTHESIA EA ADD 15 MINS: Performed by: SURGERY

## 2020-07-17 PROCEDURE — 71000015 HC POSTOP RECOV 1ST HR: Performed by: SURGERY

## 2020-07-17 RX ORDER — HYDROCODONE BITARTRATE AND ACETAMINOPHEN 5; 325 MG/1; MG/1
1 TABLET ORAL EVERY 6 HOURS PRN
Qty: 24 TABLET | Refills: 0 | Status: SHIPPED | OUTPATIENT
Start: 2020-07-17 | End: 2020-09-22

## 2020-07-17 RX ORDER — SODIUM CHLORIDE 9 MG/ML
INJECTION, SOLUTION INTRAVENOUS CONTINUOUS
Status: CANCELLED | OUTPATIENT
Start: 2020-07-17

## 2020-07-17 RX ORDER — HYDROCODONE BITARTRATE AND ACETAMINOPHEN 5; 325 MG/1; MG/1
1 TABLET ORAL ONCE AS NEEDED
Status: COMPLETED | OUTPATIENT
Start: 2020-07-17 | End: 2020-07-17

## 2020-07-17 RX ORDER — HYDROCODONE BITARTRATE AND ACETAMINOPHEN 5; 325 MG/1; MG/1
1 TABLET ORAL EVERY 4 HOURS PRN
Status: CANCELLED | OUTPATIENT
Start: 2020-07-17

## 2020-07-17 RX ORDER — ONDANSETRON 2 MG/ML
INJECTION INTRAMUSCULAR; INTRAVENOUS
Status: DISCONTINUED | OUTPATIENT
Start: 2020-07-17 | End: 2020-07-17

## 2020-07-17 RX ORDER — MIDAZOLAM HYDROCHLORIDE 1 MG/ML
INJECTION INTRAMUSCULAR; INTRAVENOUS
Status: DISCONTINUED | OUTPATIENT
Start: 2020-07-17 | End: 2020-07-17

## 2020-07-17 RX ORDER — BACITRACIN 50000 [IU]/1
INJECTION, POWDER, FOR SOLUTION INTRAMUSCULAR
Status: DISCONTINUED | OUTPATIENT
Start: 2020-07-17 | End: 2020-07-17 | Stop reason: HOSPADM

## 2020-07-17 RX ORDER — MUPIROCIN 20 MG/G
OINTMENT TOPICAL
Status: DISCONTINUED | OUTPATIENT
Start: 2020-07-17 | End: 2020-07-17 | Stop reason: HOSPADM

## 2020-07-17 RX ORDER — ACETAMINOPHEN 325 MG/1
650 TABLET ORAL EVERY 4 HOURS PRN
Status: CANCELLED | OUTPATIENT
Start: 2020-07-17

## 2020-07-17 RX ORDER — PROPOFOL 10 MG/ML
VIAL (ML) INTRAVENOUS
Status: DISCONTINUED | OUTPATIENT
Start: 2020-07-17 | End: 2020-07-17

## 2020-07-17 RX ORDER — PROPOFOL 10 MG/ML
VIAL (ML) INTRAVENOUS CONTINUOUS PRN
Status: DISCONTINUED | OUTPATIENT
Start: 2020-07-17 | End: 2020-07-17

## 2020-07-17 RX ORDER — SODIUM CHLORIDE, SODIUM LACTATE, POTASSIUM CHLORIDE, CALCIUM CHLORIDE 600; 310; 30; 20 MG/100ML; MG/100ML; MG/100ML; MG/100ML
INJECTION, SOLUTION INTRAVENOUS CONTINUOUS
Status: DISCONTINUED | OUTPATIENT
Start: 2020-07-17 | End: 2020-07-17 | Stop reason: HOSPADM

## 2020-07-17 RX ORDER — CLINDAMYCIN PHOSPHATE 900 MG/50ML
INJECTION, SOLUTION INTRAVENOUS
Status: DISCONTINUED | OUTPATIENT
Start: 2020-07-17 | End: 2020-07-17

## 2020-07-17 RX ORDER — LIDOCAINE HYDROCHLORIDE 10 MG/ML
INJECTION, SOLUTION EPIDURAL; INFILTRATION; INTRACAUDAL; PERINEURAL
Status: DISCONTINUED | OUTPATIENT
Start: 2020-07-17 | End: 2020-07-17 | Stop reason: HOSPADM

## 2020-07-17 RX ORDER — FENTANYL CITRATE 50 UG/ML
INJECTION, SOLUTION INTRAMUSCULAR; INTRAVENOUS
Status: DISCONTINUED | OUTPATIENT
Start: 2020-07-17 | End: 2020-07-17

## 2020-07-17 RX ADMIN — FENTANYL CITRATE 25 MCG: 50 INJECTION, SOLUTION INTRAMUSCULAR; INTRAVENOUS at 11:07

## 2020-07-17 RX ADMIN — ONDANSETRON 8 MG: 2 INJECTION, SOLUTION INTRAMUSCULAR; INTRAVENOUS at 11:07

## 2020-07-17 RX ADMIN — SODIUM CHLORIDE, SODIUM LACTATE, POTASSIUM CHLORIDE, AND CALCIUM CHLORIDE: .6; .31; .03; .02 INJECTION, SOLUTION INTRAVENOUS at 09:07

## 2020-07-17 RX ADMIN — PROPOFOL 100 MCG/KG/MIN: 10 INJECTION, EMULSION INTRAVENOUS at 11:07

## 2020-07-17 RX ADMIN — HYDROCODONE BITARTRATE AND ACETAMINOPHEN 1 TABLET: 5; 325 TABLET ORAL at 01:07

## 2020-07-17 RX ADMIN — PROPOFOL 40 MG: 10 INJECTION, EMULSION INTRAVENOUS at 11:07

## 2020-07-17 RX ADMIN — CLINDAMYCIN IN 5 PERCENT DEXTROSE 900 MG: 18 INJECTION, SOLUTION INTRAVENOUS at 11:07

## 2020-07-17 RX ADMIN — FENTANYL CITRATE 25 MCG: 50 INJECTION, SOLUTION INTRAMUSCULAR; INTRAVENOUS at 12:07

## 2020-07-17 RX ADMIN — MIDAZOLAM HYDROCHLORIDE 2 MG: 1 INJECTION, SOLUTION INTRAMUSCULAR; INTRAVENOUS at 11:07

## 2020-07-17 RX ADMIN — SODIUM CHLORIDE, SODIUM LACTATE, POTASSIUM CHLORIDE, AND CALCIUM CHLORIDE: .6; .31; .03; .02 INJECTION, SOLUTION INTRAVENOUS at 11:07

## 2020-07-17 NOTE — H&P
Subjective:       Patient ID: Rodney Infante is a 39 y.o. female.    Chief Complaint: No chief complaint on file.    C/o painful tender swelling right chest wll getting worse for last 3 days    Review of Systems   Constitutional: Negative.    HENT: Negative.    Eyes: Negative.    Respiratory: Negative.    Cardiovascular: Negative.    Gastrointestinal: Negative.    Genitourinary: Negative.    Musculoskeletal: Negative.    Integumentary:  Negative.   Neurological: Negative.    Psychiatric/Behavioral: Negative.          Objective:      Physical Exam  Constitutional:       Appearance: She is well-developed.   HENT:      Head: Normocephalic.   Eyes:      Conjunctiva/sclera: Conjunctivae normal.      Pupils: Pupils are equal, round, and reactive to light.   Neck:      Musculoskeletal: Normal range of motion and neck supple.   Cardiovascular:      Rate and Rhythm: Normal rate and regular rhythm.      Heart sounds: Normal heart sounds.   Pulmonary:      Effort: Pulmonary effort is normal.      Breath sounds: Normal breath sounds.   Chest:       Abdominal:      General: Bowel sounds are normal.      Palpations: Abdomen is soft.   Musculoskeletal: Normal range of motion.   Skin:     General: Skin is warm and dry.   Neurological:      Mental Status: She is alert and oriented to person, place, and time.      Deep Tendon Reflexes: Reflexes are normal and symmetric.         Assessment:     Abdominal wall  Abscess  Obesity  htn  Recurrent hidradenitis  Plan:       Excision today

## 2020-07-17 NOTE — ANESTHESIA PREPROCEDURE EVALUATION
07/17/2020  Rodney Infante is a 39 y.o., female w/ morbid obesity (BMI 50), DM, here for wound debridement of chest wall abscess.    COVID negative    Past Medical History:   Diagnosis Date    Anemia     Arthritis     Diabetes mellitus, type 2     Neuropathy      Past Surgical History:   Procedure Laterality Date    CYST REMOVAL  2014, 2/2018    back    EXCISION OF HIDRADENITIS Bilateral 12/24/2019    Procedure: EXCISION, HIDRADENITIS;  Surgeon: Marcel Pennington MD;  Location: [facility];  Service: General;  Laterality: Bilateral;           Anesthesia Evaluation    I have reviewed the Patient Summary Reports.    I have reviewed the Nursing Notes.    I have reviewed the Medications.     Review of Systems  Anesthesia Hx:  No problems with previous Anesthesia  History of prior surgery of interest to airway management or planning:  Denies Personal Hx of Anesthesia complications.   Hematology/Oncology:     Oncology Normal    -- Anemia:   EENT/Dental:EENT/Dental Normal   Cardiovascular:   Exercise tolerance: good    Pulmonary:  Pulmonary Normal    OB/GYN/PEDS:  upt negative 7/17/20   Neurological:   Peripheral Neuropathy    Endocrine:   Diabetes, type 2    Psych:  Psychiatric Normal           Physical Exam  General:  Morbid Obesity    Airway/Jaw/Neck:  Airway Findings: Mouth Opening: Normal Tongue: Normal  General Airway Assessment: Adult  Mallampati: II  TM Distance: Normal, at least 6 cm     Eyes/Ears/Nose:  EYES/EARS/NOSE FINDINGS: Normal    Chest/Lungs:  Chest/Lungs Clear    Heart/Vascular:  Heart Findings: Normal       Mental Status:  Mental Status Findings: Normal        Anesthesia Plan  Type of Anesthesia, risks & benefits discussed:  Anesthesia Type:  MAC  Patient's Preference:   Intra-op Monitoring Plan: standard ASA monitors  Intra-op Monitoring Plan Comments:   Post Op Pain Control Plan:  multimodal analgesia  Post Op Pain Control Plan Comments:   Induction:   IV  Beta Blocker:  Patient is not currently on a Beta-Blocker (No further documentation required).       Informed Consent: Patient understands risks and agrees with Anesthesia plan.  Questions answered. Anesthesia consent signed with patient.  ASA Score: 3     Day of Surgery Review of History & Physical: I have interviewed and examined the patient. I have reviewed the patient's H&P dated:  There are no significant changes.      Anesthesia Plan Notes: Npo  Consent signed        Ready For Surgery From Anesthesia Perspective.

## 2020-07-17 NOTE — TRANSFER OF CARE
"Anesthesia Transfer of Care Note    Patient: Rodney Infante    Procedure(s) Performed: Procedure(s) (LRB):  DEBRIDEMENT, WOUND (Right)    Patient location: OPS    Anesthesia Type: MAC    Transport from OR: Transported from OR on room air with adequate spontaneous ventilation    Post pain: adequate analgesia    Post assessment: no apparent anesthetic complications and tolerated procedure well    Post vital signs: stable    Level of consciousness: awake, oriented and alert    Nausea/Vomiting: no nausea/vomiting    Complications: none    Transfer of care protocol was followed      Last vitals:   Visit Vitals  /61   Pulse 87   Temp 36.6 °C (97.9 °F) (Skin)   Resp 18   Ht 5' 5" (1.651 m)   Wt 116.6 kg (257 lb)   SpO2 99%   Breastfeeding No   BMI 42.77 kg/m²     "

## 2020-07-17 NOTE — ANESTHESIA POSTPROCEDURE EVALUATION
Anesthesia Post Evaluation    Patient: Rodney Villafuerteer    Procedure(s) Performed: Procedure(s) (LRB):  DEBRIDEMENT, WOUND (Right)    Final Anesthesia Type: MAC    Patient location during evaluation: OPS  Patient participation: Yes- Able to Participate  Level of consciousness: awake and alert and oriented  Post-procedure vital signs: reviewed and stable  Pain management: adequate  Airway patency: patent    PONV status at discharge: No PONV  Anesthetic complications: no      Cardiovascular status: blood pressure returned to baseline, hemodynamically stable and stable  Respiratory status: unassisted, spontaneous ventilation and room air  Hydration status: euvolemic  Follow-up not needed.          Vitals Value Taken Time   /62 07/17/20 1216   Temp 98.3 07/17/20 1216   Pulse 82 07/17/20 1216   Resp 14 07/17/20 1216   SpO2 98% 07/17/20 1216         No case tracking events are documented in the log.      Pain/Milagros Score: No data recorded

## 2020-07-17 NOTE — BRIEF OP NOTE
Operative Note       Surgery Date: 7/17/2020     Surgeon(s) and Role:     * Marcel Pennington MD - Primary    Pre-op Diagnosis:  Abscess of chest wall [L02.213]    Post-op Diagnosis: Post-Op Diagnosis Codes:     * Abscess of chest wall [L02.213]    Procedure(s) (LRB):  DEBRIDEMENT, WOUND (Right)  Chest wall 8 x 9 x 7 cm  Anesthesia: Local MAC    Procedure in Detail/Findings:  After satisfactory anesthesia and a sedation patient in supine position right side of the chest and abdomen was prepped and draped normal sterile manner using Betadine scrub solution time-out was called site was confirmed area of the after was infiltrated using 1% xylocaine solution elliptical incision was made at the area of the infected right chest wall area 8 x 9 x 7 cm was taken down to the deep subcu tissue subcutaneous bleeders clamped bovied complete excision of infected abdominal wall abscess was performed to the deep subcutaneous tissue bleeders were clamped bovied some of the bleeders were clamped and tied using 2 silk suture hemostasis was perfectly maintained wound was cultured for aerobic and anaerobic was thoroughly irrigated antibiotic solution wound was then loosely approximated using interrupted 3 Vicryl subcutaneous tissue seen skin was closed loosely using interrupted 2 nylon suture wound was then packed using iodoform packing Xeroform 4 x 4 ABD pad dressing was applied instrument counts sponge count counts correct no intraop complication estimated blood loss 50 cc specimen removed was skin subcutaneous tissue and fascia patient tolerated well sent to recovery room in stable condition.    Estimated Blood Loss:  50 cc         Specimens (From admission, onward)    None        Implants: * No implants in log *           Disposition: PACU - hemodynamically stable.           Condition: Good    Attestation:  I performed the procedure.           Discharge Note    Admit Date: 7/17/2020    Attending Physician: Marcel Pennington,  MD     Discharge Physician: Marcel Pennington MD    Final Diagnosis: Post-Op Diagnosis Codes:     * Abscess of chest wall [L02.213]    Disposition: Home or Self Care    Patient Instructions:   Current Discharge Medication List      START taking these medications    Details   HYDROcodone-acetaminophen (NORCO) 5-325 mg per tablet Take 1 tablet by mouth every 6 (six) hours as needed for Pain.  Qty: 24 tablet, Refills: 0    Comments: Quantity prescribed more than 7 day supply? Yes, quantity medically necessary         CONTINUE these medications which have NOT CHANGED    Details   clindamycin (CLEOCIN) 300 MG capsule Take 1 capsule (300 mg total) by mouth every 8 (eight) hours.  Qty: 60 capsule, Refills: 1      cyanocobalamin (VITAMIN B-12) 1000 MCG tablet Take 1 tablet (1,000 mcg total) by mouth once daily. This is a prescription for 1 year.  Take 1 tablet Monday, Wednesday, and Friday  Qty: 36 tablet, Refills: 3    Associated Diagnoses: Iron deficiency anemia      gabapentin (NEURONTIN) 100 MG capsule Take 3 capsules (300 mg total) by mouth every evening.  Qty: 90 capsule, Refills: 3    Associated Diagnoses: Bilateral carpal tunnel syndrome      adalimumab (HUMIRA PEN) 40 mg/0.8 mL PnKt Inject 1 pen (40 mg total) into the skin every 14 (fourteen) days.  Qty: 25 pen, Refills: 0    Comments: verified q14day maintenance dose per Dr. Alberto  Associated Diagnoses: Seronegative arthritis      clotrimazole-betamethasone 1-0.05% (LOTRISONE) cream       !! ergocalciferol (ERGOCALCIFEROL) 50,000 unit Cap       !! ergocalciferol (ERGOCALCIFEROL) 50,000 unit Cap Vitamin D2 1,250 mcg (50,000 unit) capsule      estradiol cypionate (DEPO-ESTRADIOL) 5 mg/mL injection Inject into the muscle every 28 days.       !! - Potential duplicate medications found. Please discuss with provider.      STOP taking these medications       carvedilol (COREG) 25 MG tablet Comments:   Reason for Stopping:               Discharge Procedure Orders  regular diet, no heavy lifitg , rtc office Wednesday in wound center, continue home health   Discharge Procedure Orders (must include Diet, Follow-up, Activity)   Diet general     Keep surgical extremity elevated     Lifting restrictions     Call MD for:  temperature >100.4     Call MD for:  persistent nausea and vomiting     Call MD for:  severe uncontrolled pain     Call MD for:  redness, tenderness, or signs of infection (pain, swelling, redness, odor or green/yellow discharge around incision site)     Leave dressing on - Keep it clean, dry, and intact until clinic visit        Discharge Date: 7/17/2020

## 2020-07-17 NOTE — DISCHARGE INSTRUCTIONS
Return to office Wednesday in wound cetner    Continue Home Health    No Heavy Lifting      Acetaminophen; Hydrocodone tablets or capsules  What is this medicine?  ACETAMINOPHEN; HYDROCODONE (a set a SEVERINO marcel fen; sylvia droe KOE done) is a pain reliever. It is used to treat moderate to severe pain.  How should I use this medicine?  Take this medicine by mouth with a glass of water. Follow the directions on the prescription label. You can take it with or without food. If it upsets your stomach, take it with food. Do not take your medicine more often than directed.  A special MedGuide will be given to you by the pharmacist with each prescription and refill. Be sure to read this information carefully each time.  Talk to your pediatrician regarding the use of this medicine in children. Special care may be needed.  What side effects may I notice from receiving this medicine?  Side effects that you should report to your doctor or health care professional as soon as possible:  · allergic reactions like skin rash, itching or hives, swelling of the face, lips, or tongue  · breathing problems  · confusion  · redness, blistering, peeling or loosening of the skin, including inside the mouth  · signs and symptoms of low blood pressure like dizziness; feeling faint or lightheaded, falls; unusually weak or tired  · trouble passing urine or change in the amount of urine  · yellowing of the eyes or skin  Side effects that usually do not require medical attention (report to your doctor or health care professional if they continue or are bothersome):  · constipation  · dry mouth  · nausea, vomiting  · tiredness  What may interact with this medicine?  This medicine may interact with the following medications:  · alcohol  · antiviral medicines for HIV or AIDS  · atropine  · antihistamines for allergy, cough and cold  · certain antibiotics like erythromycin, clarithromycin  · certain medicines for anxiety or sleep  · certain medicines for  bladder problems like oxybutynin, tolterodine  · certain medicines for depression like amitriptyline, fluoxetine, sertraline  · certain medicines for fungal infections like ketoconazole and itraconazole  · certain medicines for Parkinson's disease like benztropine, trihexyphenidyl  · certain medicines for seizures like carbamazepine, phenobarbital, phenytoin, primidone  · certain medicines for stomach problems like dicyclomine, hyoscyamine  · certain medicines for travel sickness like scopolamine  · general anesthetics like halothane, isoflurane, methoxyflurane, propofol  · ipratropium  · local anesthetics like lidocaine, pramoxine, tetracaine  · MAOIs like Carbex, Eldepryl, Marplan, Nardil, and Parnate  · medicines that relax muscles for surgery  · other medicines with acetaminophen  · other narcotic medicines for pain or cough  · phenothiazines like chlorpromazine, mesoridazine, prochlorperazine, thioridazine  · rifampin  What if I miss a dose?  If you miss a dose, take it as soon as you can. If it is almost time for your next dose, take only that dose. Do not take double or extra doses.  Where should I keep my medicine?  Keep out of the reach of children. This medicine can be abused. Keep your medicine in a safe place to protect it from theft. Do not share this medicine with anyone. Selling or giving away this medicine is dangerous and against the law.  This medicine may cause accidental overdose and death if it taken by other adults, children, or pets. Mix any unused medicine with a substance like cat litter or coffee grounds. Then throw the medicine away in a sealed container like a sealed bag or a coffee can with a lid. Do not use the medicine after the expiration date.  Store at room temperature between 15 and 30 degrees C (59 and 86 degrees F).  What should I tell my health care provider before I take this medicine?  They need to know if you have any of these conditions:  · brain tumor  · Crohn's disease,  inflammatory bowel disease, or ulcerative colitis  · drug abuse or addiction  · head injury  · heart or circulation problems  · if you often drink alcohol  · kidney disease or problems going to the bathroom  · liver disease  · lung disease, asthma, or breathing problems  · an unusual or allergic reaction to acetaminophen, hydrocodone, other opioid analgesics, other medicines, foods, dyes, or preservatives  · pregnant or trying to get pregnant  · breast-feeding  What should I watch for while using this medicine?  Tell your doctor or health care professional if your pain does not go away, if it gets worse, or if you have new or a different type of pain. You may develop tolerance to the medicine. Tolerance means that you will need a higher dose of the medicine for pain relief. Tolerance is normal and is expected if you take the medicine for a long time.  Do not suddenly stop taking your medicine because you may develop a severe reaction. Your body becomes used to the medicine. This does NOT mean you are addicted. Addiction is a behavior related to getting and using a drug for a non-medical reason. If you have pain, you have a medical reason to take pain medicine. Your doctor will tell you how much medicine to take. If your doctor wants you to stop the medicine, the dose will be slowly lowered over time to avoid any side effects.  There are different types of narcotic medicines (opiates). If you take more than one type at the same time or if you are taking another medicine that also causes drowsiness, you may have more side effects. Give your health care provider a list of all medicines you use. Your doctor will tell you how much medicine to take. Do not take more medicine than directed. Call emergency for help if you have problems breathing or unusual sleepiness.  Do not take other medicines that contain acetaminophen with this medicine. Always read labels carefully. If you have questions, ask your doctor or  pharmacist.  If you take too much acetaminophen get medical help right away. Too much acetaminophen can be very dangerous and cause liver damage. Even if you do not have symptoms, it is important to get help right away.  You may get drowsy or dizzy. Do not drive, use machinery, or do anything that needs mental alertness until you know how this medicine affects you. Do not stand or sit up quickly, especially if you are an older patient. This reduces the risk of dizzy or fainting spells. Alcohol may interfere with the effect of this medicine. Avoid alcoholic drinks.  The medicine will cause constipation. Try to have a bowel movement at least every 2 to 3 days. If you do not have a bowel movement for 3 days, call your doctor or health care professional.  Your mouth may get dry. Chewing sugarless gum or sucking hard candy, and drinking plenty of water may help. Contact your doctor if the problem does not go away or is severe.  NOTE:This sheet is a summary. It may not cover all possible information. If you have questions about this medicine, talk to your doctor, pharmacist, or health care provider. Copyright© 2017 [employer]          ANESTHESIA  -For the first 24 hours after surgery:  Do not drive, use heavy equipment, make important decisions, or drink alcohol  -It is normal to feel sleepy for several hours.  Rest until you are more awake.  -Have someone stay with you, if needed.  They can watch for problems and help keep you safe.  -Some possible post anesthesia side effects include: nausea and vomiting, sore throat and hoarseness, sleepiness, and dizziness.    PAIN  -If you have pain after surgery, pain medicine will help you feel better.  Take it as directed, before pain becomes severe.  Most pain relievers taken by mouth need at least 20-30 minutes to start working.  -Do not drive or drink alcohol while taking pain medicine.  -Pain medication can upset your stomach.  Taking them with a little food may  help.  -Other ways to help control pain: elevation, ice, and relaxation  -Call your surgeon if still having unmanageable pain an hour after taking pain medicine.  -Pain medicine can cause constipation.  Taking an over-the counter stool softener while on prescription pain medicine and drinking plenty of fluids can prevent this side effect.  -Call your surgeon if you have severe side effects like: breathing problems, trouble waking up, dizziness, confusion, or severe constipation.    NAUSEA  -Some people have nausea after surgery.  This is often because of anesthesia, pain, pain medicine, or the stress of surgery.  -Do not push yourself to eat.  Start off with clear liquids and soup.  Slowly move to solid foods.  Don't eat fatty, rich, spicy foods at first.  Eat smaller amounts.  -If you develop persistent nausea and vomiting please notify your surgeon immediately.    BLEEDING  -Different types of surgery require different types of care and dressing changes.  It is important to follow all instructions and advice from your surgeon.  Change dressing as directed.  Call your surgeon for any concerns regarding postop bleeding.    SIGNS OF INFECTION  -Signs of infection include: fever, swelling, drainage, and redness  -Notify your surgeon if you have a fever of 100.4 F (38.0 C) or higher.  -Notify your surgeon if you notice redness, swelling, increased pain, pus, or a foul smell at the incision site.

## 2020-07-21 LAB
BACTERIA SPEC AEROBE CULT: ABNORMAL
BACTERIA SPEC AEROBE CULT: ABNORMAL
BACTERIA SPEC ANAEROBE CULT: ABNORMAL

## 2020-07-21 RX ORDER — DOXYCYCLINE HYCLATE 100 MG
100 TABLET ORAL 2 TIMES DAILY
COMMUNITY
End: 2020-09-22 | Stop reason: SDUPTHER

## 2020-07-22 LAB
FINAL PATHOLOGIC DIAGNOSIS: NORMAL
GROSS: NORMAL

## 2020-07-23 ENCOUNTER — HOSPITAL ENCOUNTER (OUTPATIENT)
Dept: WOUND CARE | Facility: HOSPITAL | Age: 40
Discharge: HOME | End: 2020-07-23
Attending: SURGERY
Payer: COMMERCIAL

## 2020-07-23 VITALS
WEIGHT: 257 LBS | HEART RATE: 93 BPM | DIASTOLIC BLOOD PRESSURE: 72 MMHG | BODY MASS INDEX: 42.82 KG/M2 | SYSTOLIC BLOOD PRESSURE: 112 MMHG | HEIGHT: 65 IN | TEMPERATURE: 98 F

## 2020-07-23 DIAGNOSIS — L73.2 HIDRADENITIS SUPPURATIVA OF RIGHT AXILLA: Primary | ICD-10-CM

## 2020-07-23 DIAGNOSIS — L73.2 LEFT AXILLARY HIDRADENITIS: ICD-10-CM

## 2020-07-23 PROBLEM — L98.499 TYPE 2 DIABETES MELLITUS WITH OTHER SKIN ULCER: Status: RESOLVED | Noted: 2017-12-18 | Resolved: 2020-07-23

## 2020-07-23 PROBLEM — E11.622 TYPE 2 DIABETES MELLITUS WITH OTHER SKIN ULCER: Status: RESOLVED | Noted: 2017-12-18 | Resolved: 2020-07-23

## 2020-07-23 PROCEDURE — 99213 OFFICE O/P EST LOW 20 MIN: CPT

## 2020-07-23 NOTE — PROGRESS NOTES
"Subjective:       Patient ID: Rodney Infante is a 39 y.o. female.    Chief Complaint: Non-healing Wound (right abdomen)    F/u for excision bilateral axillary hider adneitis    Client here today with open areas to right and left axilla. Seen by Dr. Pennington, who states sweat glands infected. Rx for clindamycin, bactroban, and tramadol provided. Stop methatrexate ( prescribed for arthritis). Client plans to have abdominal bypass surgery in September. Culture done today of right axilla.  8/7/19: F/U with Dr. Pennington. Culture results negative. Continue POC.  8/14/19: F/U with Dr. Pennington. Clindamycin d/c'd. Bactrim and Diflucan ordered today. Leave left and right axilla open to air. Culture sent to lab today.     9/4/19:  F/U with Dr. Pennington.  Wounds to right axilla healing up.  Patient states "right does not drain as much as left".  Patient feels the left axilla is not healing up like the right.  Patient still taking Bactrim PO.  Will continue for now.  9/18/19: F/U with Dr. Pennington. Both axilla continue to have "sebum - like" exudate. New site noted to left back. Culture done today. RX for lotrisone and Diflucan provided.  9/25/19: F/U with Dr. Pennington. Axillae improved. Client was not able to get Diflucan. Reordered today. Continues lotrisone and clindamycin. Culture results reviewed. Referred to ID for appt. On 10/7/19. Next visit with Dr. Pennington 10/16/19.  10/7/19: Vist today with ID. Augmentin ordered. Clindamycin d/c'd. Complete Diflucan.  11/6/19: Dr Pennington assessed patient.Wounds slowly improving. Patient states that she has had gastric sleeve procedure last week. No complaints voiced. Continuing with present plan of care. Follow up in 2 weeks.  11/20/19: F/U with Dr. Pennington. Wounds continue to improve. Continue POC. Return in 2 weeks.  12/4/19: F/U with Dr. Pennington. Axillae drainage decreased. Client has lost 97 Lbs. As of today. Surgery planned for 12/20/19. Next visit 12/18/19, and consents to be " signed.  12/18/19: F/U with Dr. Pennington. Surgery to axillae scheduled for Monday 12/23/19. Consents signed. Return 1/2/20.   12/26/19  F/U with Dr. pennington for s/p surgery to bilateral axilla.  Removal of glands and placement of theraskin.  Staples intact.  Theraskin not completely  Adhered on both areas.   Start bolster dressing to assist tin the adherence of the graft. Patient to return to clinic in 1 week.  Order homehealth for twice weekly    01/02/20: F/u with Dr. Pennington. Moderate to large amount of drainage from wounds. Staples and sutures removed from both wounds today in clinic per Dr. Pennington. New wound care orders given and routed to [facility]. Rx given for 5 mg norco every 4 hours. Patient given a note to not return to work until further notice.  1/9/20: F/U with Dr. Pennington. Removed 2 remaining staples. Discussed with patient future grafting of sites. Cont. POC. Return in 1 week.  1/16/20: F/U with Dr. Pennington. Client reports decreased drainage today. Rx for Clindamycin and Norco provided. Client prefers to wait a while before having grafting performed. Return in 1 week.  1/23/20: F/U with Dr. Pennington. Right axilla measurements improved. Continue Clindamycin. Return in 1 week.  2/6/20:Patient presents with a new wound to right lateral breast. Dr Pennington assessed patient. Continuing with same wound care orders. Rx for Norco 5/325mg given to patient for pain. F/U in 2 weeks.   2/20/20: F/U with Dr. Pennington. Sites continue to improve. Measurements decreased. Culture reviewed. Rx for Ampicillin provided. Client has taken PCN in past w/o reaction. Rx for Norco provided. Return in 2 weeks.  3/5/20: F/U with Dr. Pennington. Sites decreased in size. Cont. Ampicillin. Cont. POC. Return in 2 weeks.  3/19/20: F/U with Dr. Pennington. Sites improving. Cont. POC. Return in 2 weeks.  5/7/20: F/U with Dr. Pennington. Measurements improved all sites. Culture of left axilla done today. Cont. POC. Return in 2  weeks.  6/4/20: F/U with Dr. Pennington. All sites improved. Cont. Clindamycin. Return in 2 weeks.  6/18/20: F/U with Dr. Pennington. Measurements improved. Silver nitrate x 1 to right and left axilla hypergranulation tissue. Cont. POC. Next visit 7/2/20. Client will be out of time 7/6/20 - 7/11/20. Will notify [employer].  07/15/2020- f/u with Dr. Pennington. Pt presents with new abscess to upper right abd. Pt consented for OR this Friday 07/17/2020 for I&D to abscess. Rx for clindamycin sent to pharmacy. Dakins dc'd and xeroform started to wounds. Aquacel ag extra, mextra and large abd pad to right upper abd. Pt to f/u with Dr. Pennington in 1 week 07/23/2020.  7/23/20: F/U with Dr. Pennington. I&D of right abdomen abscess done 7/17/20 per Dr. Pennington. Site open with sutures. All previous sites improving. Iodoform gauze to sites needing packing. Rx for iodoform gauze and Vibra tabs provided today. Next visit 7/30/20.    Review of Systems   Constitutional: Negative.    HENT: Negative.    Eyes: Negative.    Respiratory: Negative.    Cardiovascular: Negative.    Gastrointestinal: Negative.    Genitourinary: Negative.    Musculoskeletal: Negative.    Skin: Negative.    Neurological: Negative.    Psychiatric/Behavioral: Negative.        Objective:      Physical Exam  Constitutional:       Appearance: She is well-developed.   HENT:      Head: Normocephalic.   Eyes:      Conjunctiva/sclera: Conjunctivae normal.      Pupils: Pupils are equal, round, and reactive to light.   Neck:      Musculoskeletal: Normal range of motion and neck supple.   Cardiovascular:      Rate and Rhythm: Normal rate and regular rhythm.      Heart sounds: Normal heart sounds.   Pulmonary:      Effort: Pulmonary effort is normal.      Breath sounds: Normal breath sounds.   Abdominal:      General: Bowel sounds are normal.      Palpations: Abdomen is soft.   Musculoskeletal: Normal range of motion.   Skin:     General: Skin is warm and dry.   Neurological:       Mental Status: She is alert and oriented to person, place, and time.      Deep Tendon Reflexes: Reflexes are normal and symmetric.         Assessment:       1. Hidradenitis suppurativa of right axilla    2. Left axillary hidradenitis           Wound 07/31/19 1300 Other (comment) Axilla #2 (Active)   07/31/19 1300    Pre-existing: Yes   Primary Wound Type: Other   Side: Left   Orientation:    Location: Axilla   Wound Number (optional): #2   Ankle-Brachial Index:    Pulses:    Removal Indication and Assessment:    Wound Outcome:    (Retired) Wound Type:    (Retired) Wound Length (cm):    (Retired) Wound Width (cm):    (Retired) Depth (cm):    Wound Description (Comments):    Removal Indications:    Wound Image   07/23/20 1100   Dressing Appearance Intact;Moist drainage 07/23/20 1100   Drainage Amount Moderate 07/23/20 1100   Drainage Characteristics/Odor Serosanguineous 07/23/20 1100   Appearance Red;Moist 07/23/20 1100   Tissue loss description Full thickness 07/23/20 1100   Red (%), Wound Tissue Color 100 % 07/23/20 1100   Periwound Area Intact;Dry 07/23/20 1100   Wound Edges Irregular 07/23/20 1100   Wound Length (cm) 5.5 cm 07/23/20 1100   Wound Width (cm) 3 cm 07/23/20 1100   Wound Depth (cm) 0.1 cm 07/23/20 1100   Wound Volume (cm^3) 1.65 cm^3 07/23/20 1100   Wound Surface Area (cm^2) 16.5 cm^2 07/23/20 1100   Care Cleansed with:;Sterile normal saline 07/23/20 1100   Dressing Abd pad;Non-adherent 07/23/20 1100   Periwound Care Absorptive dressing applied;Dry periwound area maintained 07/23/20 1100   Dressing Change Due 07/24/20 07/23/20 1100            Wound 07/31/19 1300 Other (comment) Axilla #1 (Active)   07/31/19 1300    Pre-existing: Yes   Primary Wound Type: Other   Side: Right   Orientation:    Location: Axilla   Wound Number (optional): #1   Ankle-Brachial Index:    Pulses:    Removal Indication and Assessment:    Wound Outcome:    (Retired) Wound Type:    (Retired) Wound Length (cm):    (Retired)  Wound Width (cm):    (Retired) Depth (cm):    Wound Description (Comments):    Removal Indications:    Wound Image   07/23/20 1100   Dressing Appearance Intact;Moist drainage 07/23/20 1100   Drainage Amount Moderate 07/23/20 1100   Drainage Characteristics/Odor Serosanguineous 07/23/20 1100   Appearance Red;Moist 07/23/20 1100   Tissue loss description Full thickness 07/23/20 1100   Red (%), Wound Tissue Color 100 % 07/23/20 1100   Periwound Area Intact;Dry 07/23/20 1100   Wound Edges Irregular 07/23/20 1100   Wound Length (cm) 3 cm 07/23/20 1100   Wound Width (cm) 1.5 cm 07/23/20 1100   Wound Surface Area (cm^2) 4.5 cm^2 07/23/20 1100   Care Cleansed with:;Sterile normal saline 07/23/20 1100   Dressing Abd pad;Other (see comments);Non-adherent 07/23/20 1100   Periwound Care Absorptive dressing applied 07/23/20 1100   Dressing Change Due 07/24/20 07/23/20 1100            Wound 02/06/20 1000 Other (comment) Breast #3 (Active)   02/06/20 1000    Pre-existing: Yes   Primary Wound Type: Other   Side: Right   Orientation:    Location: Breast   Wound Number (optional): #3   Ankle-Brachial Index:    Pulses:    Removal Indication and Assessment:    Wound Outcome:    (Retired) Wound Type:    (Retired) Wound Length (cm):    (Retired) Wound Width (cm):    (Retired) Depth (cm):    Wound Description (Comments):    Removal Indications:    Wound Image   07/23/20 1100   Dressing Appearance Moist drainage 07/23/20 1100   Drainage Amount Small 07/23/20 1100   Drainage Characteristics/Odor Serosanguineous 07/23/20 1100   Appearance Red;Moist 07/23/20 1100   Tissue loss description Partial thickness 07/23/20 1100   Red (%), Wound Tissue Color 100 % 07/23/20 1100   Periwound Area Dry;Intact 07/23/20 1100   Wound Edges Irregular 07/23/20 1100   Wound Length (cm) 1 cm 07/23/20 1100   Wound Width (cm) 3 cm 07/23/20 1100   Wound Depth (cm) 0.1 cm 07/23/20 1100   Wound Volume (cm^3) 0.3 cm^3 07/23/20 1100   Wound Surface Area (cm^2) 3 cm^2  07/23/20 1100   Care Cleansed with:;Sterile normal saline 07/23/20 1100   Dressing Island/border;Other (see comments) 07/23/20 1100   Periwound Care Skin barrier film applied 07/23/20 1100   Dressing Change Due 07/24/20 07/23/20 1100            Wound 07/15/20 1600 Abscess Right upper Abdomen #4 (Active)   07/15/20 1600    Pre-existing: Yes   Primary Wound Type: Abscess   Side: Right   Orientation: upper   Location: Abdomen   Wound Number (optional): #4   Ankle-Brachial Index:    Pulses:    Removal Indication and Assessment:    Wound Outcome:    (Retired) Wound Type:    (Retired) Wound Length (cm):    (Retired) Wound Width (cm):    (Retired) Depth (cm):    Wound Description (Comments):    Removal Indications:    Wound Image   07/23/20 1100   Dressing Appearance Intact;Moist drainage 07/23/20 1100   Drainage Amount Large 07/23/20 1100   Drainage Characteristics/Odor Serosanguineous 07/23/20 1100   Appearance Red;Yellow;Moist;Sutures intact 07/23/20 1100   Tissue loss description Full thickness 07/23/20 1100   Red (%), Wound Tissue Color 80 % 07/23/20 1100   Yellow (%), Wound Tissue Color 20 % 07/23/20 1100   Periwound Area Moist;Swelling 07/23/20 1100   Wound Edges Open 07/23/20 1100   Wound Length (cm) 7 cm 07/23/20 1100   Wound Width (cm) 2 cm 07/23/20 1100   Wound Surface Area (cm^2) 14 cm^2 07/23/20 1100   Care Cleansed with:;Sterile normal saline 07/23/20 1100   Dressing Non-adherent;Island/border 07/23/20 1100   Packing Incision packed with;gauze, iodoform 07/23/20 1100   Packing Inserted  1 07/23/20 1100   Periwound Care Dry periwound area maintained;Skin barrier film applied 07/23/20 1100   Dressing Change Due 07/24/20 07/23/20 1100       Left and Right Axilla  Cleanse with: Rinse with normal saline   Primary dressing: Iodoform gauze packing to right axilla, xeroform  Secondary dressing: cover with 4x4 gauze and small abd pad, secure with mefix tape   Frequency: daily     Right lateral breast   Cleanse with:  Rinse with normal saline   Periwound: cavilon  Primary dressing: Iodoform gauze  Secondary dressing: 3x3 mepore dressing   Frequency: daily     Right upper abd  Cleanse with: Rinse with normal saline   Primary dressing: Iodoform gauze packing, xeroform  Secondary dressing: small mextra, large abd pad, secure with mefix tape   Frequency: daily       Follow up with Dr. Pennington in 1 week on Thursday 7/30/20     Other orders: Rx for Vibra tabs and Iodoform gauze.     Home Health: [facility]:  Fax # [phone].  Skilled nurse to perform dressing changes every Monday, Wednesday, and Friday.  Please provide patient's caregiver with necessary supplies to do wound care: small abd pads, 4x4 gauze, mefix tape.       Plan:                Follow up in about 1 week (around 7/30/2020).

## 2020-07-30 ENCOUNTER — HOSPITAL ENCOUNTER (OUTPATIENT)
Dept: WOUND CARE | Facility: HOSPITAL | Age: 40
Discharge: HOME | End: 2020-07-30
Attending: SURGERY
Payer: COMMERCIAL

## 2020-07-30 VITALS
HEART RATE: 75 BPM | TEMPERATURE: 98 F | HEIGHT: 65 IN | DIASTOLIC BLOOD PRESSURE: 80 MMHG | WEIGHT: 257 LBS | BODY MASS INDEX: 42.82 KG/M2 | SYSTOLIC BLOOD PRESSURE: 128 MMHG

## 2020-07-30 DIAGNOSIS — L73.2 HIDRADENITIS SUPPURATIVA OF RIGHT AXILLA: Primary | ICD-10-CM

## 2020-07-30 DIAGNOSIS — E11.622 DIABETES MELLITUS WITH SKIN ULCER: ICD-10-CM

## 2020-07-30 DIAGNOSIS — L98.499 DIABETES MELLITUS WITH SKIN ULCER: ICD-10-CM

## 2020-07-30 DIAGNOSIS — L73.2 LEFT AXILLARY HIDRADENITIS: ICD-10-CM

## 2020-07-30 DIAGNOSIS — E66.01 MORBID OBESITY WITH BMI OF 40.0-44.9, ADULT: ICD-10-CM

## 2020-07-30 PROCEDURE — 99213 OFFICE O/P EST LOW 20 MIN: CPT

## 2020-07-30 NOTE — PROGRESS NOTES
"Subjective:       Patient ID: Rodney Infante is a 39 y.o. female.    Chief Complaint: Non-healing Wound (right abdomen)    F/u for excision bilateral axillary hider adneitis    Client here today with open areas to right and left axilla. Seen by Dr. Penningtno, who states sweat glands infected. Rx for clindamycin, bactroban, and tramadol provided. Stop methatrexate ( prescribed for arthritis). Client plans to have abdominal bypass surgery in September. Culture done today of right axilla.  8/7/19: F/U with Dr. Pennington. Culture results negative. Continue POC.  8/14/19: F/U with Dr. Pennington. Clindamycin d/c'd. Bactrim and Diflucan ordered today. Leave left and right axilla open to air. Culture sent to lab today.     9/4/19:  F/U with Dr. Pennington.  Wounds to right axilla healing up.  Patient states "right does not drain as much as left".  Patient feels the left axilla is not healing up like the right.  Patient still taking Bactrim PO.  Will continue for now.  9/18/19: F/U with Dr. Pennington. Both axilla continue to have "sebum - like" exudate. New site noted to left back. Culture done today. RX for lotrisone and Diflucan provided.  9/25/19: F/U with Dr. Pennington. Axillae improved. Client was not able to get Diflucan. Reordered today. Continues lotrisone and clindamycin. Culture results reviewed. Referred to ID for appt. On 10/7/19. Next visit with Dr. Pennington 10/16/19.  10/7/19: Vist today with ID. Augmentin ordered. Clindamycin d/c'd. Complete Diflucan.  11/6/19: Dr Pennington assessed patient.Wounds slowly improving. Patient states that she has had gastric sleeve procedure last week. No complaints voiced. Continuing with present plan of care. Follow up in 2 weeks.  11/20/19: F/U with Dr. Pennington. Wounds continue to improve. Continue POC. Return in 2 weeks.  12/4/19: F/U with Dr. Pennington. Axillae drainage decreased. Client has lost 97 Lbs. As of today. Surgery planned for 12/20/19. Next visit 12/18/19, and consents to be " signed.  12/18/19: F/U with Dr. Pennington. Surgery to axillae scheduled for Monday 12/23/19. Consents signed. Return 1/2/20.   12/26/19  F/U with Dr. pennington for s/p surgery to bilateral axilla.  Removal of glands and placement of theraskin.  Staples intact.  Theraskin not completely  Adhered on both areas.   Start bolster dressing to assist tin the adherence of the graft. Patient to return to clinic in 1 week.  Order homehealth for twice weekly    01/02/20: F/u with Dr. Pennington. Moderate to large amount of drainage from wounds. Staples and sutures removed from both wounds today in clinic per Dr. Pennington. New wound care orders given and routed to [facility]. Rx given for 5 mg norco every 4 hours. Patient given a note to not return to work until further notice.  1/9/20: F/U with Dr. Pennington. Removed 2 remaining staples. Discussed with patient future grafting of sites. Cont. POC. Return in 1 week.  1/16/20: F/U with Dr. Pennington. Client reports decreased drainage today. Rx for Clindamycin and Norco provided. Client prefers to wait a while before having grafting performed. Return in 1 week.  1/23/20: F/U with Dr. Pennington. Right axilla measurements improved. Continue Clindamycin. Return in 1 week.  2/6/20:Patient presents with a new wound to right lateral breast. Dr Pennington assessed patient. Continuing with same wound care orders. Rx for Norco 5/325mg given to patient for pain. F/U in 2 weeks.   2/20/20: F/U with Dr. Pennington. Sites continue to improve. Measurements decreased. Culture reviewed. Rx for Ampicillin provided. Client has taken PCN in past w/o reaction. Rx for Norco provided. Return in 2 weeks.  3/5/20: F/U with Dr. Pennington. Sites decreased in size. Cont. Ampicillin. Cont. POC. Return in 2 weeks.  3/19/20: F/U with Dr. Pennington. Sites improving. Cont. POC. Return in 2 weeks.  5/7/20: F/U with Dr. Pennington. Measurements improved all sites. Culture of left axilla done today. Cont. POC. Return in 2  weeks.  6/4/20: F/U with Dr. Pennington. All sites improved. Cont. Clindamycin. Return in 2 weeks.  6/18/20: F/U with Dr. Pennington. Measurements improved. Silver nitrate x 1 to right and left axilla hypergranulation tissue. Cont. POC. Next visit 7/2/20. Client will be out of time 7/6/20 - 7/11/20. Will notify [employer].  07/15/2020- f/u with Dr. Pennington. Pt presents with new abscess to upper right abd. Pt consented for OR this Friday 07/17/2020 for I&D to abscess. Rx for clindamycin sent to pharmacy. Dakins dc'd and xeroform started to wounds. Aquacel ag extra, mextra and large abd pad to right upper abd. Pt to f/u with Dr. Pennington in 1 week 07/23/2020.  7/23/20: F/U with Dr. Pennington. I&D of right abdomen abscess done 7/17/20 per Dr. Pennington. Site open with sutures. All previous sites improving. Iodoform gauze to sites needing packing. Rx for iodoform gauze and Vibra tabs provided today. Next visit 7/30/20.  7/30/20: F/U with Dr. Pennington. All sites improving. Sutures removed from right abdominal site per Dr. Pennington. Has excoriation under right breast. Rx for mycostatin powder provided. Cont. POC. Return 8/6/20.    Review of Systems   Constitutional: Negative.    HENT: Negative.    Eyes: Negative.    Respiratory: Negative.    Cardiovascular: Negative.    Gastrointestinal: Negative.    Genitourinary: Negative.    Musculoskeletal: Negative.    Skin: Negative.    Neurological: Negative.    Psychiatric/Behavioral: Negative.        Objective:      Physical Exam  Constitutional:       Appearance: She is well-developed.   HENT:      Head: Normocephalic.   Eyes:      Conjunctiva/sclera: Conjunctivae normal.      Pupils: Pupils are equal, round, and reactive to light.   Neck:      Musculoskeletal: Normal range of motion and neck supple.   Cardiovascular:      Rate and Rhythm: Normal rate and regular rhythm.      Heart sounds: Normal heart sounds.   Pulmonary:      Effort: Pulmonary effort is normal.      Breath sounds:  Normal breath sounds.   Abdominal:      General: Bowel sounds are normal.      Palpations: Abdomen is soft.   Musculoskeletal: Normal range of motion.   Skin:     General: Skin is warm and dry.   Neurological:      Mental Status: She is alert and oriented to person, place, and time.      Deep Tendon Reflexes: Reflexes are normal and symmetric.         Assessment:       1. Hidradenitis suppurativa of right axilla    2. Left axillary hidradenitis           Wound 07/31/19 1300 Other (comment) Axilla #2 (Active)   07/31/19 1300    Pre-existing: Yes   Primary Wound Type: Other   Side: Left   Orientation:    Location: Axilla   Wound Number (optional): #2   Ankle-Brachial Index:    Pulses:    Removal Indication and Assessment:    Wound Outcome:    (Retired) Wound Type:    (Retired) Wound Length (cm):    (Retired) Wound Width (cm):    (Retired) Depth (cm):    Wound Description (Comments):    Removal Indications:    Wound Image   07/30/20 1100   Dressing Appearance Moist drainage;Intact 07/30/20 1100   Drainage Amount Moderate 07/30/20 1100   Drainage Characteristics/Odor Serosanguineous 07/30/20 1100   Appearance Red;Moist 07/30/20 1100   Tissue loss description Full thickness 07/30/20 1100   Red (%), Wound Tissue Color 100 % 07/30/20 1100   Periwound Area Intact;Dry 07/30/20 1100   Wound Edges Irregular 07/30/20 1100   Wound Length (cm) 3 cm 07/30/20 1100   Wound Width (cm) 2 cm 07/30/20 1100   Wound Depth (cm) 0.1 cm 07/30/20 1100   Wound Volume (cm^3) 0.6 cm^3 07/30/20 1100   Wound Surface Area (cm^2) 6 cm^2 07/30/20 1100   Care Cleansed with:;Sterile normal saline 07/30/20 1100   Dressing Non-adherent;Abd pad 07/30/20 1100   Periwound Care Absorptive dressing applied 07/30/20 1100   Dressing Change Due 07/31/20 07/30/20 1100            Wound 07/31/19 1300 Other (comment) Axilla #1 (Active)   07/31/19 1300    Pre-existing: Yes   Primary Wound Type: Other   Side: Right   Orientation:    Location: Axilla   Wound Number  (optional): #1   Ankle-Brachial Index:    Pulses:    Removal Indication and Assessment:    Wound Outcome:    (Retired) Wound Type:    (Retired) Wound Length (cm):    (Retired) Wound Width (cm):    (Retired) Depth (cm):    Wound Description (Comments):    Removal Indications:    Wound Image   07/30/20 1100   Dressing Appearance Intact;Moist drainage 07/30/20 1100   Drainage Amount Moderate 07/30/20 1100   Drainage Characteristics/Odor Serosanguineous 07/30/20 1100   Appearance Red;Moist 07/30/20 1100   Tissue loss description Full thickness 07/30/20 1100   Red (%), Wound Tissue Color 100 % 07/30/20 1100   Periwound Area Dry;Intact 07/30/20 1100   Wound Edges Irregular 07/30/20 1100   Wound Length (cm) 3 cm 07/30/20 1100   Wound Width (cm) 2 cm 07/30/20 1100   Wound Surface Area (cm^2) 6 cm^2 07/30/20 1100   Care Cleansed with:;Sterile normal saline 07/30/20 1100   Dressing Non-adherent;Abd pad 07/30/20 1100   Periwound Care Absorptive dressing applied 07/30/20 1100   Dressing Change Due 07/31/20 07/30/20 1100            Wound 02/06/20 1000 Other (comment) Breast #3 (Active)   02/06/20 1000    Pre-existing: Yes   Primary Wound Type: Other   Side: Right   Orientation:    Location: Breast   Wound Number (optional): #3   Ankle-Brachial Index:    Pulses:    Removal Indication and Assessment:    Wound Outcome:    (Retired) Wound Type:    (Retired) Wound Length (cm):    (Retired) Wound Width (cm):    (Retired) Depth (cm):    Wound Description (Comments):    Removal Indications:    Wound Image   07/30/20 1100   Dressing Appearance Moist drainage 07/30/20 1100   Drainage Amount Small 07/30/20 1100   Drainage Characteristics/Odor Serosanguineous 07/30/20 1100   Appearance Red;Moist 07/30/20 1100   Tissue loss description Partial thickness 07/30/20 1100   Red (%), Wound Tissue Color 100 % 07/30/20 1100   Periwound Area Dry;Intact 07/30/20 1100   Wound Edges Irregular 07/30/20 1100   Wound Length (cm) 1 cm 07/30/20 1100   Wound  Width (cm) 3 cm 07/30/20 1100   Wound Depth (cm) 0.1 cm 07/30/20 1100   Wound Volume (cm^3) 0.3 cm^3 07/30/20 1100   Wound Surface Area (cm^2) 3 cm^2 07/30/20 1100   Care Cleansed with:;Sterile normal saline 07/30/20 1100   Dressing Island/border;Non-adherent 07/30/20 1100   Periwound Care Skin barrier film applied 07/30/20 1100   Dressing Change Due 07/31/20 07/30/20 1100            Wound 07/15/20 1600 Abscess Right upper Abdomen #4 (Active)   07/15/20 1600    Pre-existing: Yes   Primary Wound Type: Abscess   Side: Right   Orientation: upper   Location: Abdomen   Wound Number (optional): #4   Ankle-Brachial Index:    Pulses:    Removal Indication and Assessment:    Wound Outcome:    (Retired) Wound Type:    (Retired) Wound Length (cm):    (Retired) Wound Width (cm):    (Retired) Depth (cm):    Wound Description (Comments):    Removal Indications:    Wound Image   07/30/20 1100   Dressing Appearance Moist drainage;Intact 07/30/20 1100   Drainage Amount Moderate 07/30/20 1100   Drainage Characteristics/Odor Serosanguineous 07/30/20 1100   Appearance Red;Moist;Granulating 07/30/20 1100   Tissue loss description Full thickness 07/30/20 1100   Red (%), Wound Tissue Color 100 % 07/30/20 1100   Periwound Area Intact;[place];Dry 07/30/20 1100   Wound Edges Open 07/30/20 1100   Wound Length (cm) 5 cm 07/30/20 1100   Wound Width (cm) 3 cm 07/30/20 1100   Wound Depth (cm) 3.5 cm 07/30/20 1100   Wound Volume (cm^3) 52.5 cm^3 07/30/20 1100   Wound Surface Area (cm^2) 15 cm^2 07/30/20 1100   Care Cleansed with:;Sterile normal saline 07/30/20 1100   Dressing Non-adherent;Island/border 07/30/20 1100   Packing Incision packed with;gauze, iodoform 07/30/20 1100   Packing Inserted  1 07/30/20 1100   Packing Removed 1 07/30/20 1100   Periwound Care Dry periwound area maintained 07/30/20 1100   Dressing Change Due 07/31/20 07/30/20 1100       Left and Right Axilla  Cleanse with: Rinse with normal saline   Primary dressing: Iodoform gauze  packing to right axilla, xeroform  Secondary dressing: cover with 4x4 gauze and small abd pad, secure with mefix tape   Frequency: daily     Right lateral breast   Cleanse with: Rinse with normal saline   Periwound: cavilon  Primary dressing: Iodoform gauze  Secondary dressing: 3x3 mepore dressing   Frequency: daily     Right upper abd  Cleanse with: Rinse with normal saline   Primary dressing: Iodoform gauze packing, xeroform  Secondary dressing: small mextra, large abd pad, secure with mefix tape   Frequency: daily       Follow up with Dr. Pennington in 1 week on Thursday 8/6/20    Other orders: Rx for Mycostatin powder provided 7/30/20 for daily use under right breast.     Home Health: [facility]:  Fax # [phone].  Skilled nurse to perform dressing changes every Monday, Wednesday, and Friday.  Please provide patient's caregiver with necessary supplies to do wound care: small abd pads, 4x4 gauze, mefix tape.       Plan:       Dr. Pennington 8/6/20.         No follow-ups on file.

## 2020-08-07 ENCOUNTER — OFFICE VISIT (OUTPATIENT)
Dept: RHEUMATOLOGY | Facility: CLINIC | Age: 40
End: 2020-08-07
Payer: COMMERCIAL

## 2020-08-07 ENCOUNTER — LAB VISIT (OUTPATIENT)
Dept: LAB | Facility: HOSPITAL | Age: 40
End: 2020-08-07
Attending: INTERNAL MEDICINE
Payer: COMMERCIAL

## 2020-08-07 VITALS
SYSTOLIC BLOOD PRESSURE: 110 MMHG | HEART RATE: 78 BPM | DIASTOLIC BLOOD PRESSURE: 78 MMHG | HEIGHT: 65 IN | BODY MASS INDEX: 43.89 KG/M2 | WEIGHT: 263.44 LBS

## 2020-08-07 DIAGNOSIS — M13.80 SERONEGATIVE ARTHRITIS: ICD-10-CM

## 2020-08-07 DIAGNOSIS — Z71.89 COUNSELING ON HEALTH PROMOTION AND DISEASE PREVENTION: ICD-10-CM

## 2020-08-07 DIAGNOSIS — M67.90 TENDINOPATHY: Primary | ICD-10-CM

## 2020-08-07 DIAGNOSIS — Z79.60 LONG-TERM USE OF IMMUNOSUPPRESSANT MEDICATION: ICD-10-CM

## 2020-08-07 LAB
ALBUMIN SERPL BCP-MCNC: 2.6 G/DL (ref 3.5–5.2)
ALP SERPL-CCNC: 81 U/L (ref 55–135)
ALT SERPL W/O P-5'-P-CCNC: 9 U/L (ref 10–44)
ANION GAP SERPL CALC-SCNC: 8 MMOL/L (ref 8–16)
AST SERPL-CCNC: 10 U/L (ref 10–40)
BASOPHILS # BLD AUTO: 0.02 K/UL (ref 0–0.2)
BASOPHILS NFR BLD: 0.2 % (ref 0–1.9)
BILIRUB SERPL-MCNC: 0.1 MG/DL (ref 0.1–1)
BUN SERPL-MCNC: 6 MG/DL (ref 6–20)
CALCIUM SERPL-MCNC: 8.7 MG/DL (ref 8.7–10.5)
CHLORIDE SERPL-SCNC: 110 MMOL/L (ref 95–110)
CO2 SERPL-SCNC: 22 MMOL/L (ref 23–29)
CREAT SERPL-MCNC: 0.8 MG/DL (ref 0.5–1.4)
CRP SERPL-MCNC: 9.6 MG/L (ref 0–8.2)
DIFFERENTIAL METHOD: ABNORMAL
EOSINOPHIL # BLD AUTO: 0.3 K/UL (ref 0–0.5)
EOSINOPHIL NFR BLD: 3.7 % (ref 0–8)
ERYTHROCYTE [DISTWIDTH] IN BLOOD BY AUTOMATED COUNT: 13.3 % (ref 11.5–14.5)
ERYTHROCYTE [SEDIMENTATION RATE] IN BLOOD BY WESTERGREN METHOD: 58 MM/HR (ref 0–36)
EST. GFR  (AFRICAN AMERICAN): >60 ML/MIN/1.73 M^2
EST. GFR  (NON AFRICAN AMERICAN): >60 ML/MIN/1.73 M^2
GLUCOSE SERPL-MCNC: 77 MG/DL (ref 70–110)
HCT VFR BLD AUTO: 33.8 % (ref 37–48.5)
HGB BLD-MCNC: 10.6 G/DL (ref 12–16)
IMM GRANULOCYTES # BLD AUTO: 0.06 K/UL (ref 0–0.04)
IMM GRANULOCYTES NFR BLD AUTO: 0.7 % (ref 0–0.5)
LYMPHOCYTES # BLD AUTO: 3.9 K/UL (ref 1–4.8)
LYMPHOCYTES NFR BLD: 45.1 % (ref 18–48)
MCH RBC QN AUTO: 29.3 PG (ref 27–31)
MCHC RBC AUTO-ENTMCNC: 31.4 G/DL (ref 32–36)
MCV RBC AUTO: 93 FL (ref 82–98)
MONOCYTES # BLD AUTO: 0.8 K/UL (ref 0.3–1)
MONOCYTES NFR BLD: 9.1 % (ref 4–15)
NEUTROPHILS # BLD AUTO: 3.6 K/UL (ref 1.8–7.7)
NEUTROPHILS NFR BLD: 41.9 % (ref 38–73)
NRBC BLD-RTO: 0 /100 WBC
PLATELET # BLD AUTO: 458 K/UL (ref 150–350)
PMV BLD AUTO: 7.6 FL (ref 9.2–12.9)
POTASSIUM SERPL-SCNC: 3.7 MMOL/L (ref 3.5–5.1)
PROT SERPL-MCNC: 8.7 G/DL (ref 6–8.4)
RBC # BLD AUTO: 3.62 M/UL (ref 4–5.4)
SODIUM SERPL-SCNC: 140 MMOL/L (ref 136–145)
WBC # BLD AUTO: 8.6 K/UL (ref 3.9–12.7)

## 2020-08-07 PROCEDURE — 99214 PR OFFICE/OUTPT VISIT, EST, LEVL IV, 30-39 MIN: ICD-10-PCS | Mod: 25,S$GLB,, | Performed by: INTERNAL MEDICINE

## 2020-08-07 PROCEDURE — 86140 C-REACTIVE PROTEIN: CPT

## 2020-08-07 PROCEDURE — 99214 OFFICE O/P EST MOD 30 MIN: CPT | Mod: 25,S$GLB,, | Performed by: INTERNAL MEDICINE

## 2020-08-07 PROCEDURE — 99999 PR PBB SHADOW E&M-EST. PATIENT-LVL IV: CPT | Mod: PBBFAC,,, | Performed by: INTERNAL MEDICINE

## 2020-08-07 PROCEDURE — 20610 DRAIN/INJ JOINT/BURSA W/O US: CPT | Mod: LT,S$GLB,, | Performed by: INTERNAL MEDICINE

## 2020-08-07 PROCEDURE — 3008F PR BODY MASS INDEX (BMI) DOCUMENTED: ICD-10-PCS | Mod: CPTII,S$GLB,, | Performed by: INTERNAL MEDICINE

## 2020-08-07 PROCEDURE — 85025 COMPLETE CBC W/AUTO DIFF WBC: CPT

## 2020-08-07 PROCEDURE — 80053 COMPREHEN METABOLIC PANEL: CPT

## 2020-08-07 PROCEDURE — 36415 COLL VENOUS BLD VENIPUNCTURE: CPT

## 2020-08-07 PROCEDURE — 85652 RBC SED RATE AUTOMATED: CPT

## 2020-08-07 PROCEDURE — 99999 PR PBB SHADOW E&M-EST. PATIENT-LVL IV: ICD-10-PCS | Mod: PBBFAC,,, | Performed by: INTERNAL MEDICINE

## 2020-08-07 PROCEDURE — 20610 LARGE JOINT ASPIRATION/INJECTION: L SUBACROMIAL BURSA: ICD-10-PCS | Mod: LT,S$GLB,, | Performed by: INTERNAL MEDICINE

## 2020-08-07 PROCEDURE — 3008F BODY MASS INDEX DOCD: CPT | Mod: CPTII,S$GLB,, | Performed by: INTERNAL MEDICINE

## 2020-08-07 RX ORDER — TRIAMCINOLONE ACETONIDE 40 MG/ML
40 INJECTION, SUSPENSION INTRA-ARTICULAR; INTRAMUSCULAR
Status: DISCONTINUED | OUTPATIENT
Start: 2020-08-07 | End: 2020-08-07 | Stop reason: HOSPADM

## 2020-08-07 RX ORDER — SULFASALAZINE 500 MG/1
1000 TABLET, DELAYED RELEASE ORAL 2 TIMES DAILY
Qty: 360 TABLET | Refills: 0 | Status: SHIPPED | OUTPATIENT
Start: 2020-08-07 | End: 2020-09-23 | Stop reason: SDUPTHER

## 2020-08-07 RX ADMIN — TRIAMCINOLONE ACETONIDE 40 MG: 40 INJECTION, SUSPENSION INTRA-ARTICULAR; INTRAMUSCULAR at 12:08

## 2020-08-07 NOTE — PROCEDURES
Large Joint Aspiration/Injection: L subacromial bursa    Date/Time: 8/7/2020 12:40 PM  Performed by: Ken Alberto MD  Authorized by: Ken Alberto MD     Consent Done?:  Yes (Verbal)  Indications:  Pain  Site marked: the procedure site was marked    Timeout: prior to procedure the correct patient, procedure, and site was verified    Prep: patient was prepped and draped in usual sterile fashion    Local anesthetic:  Lidocaine 2% without epinephrine    Details:  Needle Size:  25 G  Ultrasonic Guidance for needle placement?: No    Location:  Shoulder  Site:  L subacromial bursa  Medications:  40 mg triamcinolone acetonide 40 mg/mL  Patient tolerance:  Patient tolerated the procedure well with no immediate complications

## 2020-08-07 NOTE — PROGRESS NOTES
RHEUMATOLOGY OUTPATIENT CLINIC NOTE    8/7/2020    Attending Rheumatologist: Ken Alberto  Primary Care Provider: [facility]   Physician Requesting Consultation: No referring provider defined for this encounter.  Chief Complaint/Reason For Consultation:  Seronegative arthritis.    Subjective:       HPI  Rodney Infante is a 39 y.o. Black or  female with seronegative arthritis comes for follow-up.    Today  Last seen communicated with patient in March via telemedicine.  Good results with regimen of sulfasalazine 1000 mg b.i.d. and Humira, was continue unchanged.  Patient contracted COVID-19 April, he without sequela and resume biologic after tolerating well without side effects.  Main complaint is Left shoulder joint pain.  Worst in the evening, aggravated by range of motion/lying on affected side.  Relieved somewhat by rest and OTC pain medication.  Denies association with prolonged morning stiffness, redness, or joint swelling.    Review of Systems   Constitutional: Negative for chills, fever and malaise/fatigue.   Eyes: Negative for pain and redness.   Respiratory: Negative for cough, hemoptysis and shortness of breath.    Cardiovascular: Negative for chest pain and leg swelling.   Gastrointestinal: Negative for abdominal pain, blood in stool and melena.   Genitourinary: Negative for dysuria and hematuria.   Musculoskeletal: Positive for joint pain (Left shoulder, mechanical pattern.). Negative for falls.   Skin: Negative for rash.        +HS.  Denies worsening compared to last visit.   Neurological: Negative for tingling and focal weakness.   Psychiatric/Behavioral: Negative for memory loss. The patient does not have insomnia.      Chronic comorbid conditions affecting medical decision making today:  Past Medical History:   Diagnosis Date    Anemia     Arthritis     Diabetes mellitus, type 2     Neuropathy      Past Surgical History:   Procedure Laterality Date    CYST  "REMOVAL  2014, 2/2018    back    EXCISION OF HIDRADENITIS Bilateral 12/24/2019    Procedure: EXCISION, HIDRADENITIS;  Surgeon: Marcel Pennington MD;  Location: [facility] OR;  Service: General;  Laterality: Bilateral;    WOUND DEBRIDEMENT Right 7/17/2020    Procedure: DEBRIDEMENT, WOUND;  Surgeon: Marcel Pennington MD;  Location: [facility] OR;  Service: General;  Laterality: Right;     Family History   Problem Relation Age of Onset    No Known Problems Mother     Drug abuse Father     No Known Problems Sister      Social History     Substance and Sexual Activity   Alcohol Use Yes    Comment: occasionally     Social History     Tobacco Use   Smoking Status Never Smoker   Smokeless Tobacco Never Used     Social History     Substance and Sexual Activity   Drug Use No       Current Outpatient Medications:     adalimumab (HUMIRA PEN) 40 mg/0.8 mL PnKt, Inject 1 pen (40 mg total) into the skin every 14 (fourteen) days., Disp: 25 pen, Rfl: 0    clotrimazole-betamethasone 1-0.05% (LOTRISONE) cream, , Disp: , Rfl:     cyanocobalamin (VITAMIN B-12) 1000 MCG tablet, Take 1 tablet (1,000 mcg total) by mouth once daily. This is a prescription for 1 year.  Take 1 tablet Monday, Wednesday, and Friday, Disp: 36 tablet, Rfl: 3    doxycycline (VIBRA-TABS) 100 MG tablet, Take 100 mg by mouth 2 (two) times daily., Disp: , Rfl:     ergocalciferol (ERGOCALCIFEROL) 50,000 unit Cap, Vitamin D2 1,250 mcg (50,000 unit) capsule, Disp: , Rfl:     estradiol cypionate (DEPO-ESTRADIOL) 5 mg/mL injection, Inject into the muscle every 28 days., Disp: , Rfl:     gabapentin (NEURONTIN) 100 MG capsule, Take 3 capsules (300 mg total) by mouth every evening., Disp: 90 capsule, Rfl: 3    iodoform 1/2 X 5 "-yard [facility], change 3 times a week as directed, Disp: 12 each, Rfl: 0    nystatin (MYCOSTATIN) powder, Apply topically locally daily under breasts as directed, Disp: 60 g, Rfl: 0    traMADoL (ULTRAM) 50 mg tablet, Take 1 tablet (50 mg total) by " "mouth every 6 (six) hours as needed., Disp: 32 tablet, Rfl: 0    clindamycin (CLEOCIN) 300 MG capsule, Take 1 capsule (300 mg total) by mouth every 8 (eight) hours., Disp: 60 capsule, Rfl: 1    doxycycline (VIBRA-TABS) 100 MG tablet, take 1 tablet by mouth twice daily, Disp: 60 tablet, Rfl: 1    ergocalciferol (ERGOCALCIFEROL) 50,000 unit Cap, , Disp: , Rfl:     HYDROcodone-acetaminophen (NORCO) 5-325 mg per tablet, Take 1 tablet by mouth every 6 (six) hours as needed for Pain., Disp: 24 tablet, Rfl: 0    sulfaSALAzine (AZULFIDINE) 500 MG EC tablet, Take 2 tablets (1,000 mg total) by mouth 2 (two) times daily., Disp: 360 tablet, Rfl: 0     Objective:         Vitals:    08/07/20 1228   BP: 110/78   Pulse: 78     Physical Exam   Constitutional: No distress.   Estimated body mass index is 43.84 kg/m² as calculated from the following:    Height as of this encounter: 5' 5" (1.651 m).    Weight as of this encounter: 119.5 kg (263 lb 7.2 oz).    Wt Readings from Last 1 Encounters:  08/07/20 1228 : 119.5 kg (263 lb 7.2 oz)     HENT:   Head: Normocephalic and atraumatic.   Eyes: Conjunctivae are normal. Pupils are equal, round, and reactive to light.   Neck: Normal range of motion.   Cardiovascular: Normal rate and intact distal pulses.    Pulmonary/Chest: Effort normal. No respiratory distress.   Abdominal: Soft. She exhibits no distension.   Neurological: She is alert. Gait normal.   Skin: No rash noted. No erythema.     Musculoskeletal: Normal range of motion. Deformity (PIP hyperflexion deformity 4th finger Rt hand) present. No tenderness.      Comments: : strong  No synovitis or significant squeeze tenderness    AROM:  Pain which shoulder abduction past 50° left side.  Otherwise  intact  PROM:  As above    Devices used by patient: none       Reviewed old and all outside pertinent medical records available.    All lab results personally reviewed and interpreted by me.  Lab Results   Component Value Date    WBC " 8.60 08/07/2020    HGB 10.6 (L) 08/07/2020    HCT 33.8 (L) 08/07/2020    MCV 93 08/07/2020    MCH 29.3 08/07/2020    MCHC 31.4 (L) 08/07/2020    RDW 13.3 08/07/2020     (H) 08/07/2020    MPV 7.6 (L) 08/07/2020    PLTEST Increased (A) 11/12/2018     Lab Results   Component Value Date     08/07/2020    K 3.7 08/07/2020     08/07/2020    CO2 22 (L) 08/07/2020    GLU 77 08/07/2020    BUN 6 08/07/2020    CALCIUM 8.7 08/07/2020    PROT 8.7 (H) 08/07/2020    ALBUMIN 2.6 (L) 08/07/2020    BILITOT 0.1 08/07/2020    AST 10 08/07/2020    ALKPHOS 81 08/07/2020    ALT 9 (L) 08/07/2020     Lab Results   Component Value Date    COLORU Yellow 02/27/2018    APPEARANCEUA Cloudy (A) 02/27/2018    SPECGRAV 1.020 02/27/2018    PHUR 5.0 02/27/2018    PROTEINUA Negative 02/27/2018    KETONESU Negative 02/27/2018    LEUKOCYTESUR 3+ (A) 02/27/2018    NITRITE Negative 02/27/2018    UROBILINOGEN Negative 02/27/2018     Lab Results   Component Value Date    CRP 9.6 (H) 08/07/2020       Lab Results   Component Value Date    SEDRATE 93 (H) 08/05/2020       Lab Results   Component Value Date    RF <10.0 04/03/2019    SEDRATE 93 (H) 08/05/2020       No components found for: 25OHVITDTOT, 90JSOAOB9, 48HGLSJW2, METHODNOTE    No results found for: URICACID    No components found for: TSPOTTB    Rheum Labs:  MILTON negative  Rheumatoid factor/CCP negative  SSA negative  ANCA screen negative  HLA B27 negative    Infectious Labs:  HCV antibody nonreactive April 2019   Hepatitis panel nonreactive  QuantiFERON TB negative August 2019    Imaging:  All imaging reviewed and independently  interpreted by me.    MRI hands August 2019  Periarticular enhancement and synovial thickening at the 1st metacarpophalangeal joint and proximal interphalangeal joint of the 2nd digit.  No erosions are visualized.     ASSESSMENT / PLAN:     Rodney Infante is a 39 y.o. Black or  female with:    1. Seronegative arthritis  - CDAI: -> no  disease activity.  Chronic complaints mostly consistent with shoulder tendinopathy  - excellent response to Humira initiated on 9/23.  Will continue unchanged.  - CSI provided today left shoulder  - continue with sulfasalazine, will decrease to 1000mg AM and 500mg in the PM  - NSAIDs p.r.n. for breakthrough pain.     2. Immunosuppressed status   - Compromised immune system secondary to autoimmune disease and use of immunosuppressive drugs.   - Monitor carefully for infections and toxicities.   - Advised to get immediate medical care if any infection.   - Also advised strict adherence to age appropriate vaccinations and cancer screenings with PCP.  - CBC, CMP before next visit    3. Other specified counseling  - Immunization counseling done. Flu shot today.  - Weight loss counseling done.  - Nutrition and exercise counseling.  - Medication counseling provided.    RTC 4 months    Method of contact with patient concerns: [facility] attn Rheumatology    Disclaimer:  This note is prepared using voice recognition software and as such is likely to have errors and has not been proof read. Please contact me for questions.     Large Joint Aspiration/Injection: L subacromial bursa    Date/Time: 8/7/2020 12:40 PM  Performed by: Ken Alberto MD  Authorized by: Ken Alberto MD     Consent Done?:  Yes (Verbal)  Indications:  Pain  Site marked: the procedure site was marked    Timeout: prior to procedure the correct patient, procedure, and site was verified    Prep: patient was prepped and draped in usual sterile fashion    Local anesthetic:  Lidocaine 2% without epinephrine    Details:  Needle Size:  25 G  Ultrasonic Guidance for needle placement?: No    Location:  Shoulder  Site:  L subacromial bursa  Medications:  40 mg triamcinolone acetonide 40 mg/mL  Patient tolerance:  Patient tolerated the procedure well with no immediate complications      Ken Alberto M.D.  Rheumatology Department   [facility]  [place]

## 2020-08-11 ENCOUNTER — TELEPHONE (OUTPATIENT)
Dept: PHARMACY | Facility: CLINIC | Age: 40
End: 2020-08-11

## 2020-08-13 ENCOUNTER — HOSPITAL ENCOUNTER (OUTPATIENT)
Dept: WOUND CARE | Facility: HOSPITAL | Age: 40
Discharge: HOME | End: 2020-08-13
Attending: SURGERY
Payer: COMMERCIAL

## 2020-08-13 VITALS
HEIGHT: 65 IN | HEART RATE: 58 BPM | BODY MASS INDEX: 43.82 KG/M2 | WEIGHT: 263 LBS | DIASTOLIC BLOOD PRESSURE: 86 MMHG | TEMPERATURE: 98 F | SYSTOLIC BLOOD PRESSURE: 132 MMHG

## 2020-08-13 DIAGNOSIS — B96.89 BACTERIAL SKIN INFECTION: ICD-10-CM

## 2020-08-13 DIAGNOSIS — L73.2 HIDRADENITIS SUPPURATIVA OF RIGHT AXILLA: Primary | ICD-10-CM

## 2020-08-13 DIAGNOSIS — E66.01 MORBID OBESITY WITH BMI OF 40.0-44.9, ADULT: ICD-10-CM

## 2020-08-13 DIAGNOSIS — L02.213 ABSCESS OF CHEST WALL: ICD-10-CM

## 2020-08-13 DIAGNOSIS — L08.9 BACTERIAL SKIN INFECTION: ICD-10-CM

## 2020-08-13 PROCEDURE — 17250 CHEM CAUT OF GRANLTJ TISSUE: CPT

## 2020-08-13 NOTE — PROGRESS NOTES
"Subjective:       Patient ID: Rodney Infante is a 39 y.o. female.    Chief Complaint: Wound Care    F/u for excision bilateral axillary hider adneitis    Client here today with open areas to right and left axilla. Seen by Dr. Pennington, who states sweat glands infected. Rx for clindamycin, bactroban, and tramadol provided. Stop methatrexate ( prescribed for arthritis). Client plans to have abdominal bypass surgery in September. Culture done today of right axilla.  8/7/19: F/U with Dr. Pennington. Culture results negative. Continue POC.  8/14/19: F/U with Dr. Pennington. Clindamycin d/c'd. Bactrim and Diflucan ordered today. Leave left and right axilla open to air. Culture sent to lab today.     9/4/19:  F/U with Dr. Pennington.  Wounds to right axilla healing up.  Patient states "right does not drain as much as left".  Patient feels the left axilla is not healing up like the right.  Patient still taking Bactrim PO.  Will continue for now.  9/18/19: F/U with Dr. Pennington. Both axilla continue to have "sebum - like" exudate. New site noted to left back. Culture done today. RX for lotrisone and Diflucan provided.  9/25/19: F/U with Dr. Pennington. Axillae improved. Client was not able to get Diflucan. Reordered today. Continues lotrisone and clindamycin. Culture results reviewed. Referred to ID for appt. On 10/7/19. Next visit with Dr. Pennington 10/16/19.  10/7/19: Vist today with ID. Augmentin ordered. Clindamycin d/c'd. Complete Diflucan.  11/6/19: Dr Pennington assessed patient.Wounds slowly improving. Patient states that she has had gastric sleeve procedure last week. No complaints voiced. Continuing with present plan of care. Follow up in 2 weeks.  11/20/19: F/U with Dr. Pennington. Wounds continue to improve. Continue POC. Return in 2 weeks.  12/4/19: F/U with Dr. Pennington. Axillae drainage decreased. Client has lost 97 Lbs. As of today. Surgery planned for 12/20/19. Next visit 12/18/19, and consents to be signed.  12/18/19: F/U " with Dr. Pennington. Surgery to axillae scheduled for Monday 12/23/19. Consents signed. Return 1/2/20.   12/26/19  F/U with Dr. pennington for s/p surgery to bilateral axilla.  Removal of glands and placement of theraskin.  Staples intact.  Theraskin not completely  Adhered on both areas.   Start bolster dressing to assist tin the adherence of the graft. Patient to return to clinic in 1 week.  Order homehealth for twice weekly    01/02/20: F/u with Dr. Pennington. Moderate to large amount of drainage from wounds. Staples and sutures removed from both wounds today in clinic per Dr. Pennington. New wound care orders given and routed to [facility]. Rx given for 5 mg norco every 4 hours. Patient given a note to not return to work until further notice.  1/9/20: F/U with Dr. Pennington. Removed 2 remaining staples. Discussed with patient future grafting of sites. Cont. POC. Return in 1 week.  1/16/20: F/U with Dr. Pennington. Client reports decreased drainage today. Rx for Clindamycin and Norco provided. Client prefers to wait a while before having grafting performed. Return in 1 week.  1/23/20: F/U with Dr. Pennington. Right axilla measurements improved. Continue Clindamycin. Return in 1 week.  2/6/20:Patient presents with a new wound to right lateral breast. Dr Pennington assessed patient. Continuing with same wound care orders. Rx for Norco 5/325mg given to patient for pain. F/U in 2 weeks.   2/20/20: F/U with Dr. Pennington. Sites continue to improve. Measurements decreased. Culture reviewed. Rx for Ampicillin provided. Client has taken PCN in past w/o reaction. Rx for Norco provided. Return in 2 weeks.  3/5/20: F/U with Dr. Pennington. Sites decreased in size. Cont. Ampicillin. Cont. POC. Return in 2 weeks.  3/19/20: F/U with Dr. Pennington. Sites improving. Cont. POC. Return in 2 weeks.  5/7/20: F/U with Dr. Pennington. Measurements improved all sites. Culture of left axilla done today. Cont. POC. Return in 2 weeks.  6/4/20: F/U with   [facility]. All sites improved. Cont. Clindamycin. Return in 2 weeks.  6/18/20: F/U with Dr. Pennington. Measurements improved. Silver nitrate x 1 to right and left axilla hypergranulation tissue. Cont. POC. Next visit 7/2/20. Client will be out of time 7/6/20 - 7/11/20. Will notify [facility].  07/15/2020- f/u with Dr. Pennington. Pt presents with new abscess to upper right abd. Pt consented for OR this Friday 07/17/2020 for I&D to abscess. Rx for clindamycin sent to pharmacy. Dakins dc'd and xeroform started to wounds. Aquacel ag extra, mextra and large abd pad to right upper abd. Pt to f/u with Dr. Pennington in 1 week 07/23/2020.  7/23/20: F/U with Dr. Pennington. I&D of right abdomen abscess done 7/17/20 per Dr. Pennington. Site open with sutures. All previous sites improving. Iodoform gauze to sites needing packing. Rx for iodoform gauze and Vibra tabs provided today. Next visit 7/30/20.  7/30/20: F/U with Dr. Pennington. All sites improving. Sutures removed from right abdominal site per Dr. Pennington. Has excoriation under right breast. Rx for mycostatin powder provided. Cont. POC. Return 8/6/20.  08/13/2020- f/u with Dr. Pennington. Pt wounds improving. Silver nitrate x 2 to wounds for hypergranulation. Cont POC. F/u with Dr. Pennington in 2 weeks 08/27/2020.    Review of Systems   Constitutional: Negative.    HENT: Negative.    Eyes: Negative.    Respiratory: Negative.    Cardiovascular: Negative.    Gastrointestinal: Negative.    Genitourinary: Negative.    Musculoskeletal: Negative.    Skin: Negative.    Neurological: Negative.    Psychiatric/Behavioral: Negative.        Objective:      Physical Exam  Constitutional:       Appearance: She is well-developed.   HENT:      Head: Normocephalic.   Eyes:      Conjunctiva/sclera: Conjunctivae normal.      Pupils: Pupils are equal, round, and reactive to light.   Neck:      Musculoskeletal: Normal range of motion and neck supple.   Cardiovascular:      Rate and Rhythm: Normal rate and  regular rhythm.      Heart sounds: Normal heart sounds.   Pulmonary:      Effort: Pulmonary effort is normal.      Breath sounds: Normal breath sounds.   Abdominal:      General: Bowel sounds are normal.      Palpations: Abdomen is soft.   Musculoskeletal: Normal range of motion.   Skin:     General: Skin is warm and dry.   Neurological:      Mental Status: She is alert and oriented to person, place, and time.      Deep Tendon Reflexes: Reflexes are normal and symmetric.         Assessment:       1. Hidradenitis suppurativa of right axilla           Wound 07/31/19 1300 Other (comment) Axilla #2 (Active)   07/31/19 1300    Pre-existing: Yes   Primary Wound Type: Other   Side: Left   Orientation:    Location: Axilla   Wound Number (optional): #2   Ankle-Brachial Index:    Pulses:    Removal Indication and Assessment:    Wound Outcome:    (Retired) Wound Type:    (Retired) Wound Length (cm):    (Retired) Wound Width (cm):    (Retired) Depth (cm):    Wound Description (Comments):    Removal Indications:    Wound Image   08/13/20 1000   Dressing Appearance Intact;Moist drainage 08/13/20 1000   Drainage Amount Moderate 08/13/20 1000   Drainage Characteristics/Odor Serosanguineous 08/13/20 1000   Appearance Red;Hypergranulation 08/13/20 1000   Tissue loss description Partial thickness 08/13/20 1000   Red (%), Wound Tissue Color 100 % 08/13/20 1000   Periwound Area Intact 08/13/20 1000   Wound Edges Irregular 08/13/20 1000   Wound Length (cm) 0.7 cm 08/13/20 1000   Wound Width (cm) 1.8 cm 08/13/20 1000   Wound Depth (cm) 0.1 cm 08/13/20 1000   Wound Volume (cm^3) 0.13 cm^3 08/13/20 1000   Wound Surface Area (cm^2) 1.26 cm^2 08/13/20 1000   Care Cleansed with:;Sterile normal saline 08/13/20 1000   Dressing Gauze;Abd pad 08/13/20 1000   Periwound Care Dry periwound area maintained 08/13/20 1000   Dressing Change Due 08/14/20 08/13/20 1000            Wound 07/31/19 1300 Other (comment) Axilla #1 (Active)   07/31/19 1300     Pre-existing: Yes   Primary Wound Type: Other   Side: Right   Orientation:    Location: Axilla   Wound Number (optional): #1   Ankle-Brachial Index:    Pulses:    Removal Indication and Assessment:    Wound Outcome:    (Retired) Wound Type:    (Retired) Wound Length (cm):    (Retired) Wound Width (cm):    (Retired) Depth (cm):    Wound Description (Comments):    Removal Indications:    Wound Image   08/13/20 1000   Dressing Appearance Intact 08/13/20 1000   Drainage Amount Moderate 08/13/20 1000   Drainage Characteristics/Odor Serosanguineous 08/13/20 1000   Appearance Red;Hypergranulation 08/13/20 1000   Tissue loss description Partial thickness 08/13/20 1000   Red (%), Wound Tissue Color 100 % 08/13/20 1000   Periwound Area Intact 08/13/20 1000   Wound Edges Irregular 08/13/20 1000   Wound Length (cm) 1.7 cm 08/13/20 1000   Wound Width (cm) 1.2 cm 08/13/20 1000   Wound Depth (cm) 0.1 cm 08/13/20 1000   Wound Volume (cm^3) 0.2 cm^3 08/13/20 1000   Wound Surface Area (cm^2) 2.04 cm^2 08/13/20 1000   Care Cleansed with:;Sterile normal saline 08/13/20 1000   Dressing Gauze;Abd pad 08/13/20 1000   Periwound Care Dry periwound area maintained 08/13/20 1000   Dressing Change Due 08/14/20 08/13/20 1000            Wound 02/06/20 1000 Other (comment) Breast #3 (Active)   02/06/20 1000    Pre-existing: Yes   Primary Wound Type: Other   Side: Right   Orientation:    Location: Breast   Wound Number (optional): #3   Ankle-Brachial Index:    Pulses:    Removal Indication and Assessment:    Wound Outcome:    (Retired) Wound Type:    (Retired) Wound Length (cm):    (Retired) Wound Width (cm):    (Retired) Depth (cm):    Wound Description (Comments):    Removal Indications:    Wound Image   08/13/20 1000   Dressing Appearance Intact;Moist drainage 08/13/20 1000   Drainage Amount Moderate 08/13/20 1000   Drainage Characteristics/Odor Serosanguineous 08/13/20 1000   Appearance Red;Hypergranulation 08/13/20 1000   Tissue loss  description Partial thickness 08/13/20 1000   Red (%), Wound Tissue Color 100 % 08/13/20 1000   Periwound Area Intact 08/13/20 1000   Wound Edges Irregular 08/13/20 1000   Wound Length (cm) 0.6 cm 08/13/20 1000   Wound Width (cm) 3.2 cm 08/13/20 1000   Wound Depth (cm) 0.1 cm 08/13/20 1000   Wound Volume (cm^3) 0.19 cm^3 08/13/20 1000   Wound Surface Area (cm^2) 1.92 cm^2 08/13/20 1000   Care Cleansed with:;Sterile normal saline 08/13/20 1000   Dressing Gauze;Abd pad 08/13/20 1000   Periwound Care Dry periwound area maintained 08/13/20 1000   Dressing Change Due 08/14/20 08/13/20 1000            Wound 07/15/20 1600 Abscess Right upper Abdomen #4 (Active)   07/15/20 1600    Pre-existing: Yes   Primary Wound Type: Abscess   Side: Right   Orientation: upper   Location: Abdomen   Wound Number (optional): #4   Ankle-Brachial Index:    Pulses:    Removal Indication and Assessment:    Wound Outcome:    (Retired) Wound Type:    (Retired) Wound Length (cm):    (Retired) Wound Width (cm):    (Retired) Depth (cm):    Wound Description (Comments):    Removal Indications:    Wound Image   08/13/20 1000   Dressing Appearance Intact 08/13/20 1000   Drainage Amount Moderate 08/13/20 1000   Drainage Characteristics/Odor Serosanguineous 08/13/20 1000   Appearance Red;Hypergranulation 08/13/20 1000   Tissue loss description Full thickness 08/13/20 1000   Red (%), Wound Tissue Color 100 % 08/13/20 1000   Periwound Area Intact 08/13/20 1000   Wound Edges Irregular 08/13/20 1000   Wound Length (cm) 7.2 cm 08/13/20 1000   Wound Width (cm) 8 cm 08/13/20 1000   Wound Depth (cm) 0.1 cm 08/13/20 1000   Wound Volume (cm^3) 5.76 cm^3 08/13/20 1000   Wound Surface Area (cm^2) 57.6 cm^2 08/13/20 1000   Care Cleansed with:;Sterile normal saline 08/13/20 1000   Dressing Gauze;Abd pad 08/13/20 1000   Periwound Care Dry periwound area maintained 08/13/20 1000   Dressing Change Due 08/14/20 08/13/20 1000       Left and Right Axilla   Cleanse with:  Rinse with normal saline   Primary dressing: Iodoform gauze packing to right axilla, xeroform to right and left   Secondary dressing: cover with 4x4 gauze and small abd pad, secure with mefix tape   Frequency: daily     Right lateral breast   Cleanse with: Rinse with normal saline   Periwound: cavilon   Primary dressing: iodoform gauze   Secondary dressing: small abd, secure with mefix tape  Frequency: daily     Right upper abd   Cleanse with: Rinse with normal saline   Primary dressing: Iodoform gauze packing, xeroform   Secondary dressing: small mextra, large abd pad, secure with mefix tape   Frequency: daily       Follow up with Dr. Pennington in 2 weeks on Thursday 8/27/20     Other orders: Continue Vibra tabs. Rx for Mycostatin powder daily under right breast.       Home Health: [facility]:  Fax # [phone].  Skilled nurse to perform dressing changes every Monday, Wednesday, and Friday.  Please provide patient's caregiver with necessary supplies to do wound care: small abd pads, 4x4 gauze, mefix tape.       Plan:                Follow up in about 2 weeks (around 8/27/2020).

## 2020-08-18 ENCOUNTER — CLINICAL SUPPORT (OUTPATIENT)
Dept: REHABILITATION | Facility: HOSPITAL | Age: 40
End: 2020-08-18
Attending: INTERNAL MEDICINE
Payer: COMMERCIAL

## 2020-08-18 DIAGNOSIS — M67.90 TENDINOPATHY: ICD-10-CM

## 2020-08-18 DIAGNOSIS — M25.512 CHRONIC LEFT SHOULDER PAIN: Primary | ICD-10-CM

## 2020-08-18 DIAGNOSIS — G89.29 CHRONIC LEFT SHOULDER PAIN: Primary | ICD-10-CM

## 2020-08-18 LAB — FUNGUS SPEC CULT: NORMAL

## 2020-08-18 PROCEDURE — 97162 PT EVAL MOD COMPLEX 30 MIN: CPT | Performed by: PHYSICAL THERAPIST

## 2020-08-18 NOTE — PLAN OF CARE
[facility]  Physical Therapy Initial Evaluation    Name: Rodney Gonzalez [facility] Number: [id number]    Therapy Diagnosis:   Encounter Diagnoses   Name Primary?    Chronic left shoulder pain Yes    Tendinopathy      Physician: Ken Alberto MD    Physician Orders: PT Eval and Treat  Medical Diagnosis from Referral: Tendinopathy   Evaluation Date: 8/18/2020  Authorization Period Expiration: 8/7/2021  Plan of Care Expiration: 11/16/2020  Visit # / Visits authorized: 1/ 24    Precautions: Standard and post op sx for hidradenitis under both arms on 12/24/2020    Time In: 10:00am  Time Out: 10:45am  Total Billable Time: 5 minutes    SUBJECTIVE     Date of onset: Approximately 6 months ago    History of current condition - Rodney is a 39 y.o. female whom reports that she started with left shoulder pain around November of 2019. She states that she thought it was just go away if she didn't use it as much and if she took over the counter NSAIDS. Lifting and reaching would increase her pain levels. She ended up having to have surgery under both arms for Hidradenitis on 12/24/2019. She states that she wasn't supposed to lift her arms after the surgery, so she didn't use her L shoulder much anyway. When the HH nurse started coming in February/March to change her dressings, she would have a hard time lifting her left arm OH for her to change the dressings. COVID-19 hit, so she was unable to see her MD for follow up until just a couple of weeks ago. Patient reports that her MD would like for her to try PT before giving her any medications. Patient reports that she was not doing any exercises at home, and she did not have any HH PT, only the nurse to change the dressings. She still has a HH nurse coming to change her dressings at this time. Patient reports that she has not had any infection since the surgery.      Medical History:   Past Medical History:   Diagnosis Date    Anemia      Arthritis     Diabetes mellitus, type 2     Neuropathy        Surgical History:   Rodney Infante  has a past surgical history that includes Cyst Removal (2014, 2/2018); Excision of hidradenitis (Bilateral, 12/24/2019); and Wound debridement (Right, 7/17/2020).    Medications:   Rodney has a current medication list which includes the following prescription(s): humira pen, clindamycin, clotrimazole-betamethasone 1-0.05%, cyanocobalamin, doxycycline, doxycycline, ergocalciferol, ergocalciferol, estradiol cypionate, gabapentin, hydrocodone-acetaminophen, iodoform, nystatin, sulfasalazine, and tramadol.    Allergies:   Review of patient's allergies indicates:   Allergen Reactions    Pcn [penicillins] Hives and Swelling     Patient reports allergic reaction to liquid PCN as a child.  She reports tolerating Amoxacillin 4 years ago without reaction.        Imaging: none     Prior Therapy: Yes - on her hand  Social History: Pt lives alone  Occupation: Pt is a . She is not currently driving due to the surgery but does still report to them.  Prior Level of Function: Patient was able to perform normal ADL, recreational, and work related activities without pain or limitation.   Current Level of Function: Patient is unable to perform normal ADL, recreational, or work related activities without pain or limitation.     Pain:  Current 1/10, worst 10/10, best 1/10   Location: left shoulder  Description: Aching  Aggravating Factors: Lifting and reaching OH  Easing Factors: nothing    Dominant Extremity: Right    Pts goals: Pt reported goals are to return to normal ADL and recreational activities with less pain and limitation. She would like to return to work.     OBJECTIVE   (x = not tested due to pain and/or inability to obtain test position)    RANGE OF MOTION:      Shoulder ROM Right  8/18/2020 Left  8/18/2020 Pain/Dysfunction with Movement Goal   Shoulder Flexion (180) 180 115 Pain in L sh 180    Shoulder Abduction (180) 180 135 Pain in L sh 180   Shoulder ER (90) 85 30 Pain in L sh 85   Shoulder IR (70) 70 60 No Pain 70       STRENGTH:    U/E MMT Right  8/18/2020 Left  8/18/2020 Pain/Dysfunction with Movement Goal   Shoulder Flexion 4-/5 3/5 Pain 5/5 B   Shoulder Abduction 4-/5 3/5  5/5 B   Shoulder IR 4/5 4-/5  5/5 B   Shoulder ER   4-/5 3+/5  5/5 B   Elbow Flexion  4/5 4-/5  5/5 B   Elbow Extension 4/5 4-/5  5/5 B         MUSCLE LENGTH:     Muscle Tested  Right  8/18/2020 Left   8/18/2020 Goal   Upper Trapezius  decreased decreased Normal B    Levator Scapulae  decreased decreased Normal B   Sternocleidomastoid decreased decreased Normal B   Scalenes  decreased decreased Normal B    Pectoralis Minor  decreased decreased Normal B   Pectoralis Major decreased decreased Normal B       JOINT MOBILITY:     Joint Motion Tested Right  (spine)  8/18/2020 Left   8/18/2020 Goal   Glenohumeral Joint Normal Hypomobile/Painful Normal B       SPECIAL TESTS:  **Unable to assess at this time due to pain and patient being unable to obtain test positions.      Sensation:  Sensation is intact to light touch    Palpation: Increased tone and tenderness noted with palpation of left infraspinatus , teres major and minor , subscapularis  and periscapular musculature.   Unable to assess wound as it has been dressed and covered by HH nurse.     Posture:  Pt presents with postural abnormalities which include: forward head and rounded shoulders     Gait Analysis: The patient ambulated with the following assistive device: none; the pt presents with the following gait abnormalities: decreased reciprocal arm swing        FUNCTION:     CMS Impairment/Limitation/Restriction for FOTO Shoulder Survey    Therapist reviewed FOTO scores for Rodney Infante on 8/18/2020.   FOTO documents entered into EPIC - see Media section.    Limitation Score: 62%         TREATMENT   Treatment Time In: 10:00am  Treatment Time Out: 10:45am  Total  Treatment time separate from Evaluation: 5 minutes    Rodney received therapeutic exercises to develop strength, endurance, ROM, flexibility, posture and core stabilization for 5 minutes including:    Exercise 8/18/2020   HEP established  X                               x = exercise details same as prior session      Home Exercises and Patient Education Provided    Education/Self-Care provided:    Patient educated on the impairments noted above and the effects of physical therapy intervention to improve overall condition and QOL.       Written Home Exercises Provided: yes.  Exercises were reviewed and Rodney was able to demonstrate them prior to the end of the session.  Rodney demonstrated good  understanding of the education provided.     See EMR under Patient Instructions for exercises provided 8/18/2020.    ASSESSMENT   Rodney is a 39 y.o. female referred to outpatient Physical Therapy with a medical diagnosis of Tendinopathy. Pt presents with impairments including: decreased ROM, decreased strength, decreased joint mobility, decreased muscle length, postural abnormalities, gait abnormalities and decreased overall function.    Pt prognosis is Good.   Pt will benefit from skilled outpatient Physical Therapy to address the deficits stated above and in the chart below, provide pt/family education, and to maximize pt's level of independence.     Plan of care discussed with patient: Yes  Pt's spiritual, cultural and educational needs considered and patient is agreeable to the plan of care and goals as stated below:     Anticipated Barriers for therapy: co-morbidities    Medical Necessity is demonstrated by the following  History  Co-morbidities and personal factors that may impact the plan of care Co-morbidities:   surgery for hidradenitis     Personal Factors:   no deficits     moderate   Examination  Body Structures and Functions, activity limitations and participation restrictions that may impact the plan of care  "Body Regions:   upper extremities    Body Systems:    ROM  strength  gait  transfers  motor control    Participation Restrictions:   See above in "Current Level of Function"     Activity limitations:   Learning and applying knowledge  no deficits    General Tasks and Commands  no deficits    Communication  no deficits    Mobility  lifting and carrying objects    Self care  washing oneself (bathing, drying, washing hands)  caring for body parts (brushing teeth, shaving, grooming)  dressing    Domestic Life  shopping  cooking  doing house work (cleaning house, washing dishes, laundry)    Interactions/Relationships  no deficits    Life Areas  no deficits    Community and Social Life  community life  recreation and leisure         moderate   Clinical Presentation evolving clinical presentation with changing clinical characteristics moderate   Decision Making/ Complexity Score: moderate       GOALS:    Short Term Goals:  6 weeks    1. Pain: Pt will demonstrate improved pain by reports of less than or equal to 8/10 worst pain on the verbal rating scale in order to progress toward maximal functional ability and improve QOL.    2. Function: Patient will demonstrate improved function as indicated by a functional limitation score of less than or equal to 49 out of 100 on FOTO.    3. Mobility: Patient will improve AROM to 50% of stated goals, listed in objective measures above, in order to progress towards independence with functional activities.     4. Strength: Patient will improve strength to 50% of stated goals, listed in objective measures above, in order to progress towards independence with functional activities.     5. Gait: Patient will demonstrate improved gait mechanics in order to improve functional mobility, improve quality of life, and decrease risk of further injury or fall.     6. HEP: Patient will demonstrate independence with HEP in order to progress toward functional independence.    7. Improve postural " awareness.         Long Term Goals:  12 weeks    1. Pain: Pt will demonstrate improved pain by reports of less than or equal to 6/10 worst pain on the verbal rating scale in order to progress toward maximal functional ability and improve QOL.      2. Function: Patient will demonstrate improved function as indicated by a functional limitation score of less than or equal to 35 out of 100 on FOTO.    3. Mobility: Patient will improve AROM to stated goals, listed in objective measures above, in order to return to maximal functional potential and improve quality of life.    4. Strength: Patient will improve strength to stated goals, listed in objective measures above, in order to improve functional independence and quality of life.    5. Gait: Patient will demonstrate normalized gait mechanics with minimal compensation in order to return to PLOF.    6. Patient will return to normal ADL's, IADL's, community involvement, recreational activities, and work-related activities with less than or equal to 3/10 pain and maximal function.     7. Patient will be able to return to work without limitation.         PLAN   Plan of care Certification: 8/18/2020 to 11/16/2020.    Outpatient Physical Therapy 2 times weekly for 12 weeks to include any combination of the following interventions: virtual visits, dry needling, modalities, electrical stimulation (IFC, Pre-Mod, Attended with Functional Dry Needling), Gait Training, Manual Therapy, Neuromuscular Re-ed, Patient Education, Self Care, Therapeutic Activites and Therapeutic Exercise     Thank you for this referral.    These services are reasonable and necessary for the conditions set forth above while under my care.    Izabella Sorto, PT, DPT

## 2020-08-25 NOTE — TELEPHONE ENCOUNTER
DOCUMENTATION ONLY:     Prior authorization for Humira approved from 08/25/20 to 08/25/21.     Case ID# [id number]          Co-pay: $5         Patient Assistance IS NOT required.           Forward to clinical pharmacist for consult & shipment. CRM

## 2020-09-02 ENCOUNTER — CLINICAL SUPPORT (OUTPATIENT)
Dept: REHABILITATION | Facility: HOSPITAL | Age: 40
End: 2020-09-02
Payer: COMMERCIAL

## 2020-09-02 DIAGNOSIS — M25.512 CHRONIC LEFT SHOULDER PAIN: ICD-10-CM

## 2020-09-02 DIAGNOSIS — G89.29 CHRONIC LEFT SHOULDER PAIN: ICD-10-CM

## 2020-09-02 PROCEDURE — 97110 THERAPEUTIC EXERCISES: CPT | Mod: CQ

## 2020-09-02 PROCEDURE — 97140 MANUAL THERAPY 1/> REGIONS: CPT | Mod: CQ

## 2020-09-02 NOTE — PROGRESS NOTES
"  Physical Therapy Daily Treatment Note     Name: Rodney Gonzalez [facility] Number: [id number]    Therapy Diagnosis:   Encounter Diagnosis   Name Primary?    Chronic left shoulder pain      Physician: Ken Alberto MD    Visit Date: 9/2/2020    Physician Orders: PT Eval and Treat  Medical Diagnosis from Referral: Tendinopathy   Evaluation Date: 8/18/2020  Authorization Period Expiration: 8/7/2021  Plan of Care Expiration: 11/16/2020  Visit # / Visits authorized: 2/ 24     Precautions: Standard and post op sx for hidradenitis under both arms on 12/24/2020     Time In: 10:00am  Time Out: 10:45am  Total Billable Time: 45 minutes    SUBJECTIVE     Today, pt reports: that she is just having a slight ache. 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 She has been doing her exercises and she has noticed that she is not as tight.   He/She was compliant with home exercise program.  Response to previous treatment: decreased pain  Functional change: none noted    Pre-Treatment Pain: 1/10  Post-Treatment Pain: 1/10  Location: left shoulder   TREATMENT     Rodney received therapeutic exercises to develop strength, endurance, ROM and flexibility for 35 minutes including:    Exercise 9/2/2020   UBE for endurance 2'/2'   Rope and pulley Flexion/scaption 3 minutes each   Seated scapular retractions 3 x 10 5"holds   Wall ball rolls Flex/scap 20x   Supine dowel flexion 20x   Supine dowel ER 20x   Doorway stretch 3 x 30"           Rodney received the following manual therapy techniques: Myofacial release and Soft tissue Mobilization were applied to the: left shoulder for 10 minutes, including:  STM of left upper trapezius, pectoralis major, pectoralis minor, biceps brachii, deltoid and supraspinatus       Home Exercises Provided and Patient Education Provided     Education/Self-Care provided: (minutes)   Patient educated on biomechanical " justification for therapeutic exercise and importance of compliance with HEP in order to improve overall impairments and QOL    Patient educated on postural awareness and the use of a lumbar roll when in a seated position to reduce stress and maintain optimal alignment of the spine.    Patient educated on proper ergonomics at the work station in order to maintain optimal alignment of the musculoskeletal system and improve efficiency in the work environment.   Patient educated on the importance of improved core and upper extremity strength in order to improve alignment of the spine and upper extremities with static positions and dynamic movement.    Patient educated on the importance of strong core and lower extremity musculature in order to improve both static and dynamic balance, improve gait mechanics, reduce fall risk and improve household and community mobility.       Written Home Exercises Provided: Patient instructed to cont prior HEP.  Exercises were reviewed and Rodney was able to demonstrate them prior to the end of the session.  Rodney demonstrated good  understanding of the education provided.     See EMR under Patient Instructions for exercises provided 8/18/2020.    ASSESSMENT   Patient tolerated treatment well today with minimal complaints of discomfort, good fatigue noted throughout session. Patient had discomfort with most AAROM activities which subsided with modification of stretch. Patient had increased tightness and tenderness in left pectoralis and deltoid which decreased with manual therapy. Patient left treatment with reports of increased soreness but no reports of increased pain. Educated patient on using ice at home for soreness and not using heat due to her underarm wounds.     Rodney is progressing well towards her goals.   Pt prognosis is Good.     Pt will continue to benefit from skilled outpatient physical therapy to address the deficits listed in the problem list box on initial  evaluation, provide pt/family education and to maximize pt's level of independence in the home and community environment.     Pt's spiritual, cultural and educational needs considered and pt agreeable to plan of care and goals.     Anticipated barriers to physical therapy: co-morbidities    Goals:      Short Term Goals:  6 weeks     1. Pain: Pt will demonstrate improved pain by reports of less than or equal to 8/10 worst pain on the verbal rating scale in order to progress toward maximal functional ability and improve QOL.     2. Function: Patient will demonstrate improved function as indicated by a functional limitation score of less than or equal to 49 out of 100 on FOTO.     3. Mobility: Patient will improve AROM to 50% of stated goals, listed in objective measures above, in order to progress towards independence with functional activities.      4. Strength: Patient will improve strength to 50% of stated goals, listed in objective measures above, in order to progress towards independence with functional activities.      5. Gait: Patient will demonstrate improved gait mechanics in order to improve functional mobility, improve quality of life, and decrease risk of further injury or fall.      6. HEP: Patient will demonstrate independence with HEP in order to progress toward functional independence.     7. Improve postural awareness.            Long Term Goals:  12 weeks     1. Pain: Pt will demonstrate improved pain by reports of less than or equal to 6/10 worst pain on the verbal rating scale in order to progress toward maximal functional ability and improve QOL.       2. Function: Patient will demonstrate improved function as indicated by a functional limitation score of less than or equal to 35 out of 100 on FOTO.     3. Mobility: Patient will improve AROM to stated goals, listed in objective measures above, in order to return to maximal functional potential and improve quality of life.     4. Strength: Patient  will improve strength to stated goals, listed in objective measures above, in order to improve functional independence and quality of life.     5. Gait: Patient will demonstrate normalized gait mechanics with minimal compensation in order to return to PLOF.     6. Patient will return to normal ADL's, IADL's, community involvement, recreational activities, and work-related activities with less than or equal to 3/10 pain and maximal function.      7. Patient will be able to return to work without limitation.             PLAN   Plan of care Certification: 8/18/2020 to 11/16/2020.    Continue Plan of Care (POC) and progress per patient tolerance.    Ellie Quintero, PTA

## 2020-09-03 ENCOUNTER — HOSPITAL ENCOUNTER (OUTPATIENT)
Dept: WOUND CARE | Facility: HOSPITAL | Age: 40
Discharge: HOME | End: 2020-09-03
Attending: SURGERY
Payer: COMMERCIAL

## 2020-09-03 VITALS
HEIGHT: 65 IN | DIASTOLIC BLOOD PRESSURE: 78 MMHG | TEMPERATURE: 98 F | SYSTOLIC BLOOD PRESSURE: 119 MMHG | HEART RATE: 89 BPM | WEIGHT: 263 LBS | BODY MASS INDEX: 43.82 KG/M2

## 2020-09-03 DIAGNOSIS — L73.2 HIDRADENITIS SUPPURATIVA OF RIGHT AXILLA: Primary | ICD-10-CM

## 2020-09-03 DIAGNOSIS — B96.89 BACTERIAL SKIN INFECTION: ICD-10-CM

## 2020-09-03 DIAGNOSIS — L73.2 LEFT AXILLARY HIDRADENITIS: ICD-10-CM

## 2020-09-03 DIAGNOSIS — L02.213 ABSCESS OF CHEST WALL: ICD-10-CM

## 2020-09-03 DIAGNOSIS — L08.9 BACTERIAL SKIN INFECTION: ICD-10-CM

## 2020-09-03 DIAGNOSIS — Z98.84 S/P BARIATRIC SURGERY: ICD-10-CM

## 2020-09-03 DIAGNOSIS — E66.01 MORBID OBESITY WITH BMI OF 40.0-44.9, ADULT: ICD-10-CM

## 2020-09-03 PROCEDURE — 99213 OFFICE O/P EST LOW 20 MIN: CPT

## 2020-09-03 NOTE — PROGRESS NOTES
"Subjective:       Patient ID: Rodney Infante is a 39 y.o. female.    Chief Complaint: Non-healing Wound (left and right axilla)    F/u for excision bilateral axillary hider adneitis    Client here today with open areas to right and left axilla. Seen by Dr. Pennington, who states sweat glands infected. Rx for clindamycin, bactroban, and tramadol provided. Stop methatrexate ( prescribed for arthritis). Client plans to have abdominal bypass surgery in September. Culture done today of right axilla.  8/7/19: F/U with Dr. Pennington. Culture results negative. Continue POC.  8/14/19: F/U with Dr. Pennington. Clindamycin d/c'd. Bactrim and Diflucan ordered today. Leave left and right axilla open to air. Culture sent to lab today.     9/4/19:  F/U with Dr. Pennington.  Wounds to right axilla healing up.  Patient states "right does not drain as much as left".  Patient feels the left axilla is not healing up like the right.  Patient still taking Bactrim PO.  Will continue for now.  9/18/19: F/U with Dr. Pennington. Both axilla continue to have "sebum - like" exudate. New site noted to left back. Culture done today. RX for lotrisone and Diflucan provided.  9/25/19: F/U with Dr. Pennington. Axillae improved. Client was not able to get Diflucan. Reordered today. Continues lotrisone and clindamycin. Culture results reviewed. Referred to ID for appt. On 10/7/19. Next visit with Dr. Pennington 10/16/19.  10/7/19: Vist today with ID. Augmentin ordered. Clindamycin d/c'd. Complete Diflucan.  11/6/19: Dr Pennington assessed patient.Wounds slowly improving. Patient states that she has had gastric sleeve procedure last week. No complaints voiced. Continuing with present plan of care. Follow up in 2 weeks.  11/20/19: F/U with Dr. Pennington. Wounds continue to improve. Continue POC. Return in 2 weeks.  12/4/19: F/U with Dr. Pennington. Axillae drainage decreased. Client has lost 97 Lbs. As of today. Surgery planned for 12/20/19. Next visit 12/18/19, and " consents to be signed.  12/18/19: F/U with Dr. Pennington. Surgery to axillae scheduled for Monday 12/23/19. Consents signed. Return 1/2/20.   12/26/19  F/U with Dr. pennington for s/p surgery to bilateral axilla.  Removal of glands and placement of theraskin.  Staples intact.  Theraskin not completely  Adhered on both areas.   Start bolster dressing to assist tin the adherence of the graft. Patient to return to clinic in 1 week.  Order homehealth for twice weekly    01/02/20: F/u with Dr. Pennington. Moderate to large amount of drainage from wounds. Staples and sutures removed from both wounds today in clinic per Dr. Pennington. New wound care orders given and routed to [facility]. Rx given for 5 mg norco every 4 hours. Patient given a note to not return to work until further notice.  1/9/20: F/U with Dr. Pennington. Removed 2 remaining staples. Discussed with patient future grafting of sites. Cont. POC. Return in 1 week.  1/16/20: F/U with Dr. Pennington. Client reports decreased drainage today. Rx for Clindamycin and Norco provided. Client prefers to wait a while before having grafting performed. Return in 1 week.  1/23/20: F/U with Dr. Pennington. Right axilla measurements improved. Continue Clindamycin. Return in 1 week.  2/6/20:Patient presents with a new wound to right lateral breast. Dr Pennington assessed patient. Continuing with same wound care orders. Rx for Norco 5/325mg given to patient for pain. F/U in 2 weeks.   2/20/20: F/U with Dr. Pennington. Sites continue to improve. Measurements decreased. Culture reviewed. Rx for Ampicillin provided. Client has taken PCN in past w/o reaction. Rx for Norco provided. Return in 2 weeks.  3/5/20: F/U with Dr. Pennington. Sites decreased in size. Cont. Ampicillin. Cont. POC. Return in 2 weeks.  3/19/20: F/U with Dr. Pennington. Sites improving. Cont. POC. Return in 2 weeks.  5/7/20: F/U with Dr. Pennington. Measurements improved all sites. Culture of left axilla done today. Cont. POC. Return in  2 weeks.  6/4/20: F/U with Dr. Pennington. All sites improved. Cont. Clindamycin. Return in 2 weeks.  6/18/20: F/U with Dr. Pennington. Measurements improved. Silver nitrate x 1 to right and left axilla hypergranulation tissue. Cont. POC. Next visit 7/2/20. Client will be out of time 7/6/20 - 7/11/20. [facility]  07/15/2020- f/u with Dr. Pennington. Pt presents with new abscess to upper right abd. Pt consented for OR this Friday 07/17/2020 for I&D to abscess. Rx for clindamycin sent to pharmacy. Dakins dc'd and xeroform started to wounds. Aquacel ag extra, mextra and large abd pad to right upper abd. Pt to f/u with Dr. Pennington in 1 week 07/23/2020.  7/23/20: F/U with Dr. Pennington. I&D of right abdomen abscess done 7/17/20 per Dr. Pennington. Site open with sutures. All previous sites improving. Iodoform gauze to sites needing packing. Rx for iodoform gauze and Vibra tabs provided today. Next visit 7/30/20.  7/30/20: F/U with Dr. Pennington. All sites improving. Sutures removed from right abdominal site per Dr. Pennington. Has excoriation under right breast. Rx for mycostatin powder provided. Cont. POC. Return 8/6/20.  08/13/2020- f/u with Dr. Pennington. Pt wounds improving. Silver nitrate x 2 to wounds for hypergranulation. Cont POC. F/u with Dr. Pennington in 2 weeks 08/27/2020  9/3/20: F/U with Dr. Pennington. All measurements improved. Breast site resolved. Wound care orders changed. Cont. Doxycycline and mycostatin powder. Next visit 9/17/20.    Review of Systems   Constitutional: Negative.    HENT: Negative.    Eyes: Negative.    Respiratory: Negative.    Cardiovascular: Negative.    Gastrointestinal: Negative.    Genitourinary: Negative.    Musculoskeletal: Negative.    Skin: Negative.    Neurological: Negative.    Psychiatric/Behavioral: Negative.        Objective:      Physical Exam  Constitutional:       Appearance: She is well-developed.   HENT:      Head: Normocephalic.   Eyes:      Conjunctiva/sclera:  Conjunctivae normal.      Pupils: Pupils are equal, round, and reactive to light.   Neck:      Musculoskeletal: Normal range of motion and neck supple.   Cardiovascular:      Rate and Rhythm: Normal rate and regular rhythm.      Heart sounds: Normal heart sounds.   Pulmonary:      Effort: Pulmonary effort is normal.      Breath sounds: Normal breath sounds.   Abdominal:      General: Bowel sounds are normal.      Palpations: Abdomen is soft.   Musculoskeletal: Normal range of motion.   Skin:     General: Skin is warm and dry.   Neurological:      Mental Status: She is alert and oriented to person, place, and time.      Deep Tendon Reflexes: Reflexes are normal and symmetric.         Assessment:       1. Hidradenitis suppurativa of right axilla    2. Left axillary hidradenitis           Wound 07/31/19 1300 Other (comment) Axilla #2 (Active)   07/31/19 1300    Pre-existing: Yes   Primary Wound Type: Other   Side: Left   Orientation:    Location: Axilla   Wound Number (optional): #2   Ankle-Brachial Index:    Pulses:    Removal Indication and Assessment:    Wound Outcome:    (Retired) Wound Type:    (Retired) Wound Length (cm):    (Retired) Wound Width (cm):    (Retired) Depth (cm):    Wound Description (Comments):    Removal Indications:    Wound Image   09/03/20 1000   Dressing Appearance Moist drainage;Intact 09/03/20 1000   Drainage Amount Small 09/03/20 1000   Drainage Characteristics/Odor Serosanguineous 09/03/20 1000   Appearance Red;Moist 09/03/20 1000   Tissue loss description Full thickness 09/03/20 1000   Red (%), Wound Tissue Color 100 % 09/03/20 1000   Periwound Area Intact;Dry;Scar tissue 09/03/20 1000   Wound Edges Irregular 09/03/20 1000   Wound Length (cm) 0.5 cm 09/03/20 1000   Wound Width (cm) 5.5 cm 09/03/20 1000   Wound Depth (cm) 0.1 cm 09/03/20 1000   Wound Volume (cm^3) 0.28 cm^3 09/03/20 1000   Wound Surface Area (cm^2) 2.75 cm^2 09/03/20 1000   Care Cleansed with:;Sterile normal saline  09/03/20 1000   Dressing Non-adherent;Abd pad 09/03/20 1000   Periwound Care Absorptive dressing applied 09/03/20 1000   Dressing Change Due 09/04/20 09/03/20 1000            Wound 07/31/19 1300 Other (comment) Axilla #1 (Active)   07/31/19 1300    Pre-existing: Yes   Primary Wound Type: Other   Side: Right   Orientation:    Location: Axilla   Wound Number (optional): #1   Ankle-Brachial Index:    Pulses:    Removal Indication and Assessment:    Wound Outcome:    (Retired) Wound Type:    (Retired) Wound Length (cm):    (Retired) Wound Width (cm):    (Retired) Depth (cm):    Wound Description (Comments):    Removal Indications:    Wound Image   09/03/20 1000   Dressing Appearance Intact;Moist drainage 09/03/20 1000   Drainage Amount Moderate 09/03/20 1000   Drainage Characteristics/Odor Serosanguineous 09/03/20 1000   Appearance Red;Moist 09/03/20 1000   Tissue loss description Full thickness 09/03/20 1000   Red (%), Wound Tissue Color 100 % 09/03/20 1000   Periwound Area Dry;Intact;Scar tissue 09/03/20 1000   Wound Edges Irregular 09/03/20 1000   Wound Length (cm) 3 cm 09/03/20 1000   Wound Width (cm) 1.5 cm 09/03/20 1000   Wound Depth (cm) 0.1 cm 09/03/20 1000   Wound Volume (cm^3) 0.45 cm^3 09/03/20 1000   Wound Surface Area (cm^2) 4.5 cm^2 09/03/20 1000   Care Sterile normal saline 09/03/20 1000   Dressing Non-adherent;Abd pad 09/03/20 1000   Periwound Care Absorptive dressing applied 09/03/20 1000   Dressing Change Due 09/04/20 09/03/20 1000            Wound 02/06/20 1000 Other (comment) Breast #3 (Active)   02/06/20 1000    Pre-existing: Yes   Primary Wound Type: Other   Side: Right   Orientation:    Location: Breast   Wound Number (optional): #3   Ankle-Brachial Index:    Pulses:    Removal Indication and Assessment:    Wound Outcome:    (Retired) Wound Type:    (Retired) Wound Length (cm):    (Retired) Wound Width (cm):    (Retired) Depth (cm):    Wound Description (Comments):    Removal Indications:    Wound  Image   09/03/20 1000   Dressing Appearance Intact;Dry 09/03/20 1000   Drainage Amount None 09/03/20 1000   Appearance Pink;Closed/resurfaced 09/03/20 1000   Tissue loss description Not applicable 09/03/20 1000   Red (%), Wound Tissue Color 100 % 09/03/20 1000   Periwound Area Intact;Dry 09/03/20 1000   Wound Edges Rolled/closed 09/03/20 1000   Wound Length (cm) 0 cm 09/03/20 1000   Wound Width (cm) 0 cm 09/03/20 1000   Wound Depth (cm) 0 cm 09/03/20 1000   Wound Volume (cm^3) 0 cm^3 09/03/20 1000   Wound Surface Area (cm^2) 0 cm^2 09/03/20 1000            Wound 07/15/20 1600 Abscess Right upper Abdomen #4 (Active)   07/15/20 1600    Pre-existing: Yes   Primary Wound Type: Abscess   Side: Right   Orientation: upper   Location: Abdomen   Wound Number (optional): #4   Ankle-Brachial Index:    Pulses:    Removal Indication and Assessment:    Wound Outcome:    (Retired) Wound Type:    (Retired) Wound Length (cm):    (Retired) Wound Width (cm):    (Retired) Depth (cm):    Wound Description (Comments):    Removal Indications:    Wound Image   09/03/20 1000   Dressing Appearance Moist drainage;Intact 09/03/20 1000   Drainage Amount Moderate 09/03/20 1000   Drainage Characteristics/Odor Serosanguineous 09/03/20 1000   Appearance Red;Moist 09/03/20 1000   Tissue loss description Full thickness 09/03/20 1000   Red (%), Wound Tissue Color 100 % 09/03/20 1000   Periwound Area Intact;[employer];Dry 09/03/20 1000   Wound Edges Irregular 09/03/20 1000   Wound Length (cm) 5 cm 09/03/20 1000   Wound Width (cm) 3 cm 09/03/20 1000   Wound Depth (cm) 0.1 cm 09/03/20 1000   Wound Volume (cm^3) 1.5 cm^3 09/03/20 1000   Wound Surface Area (cm^2) 15 cm^2 09/03/20 1000   Care Cleansed with:;Sterile normal saline 09/03/20 1000   Dressing Non-adherent;Island/border 09/03/20 1000   Periwound Care Dry periwound area maintained 09/03/20 1000   Dressing Change Due 09/04/20 09/03/20 1000       Left and Right Axilla   Cleanse with: Rinse with normal  saline   Primary dressing: Iodoform gauze packing to right axilla, xeroform to right and left   Secondary dressing: cover with 4x4 gauze and small abd pad, secure with mefix tape   Frequency: daily     Right upper abd   Cleanse with: Rinse with normal saline   Primary dressing:  Xeroform, 4 x 4 gauze  Secondary dressing: 5 x 5 bordered foam  Frequency: daily       Follow up with Dr. Pennington in 2 weeks on Thursday 9/17/20    Other orders: Continue Vibra tabs. Rx for Mycostatin powder daily under right breast.       Home Health: [facility]:  Fax # [phone].  Skilled nurse to perform dressing changes every Monday, Wednesday, and Friday.  Please provide patient's caregiver with necessary supplies to do wound care: small abd pads, 4x4 gauze, mefix tape.       Plan:                Follow up in about 2 weeks (around 9/17/2020).

## 2020-09-04 ENCOUNTER — CLINICAL SUPPORT (OUTPATIENT)
Dept: REHABILITATION | Facility: HOSPITAL | Age: 40
End: 2020-09-04
Payer: COMMERCIAL

## 2020-09-04 DIAGNOSIS — M25.512 CHRONIC LEFT SHOULDER PAIN: ICD-10-CM

## 2020-09-04 DIAGNOSIS — G89.29 CHRONIC LEFT SHOULDER PAIN: ICD-10-CM

## 2020-09-04 PROCEDURE — 97140 MANUAL THERAPY 1/> REGIONS: CPT | Performed by: PHYSICAL THERAPIST

## 2020-09-04 PROCEDURE — 97110 THERAPEUTIC EXERCISES: CPT | Performed by: PHYSICAL THERAPIST

## 2020-09-04 NOTE — PROGRESS NOTES
"  Physical Therapy Daily Treatment Note     Name: Rodney Gonzalez [facility] Number: [id number]    Therapy Diagnosis:   Encounter Diagnosis   Name Primary?    Chronic left shoulder pain      Physician: Ken Alberto MD    Visit Date: 9/4/2020    Physician Orders: PT Eval and Treat  Medical Diagnosis from Referral: Tendinopathy   Evaluation Date: 8/18/2020  Authorization Period Expiration: 8/7/2021  Plan of Care Expiration: 11/16/2020  Visit # / Visits authorized: 3/ 24     Precautions: Standard and post op sx for hidradenitis under both arms on 12/24/2020     Time In: 10:08am  Time Out: 10:50am  Total Billable Time: 38 minutes     SUBJECTIVE     Today, pt reports: that she is feeling pretty good. She reports that she only had minimal soreness following previous visit. Patient reports that it takes her an hour to get to this clinic, so she would like to move to one of the clinics that is closer to her.     He/She was compliant with home exercise program.  Response to previous treatment: decreased pain  Functional change: none noted yet    Pre-Treatment Pain: 2/10   Post-Treatment Pain: 1/10  Location: left shoulder   TREATMENT     Rodney received therapeutic exercises to develop strength, endurance, ROM and flexibility for 30 minutes including:    Exercise 9/4/2020   UBE for endurance 2'/2'   Rope and pulley Flexion/scaption 3 minutes each    scapular retractions 3 x 10, red theraband   Wall ball rolls Flex/scap 20x   Supine dowel flexion 20x   Supine dowel ER 20x   Doorway stretch 3 x 30"           Rodney received the following manual therapy techniques: Myofacial release and Soft tissue Mobilization were applied to the: left shoulder for 8 minutes, including:  STM of left upper trapezius, pectoralis major, pectoralis minor, biceps brachii, deltoid and supraspinatus       Home Exercises Provided and Patient Education Provided     Education/Self-Care provided: (minutes)   Patient educated on biomechanical " justification for therapeutic exercise and importance of compliance with HEP in order to improve overall impairments and QOL    Patient educated on postural awareness and the use of a lumbar roll when in a seated position to reduce stress and maintain optimal alignment of the spine.    Patient educated on proper ergonomics at the work station in order to maintain optimal alignment of the musculoskeletal system and improve efficiency in the work environment.   Patient educated on the importance of improved core and upper extremity strength in order to improve alignment of the spine and upper extremities with static positions and dynamic movement.    Patient educated on the importance of strong core and lower extremity musculature in order to improve both static and dynamic balance, improve gait mechanics, reduce fall risk and improve household and community mobility.       Written Home Exercises Provided: Patient instructed to cont prior HEP.  Exercises were reviewed and Rodney was able to demonstrate them prior to the end of the session.  Rodney demonstrated good  understanding of the education provided.     See EMR under Patient Instructions for exercises provided 8/18/2020.    ASSESSMENT     Patient did well with treatment again today. She demonstrated less difficulty with exercises and had less discomfort. No increased complaints noted with exercises performed today. PROM improved slightly as compared to IE, but no end range reached. Patient stops PT due to pain first. She would benefit from continued ROM and strengthening activities per tolerance.     Rodney is progressing well towards her goals.   Pt prognosis is Good.     Pt will continue to benefit from skilled outpatient physical therapy to address the deficits listed in the problem list box on initial evaluation, provide pt/family education and to maximize pt's level of independence in the home and community environment.     Pt's spiritual, cultural and  educational needs considered and pt agreeable to plan of care and goals.     Anticipated barriers to physical therapy: co-morbidities    Goals:      Short Term Goals:  6 weeks     1. Pain: Pt will demonstrate improved pain by reports of less than or equal to 8/10 worst pain on the verbal rating scale in order to progress toward maximal functional ability and improve QOL.     2. Function: Patient will demonstrate improved function as indicated by a functional limitation score of less than or equal to 49 out of 100 on FOTO.     3. Mobility: Patient will improve AROM to 50% of stated goals, listed in objective measures above, in order to progress towards independence with functional activities.      4. Strength: Patient will improve strength to 50% of stated goals, listed in objective measures above, in order to progress towards independence with functional activities.      5. Gait: Patient will demonstrate improved gait mechanics in order to improve functional mobility, improve quality of life, and decrease risk of further injury or fall.      6. HEP: Patient will demonstrate independence with HEP in order to progress toward functional independence.     7. Improve postural awareness.            Long Term Goals:  12 weeks     1. Pain: Pt will demonstrate improved pain by reports of less than or equal to 6/10 worst pain on the verbal rating scale in order to progress toward maximal functional ability and improve QOL.       2. Function: Patient will demonstrate improved function as indicated by a functional limitation score of less than or equal to 35 out of 100 on FOTO.     3. Mobility: Patient will improve AROM to stated goals, listed in objective measures above, in order to return to maximal functional potential and improve quality of life.     4. Strength: Patient will improve strength to stated goals, listed in objective measures above, in order to improve functional independence and quality of life.     5. Gait:  Patient will demonstrate normalized gait mechanics with minimal compensation in order to return to PLOF.     6. Patient will return to normal ADL's, IADL's, community involvement, recreational activities, and work-related activities with less than or equal to 3/10 pain and maximal function.      7. Patient will be able to return to work without limitation.             PLAN   Plan of care Certification: 8/18/2020 to 11/16/2020.    Continue Plan of Care (POC) and progress per patient tolerance.    Izabella Sorto, PT

## 2020-09-09 DIAGNOSIS — M13.80 SERONEGATIVE ARTHRITIS: ICD-10-CM

## 2020-09-10 ENCOUNTER — TELEPHONE (OUTPATIENT)
Dept: PHARMACY | Facility: CLINIC | Age: 40
End: 2020-09-10

## 2020-09-10 ENCOUNTER — CLINICAL SUPPORT (OUTPATIENT)
Dept: REHABILITATION | Facility: HOSPITAL | Age: 40
End: 2020-09-10
Payer: COMMERCIAL

## 2020-09-10 DIAGNOSIS — G89.29 CHRONIC LEFT SHOULDER PAIN: ICD-10-CM

## 2020-09-10 DIAGNOSIS — M25.512 CHRONIC LEFT SHOULDER PAIN: ICD-10-CM

## 2020-09-10 PROCEDURE — 97110 THERAPEUTIC EXERCISES: CPT | Mod: PO

## 2020-09-10 RX ORDER — ADALIMUMAB 40MG/0.8ML
40 KIT SUBCUTANEOUS
Qty: 25 PEN | Refills: 0 | Status: SHIPPED | OUTPATIENT
Start: 2020-09-10 | End: 2020-10-07 | Stop reason: SDUPTHER

## 2020-09-10 NOTE — PROGRESS NOTES
"  Physical Therapy Daily Treatment Note     Name: Rodney Gonzalez [facility] Number: [id number]    Therapy Diagnosis:   Encounter Diagnosis   Name Primary?    Chronic left shoulder pain      Physician: Ken Alberto MD    Visit Date: 9/10/2020    Physician Orders: PT Eval and Treat  Medical Diagnosis from Referral: Tendinopathy   Evaluation Date: 8/18/2020  Authorization Period Expiration: 12/31/2020  Plan of Care Expiration: 11/16/2020  Visit # / Visits authorized: 3/ 20     Precautions: Standard and post op sx for hidradenitis under both arms on 12/24/2020     Time In: 5:00 pm  Time Out: 5:45 pm  Total Billable Time: 38 minutes     SUBJECTIVE     Today, pt reports: having tightness/pulling across top and anterior aspect of left shoulder with reaching overhead.  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      He/She was compliant with home exercise program.  Response to previous treatment: decreased pain  Functional change: none noted yet    Pain: 4/10   Location: left shoulder   TREATMENT     Rodney received therapeutic exercises to develop strength, endurance, ROM and flexibility for 38 minutes including:    Date  9/10/2020   VISIT 3/20  12/31/2020       POC EXP. DATE 11/16/2020   FACE-TO-FACE 9/18/2020   FOTO 3/5       UBE    Pulleys (flex, scap) 3 min each   TABLE:    Wand flexion 2 x 10   Wand ER Not today   Side lying:  Flexion  Abduction   1 x 10  1 x 10   Scapular protractions 2 x 10 x 1#   Reverse Codman's 20 x cw/ccw           SEATED:                STANDING:    Doorway stretch 3 x 30"   rows 3 x 10 BTB   Edwards's 1 x 10 RTB   W  I  T --  2 x 10 RTB                 Initials MA       Standing AROM of left shoulder:  Flexion: 130 degrees  Abduction: 110 degrees  Functional ER reaches to T2  Functional IR reaches to T8    MMT of left shoulder:  Flexion:  4-/5  Abduction: 4-/5  ER: 4/5  IR: 4+/5    Rodney received the following manual therapy techniques: " Myofacial release and Soft tissue Mobilization were applied to the: left shoulder for 0 minutes, including: NOT TODAY  STM of left upper trapezius, pectoralis major, pectoralis minor, biceps brachii, deltoid and supraspinatus       Home Exercises Provided and Patient Education Provided     Education/Self-Care provided: (minutes)   Patient educated on biomechanical justification for therapeutic exercise and importance of compliance with HEP in order to improve overall impairments and QOL    Patient educated on postural awareness and the use of a lumbar roll when in a seated position to reduce stress and maintain optimal alignment of the spine.    Patient educated on proper ergonomics at the work station in order to maintain optimal alignment of the musculoskeletal system and improve efficiency in the work environment.   Patient educated on the importance of improved core and upper extremity strength in order to improve alignment of the spine and upper extremities with static positions and dynamic movement.    Patient educated on the importance of strong core and lower extremity musculature in order to improve both static and dynamic balance, improve gait mechanics, reduce fall risk and improve household and community mobility.       Written Home Exercises Provided: Patient instructed to cont prior HEP.  Exercises were reviewed and Rodney was able to demonstrate them prior to the end of the session.  Rodney demonstrated good  understanding of the education provided.     See EMR under Patient Instructions for exercises provided 8/18/2020.    ASSESSMENT     Rodney presents to therapy with complaints of left shoulder pain with reaching overhead or behind her back.  She is noted to have decreased AROM and strength of left shoulder.  Pt was progressed with shoulder and scapular stabilization exercises as well as increase in AROM exercises, but did not have time for manual therapy on this date.  PT will continue with  progression of ROM and strengthening as well as resume manual therapy in order to improve functional mobility of left shoulder and return to prior level of function.      Rodney is progressing well towards her goals.   Pt prognosis is Good.     Pt will continue to benefit from skilled outpatient physical therapy to address the deficits listed in the problem list box on initial evaluation, provide pt/family education and to maximize pt's level of independence in the home and community environment.     Pt's spiritual, cultural and educational needs considered and pt agreeable to plan of care and goals.     Anticipated barriers to physical therapy: co-morbidities    Goals:      Short Term Goals:  6 weeks     1. Pain: Pt will demonstrate improved pain by reports of less than or equal to 8/10 worst pain on the verbal rating scale in order to progress toward maximal functional ability and improve QOL.     2. Function: Patient will demonstrate improved function as indicated by a functional limitation score of less than or equal to 49 out of 100 on FOTO.     3. Mobility: Patient will improve AROM to 50% of stated goals, listed in objective measures above, in order to progress towards independence with functional activities.      4. Strength: Patient will improve strength to 50% of stated goals, listed in objective measures above, in order to progress towards independence with functional activities.      5. Gait: Patient will demonstrate improved gait mechanics in order to improve functional mobility, improve quality of life, and decrease risk of further injury or fall.      6. HEP: Patient will demonstrate independence with HEP in order to progress toward functional independence.     7. Improve postural awareness.            Long Term Goals:  12 weeks     1. Pain: Pt will demonstrate improved pain by reports of less than or equal to 6/10 worst pain on the verbal rating scale in order to progress toward maximal functional  ability and improve QOL.       2. Function: Patient will demonstrate improved function as indicated by a functional limitation score of less than or equal to 35 out of 100 on FOTO.     3. Mobility: Patient will improve AROM to stated goals, listed in objective measures above, in order to return to maximal functional potential and improve quality of life.     4. Strength: Patient will improve strength to stated goals, listed in objective measures above, in order to improve functional independence and quality of life.     5. Gait: Patient will demonstrate normalized gait mechanics with minimal compensation in order to return to PLOF.     6. Patient will return to normal ADL's, IADL's, community involvement, recreational activities, and work-related activities with less than or equal to 3/10 pain and maximal function.      7. Patient will be able to return to work without limitation.             PLAN   Plan of care Certification: 8/18/2020 to 11/16/2020.    Continue Plan of Care (POC) and progress shoulder strengthening next visit.    Douglas Todd, PT

## 2020-09-15 ENCOUNTER — CLINICAL SUPPORT (OUTPATIENT)
Dept: REHABILITATION | Facility: HOSPITAL | Age: 40
End: 2020-09-15
Payer: COMMERCIAL

## 2020-09-15 DIAGNOSIS — G89.29 CHRONIC LEFT SHOULDER PAIN: ICD-10-CM

## 2020-09-15 DIAGNOSIS — M25.512 CHRONIC LEFT SHOULDER PAIN: ICD-10-CM

## 2020-09-15 PROCEDURE — 97110 THERAPEUTIC EXERCISES: CPT | Mod: PO
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
## 2020-09-17 ENCOUNTER — HOSPITAL ENCOUNTER (OUTPATIENT)
Dept: WOUND CARE | Facility: HOSPITAL | Age: 40
Discharge: HOME | End: 2020-09-17
Attending: SURGERY
Payer: COMMERCIAL

## 2020-09-17 VITALS
SYSTOLIC BLOOD PRESSURE: 123 MMHG | HEART RATE: 78 BPM | BODY MASS INDEX: 43.82 KG/M2 | WEIGHT: 263 LBS | TEMPERATURE: 97 F | HEIGHT: 65 IN | DIASTOLIC BLOOD PRESSURE: 86 MMHG

## 2020-09-17 DIAGNOSIS — L08.9 BACTERIAL SKIN INFECTION: ICD-10-CM

## 2020-09-17 DIAGNOSIS — L73.2 LEFT AXILLARY HIDRADENITIS: ICD-10-CM

## 2020-09-17 DIAGNOSIS — B96.89 BACTERIAL SKIN INFECTION: ICD-10-CM

## 2020-09-17 DIAGNOSIS — Z98.84 S/P BARIATRIC SURGERY: ICD-10-CM

## 2020-09-17 DIAGNOSIS — L73.2 HIDRADENITIS SUPPURATIVA OF RIGHT AXILLA: Primary | ICD-10-CM

## 2020-09-17 PROCEDURE — 17250 CHEM CAUT OF GRANLTJ TISSUE: CPT

## 2020-09-17 NOTE — PROGRESS NOTES
"Subjective:       Patient ID: Rodney Infante is a 39 y.o. female.    Chief Complaint: Non-healing Wound Follow Up    F/u for excision bilateral axillary hider adneitis    Client here today with open areas to right and left axilla. Seen by Dr. Pennington, who states sweat glands infected. Rx for clindamycin, bactroban, and tramadol provided. Stop methatrexate ( prescribed for arthritis). Client plans to have abdominal bypass surgery in September. Culture done today of right axilla.  8/7/19: F/U with Dr. Pennington. Culture results negative. Continue POC.  8/14/19: F/U with Dr. Pennington. Clindamycin d/c'd. Bactrim and Diflucan ordered today. Leave left and right axilla open to air. Culture sent to lab today.     9/4/19:  F/U with Dr. Pennington.  Wounds to right axilla healing up.  Patient states "right does not drain as much as left".  Patient feels the left axilla is not healing up like the right.  Patient still taking Bactrim PO.  Will continue for now.  9/18/19: F/U with Dr. Pennington. Both axilla continue to have "sebum - like" exudate. New site noted to left back. Culture done today. RX for lotrisone and Diflucan provided.  9/25/19: F/U with Dr. Pennington. Axillae improved. Client was not able to get Diflucan. Reordered today. Continues lotrisone and clindamycin. Culture results reviewed. Referred to ID for appt. On 10/7/19. Next visit with Dr. Pennington 10/16/19.  10/7/19: Vist today with ID. Augmentin ordered. Clindamycin d/c'd. Complete Diflucan.  11/6/19: Dr Pennington assessed patient.Wounds slowly improving. Patient states that she has had gastric sleeve procedure last week. No complaints voiced. Continuing with present plan of care. Follow up in 2 weeks.  11/20/19: F/U with Dr. Pennington. Wounds continue to improve. Continue POC. Return in 2 weeks.  12/4/19: F/U with Dr. Pennington. Axillae drainage decreased. Client has lost 97 Lbs. As of today. Surgery planned for 12/20/19. Next visit 12/18/19, and consents to be " signed.  12/18/19: F/U with Dr. Pennington. Surgery to axillae scheduled for Monday 12/23/19. Consents signed. Return 1/2/20.   12/26/19  F/U with Dr. pennington for s/p surgery to bilateral axilla.  Removal of glands and placement of theraskin.  Staples intact.  Theraskin not completely  Adhered on both areas.   Start bolster dressing to assist tin the adherence of the graft. Patient to return to clinic in 1 week.  Order homehealth for twice weekly    01/02/20: F/u with Dr. Pennington. Moderate to large amount of drainage from wounds. Staples and sutures removed from both wounds today in clinic per Dr. Pennington. New wound care orders given and routed to [facility]. Rx given for 5 mg norco every 4 hours. Patient given a note to not return to work until further notice.  1/9/20: F/U with Dr. Pennington. Removed 2 remaining staples. Discussed with patient future grafting of sites. Cont. POC. Return in 1 week.  1/16/20: F/U with Dr. Pennington. Client reports decreased drainage today. Rx for Clindamycin and Norco provided. Client prefers to wait a while before having grafting performed. Return in 1 week.  1/23/20: F/U with Dr. Pennington. Right axilla measurements improved. Continue Clindamycin. Return in 1 week.  2/6/20:Patient presents with a new wound to right lateral breast. Dr Pennington assessed patient. Continuing with same wound care orders. Rx for Norco 5/325mg given to patient for pain. F/U in 2 weeks.   2/20/20: F/U with Dr. Pennington. Sites continue to improve. Measurements decreased. Culture reviewed. Rx for Ampicillin provided. Client has taken PCN in past w/o reaction. Rx for Norco provided. Return in 2 weeks.  3/5/20: F/U with Dr. Pennington. Sites decreased in size. Cont. Ampicillin. Cont. POC. Return in 2 weeks.  3/19/20: F/U with Dr. Pennington. Sites improving. Cont. POC. Return in 2 weeks.  5/7/20: F/U with Dr. Pennington. Measurements improved all sites. Culture of left axilla done today. Cont. POC. Return in 2  weeks.  6/4/20: F/U with Dr. Pennington. All sites improved. Cont. Clindamycin. Return in 2 weeks.  6/18/20: F/U with Dr. Pennington. Measurements improved. Silver nitrate x 1 to right and left axilla hypergranulation tissue. Cont. POC. Next visit 7/2/20. Client will be out of time 7/6/20 - 7/11/20. Will notify [facility].  07/15/2020- f/u with Dr. Pennington. Pt presents with new abscess to upper right abd. Pt consented for OR this Friday 07/17/2020 for I&D to abscess. Rx for clindamycin sent to pharmacy. Dakins dc'd and xeroform started to wounds. Aquacel ag extra, mextra and large abd pad to right upper abd. Pt to f/u with Dr. Pennington in 1 week 07/23/2020.  7/23/20: F/U with Dr. Pennington. I&D of right abdomen abscess done 7/17/20 per Dr. Pennington. Site open with sutures. All previous sites improving. Iodoform gauze to sites needing packing. Rx for iodoform gauze and Vibra tabs provided today. Next visit 7/30/20.  7/30/20: F/U with Dr. Pennington. All sites improving. Sutures removed from right abdominal site per Dr. Pennington. Has excoriation under right breast. Rx for mycostatin powder provided. Cont. POC. Return 8/6/20.  08/13/2020- f/u with Dr. Pennington. Pt wounds improving. Silver nitrate x 2 to wounds for hypergranulation. Cont POC. F/u with Dr. Pennington in 2 weeks 08/27/2020  9/3/20: F/U with Dr. Pennington. All measurements improved. Breast site resolved. Wound care orders changed. Cont. Doxycycline and mycostatin powder. Next visit 9/17/20.  9/17/2020: Fu with Dr. Pennington. Wounds hypergranulated.  applied silver nitrate x 1 to each wound, patient tolerated well. Continued with current treatment plan. Fu 2 weeks 10/1/2020.    Review of Systems    Objective:      Physical Exam    Assessment:       No diagnosis found.       Wound 07/31/19 1300 Other (comment) Axilla #2 (Active)   07/31/19 1300    Pre-existing: Yes   Primary Wound Type: Other   Side: Left   Orientation:    Location: Axilla   Wound Number  (optional): #2   Ankle-Brachial Index:    Pulses:    Removal Indication and Assessment:    Wound Outcome:    (Retired) Wound Type:    (Retired) Wound Length (cm):    (Retired) Wound Width (cm):    (Retired) Depth (cm):    Wound Description (Comments):    Removal Indications:    Wound Image   09/17/20 1100   Dressing Appearance Intact;Moist drainage 09/17/20 1100   Drainage Amount Small 09/17/20 1100   Drainage Characteristics/Odor Serosanguineous 09/17/20 1100   Appearance Red;Moist;Hypergranulation 09/17/20 1100   Tissue loss description Full thickness 09/17/20 1100   Red (%), Wound Tissue Color 100 % 09/17/20 1100   Periwound Area Intact;Dry;Scar tissue;Satellite lesion 09/17/20 1100   Wound Edges Irregular 09/17/20 1100   Wound Length (cm) 0.5 cm 09/17/20 1100   Wound Width (cm) 5.6 cm 09/17/20 1100   Wound Depth (cm) 0.1 cm 09/17/20 1100   Wound Volume (cm^3) 0.28 cm^3 09/17/20 1100   Wound Surface Area (cm^2) 2.8 cm^2 09/17/20 1100   Care Cleansed with:;Sterile normal saline 09/17/20 1100   Dressing Applied;Abd pad;Silver 09/17/20 1100   Periwound Care Absorptive dressing applied;Skin barrier film applied 09/17/20 1100   Dressing Change Due 09/18/20 09/17/20 1100            Wound 07/31/19 1300 Other (comment) Axilla #1 (Active)   07/31/19 1300    Pre-existing: Yes   Primary Wound Type: Other   Side: Right   Orientation:    Location: Axilla   Wound Number (optional): #1   Ankle-Brachial Index:    Pulses:    Removal Indication and Assessment:    Wound Outcome:    (Retired) Wound Type:    (Retired) Wound Length (cm):    (Retired) Wound Width (cm):    (Retired) Depth (cm):    Wound Description (Comments):    Removal Indications:    Wound Image   09/17/20 1100   Dressing Appearance Intact;Moist drainage 09/17/20 1100   Drainage Amount Small 09/17/20 1100   Drainage Characteristics/Odor Serosanguineous 09/17/20 1100   Appearance Red;Moist;Hypergranulation 09/17/20 1100   Tissue loss description Full thickness  09/17/20 1100   Red (%), Wound Tissue Color 100 % 09/17/20 1100   Periwound Area Intact;Dry 09/17/20 1100   Wound Edges Irregular 09/17/20 1100   Wound Length (cm) 2.5 cm 09/17/20 1100   Wound Width (cm) 1.2 cm 09/17/20 1100   Wound Depth (cm) 0.1 cm 09/17/20 1100   Wound Volume (cm^3) 0.3 cm^3 09/17/20 1100   Wound Surface Area (cm^2) 3 cm^2 09/17/20 1100   Care Cleansed with:;Sterile normal saline 09/17/20 1100   Dressing Applied;Silver;Abd pad;Other (see comments) 09/17/20 1100   Periwound Care Absorptive dressing applied;Skin barrier film applied 09/17/20 1100   Dressing Change Due 09/18/20 09/17/20 1100            Wound 07/15/20 1600 Abscess Right upper Abdomen #4 (Active)   07/15/20 1600    Pre-existing: Yes   Primary Wound Type: Abscess   Side: Right   Orientation: upper   Location: Abdomen   Wound Number (optional): #4   Ankle-Brachial Index:    Pulses:    Removal Indication and Assessment:    Wound Outcome:    (Retired) Wound Type:    (Retired) Wound Length (cm):    (Retired) Wound Width (cm):    (Retired) Depth (cm):    Wound Description (Comments):    Removal Indications:    Wound Image   09/17/20 1100   Dressing Appearance Moist drainage;Intact 09/17/20 1100   Drainage Amount Small 09/17/20 1100   Drainage Characteristics/Odor Serosanguineous 09/17/20 1100   Appearance Red;Moist;Granulating 09/17/20 1100   Tissue loss description Full thickness 09/17/20 1100   Red (%), Wound Tissue Color 100 % 09/17/20 1100   Periwound Area Intact;Struthers;Dry 09/17/20 1100   Wound Edges Irregular 09/17/20 1100   Wound Length (cm) 5 cm 09/17/20 1100   Wound Width (cm) 3.1 cm 09/17/20 1100   Wound Depth (cm) 0.1 cm 09/17/20 1100   Wound Volume (cm^3) 1.55 cm^3 09/17/20 1100   Wound Surface Area (cm^2) 15.5 cm^2 09/17/20 1100   Care Cleansed with:;Sterile normal saline 09/17/20 1100   Dressing Applied;Silver;Non-adherent;Abd pad 09/17/20 1100   Periwound Care Dry periwound area maintained;Skin barrier film applied 09/17/20  1100   Dressing Change Due 09/18/20 09/17/20 1100            Wound 09/17/20 1031 Right posterior Back #5 (Active)   09/17/20 1031    Pre-existing: Yes   Primary Wound Type:    Side: Right   Orientation: posterior   Location: Back   Wound Number (optional): #5   Ankle-Brachial Index:    Pulses:    Removal Indication and Assessment:    Wound Outcome:    (Retired) Wound Type:    (Retired) Wound Length (cm):    (Retired) Wound Width (cm):    (Retired) Depth (cm):    Wound Description (Comments):    Removal Indications:    Wound Image   09/17/20 1100   Dressing Appearance Intact;Moist drainage 09/17/20 1100   Drainage Amount Moderate 09/17/20 1100   Drainage Characteristics/Odor Serosanguineous 09/17/20 1100   Appearance Red;Moist;Hypergranulation 09/17/20 1100   Tissue loss description Full thickness 09/17/20 1100   Red (%), Wound Tissue Color 100 % 09/17/20 1100   Periwound Area Intact;Dry 09/17/20 1100   Wound Edges Defined 09/17/20 1100   Wound Length (cm) 1.1 cm 09/17/20 1100   Wound Width (cm) 2.4 cm 09/17/20 1100   Wound Depth (cm) 0.1 cm 09/17/20 1100   Wound Volume (cm^3) 0.26 cm^3 09/17/20 1100   Wound Surface Area (cm^2) 2.64 cm^2 09/17/20 1100   Care Cleansed with:;Sterile normal saline 09/17/20 1100   Dressing Applied;Silver;Abd pad;Non-adherent;Other (see comments) 09/17/20 1100   Periwound Care Absorptive dressing applied;Skin barrier film applied 09/17/20 1100   Dressing Change Due 09/18/20 09/17/20 1100       Left and Right Axilla   Cleanse with: Rinse with normal saline   Primary dressing: Iodoform gauze packing to right axilla, xeroform to right and left   Secondary dressing: cover with 4x4 gauze and small abd pad, secure with mefix tape   Frequency: daily     Right upper abd   Cleanse with: Rinse with normal saline   Primary dressing: xeroform   Secondary dressing: small mextra, large abd pad, secure with mefix tape   Frequency: daily       Follow up with Dr. Pennington in 2 weeks on Thursday 10/01/2020      Other orders: Continue Vibra tabs. Mycostatin powder daily under right breast.       Home Health: [facility]:  Fax # [phone].  Skilled nurse to perform dressing changes every Monday, Wednesday, and Friday.  Please provide patient's caregiver with necessary supplies to do wound care: small abd pads, 4x4 gauze, mefix tape.     Plan:                10/1/2020 Dr. Pennington

## 2020-09-18 LAB
ACID FAST MOD KINY STN SPEC: NORMAL
MYCOBACTERIUM SPEC QL CULT: NORMAL

## 2020-09-23 DIAGNOSIS — M13.80 SERONEGATIVE ARTHRITIS: ICD-10-CM

## 2020-09-23 RX ORDER — SULFASALAZINE 500 MG/1
1000 TABLET, DELAYED RELEASE ORAL 2 TIMES DAILY
Qty: 360 TABLET | Refills: 0 | Status: SHIPPED | OUTPATIENT
Start: 2020-09-23 | End: 2020-12-22

## 2020-09-28 ENCOUNTER — DOCUMENTATION ONLY (OUTPATIENT)
Dept: REHABILITATION | Facility: HOSPITAL | Age: 40
End: 2020-09-28

## 2020-09-28 NOTE — PROGRESS NOTES
"  Physical Therapy Daily Treatment Note     Name: Rodney Gonzalez [facility] Number: [id number]    Therapy Diagnosis:   Encounter Diagnosis   Name Primary?   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   She was compliant with home exercise program.  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 DATE 11/16/2020 11/16/2020 11/16/2020   FACE-TO-FACE 10/28/2020 9/18/2020 9/18/2020   FOTO 5/5 4/5 3/5         UBE L2 3' ea 3' frwd/3'back    Pulleys (flex, scap) 3' ea 3' ea 3 min each   TABLE:      Wand flexion NT 2 x 10 2 x 10   Wand ER  --- Not today   Side lying:  - Flexion  - Abduction NT   2 x 10  2 x 10   1 x 10  1 x 10   Scapular protractions NT 2 x 10 x 2# 2 x 10 x 1#   Reverse Codman's NT 30 x 2# ea  cw/ccw  20 x cw/ccw               STANDING:      Doorway stretch 3 x 30" 3 x 30" 3 x 30"   Rows 3 x 10 BTB 3 x 10 BTB 3 x 10 BTB   [place] 3 x 10 RTB 2 x 10 RTB 1 x 10 RTB   W  I  T 2 x 10 RTB  2 x 10 GTB  2 x 10 RTB 2 x 10 YTB  2 x 10 GTB  2 x 10 YTB --  2 x 10 RTB  --   Shldr AROM:  - Flexion  - Scaption  - Abduction thumb up To 90 deg  1 x 10 + 1 x 10 " x 1#  2 x 10 x 1#  2 x 10 Next?    PNF D2 flexion Next Next?          Initials SB MO HASSAN       Standing AROM of left shoulder:  Flexion: 135 degrees  Abduction: 120 degrees  Functional ER reaches to T2  Functional IR reaches to T8    Rodney received the following manual therapy techniques: Myofacial release and Soft tissue Mobilization were applied to the: left shoulder for 0 minutes, including: NOT TODAY  - STM of left upper trapezius, pectoralis major, pectoralis minor, biceps brachii, deltoid and supraspinatus       Home Exercises Provided and Patient Education Provided     Education/Self-Care provided: (0 minutes)   Patient educated on biomechanical justification for therapeutic exercise and importance of compliance with HEP in order to improve overall impairments and QOL    Patient educated on postural awareness and the use of a lumbar roll when in a seated position to reduce stress and maintain optimal alignment of the spine.    Patient educated on proper ergonomics at the work station in order to maintain optimal alignment of the musculoskeletal system and improve efficiency in the work environment.   Patient educated on the importance of improved core and upper extremity strength in order to improve alignment of the spine and upper extremities with static positions and dynamic movement.    Patient educated on the importance of strong core and lower extremity musculature in order to improve both static and dynamic balance, improve gait mechanics, reduce fall risk and improve household and community mobility.       Written Home Exercises Provided: Patient instructed to cont prior HEP.  Exercises were reviewed and Amanloraine was able to demonstrate them prior to the end of the session.  Rodney demonstrated good  understanding of the education provided.     See EMR under Patient Instructions for exercises provided 8/18/2020.    ASSESSMENT     Rodney is progressing with shoulder AROM against gravity and has no  "increase in symptoms with progression of exercises prior to leaving clinic. 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 Pt had the most noted difficulty with ABD against gravity, although able to reach to 90 degrees. She will continue with progression to improve functional mobility of shoulder and return to prior level of function.    Rodney is progressing well towards her goals.   Pt prognosis is Good.     Pt will continue to benefit from skilled outpatient physical therapy to address the deficits listed in the problem list box on initial evaluation, provide pt/family education and to maximize pt's level of independence in the home and community environment.     Pt's spiritual, cultural and educational needs considered and pt agreeable to plan of care and goals.     Anticipated barriers to physical therapy: co-morbidities    Goals:      Short Term Goals:  6 weeks     1. Pain: Pt will demonstrate improved pain by reports of less than or equal to 8/10 worst pain on the verbal rating scale in order to progress toward maximal functional ability and improve QOL.     2. Function: Patient will demonstrate improved function as indicated by a functional limitation score of less than or equal to 49 out of 100 on FOTO.     3. Mobility: Patient will improve AROM to 50% of stated goals, listed in objective measures above, in order to progress towards independence with functional activities.      4. Strength: Patient will improve strength to 50% of stated goals, listed in objective measures above, in order to progress towards independence with functional activities.      5. Gait: Patient will demonstrate improved gait mechanics in order to improve functional mobility, improve quality of life, and decrease risk of further injury or fall.      6. HEP: Patient will demonstrate independence with HEP in order to progress toward functional " independence.     7. Improve postural awareness.            Long Term Goals:  12 weeks     1. Pain: Pt will demonstrate improved pain by reports of less than or equal to 6/10 worst pain on the verbal rating scale in order to progress toward maximal functional ability and improve QOL.       2. Function: Patient will demonstrate improved function as indicated by a functional limitation score of less than or equal to 35 out of 100 on FOTO.     3. Mobility: Patient will improve AROM to stated goals, listed in objective measures above, in order to return to maximal functional potential and improve quality of life.     4. Strength: Patient will improve strength to stated goals, listed in objective measures above, in order to improve functional independence and quality of life.     5. Gait: Patient will demonstrate normalized gait mechanics with minimal compensation in order to return to PLOF.     6. Patient will return to normal ADL's, IADL's, community involvement, recreational activities, and work-related activities with less than or equal to 3/10 pain and maximal function.      7. Patient will be able to return to work without limitation.             PLAN   Plan of care Certification: 8/18/2020 to 11/16/2020.    Continue Plan of Care (POC) as tolerated. Add PNF and resume mat exercises. Adjust pulleys to 2' each direction next visit.     Zabrina Bloom, PTA 1/6

## 2020-09-28 NOTE — PROGRESS NOTES
Face to Face PTA Conference performed with Douglas Todd PT regarding patient's current status, overall progress, and plan of care. Pt will be seen by a physical therapist minimally every 6th visit or every 30 days.    Zabrina Bloom PTA  9/28/2020      Face to Face PTA Conference performed with Zabrina Bloom PTA regarding patient's current status, overall progress, and plan of care. Pt will be seen by a physical therapist minimally every 6th visit or every 30 days.

## 2020-09-29 ENCOUNTER — CLINICAL SUPPORT (OUTPATIENT)
Dept: REHABILITATION | Facility: HOSPITAL | Age: 40
End: 2020-09-29
Payer: COMMERCIAL

## 2020-09-29 DIAGNOSIS — M25.512 CHRONIC LEFT SHOULDER PAIN: Primary | ICD-10-CM

## 2020-09-29 DIAGNOSIS — G89.29 CHRONIC LEFT SHOULDER PAIN: Primary | ICD-10-CM

## 2020-09-29 PROCEDURE — 97110 THERAPEUTIC EXERCISES: CPT | Mod: PO,CQ

## 2020-10-01 ENCOUNTER — HOSPITAL ENCOUNTER (OUTPATIENT)
Dept: WOUND CARE | Facility: HOSPITAL | Age: 40
Discharge: HOME | End: 2020-10-01
Attending: SURGERY
Payer: COMMERCIAL

## 2020-10-01 VITALS
HEIGHT: 65 IN | BODY MASS INDEX: 43.82 KG/M2 | WEIGHT: 263 LBS | TEMPERATURE: 98 F | SYSTOLIC BLOOD PRESSURE: 126 MMHG | HEART RATE: 82 BPM | DIASTOLIC BLOOD PRESSURE: 81 MMHG

## 2020-10-01 DIAGNOSIS — Z98.84 S/P BARIATRIC SURGERY: ICD-10-CM

## 2020-10-01 DIAGNOSIS — L73.2 HIDRADENITIS SUPPURATIVA OF RIGHT AXILLA: Primary | ICD-10-CM

## 2020-10-01 DIAGNOSIS — L73.2 LEFT AXILLARY HIDRADENITIS: ICD-10-CM

## 2020-10-01 DIAGNOSIS — L73.2 HIDRADENITIS AXILLARIS: ICD-10-CM

## 2020-10-01 PROCEDURE — 17250 CHEM CAUT OF GRANLTJ TISSUE: CPT

## 2020-10-01 NOTE — PROGRESS NOTES
"Subjective:       Patient ID: Rodney Infante is a 39 y.o. female.    Chief Complaint: Non-healing Wound (right and left axilla)    F/u for excision bilateral axillary hider adneitis    Client here today with open areas to right and left axilla. Seen by Dr. Pennington, who states sweat glands infected. Rx for clindamycin, bactroban, and tramadol provided. Stop methatrexate ( prescribed for arthritis). Client plans to have abdominal bypass surgery in September. Culture done today of right axilla.  8/7/19: F/U with Dr. Pennington. Culture results negative. Continue POC.  8/14/19: F/U with Dr. Pennington. Clindamycin d/c'd. Bactrim and Diflucan ordered today. Leave left and right axilla open to air. Culture sent to lab today.     9/4/19:  F/U with Dr. Pennington.  Wounds to right axilla healing up.  Patient states "right does not drain as much as left".  Patient feels the left axilla is not healing up like the right.  Patient still taking Bactrim PO.  Will continue for now.  9/18/19: F/U with Dr. Pennington. Both axilla continue to have "sebum - like" exudate. New site noted to left back. Culture done today. RX for lotrisone and Diflucan provided.  9/25/19: F/U with Dr. Pennington. Axillae improved. Client was not able to get Diflucan. Reordered today. Continues lotrisone and clindamycin. Culture results reviewed. Referred to ID for appt. On 10/7/19. Next visit with Dr. Pennington 10/16/19.  10/7/19: Vist today with ID. Augmentin ordered. Clindamycin d/c'd. Complete Diflucan.  11/6/19: Dr Pennington assessed patient.Wounds slowly improving. Patient states that she has had gastric sleeve procedure last week. No complaints voiced. Continuing with present plan of care. Follow up in 2 weeks.  11/20/19: F/U with Dr. Pennington. Wounds continue to improve. Continue POC. Return in 2 weeks.  12/4/19: F/U with Dr. Pennington. Axillae drainage decreased. Client has lost 97 Lbs. As of today. Surgery planned for 12/20/19. Next visit 12/18/19, and " consents to be signed.  12/18/19: F/U with Dr. Pennington. Surgery to axillae scheduled for Monday 12/23/19. Consents signed. Return 1/2/20.   12/26/19  F/U with Dr. pennington for s/p surgery to bilateral axilla.  Removal of glands and placement of theraskin.  Staples intact.  Theraskin not completely  Adhered on both areas.   Start bolster dressing to assist tin the adherence of the graft. Patient to return to clinic in 1 week.  Order homehealth for twice weekly    01/02/20: F/u with Dr. Pennington. Moderate to large amount of drainage from wounds. Staples and sutures removed from both wounds today in clinic per Dr. Pennington. New wound care orders given and routed to [facility]. Rx given for 5 mg norco every 4 hours. Patient given a note to not return to work until further notice.  1/9/20: F/U with Dr. Pennington. Removed 2 remaining staples. Discussed with patient future grafting of sites. Cont. POC. Return in 1 week.  1/16/20: F/U with Dr. Pennington. Client reports decreased drainage today. Rx for Clindamycin and Norco provided. Client prefers to wait a while before having grafting performed. Return in 1 week.  1/23/20: F/U with Dr. Pennington. Right axilla measurements improved. Continue Clindamycin. Return in 1 week.  2/6/20:Patient presents with a new wound to right lateral breast. Dr Pennington assessed patient. Continuing with same wound care orders. Rx for Norco 5/325mg given to patient for pain. F/U in 2 weeks.   2/20/20: F/U with Dr. Pennington. Sites continue to improve. Measurements decreased. Culture reviewed. Rx for Ampicillin provided. Client has taken PCN in past w/o reaction. Rx for Norco provided. Return in 2 weeks.  3/5/20: F/U with Dr. Pennington. Sites decreased in size. Cont. Ampicillin. Cont. POC. Return in 2 weeks.  3/19/20: F/U with Dr. Pennington. Sites improving. Cont. POC. Return in 2 weeks.  5/7/20: F/U with Dr. Pennington. Measurements improved all sites. Culture of left axilla done today. Cont. POC. Return in  2 weeks.  6/4/20: F/U with Dr. Pennington. All sites improved. Cont. Clindamycin. Return in 2 weeks.  6/18/20: F/U with Dr. Pennington. Measurements improved. Silver nitrate x 1 to right and left axilla hypergranulation tissue. Cont. POC. Next visit 7/2/20. Client will be out of time 7/6/20 - 7/11/20. Will notify [facility].  07/15/2020- f/u with Dr. Pennington. Pt presents with new abscess to upper right abd. Pt consented for OR this Friday 07/17/2020 for I&D to abscess. Rx for clindamycin sent to pharmacy. Dakins dc'd and xeroform started to wounds. Aquacel ag extra, mextra and large abd pad to right upper abd. Pt to f/u with Dr. Pennington in 1 week 07/23/2020.  7/23/20: F/U with Dr. Pennington. I&D of right abdomen abscess done 7/17/20 per Dr. Pennington. Site open with sutures. All previous sites improving. Iodoform gauze to sites needing packing. Rx for iodoform gauze and Vibra tabs provided today. Next visit 7/30/20.  7/30/20: F/U with Dr. Pennington. All sites improving. Sutures removed from right abdominal site per Dr. Pennington. Has excoriation under right breast. Rx for mycostatin powder provided. Cont. POC. Return 8/6/20.  08/13/2020- f/u with Dr. Pennington. Pt wounds improving. Silver nitrate x 2 to wounds for hypergranulation. Cont POC. F/u with Dr. Penningotn in 2 weeks 08/27/2020  9/3/20: F/U with Dr. Pennington. All measurements improved. Breast site resolved. Wound care orders changed. Cont. Doxycycline and mycostatin powder. Next visit 9/17/20.  9/17/2020: Fu with Dr. Pennington. Wounds hypergranulated.  applied silver nitrate x 1 to each wound, patient tolerated well. Continued with current treatment plan. Fu 2 weeks 10/1/2020.  10/1/20: F/U with Dr. Pennington. Measurements improved. All sites with hypergranulation tissue. Lidocaine 4% to all sites prior to silver nitrate application. Continue POC. Next visit 10/15/20.    Review of Systems   Constitutional: Negative.    HENT: Negative.    Eyes: Negative.     Respiratory: Negative.    Cardiovascular: Negative.    Gastrointestinal: Negative.    Genitourinary: Negative.    Musculoskeletal: Negative.    Skin: Negative.    Neurological: Negative.    Psychiatric/Behavioral: Negative.        Objective:      Physical Exam  Constitutional:       Appearance: She is well-developed.   HENT:      Head: Normocephalic.   Eyes:      Conjunctiva/sclera: Conjunctivae normal.      Pupils: Pupils are equal, round, and reactive to light.   Neck:      Musculoskeletal: Normal range of motion and neck supple.   Cardiovascular:      Rate and Rhythm: Normal rate and regular rhythm.      Heart sounds: Normal heart sounds.   Pulmonary:      Effort: Pulmonary effort is normal.      Breath sounds: Normal breath sounds.   Abdominal:      General: Bowel sounds are normal.      Palpations: Abdomen is soft.   Musculoskeletal: Normal range of motion.   Skin:     General: Skin is warm and dry.   Neurological:      Mental Status: She is alert and oriented to person, place, and time.      Deep Tendon Reflexes: Reflexes are normal and symmetric.         Assessment:       1. Hidradenitis suppurativa of right axilla           Wound 07/31/19 1300 Other (comment) Axilla #2 (Active)   07/31/19 1300    Pre-existing: Yes   Primary Wound Type: Other   Side: Left   Orientation:    Location: Axilla   Wound Number (optional): #2   Ankle-Brachial Index:    Pulses:    Removal Indication and Assessment:    Wound Outcome:    (Retired) Wound Type:    (Retired) Wound Length (cm):    (Retired) Wound Width (cm):    (Retired) Depth (cm):    Wound Description (Comments):    Removal Indications:    Wound Image   10/01/20 1400   Dressing Appearance Moist drainage;Intact 10/01/20 1400   Drainage Amount Moderate 10/01/20 1400   Drainage Characteristics/Odor Serosanguineous 10/01/20 1400   Appearance Red;Moist;Hypergranulation 10/01/20 1400   Tissue loss description Full thickness 10/01/20 1400   Red (%), Wound Tissue Color 100 %  10/01/20 1400   Periwound Area Dry;Intact 10/01/20 1400   Wound Edges Irregular 10/01/20 1400   Wound Length (cm) 0.5 cm 10/01/20 1400   Wound Width (cm) 0.5 cm 10/01/20 1400   Wound Depth (cm) 0.1 cm 10/01/20 1400   Wound Volume (cm^3) 0.02 cm^3 10/01/20 1400   Wound Surface Area (cm^2) 0.25 cm^2 10/01/20 1400   Care Cleansed with:;Sterile normal saline 10/01/20 1400   Dressing Non-adherent;Island/border;Gauze 10/01/20 1400   Periwound Care Dry periwound area maintained 10/01/20 1400   Dressing Change Due 10/02/20 10/01/20 1400            Wound 07/31/19 1300 Other (comment) Axilla #1 (Active)   07/31/19 1300    Pre-existing: Yes   Primary Wound Type: Other   Side: Right   Orientation:    Location: Axilla   Wound Number (optional): #1   Ankle-Brachial Index:    Pulses:    Removal Indication and Assessment:    Wound Outcome:    (Retired) Wound Type:    (Retired) Wound Length (cm):    (Retired) Wound Width (cm):    (Retired) Depth (cm):    Wound Description (Comments):    Removal Indications:    Wound Image   10/01/20 1400   Dressing Appearance Intact;Moist drainage 10/01/20 1400   Drainage Amount Moderate 10/01/20 1400   Drainage Characteristics/Odor Serosanguineous 10/01/20 1400   Appearance Red;Moist;Hypergranulation 10/01/20 1400   Tissue loss description Full thickness 10/01/20 1400   Red (%), Wound Tissue Color 100 % 10/01/20 1400   Periwound Area Dry;Intact;Scar tissue 10/01/20 1400   Wound Edges Irregular 10/01/20 1400   Wound Length (cm) 3 cm 10/01/20 1400   Wound Width (cm) 0.5 cm 10/01/20 1400   Wound Depth (cm) 0.1 cm 10/01/20 1400   Wound Volume (cm^3) 0.15 cm^3 10/01/20 1400   Wound Surface Area (cm^2) 1.5 cm^2 10/01/20 1400   Care Cleansed with:;Sterile normal saline 10/01/20 1400   Dressing Gauze;Island/border;Non-adherent 10/01/20 1400   Periwound Care Dry periwound area maintained 10/01/20 1400   Dressing Change Due 10/02/20 10/01/20 1400            Wound 07/15/20 1600 Abscess Right upper Abdomen #4  (Active)   07/15/20 1600    Pre-existing: Yes   Primary Wound Type: Abscess   Side: Right   Orientation: upper   Location: Abdomen   Wound Number (optional): #4   Ankle-Brachial Index:    Pulses:    Removal Indication and Assessment:    Wound Outcome:    (Retired) Wound Type:    (Retired) Wound Length (cm):    (Retired) Wound Width (cm):    (Retired) Depth (cm):    Wound Description (Comments):    Removal Indications:    Wound Image   10/01/20 1400   Dressing Appearance Moist drainage;Intact 10/01/20 1400   Drainage Amount Moderate 10/01/20 1400   Drainage Characteristics/Odor Serosanguineous 10/01/20 1400   Appearance Red;Moist;Hypergranulation 10/01/20 1400   Tissue loss description Partial thickness 10/01/20 1400   Red (%), Wound Tissue Color 100 % 10/01/20 1400   Periwound Area Dry;Scar tissue 10/01/20 1400   Wound Edges Irregular 10/01/20 1400   Wound Length (cm) 4.5 cm 10/01/20 1400   Wound Width (cm) 2 cm 10/01/20 1400   Wound Depth (cm) 0.1 cm 10/01/20 1400   Wound Volume (cm^3) 0.9 cm^3 10/01/20 1400   Wound Surface Area (cm^2) 9 cm^2 10/01/20 1400   Care Cleansed with:;Sterile normal saline 10/01/20 1400   Periwound Care Dry periwound area maintained 10/01/20 1400   Dressing Change Due 10/02/20 10/01/20 1400            Wound 09/17/20 1031 Right posterior Back #5 (Active)   09/17/20 1031    Pre-existing: Yes   Primary Wound Type:    Side: Right   Orientation: posterior   Location: Back   Wound Number (optional): #5   Ankle-Brachial Index:    Pulses:    Removal Indication and Assessment:    Wound Outcome:    (Retired) Wound Type:    (Retired) Wound Length (cm):    (Retired) Wound Width (cm):    (Retired) Depth (cm):    Wound Description (Comments):    Removal Indications:    Wound Image   10/01/20 1400   Dressing Appearance Intact;Moist drainage 10/01/20 1400   Drainage Amount Moderate 10/01/20 1400   Drainage Characteristics/Odor Serosanguineous 10/01/20 1400   Appearance Red;Moist;Hypergranulation 10/01/20  1400   Tissue loss description Partial thickness 10/01/20 1400   Red (%), Wound Tissue Color 100 % 10/01/20 1400   Periwound Area Intact;Dry 10/01/20 1400   Wound Edges Irregular 10/01/20 1400   Wound Length (cm) 3 cm 10/01/20 1400   Wound Width (cm) 1.5 cm 10/01/20 1400   Wound Depth (cm) 0.1 cm 10/01/20 1400   Wound Volume (cm^3) 0.45 cm^3 10/01/20 1400   Wound Surface Area (cm^2) 4.5 cm^2 10/01/20 1400   Care Cleansed with:;Sterile normal saline 10/01/20 1400   Dressing Island/border;Non-adherent;Gauze 10/01/20 1400   Periwound Care Dry periwound area maintained 10/01/20 1400   Dressing Change Due 10/02/20 10/01/20 1400       Left and Right Axilla   Cleanse with: Rinse with normal saline   Primary dressing:  xeroform to right and left   Secondary dressing: cover with 4x4 gauze and small abd pad, secure with mefix tape   Frequency: daily     Right upper abd and right back:  Cleanse with: Rinse with normal saline   Primary dressing: xeroform   Secondary dressing: gauze, 4 x 4 mepore dressing  Frequency: daily       Follow up with Dr. Pennington in 2 weeks on Thursday 10/15/2020     Other orders: Continue Vibra tabs. Mycostatin powder daily under right breast.       Home Health: [facility]:  Fax # [phone].  Skilled nurse to perform dressing changes every Monday, Wednesday except when in clinic. Patient will do own wound care of Fridays.  Please provide patient's caregiver with necessary supplies to do wound care: small abd pads, 4x4 gauze, mefix tape.     Plan:                10/15/2020 Dr. Pennington

## 2020-10-06 ENCOUNTER — CLINICAL SUPPORT (OUTPATIENT)
Dept: REHABILITATION | Facility: HOSPITAL | Age: 40
End: 2020-10-06
Payer: COMMERCIAL

## 2020-10-06 DIAGNOSIS — M25.512 CHRONIC LEFT SHOULDER PAIN: ICD-10-CM

## 2020-10-06 DIAGNOSIS — G89.29 CHRONIC LEFT SHOULDER PAIN: ICD-10-CM

## 2020-10-06 PROCEDURE — 97110 THERAPEUTIC EXERCISES: CPT | Mod: PO

## 2020-10-06 NOTE — PROGRESS NOTES
"  Physical Therapy Daily Treatment Note     Name: Rodney Gonzalez [facility] Number: [id number]    Therapy Diagnosis:   Encounter Diagnosis   Name Primary?    Chronic left shoulder pain      Physician: Ken Alberot MD    Visit Date: 10/6/2020    Physician Orders: PT Eval and Treat  Medical Diagnosis from Referral: Tendinopathy   Evaluation Date: 8/18/2020  Authorization Period Expiration: 12/31/2020  Plan of Care Expiration: 11/16/2020  Visit # / Visits authorized: 6 / 20     Precautions: Standard and post op sx for hidradenitis under both arms on 12/24/2020     Time In: 9:00 am  Time Out: 9:45 am  Total Billable Time: 45 minutes     SUBJECTIVE     Today, pt reports: main complaint at this time is stiffness and some remaining weakness in left shoulder.   She was compliant with home exercise program.  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 DATE 11/16/2020 11/16/2020 11/16/2020 11/16/2020   FACE-TO-FACE 10/28/2020 10/28/2020 9/18/2020 9/18/2020   FOTO -- 5/5 4/5 3/5          UBE L2.5 x 3' ea L2 3' ea 3' frwd/3'back    Pulleys (flex, scap) 2' ea 3' ea 3' ea 3 min each   TABLE:       Wand flexion 2 x 10 x 2# bar NT 2 x 10 2 x 10   Wand ER ---  --- Not today   Side lying:  - Flexion  - Abduction --- NT   2 x 10  2 x 10   1 x 10  1 x 10   Scapular protractions 3 x 10 x 3# NT 2 x 10 x 2# 2 x 10 x 1#   Reverse Codman's 30 x 3# ea  cw/ccw NT 30 x 2# ea  cw/ccw  20 x cw/ccw                 STANDING:       Doorway stretch 3 x 30" 3 x 30" 3 x 30" 3 x 30"   Rows 3 x 10 STB 3 x 10 BTB 3 x 10 BTB 3 x 10 BTB   Carlitos's 3 x 10 GTB 3 x 10 RTB 2 x 10 RTB 1 x 10 RTB   W  I  T 2 x 10 " RTB  2 x 10 BTB  2 x 10 RTB 2 x 10 RTB  2 x 10 GTB  2 x 10 RTB 2 x 10 YTB  2 x 10 GTB  2 x 10 YTB --  2 x 10 RTB  --   Shldr AROM:  - Flexion  - Scaption  - Abduction thumb up   2 x 10 x 1#  2 x 10 x 1#  2 x 10 x 1# To 90 deg  1 x 10 + 1 x 10 x 1#  2 x 10 x 1#  2 x 10 Next?    PNF D2 flexion 2 x 10 x 1# Next Next?           Initials MO Stevens received the following manual therapy techniques: Myofacial release and Soft tissue Mobilization were applied to the: left shoulder for 0 minutes, including: NOT TODAY  - STM of left upper trapezius, pectoralis major, pectoralis minor, biceps brachii, deltoid and supraspinatus       Home Exercises Provided and Patient Education Provided     Education/Self-Care provided: (0 minutes)   Patient educated on biomechanical justification for therapeutic exercise and importance of compliance with HEP in order to improve overall impairments and QOL    Patient educated on postural awareness and the use of a lumbar roll when in a seated position to reduce stress and maintain optimal alignment of the spine.    Patient educated on proper ergonomics at the work station in order to maintain optimal alignment of the musculoskeletal system and improve efficiency in the work environment.   Patient educated on the importance of improved core and upper extremity strength in order to improve alignment of the spine and upper extremities with static positions and dynamic movement.    Patient educated on the importance of strong core and lower extremity musculature in order to improve both static and dynamic balance, improve gait mechanics, reduce fall risk and improve household and community mobility.       Written Home Exercises Provided: Patient instructed to cont prior HEP.  Exercises were reviewed and Rodney was able to demonstrate them prior to the end of the session.  Rodney demonstrated good  understanding of the education provided.     See EMR under Patient Instructions  for exercises provided 8/18/2020.    ASSESSMENT     Rodney is continuing to demonstrate improved strength in left shoulder and scapular stability with progression of strengthening exercises with proper form and scapular setting.  Pt will continue with current plan of care to improve AROM and strength of left shoulder and return to prior level of function.  She is able to progress with today's exercises with no increase in symptoms prior to leaving clinic.     Rodney is progressing well towards her goals.   Pt prognosis is Good.     Pt will continue to benefit from skilled outpatient physical therapy to address the deficits listed in the problem list box on initial evaluation, provide pt/family education and to maximize pt's level of independence in the home and community environment.     Pt's spiritual, cultural and educational needs considered and pt agreeable to plan of care and goals.     Anticipated barriers to physical therapy: co-morbidities    Goals:      Short Term Goals:  6 weeks     1. Pain: Pt will demonstrate improved pain by reports of less than or equal to 8/10 worst pain on the verbal rating scale in order to progress toward maximal functional ability and improve QOL.  MET   2. Function: Patient will demonstrate improved function as indicated by a functional limitation score of less than or equal to 49 out of 100 on FOTO.     3. Mobility: Patient will improve AROM to 50% of stated goals, listed in objective measures above, in order to progress towards independence with functional activities.      4. Strength: Patient will improve strength to 50% of stated goals, listed in objective measures above, in order to progress towards independence with functional activities.      5. Gait: Patient will demonstrate improved gait mechanics in order to improve functional mobility, improve quality of life, and decrease risk of further injury or fall.      6. HEP: Patient will demonstrate independence with HEP in  order to progress toward functional independence.     7. Improve postural awareness.            Long Term Goals:  12 weeks     1. Pain: Pt will demonstrate improved pain by reports of less than or equal to 6/10 worst pain on the verbal rating scale in order to progress toward maximal functional ability and improve QOL.    MET   2. Function: Patient will demonstrate improved function as indicated by a functional limitation score of less than or equal to 35 out of 100 on FOTO.     3. Mobility: Patient will improve AROM to stated goals, listed in objective measures above, in order to return to maximal functional potential and improve quality of life.     4. Strength: Patient will improve strength to stated goals, listed in objective measures above, in order to improve functional independence and quality of life.     5. Gait: Patient will demonstrate normalized gait mechanics with minimal compensation in order to return to PLOF.     6. Patient will return to normal ADL's, IADL's, community involvement, recreational activities, and work-related activities with less than or equal to 3/10 pain and maximal function.      7. Patient will be able to return to work without limitation.   MET          PLAN   Plan of care Certification: 8/18/2020 to 11/16/2020.    Continue Plan of Care (POC) as tolerated. Progress reverse codman's to standing with ball against wall next visit.     Douglas Todd, PT

## 2020-10-07 ENCOUNTER — TELEPHONE (OUTPATIENT)
Dept: RHEUMATOLOGY | Facility: CLINIC | Age: 40
End: 2020-10-07

## 2020-10-07 ENCOUNTER — CLINICAL SUPPORT (OUTPATIENT)
Dept: REHABILITATION | Facility: HOSPITAL | Age: 40
End: 2020-10-07
Payer: COMMERCIAL

## 2020-10-07 DIAGNOSIS — M25.512 CHRONIC LEFT SHOULDER PAIN: Primary | ICD-10-CM

## 2020-10-07 DIAGNOSIS — G89.29 CHRONIC LEFT SHOULDER PAIN: Primary | ICD-10-CM

## 2020-10-07 DIAGNOSIS — M13.80 SERONEGATIVE ARTHRITIS: ICD-10-CM

## 2020-10-07 PROCEDURE — 97110 THERAPEUTIC EXERCISES: CPT | Mod: PO,CQ

## 2020-10-07 RX ORDER — ADALIMUMAB 40MG/0.8ML
40 KIT SUBCUTANEOUS
Qty: 6 PEN | Refills: 2 | Status: SHIPPED | OUTPATIENT
Start: 2020-10-07 | End: 2020-10-13

## 2020-10-07 NOTE — PROGRESS NOTES
"  Physical Therapy Daily Treatment Note     Name: Rodney [facility] Number: [id number]    Therapy Diagnosis:   Encounter Diagnosis   Name Primary?    Chronic left shoulder pain Yes     Physician: Ken Alberto MD    Visit Date: 10/7/2020    Physician Orders: PT Eval and Treat  Medical Diagnosis from Referral: Tendinopathy   Evaluation Date: 8/18/2020  Authorization Period Expiration: 12/31/2020  Plan of Care Expiration: 11/16/2020  Visit # / Visits authorized: 7 / 20     Precautions: Standard and post op sx for hidradenitis under both arms on 12/24/2020     Time In: 5:45 pm  Time Out: 6:30 pm  Total Billable Time: 45 minutes     SUBJECTIVE     Today, pt reports: Increased soreness today  She was compliant with home exercise program.  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 DATE 11/16/2020 11/16/2020 11/16/2020 11/16/2020 11/16/2020   FACE-TO-FACE 10/28/2020 10/28/2020 10/28/2020 9/18/2020 9/18/2020   FOTO -- -- 5/5 4/5 3/5           UBE L3 x 2' ea L2.5 x 3' ea L2 3' ea 3' frwd/3'back    Pulleys (flex, scap) 2' ea 2' ea 3' ea 3' ea 3 min each   TABLE:        Wand flexion NT 2 x 10 x 2# bar NT 2 x 10 2 x 10   Wand ER -- ---  --- Not today   Side lying:  - Flexion  - Abduction D/C --- NT   2 x 10  2 x 10   1 x 10  1 x 10   Scapular protractions NT 3 x 10 x 3# NT 2 x 10 x 2# 2 x 10 x 1#   Reverse Codman's Ball on wall  20 x cw/ccw   500g G ball 30 x 3# ea  cw/ccw NT 30 x 2# ea  cw/ccw  20 x cw/ccw                   STANDING:        Doorway stretch 3 x 30" 3 x 30" 3 x 30" 3 x 30" 3 x 30"   IR stretch 3 x 20"       Rows 3 x 10 STB 3 x 10 STB 3 x 10 BTB 3 " x 10 BTB 3 x 10 BTB   Carlitos's NT 3 x 10 GTB 3 x 10 RTB 2 x 10 RTB 1 x 10 RTB   Theraband:  - W  - I  - T   NT  3 x 10 BTB  NT   2 x 10 RTB  2 x 10 BTB  2 x 10 RTB   2 x 10 RTB  2 x 10 GTB  2 x 10 RTB   2 x 10 YTB  2 x 10 GTB  2 x 10 YTB   --  2 x 10 RTB  --   Shldr AROM:  - Flexion  - Scaption  - Abduction thumb up   2 x 10 x 1#  2 x 10 x 1#  2 x 10 x 1#   2 x 10 x 1#  2 x 10 x 1#  2 x 10 x 1# To 90 deg  1 x 10 + 1 x 10 x 1#  2 x 10 x 1#  2 x 10 Next?    PNF D2 flexion 2 x 10 x 1# 2 x 10 x 1# Next Next?    Serratus wall slides Next?               Initials KASEY Stevens received the following manual therapy techniques: Myofacial release and Soft tissue Mobilization were applied to the: left shoulder for 0 minutes, including: NOT TODAY  - STM of left upper trapezius, pectoralis major, pectoralis minor, biceps brachii, deltoid and supraspinatus       Rodney received hot pack for 5 minutes to L shoulder.      Home Exercises Provided and Patient Education Provided     Education/Self-Care provided: (0 minutes)   Patient educated on biomechanical justification for therapeutic exercise and importance of compliance with HEP in order to improve overall impairments and QOL    Patient educated on postural awareness and the use of a lumbar roll when in a seated position to reduce stress and maintain optimal alignment of the spine.    Patient educated on proper ergonomics at the work station in order to maintain optimal alignment of the musculoskeletal system and improve efficiency in the work environment.   Patient educated on the importance of improved core and upper extremity strength in order to improve alignment of the spine and upper extremities with static positions and dynamic movement.    Patient educated on the importance of strong core and lower extremity musculature in order to improve both static and dynamic balance, improve gait mechanics, reduce fall risk and improve household and community  mobility.   - passive stretching techniques for posterior capsule stretching and IR stretching.     Written Home Exercises Provided: Patient instructed to cont prior HEP.  Exercises were reviewed and Rodney was able to demonstrate them prior to the end of the session.  Rodney demonstrated good  understanding of the education provided.     See EMR under Patient Instructions for exercises provided 8/18/2020.    ASSESSMENT     Rodney continues to progress well towards set goals, although today complained of increased muscle soreness from over-doing her exercises at home. Pt was able to complete most of routine today, although was adjusted due to increased soreness reported in anterior/middle deltoid and anterior shoulder. Pt was shown two stretches to begin at home for soreness relief. Pt had the most difficulty with PNF, although demo'd good technique with exercise.     Rodney is progressing well towards her goals.   Pt prognosis is Good.     Pt will continue to benefit from skilled outpatient physical therapy to address the deficits listed in the problem list box on initial evaluation, provide pt/family education and to maximize pt's level of independence in the home and community environment.     Pt's spiritual, cultural and educational needs considered and pt agreeable to plan of care and goals.     Anticipated barriers to physical therapy: co-morbidities    Goals:      Short Term Goals:  6 weeks     1. Pain: Pt will demonstrate improved pain by reports of less than or equal to 8/10 worst pain on the verbal rating scale in order to progress toward maximal functional ability and improve QOL.  MET   2. Function: Patient will demonstrate improved function as indicated by a functional limitation score of less than or equal to 49 out of 100 on FOTO.  MET   3. Mobility: Patient will improve AROM to 50% of stated goals, listed in objective measures above, in order to progress towards independence with functional  activities.      4. Strength: Patient will improve strength to 50% of stated goals, listed in objective measures above, in order to progress towards independence with functional activities.      5. Gait: Patient will demonstrate improved gait mechanics in order to improve functional mobility, improve quality of life, and decrease risk of further injury or fall.      6. HEP: Patient will demonstrate independence with HEP in order to progress toward functional independence.  MET   7. Improve postural awareness.            Long Term Goals:  12 weeks     1. Pain: Pt will demonstrate improved pain by reports of less than or equal to 6/10 worst pain on the verbal rating scale in order to progress toward maximal functional ability and improve QOL.    MET   2. Function: Patient will demonstrate improved function as indicated by a functional limitation score of less than or equal to 35 out of 100 on FOTO.     3. Mobility: Patient will improve AROM to stated goals, listed in objective measures above, in order to return to maximal functional potential and improve quality of life.     4. Strength: Patient will improve strength to stated goals, listed in objective measures above, in order to improve functional independence and quality of life.     5. Gait: Patient will demonstrate normalized gait mechanics with minimal compensation in order to return to PLOF.     6. Patient will return to normal ADL's, IADL's, community involvement, recreational activities, and work-related activities with less than or equal to 3/10 pain and maximal function.      7. Patient will be able to return to work without limitation.   MET          PLAN   Plan of care Certification: 8/18/2020 to 11/16/2020.    Continue Plan of Care (POC) as tolerated.     Zabrina Bloom, PTA 1/6

## 2020-10-13 ENCOUNTER — CLINICAL SUPPORT (OUTPATIENT)
Dept: REHABILITATION | Facility: HOSPITAL | Age: 40
End: 2020-10-13
Payer: COMMERCIAL

## 2020-10-13 DIAGNOSIS — M25.512 CHRONIC LEFT SHOULDER PAIN: ICD-10-CM

## 2020-10-13 DIAGNOSIS — G89.29 CHRONIC LEFT SHOULDER PAIN: ICD-10-CM

## 2020-10-13 PROCEDURE — 97110 THERAPEUTIC EXERCISES: CPT | Mod: PO

## 2020-10-13 RX ORDER — ADALIMUMAB 40MG/0.8ML
40 KIT SUBCUTANEOUS
Qty: 6 PEN | Refills: 2 | Status: SHIPPED | OUTPATIENT
Start: 2020-10-13 | End: 2021-04-16 | Stop reason: SDUPTHER

## 2020-10-13 NOTE — TELEPHONE ENCOUNTER
"----- Message from Loi Clarke sent at 10/13/2020 11:23 AM CDT -----  Regarding: Maddie Alberto and staff,    We received the prescription for Humira. Patients insurance requires the patient to fill Humira through [facility].    Please send prescription to [facility] - [address].      To complete this in EPIC  The original order MUST be discontinued and re-typed as a new prescription with the updated pharmacy listed.     I have added [facility] to the patients preferred pharmacies.       Uncheck the box that says "send to [facility]" AND Check box with [facility] pharmacy.    #please do not  click "reorder" -- as it will continue to route the rx to [facility] even if the pharmacy is changed.     Please opt the patient out of [facility] when the BPA is fired.     Please inform us if there is anything further we can do to assist.     Thank you,  Loi"

## 2020-10-13 NOTE — PROGRESS NOTES
"  Physical Therapy Daily Treatment Note     Name: Rodney Gonzalez [facility] Number: [id number]    Therapy Diagnosis:   Encounter Diagnosis   Name Primary?    Chronic left shoulder pain      Physician: Ken Alberto MD    Visit Date: 10/13/2020    Physician Orders: PT Eval and Treat  Medical Diagnosis from Referral: Tendinopathy   Evaluation Date: 8/18/2020  Authorization Period Expiration: 12/31/2020  Plan of Care Expiration: 11/16/2020  Visit # / Visits authorized: 8 / 20     Precautions: Standard and post op sx for hidradenitis under both arms on 12/24/2020     Time In: 9:00 am  Time Out: 9:45 am  Total Billable Time: 45 minutes     SUBJECTIVE     Today, pt reports: improved mobility and decreased tightness in shoulder at this time.  She continues to have difficulty with reaching overhead.  She was compliant with home exercise program.  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 DATE 11/16/2020 11/16/2020 11/16/2020 11/16/2020 11/16/2020 11/16/2020   FACE-TO-FACE 10/28/2020 10/28/2020 10/28/2020 10/28/2020 9/18/2020 9/18/2020   FOTO  -- -- 5/5 4/5 3/5            UBE L3 x 2' ea L3 x 2' ea L2.5 x 3' ea L2 3' ea 3' frwd/3'back    Pulleys (flex, scap) 2' ea 2' ea 2' ea 3' ea 3' ea 3 min each   TABLE:         Wand flexion NT NT 2 x 10 x 2# bar NT 2 x 10 2 x 10   Scapular protractions NT NT 3 x 10 x 3# NT 2 x 10 x 2# 2 x 10 x 1#   Reverse Codman's Ball on wall  30 x cw/ccw   500g G ball Ball on wall  20 x cw/ccw   500g G ball 30 x 3# ea  cw/ccw NT 30 x 2# ea  cw/ccw  20 x cw/ccw                     STANDING:         Doorway stretch NT 3 x 30" 3 x " "30" 3 x 30" 3 x 30" 3 x 30"   IR stretch NT 3 x 20"       Rows NT 3 x 10 STB 3 x 10 STB 3 x 10 BTB 3 x 10 BTB 3 x 10 BTB   [place]'s 3 x 10 BTB (ER/IR) NT 3 x 10 GTB 3 x 10 RTB 2 x 10 RTB 1 x 10 RTB   Theraband:  - W  - I  - T   2 x 10 GTB  2 x 10 BTB  2 x 10 GTB   NT  3 x 10 BTB  NT   2 x 10 RTB  2 x 10 BTB  2 x 10 RTB   2 x 10 RTB  2 x 10 GTB  2 x 10 RTB   2 x 10 YTB  2 x 10 GTB  2 x 10 YTB   --  2 x 10 RTB  --   Shldr AROM:  - Flexion  - Scaption  - Abduction thumb up   2 x 10 x 2#  2 x 10 x 2#  2 x 10 x 2#   2 x 10 x 1#  2 x 10 x 1#  2 x 10 x 1#   2 x 10 x 1#  2 x 10 x 1#  2 x 10 x 1# To 90 deg  1 x 10 + 1 x 10 x 1#  2 x 10 x 1#  2 x 10 Next?    PNF D2 flexion 2 x 10 x 2# 2 x 10 x 1# 2 x 10 x 1# Next Next?    Serratus wall slides 2 x 10 Next?       Overhead press 1 x 10 x 1#        Overhead arc on wall 1 x 10                 Initials MA KASEY Stevens received the following manual therapy techniques: Myofacial release and Soft tissue Mobilization were applied to the: left shoulder for 0 minutes, including: NOT TODAY  - STM of left upper trapezius, pectoralis major, pectoralis minor, biceps brachii, deltoid and supraspinatus       Rodney received hot pack for 5 minutes to L shoulder.      Home Exercises Provided and Patient Education Provided     Education/Self-Care provided: (0 minutes)   Patient educated on biomechanical justification for therapeutic exercise and importance of compliance with HEP in order to improve overall impairments and QOL    Patient educated on postural awareness and the use of a lumbar roll when in a seated position to reduce stress and maintain optimal alignment of the spine.    Patient educated on proper ergonomics at the work station in order to maintain optimal alignment of the musculoskeletal system and improve efficiency in the work environment.   Patient educated on the importance of improved core and upper extremity strength in order to improve alignment of the " spine and upper extremities with static positions and dynamic movement.    Patient educated on the importance of strong core and lower extremity musculature in order to improve both static and dynamic balance, improve gait mechanics, reduce fall risk and improve household and community mobility.   - passive stretching techniques for posterior capsule stretching and IR stretching.     Written Home Exercises Provided: Patient instructed to cont prior HEP.  Exercises were reviewed and Rodney was able to demonstrate them prior to the end of the session.  Rodney demonstrated good  understanding of the education provided.     See EMR under Patient Instructions for exercises provided 8/18/2020.    ASSESSMENT     Rodney continues to report difficulty with overhead activities and is progressed with overhead strengthening on this date.  She was instructed to continue with overhead press at home and above head wall arcs to improve strength and endurance with overhead activities.  Pt has no increase in symptoms prior to leaving clinic.     Rodney is progressing well towards her goals.   Pt prognosis is Good.     Pt will continue to benefit from skilled outpatient physical therapy to address the deficits listed in the problem list box on initial evaluation, provide pt/family education and to maximize pt's level of independence in the home and community environment.     Pt's spiritual, cultural and educational needs considered and pt agreeable to plan of care and goals.     Anticipated barriers to physical therapy: co-morbidities    Goals:      Short Term Goals:  6 weeks     1. Pain: Pt will demonstrate improved pain by reports of less than or equal to 8/10 worst pain on the verbal rating scale in order to progress toward maximal functional ability and improve QOL.  MET   2. Function: Patient will demonstrate improved function as indicated by a functional limitation score of less than or equal to 49 out of 100 on FOTO.  MET    3. Mobility: Patient will improve AROM to 50% of stated goals, listed in objective measures above, in order to progress towards independence with functional activities.      4. Strength: Patient will improve strength to 50% of stated goals, listed in objective measures above, in order to progress towards independence with functional activities.      5. Gait: Patient will demonstrate improved gait mechanics in order to improve functional mobility, improve quality of life, and decrease risk of further injury or fall.      6. HEP: Patient will demonstrate independence with HEP in order to progress toward functional independence.  MET   7. Improve postural awareness.            Long Term Goals:  12 weeks     1. Pain: Pt will demonstrate improved pain by reports of less than or equal to 6/10 worst pain on the verbal rating scale in order to progress toward maximal functional ability and improve QOL.    MET   2. Function: Patient will demonstrate improved function as indicated by a functional limitation score of less than or equal to 35 out of 100 on FOTO.     3. Mobility: Patient will improve AROM to stated goals, listed in objective measures above, in order to return to maximal functional potential and improve quality of life.     4. Strength: Patient will improve strength to stated goals, listed in objective measures above, in order to improve functional independence and quality of life.     5. Gait: Patient will demonstrate normalized gait mechanics with minimal compensation in order to return to PLOF.     6. Patient will return to normal ADL's, IADL's, community involvement, recreational activities, and work-related activities with less than or equal to 3/10 pain and maximal function.      7. Patient will be able to return to work without limitation.   MET          PLAN   Plan of care Certification: 8/18/2020 to 11/16/2020.    Continue Plan of Care (POC) as tolerated.     Douglas Todd, PT

## 2020-10-15 ENCOUNTER — HOSPITAL ENCOUNTER (OUTPATIENT)
Dept: WOUND CARE | Facility: HOSPITAL | Age: 40
Discharge: HOME | End: 2020-10-15
Attending: SURGERY
Payer: COMMERCIAL

## 2020-10-15 VITALS — TEMPERATURE: 98 F | SYSTOLIC BLOOD PRESSURE: 124 MMHG | DIASTOLIC BLOOD PRESSURE: 68 MMHG | HEART RATE: 74 BPM

## 2020-10-15 DIAGNOSIS — L73.2 LEFT AXILLARY HIDRADENITIS: ICD-10-CM

## 2020-10-15 DIAGNOSIS — L73.2 HIDRADENITIS SUPPURATIVA OF RIGHT AXILLA: Primary | ICD-10-CM

## 2020-10-15 PROCEDURE — 17250 CHEM CAUT OF GRANLTJ TISSUE: CPT

## 2020-10-15 NOTE — PROGRESS NOTES
"Subjective:       Patient ID: Rodney Infante is a 39 y.o. female.    Chief Complaint: Non-healing Wound (right and left axilla)    F/u for excision bilateral axillary hider adneitis    Client here today with open areas to right and left axilla. Seen by Dr. Pennington, who states sweat glands infected. Rx for clindamycin, bactroban, and tramadol provided. Stop methatrexate ( prescribed for arthritis). Client plans to have abdominal bypass surgery in September. Culture done today of right axilla.  8/7/19: F/U with Dr. Pennington. Culture results negative. Continue POC.  8/14/19: F/U with Dr. Pennington. Clindamycin d/c'd. Bactrim and Diflucan ordered today. Leave left and right axilla open to air. Culture sent to lab today.     9/4/19:  F/U with Dr. Pennington.  Wounds to right axilla healing up.  Patient states "right does not drain as much as left".  Patient feels the left axilla is not healing up like the right.  Patient still taking Bactrim PO.  Will continue for now.  9/18/19: F/U with Dr. Pennington. Both axilla continue to have "sebum - like" exudate. New site noted to left back. Culture done today. RX for lotrisone and Diflucan provided.  9/25/19: F/U with Dr. Pennington. Axillae improved. Client was not able to get Diflucan. Reordered today. Continues lotrisone and clindamycin. Culture results reviewed. Referred to ID for appt. On 10/7/19. Next visit with Dr. Pennington 10/16/19.  10/7/19: Vist today with ID. Augmentin ordered. Clindamycin d/c'd. Complete Diflucan.  11/6/19: Dr Pennington assessed patient.Wounds slowly improving. Patient states that she has had gastric sleeve procedure last week. No complaints voiced. Continuing with present plan of care. Follow up in 2 weeks.  11/20/19: F/U with Dr. Pennington. Wounds continue to improve. Continue POC. Return in 2 weeks.  12/4/19: F/U with Dr. Pennington. Axillae drainage decreased. Client has lost 97 Lbs. As of today. Surgery planned for 12/20/19. Next visit 12/18/19, and " consents to be signed.  12/18/19: F/U with Dr. Pennington. Surgery to axillae scheduled for Monday 12/23/19. Consents signed. Return 1/2/20.   12/26/19  F/U with Dr. pennington for s/p surgery to bilateral axilla.  Removal of glands and placement of theraskin.  Staples intact.  Theraskin not completely  Adhered on both areas.   Start bolster dressing to assist tin the adherence of the graft. Patient to return to clinic in 1 week.  Order homehealth for twice weekly    01/02/20: F/u with Dr. Pennington. Moderate to large amount of drainage from wounds. Staples and sutures removed from both wounds today in clinic per Dr. Pennington. New wound care orders given and routed to [facility]. Rx given for 5 mg norco every 4 hours. Patient given a note to not return to work until further notice.  1/9/20: F/U with Dr. Pennington. Removed 2 remaining staples. Discussed with patient future grafting of sites. Cont. POC. Return in 1 week.  1/16/20: F/U with Dr. Pennington. Client reports decreased drainage today. Rx for Clindamycin and Norco provided. Client prefers to wait a while before having grafting performed. Return in 1 week.  1/23/20: F/U with Dr. Pennington. Right axilla measurements improved. Continue Clindamycin. Return in 1 week.  2/6/20:Patient presents with a new wound to right lateral breast. Dr Pennington assessed patient. Continuing with same wound care orders. Rx for Norco 5/325mg given to patient for pain. F/U in 2 weeks.   2/20/20: F/U with Dr. Pennington. Sites continue to improve. Measurements decreased. Culture reviewed. Rx for Ampicillin provided. Client has taken PCN in past w/o reaction. Rx for Norco provided. Return in 2 weeks.  3/5/20: F/U with Dr. Pennington. Sites decreased in size. Cont. Ampicillin. Cont. POC. Return in 2 weeks.  3/19/20: F/U with Dr. Pennington. Sites improving. Cont. POC. Return in 2 weeks.  5/7/20: F/U with Dr. Pennington. Measurements improved all sites. Culture of left axilla done today. Cont. POC. Return in  2 weeks.  6/4/20: F/U with Dr. Pennington. All sites improved. Cont. Clindamycin. Return in 2 weeks.  6/18/20: F/U with Dr. Pennington. Measurements improved. Silver nitrate x 1 to right and left axilla hypergranulation tissue. Cont. POC. Next visit 7/2/20. Client will be out of time 7/6/20 - 7/11/20. Will notify [facility] [employer] [facility].  07/15/2020- f/u with Dr. Pennington. Pt presents with new abscess to upper right abd. Pt consented for OR this Friday 07/17/2020 for I&D to abscess. Rx for clindamycin sent to pharmacy. Dakins dc'd and xeroform started to wounds. Aquacel ag extra, mextra and large abd pad to right upper abd. Pt to f/u with Dr. Pennington in 1 week 07/23/2020.  7/23/20: F/U with Dr. Pennington. I&D of right abdomen abscess done 7/17/20 per Dr. Pennington. Site open with sutures. All previous sites improving. Iodoform gauze to sites needing packing. Rx for iodoform gauze and Vibra tabs provided today. Next visit 7/30/20.  7/30/20: F/U with Dr. Pennington. All sites improving. Sutures removed from right abdominal site per Dr. Pennington. Has excoriation under right breast. Rx for mycostatin powder provided. Cont. POC. Return 8/6/20.  08/13/2020- f/u with Dr. Pennington. Pt wounds improving. Silver nitrate x 2 to wounds for hypergranulation. Cont POC. F/u with Dr. Pennington in 2 weeks 08/27/2020  9/3/20: F/U with Dr. Pennington. All measurements improved. Breast site resolved. Wound care orders changed. Cont. Doxycycline and mycostatin powder. Next visit 9/17/20.  9/17/2020: Fu with Dr. Pennington. Wounds hypergranulated.  applied silver nitrate x 1 to each wound, patient tolerated well. Continued with current treatment plan. Fu 2 weeks 10/1/2020.  10/1/20: F/U with Dr. Pennington. Measurements improved. All sites with hypergranulation tissue. Lidocaine 4% to all sites prior to silver nitrate application. Continue POC. Next visit 10/15/20.  10/15/20: F/U with Dr. Pennington. Measurements decreased. Silver nitrate to all sites for  hypergranulation tissue. Cont. POC. Next visit 10/29/20.    Review of Systems   Constitutional: Negative.    HENT: Negative.    Eyes: Negative.    Respiratory: Negative.    Cardiovascular: Negative.    Gastrointestinal: Negative.    Genitourinary: Negative.    Musculoskeletal: Negative.    Skin: Negative.    Neurological: Negative.    Psychiatric/Behavioral: Negative.        Objective:      Physical Exam  Constitutional:       Appearance: She is well-developed.   HENT:      Head: Normocephalic.   Eyes:      Conjunctiva/sclera: Conjunctivae normal.      Pupils: Pupils are equal, round, and reactive to light.   Neck:      Musculoskeletal: Normal range of motion and neck supple.   Cardiovascular:      Rate and Rhythm: Normal rate and regular rhythm.      Heart sounds: Normal heart sounds.   Pulmonary:      Effort: Pulmonary effort is normal.      Breath sounds: Normal breath sounds.   Abdominal:      General: Bowel sounds are normal.      Palpations: Abdomen is soft.   Musculoskeletal: Normal range of motion.   Skin:     General: Skin is warm and dry.   Neurological:      Mental Status: She is alert and oriented to person, place, and time.      Deep Tendon Reflexes: Reflexes are normal and symmetric.         Assessment:       1. Hidradenitis suppurativa of right axilla    2. Left axillary hidradenitis           Wound 07/31/19 1300 Other (comment) Axilla #2 (Active)   07/31/19 1300    Pre-existing: Yes   Primary Wound Type: Other   Side: Left   Orientation:    Location: Axilla   Wound Number (optional): #2   Ankle-Brachial Index:    Pulses:    Removal Indication and Assessment:    Wound Outcome:    (Retired) Wound Type:    (Retired) Wound Length (cm):    (Retired) Wound Width (cm):    (Retired) Depth (cm):    Wound Description (Comments):    Removal Indications:    Dressing Appearance Moist drainage;Intact 10/15/20 1500   Drainage Amount Moderate 10/15/20 1500   Drainage Characteristics/Odor Serosanguineous 10/15/20  1500   Appearance Red;Moist;Hypergranulation 10/15/20 1500   Tissue loss description Full thickness 10/15/20 1500   Red (%), Wound Tissue Color 100 % 10/15/20 1500   Periwound Area Intact;Dry 10/15/20 1500   Wound Edges Irregular 10/15/20 1500   Wound Length (cm) 2.5 cm 10/15/20 1500   Wound Width (cm) 0.5 cm 10/15/20 1500   Wound Depth (cm) 0.1 cm 10/15/20 1500   Wound Volume (cm^3) 0.12 cm^3 10/15/20 1500   Wound Surface Area (cm^2) 1.25 cm^2 10/15/20 1500   Care Cleansed with:;Sterile normal saline 10/15/20 1500   Dressing Non-adherent;Island/border 10/15/20 1500   Periwound Care Skin barrier film applied 10/15/20 1500   Dressing Change Due 10/16/20 10/15/20 1500            Wound 07/31/19 1300 Other (comment) Axilla #1 (Active)   07/31/19 1300    Pre-existing: Yes   Primary Wound Type: Other   Side: Right   Orientation:    Location: Axilla   Wound Number (optional): #1   Ankle-Brachial Index:    Pulses:    Removal Indication and Assessment:    Wound Outcome:    (Retired) Wound Type:    (Retired) Wound Length (cm):    (Retired) Wound Width (cm):    (Retired) Depth (cm):    Wound Description (Comments):    Removal Indications:    Wound Image   10/15/20 1500   Dressing Appearance Intact;Moist drainage 10/15/20 1500   Drainage Amount Moderate 10/15/20 1500   Drainage Characteristics/Odor Serosanguineous 10/15/20 1500   Appearance Red;Moist;Hypergranulation 10/15/20 1500   Tissue loss description Full thickness 10/15/20 1500   Red (%), Wound Tissue Color 100 % 10/15/20 1500   Periwound Area Dry;Intact 10/15/20 1500   Wound Edges Irregular 10/15/20 1500   Wound Length (cm) 3 cm 10/15/20 1500   Wound Width (cm) 0.5 cm 10/15/20 1500   Wound Depth (cm) 0.1 cm 10/15/20 1500   Wound Volume (cm^3) 0.15 cm^3 10/15/20 1500   Wound Surface Area (cm^2) 1.5 cm^2 10/15/20 1500   Care Cleansed with:;Sterile normal saline 10/15/20 1500   Dressing Island/border;Non-adherent 10/15/20 1500   Periwound Care Dry periwound area maintained  10/15/20 1500   Dressing Change Due 10/16/20 10/15/20 1500            Wound 07/15/20 1600 Abscess Right upper Abdomen #4 (Active)   07/15/20 1600    Pre-existing: Yes   Primary Wound Type: Abscess   Side: Right   Orientation: upper   Location: Abdomen   Wound Number (optional): #4   Ankle-Brachial Index:    Pulses:    Removal Indication and Assessment:    Wound Outcome:    (Retired) Wound Type:    (Retired) Wound Length (cm):    (Retired) Wound Width (cm):    (Retired) Depth (cm):    Wound Description (Comments):    Removal Indications:    Wound Image   10/15/20 1500   Dressing Appearance Intact;Moist drainage 10/15/20 1500   Drainage Amount Moderate 10/15/20 1500   Drainage Characteristics/Odor Serosanguineous 10/15/20 1500   Appearance Red;Moist;Hypergranulation 10/15/20 1500   Tissue loss description Partial thickness 10/15/20 1500   Red (%), Wound Tissue Color 100 % 10/15/20 1500   Periwound Area Dry;Intact 10/15/20 1500   Wound Edges Irregular 10/15/20 1500   Wound Length (cm) 3.5 cm 10/15/20 1500   Wound Width (cm) 2.5 cm 10/15/20 1500   Wound Depth (cm) 0.1 cm 10/15/20 1500   Wound Volume (cm^3) 0.88 cm^3 10/15/20 1500   Wound Surface Area (cm^2) 8.75 cm^2 10/15/20 1500   Care Cleansed with:;Sterile normal saline 10/15/20 1500   Dressing Non-adherent;Island/border 10/15/20 1500   Periwound Care Dry periwound area maintained 10/15/20 1500   Dressing Change Due 10/16/20 10/15/20 1500            Wound 09/17/20 1031 Right posterior Back #5 (Active)   09/17/20 1031    Pre-existing: Yes   Primary Wound Type:    Side: Right   Orientation: posterior   Location: Back   Wound Number (optional): #5   Ankle-Brachial Index:    Pulses:    Removal Indication and Assessment:    Wound Outcome:    (Retired) Wound Type:    (Retired) Wound Length (cm):    (Retired) Wound Width (cm):    (Retired) Depth (cm):    Wound Description (Comments):    Removal Indications:    Wound Image    10/15/20 1500   Dressing Appearance Intact;Moist  drainage 10/15/20 1500   Drainage Amount Moderate 10/15/20 1500   Drainage Characteristics/Odor Serosanguineous 10/15/20 1500   Appearance Red;Moist;Hypergranulation 10/15/20 1500   Tissue loss description Partial thickness 10/15/20 1500   Red (%), Wound Tissue Color 100 % 10/15/20 1500   Periwound Area Intact;Dry 10/15/20 1500   Wound Edges Irregular 10/15/20 1500   Wound Length (cm) 2.5 cm 10/15/20 1500   Wound Width (cm) 1 cm 10/15/20 1500   Wound Depth (cm) 0.1 cm 10/15/20 1500   Wound Volume (cm^3) 0.25 cm^3 10/15/20 1500   Wound Surface Area (cm^2) 2.5 cm^2 10/15/20 1500   Care Cleansed with:;Sterile normal saline 10/15/20 1500   Dressing Non-adherent;Island/border 10/15/20 1500   Periwound Care Dry periwound area maintained 10/15/20 1500   Dressing Change Due 10/16/20 10/15/20 1500            Wound 10/15/20 1030 Abscess Right lower Abdomen (Active)   10/15/20 1030    Pre-existing: Yes   Primary Wound Type: Abscess   Side: Right   Orientation: lower   Location: Abdomen   Wound Number (optional):    Ankle-Brachial Index:    Pulses:    Removal Indication and Assessment:    Wound Outcome:    (Retired) Wound Type:    (Retired) Wound Length (cm):    (Retired) Wound Width (cm):    (Retired) Depth (cm):    Wound Description (Comments):    Removal Indications:    Wound Image   10/15/20 1500   Dressing Appearance Intact;Moist drainage 10/15/20 1500   Drainage Amount Moderate 10/15/20 1500   Drainage Characteristics/Odor Serosanguineous 10/15/20 1500   Appearance Red;Moist;Hypergranulation 10/15/20 1500   Tissue loss description Partial thickness 10/15/20 1500   Red (%), Wound Tissue Color 100 % 10/15/20 1500   Periwound Area Intact;Dry 10/15/20 1500   Wound Edges Irregular 10/15/20 1500   Wound Length (cm) 2 cm 10/15/20 1500   Wound Width (cm) 1.7 cm 10/15/20 1500   Wound Depth (cm) 0.1 cm 10/15/20 1500   Wound Volume (cm^3) 0.34 cm^3 10/15/20 1500   Wound Surface Area (cm^2) 3.4 cm^2 10/15/20 1500   Care Cleansed  with:;Sterile normal saline 10/15/20 1500   Dressing Island/border;Non-adherent 10/15/20 1500   Periwound Care Skin barrier film applied 10/15/20 1500   Dressing Change Due 10/16/20 10/15/20 1500       Left and Right Axilla   Cleanse with: Rinse with normal saline   Primary dressing:  xeroform to right and left   Secondary dressing: cover with 4x4 gauze and small abd pad, secure with mefix tape   Frequency: daily     Right upper abd, right lower abdomen,and right back:  Cleanse with: Rinse with normal saline   Primary dressing: xeroform   Secondary dressing: gauze, 4 x 4 mepore dressing  Frequency: daily       Follow up with Dr. Pennington in 2 weeks on Thursday 10/29/2020     Other orders: Continue Vibra tabs. Mycostatin powder daily under right breast.       Home Health: [facility]:  Fax # [phone].  Skilled nurse to perform dressing changes every Monday, Wednesday except when in clinic. Patient will do own wound care of Fridays.  Please provide patient's caregiver with necessary supplies to do wound care: small abd pads, 4x4 gauze, mefix tape.     Plan:                10/29/2020 Dr. Pennington

## 2020-10-20 ENCOUNTER — CLINICAL SUPPORT (OUTPATIENT)
Dept: REHABILITATION | Facility: HOSPITAL | Age: 40
End: 2020-10-20
Payer: COMMERCIAL

## 2020-10-20 DIAGNOSIS — M25.512 CHRONIC LEFT SHOULDER PAIN: ICD-10-CM

## 2020-10-20 DIAGNOSIS — G89.29 CHRONIC LEFT SHOULDER PAIN: ICD-10-CM

## 2020-10-20 PROCEDURE — 97110 THERAPEUTIC EXERCISES: CPT | Mod: PO

## 2020-10-20 NOTE — PROGRESS NOTES
Physical Therapy Daily Treatment Note     Name: Rodney Gonzalez [facility] Number: [id number]    Therapy Diagnosis:   Encounter Diagnosis   Name Primary?    Chronic left shoulder pain Yes     Physician: Ken Alberto MD    Visit Date: 10/21/2020    Physician Orders: PT Eval and Treat  Medical Diagnosis from Referral: Tendinopathy   Evaluation Date: 8/18/2020  Authorization Period Expiration: 12/31/2020  Plan of Care Expiration: 11/16/2020  Visit # / Visits authorized: 10 / 20     Precautions: Standard and post op sx for hidradenitis under both arms on 12/24/2020     Time In: 9:07 am  Time Out: 9:47 am  Total Billable Time: 40 minutes     SUBJECTIVE     Today, pt reports: continued tightness and difficulty with reaching overhead.  She was compliant with home exercise program.  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 DATE 11/16/2020 11/16/2020 11/16/2020 11/16/2020 11/16/2020 11/16/2020 11/16/2020 11/16/2020   FACE-TO-FACE 10/28/2020 10/28/2020 10/28/2020 10/28/2020 10/28/2020 10/28/2020 9/18/2020 9/18/2020   FOTO -- --  -- -- 5/5 4/5 3/5              UBE   L3 x 2' ea L3 x 2' ea L2.5 x 3' ea L2 3' ea 3' frwd/3'back    Pulleys (flex, scap) 2' ea 2' ea 2' ea 2' ea 2' ea 3' ea 3' ea 3 min each   TABLE:           Wand flexion NT  NT NT 2 x 10 x 2# bar NT 2 x 10 2 x 10   Scapular protractions NT  NT NT 3 x 10 x 3# NT 2 x 10 x 2# 2 x 10 x 1#   Reverse Codman's Ball on wall  30 x cw/ccw   1 kg G ball Ball on wall  30 x cw/ccw   500g G ball Ball on wall  30 x cw/ccw   500g G ball Ball on wall  20 x cw/ccw   500g G  "ball 30 x 3# ea  cw/ccw NT 30 x 2# ea  cw/ccw  20 x cw/ccw                         STANDING:           Doorway stretch --  NT 3 x 30" 3 x 30" 3 x 30" 3 x 30" 3 x 30"   IR stretch --  NT 3 x 20"       Rows --  NT 3 x 10 STB 3 x 10 STB 3 x 10 BTB 3 x 10 BTB 3 x 10 BTB   ER at 90 deg abduction 2 x 10 YTB 2 x 10 YTB         Carlitos's 3 x 10 BTB (ER/IR) 3 x 10 BTB (ER/IR) 3 x 10 BTB (ER/IR) NT 3 x 10 GTB 3 x 10 RTB 2 x 10 RTB 1 x 10 RTB   Theraband:  - W  - I  - T   2 x 10 GTB  3 x 10 BTB  3 x 10 GTB   2 x 10 GTB  3 x 10 BTB  3 x 10 GTB   2 x 10 GTB  2 x 10 BTB  2 x 10 GTB   NT  3 x 10 BTB  NT   2 x 10 RTB  2 x 10 BTB  2 x 10 RTB   2 x 10 RTB  2 x 10 GTB  2 x 10 RTB   2 x 10 YTB  2 x 10 GTB  2 x 10 YTB   --  2 x 10 RTB  --   Shldr AROM:  - Flexion  - Scaption  - Abduction thumb up   3 x 10 x 2#  3 x 10 x 2#  3 x 10 x 2#   2 x 10 x 2#  2 x 10 x 2#  2 x 10 x 2#   2 x 10 x 2#  2 x 10 x 2#  2 x 10 x 2#   2 x 10 x 1#  2 x 10 x 1#  2 x 10 x 1#   2 x 10 x 1#  2 x 10 x 1#  2 x 10 x 1# To 90 deg  1 x 10 + 1 x 10 x 1#  2 x 10 x 1#  2 x 10 Next?    PNF D2 flexion 2 x 10 x 1# 2 x 10 x 1# 2 x 10 x 2# 2 x 10 x 1# 2 x 10 x 1# Next Next?    Serratus wall slides 2 x 10 YTB 2 x 10 2 x 10 Next?       Overhead press 2 x 10 x 1# 2 x 10 x 1# 1 x 10 x 1#        Overhead arc on wall 1 x 10 1 x 10 1 x 10        Overhead dribble  Next?                      Initials KASEY Stevens received the following manual therapy techniques: Myofacial release and Soft tissue Mobilization were applied to the: left shoulder for 0 minutes, including: NOT TODAY  - STM of left upper trapezius, pectoralis major, pectoralis minor, biceps brachii, deltoid and supraspinatus       Rodney received hot pack for 5 minutes to L shoulder.       Home Exercises Provided and Patient Education Provided     Education/Self-Care provided: (0 minutes)   Patient educated on biomechanical justification for therapeutic exercise and importance of compliance with " HEP in order to improve overall impairments and QOL    Patient educated on postural awareness and the use of a lumbar roll when in a seated position to reduce stress and maintain optimal alignment of the spine.    Patient educated on proper ergonomics at the work station in order to maintain optimal alignment of the musculoskeletal system and improve efficiency in the work environment.   Patient educated on the importance of improved core and upper extremity strength in order to improve alignment of the spine and upper extremities with static positions and dynamic movement.    Patient educated on the importance of strong core and lower extremity musculature in order to improve both static and dynamic balance, improve gait mechanics, reduce fall risk and improve household and community mobility.   - passive stretching techniques for posterior capsule stretching and IR stretching.     Written Home Exercises Provided: Patient instructed to cont prior HEP.  Exercises were reviewed and Rodney was able to demonstrate them prior to the end of the session.  Rodney demonstrated good  understanding of the education provided.     See EMR under Patient Instructions for exercises provided 8/18/2020.    ASSESSMENT     Rodney continues to have complaints of weakness in left shoulder during overhead activities as well as fatigue in both shoulders during prolonged walking. Pt continues to progress with overhead activities and demo'd decreased fatigue at end of exercises. Pt was able to complete full routine and progressions listed above without increase in symptoms or pain reported prior to leaving the clinic.     Rodney is progressing well towards her goals.   Pt prognosis is Good.     Pt will continue to benefit from skilled outpatient physical therapy to address the deficits listed in the problem list box on initial evaluation, provide pt/family education and to maximize pt's level of independence in the home and community  environment.     Pt's spiritual, cultural and educational needs considered and pt agreeable to plan of care and goals.     Anticipated barriers to physical therapy: co-morbidities    Goals:      Short Term Goals:  6 weeks     1. Pain: Pt will demonstrate improved pain by reports of less than or equal to 8/10 worst pain on the verbal rating scale in order to progress toward maximal functional ability and improve QOL.  MET   2. Function: Patient will demonstrate improved function as indicated by a functional limitation score of less than or equal to 49 out of 100 on FOTO.  MET   3. Mobility: Patient will improve AROM to 50% of stated goals, listed in objective measures above, in order to progress towards independence with functional activities.      4. Strength: Patient will improve strength to 50% of stated goals, listed in objective measures above, in order to progress towards independence with functional activities.      5. Gait: Patient will demonstrate improved gait mechanics in order to improve functional mobility, improve quality of life, and decrease risk of further injury or fall.      6. HEP: Patient will demonstrate independence with HEP in order to progress toward functional independence.  MET   7. Improve postural awareness.            Long Term Goals:  12 weeks     1. Pain: Pt will demonstrate improved pain by reports of less than or equal to 6/10 worst pain on the verbal rating scale in order to progress toward maximal functional ability and improve QOL.    MET   2. Function: Patient will demonstrate improved function as indicated by a functional limitation score of less than or equal to 35 out of 100 on FOTO.     3. Mobility: Patient will improve AROM to stated goals, listed in objective measures above, in order to return to maximal functional potential and improve quality of life.     4. Strength: Patient will improve strength to stated goals, listed in objective measures above, in order to improve  functional independence and quality of life.     5. Gait: Patient will demonstrate normalized gait mechanics with minimal compensation in order to return to PLOF.     6. Patient will return to normal ADL's, IADL's, community involvement, recreational activities, and work-related activities with less than or equal to 3/10 pain and maximal function.      7. Patient will be able to return to work without limitation.   MET          PLAN   Plan of care Certification: 8/18/2020 to 11/16/2020.    Continue Plan of Care (POC) as tolerated. Increase overhead arc reps and attempt overhead dribble.     Zabrina Bloom, PTA 1/6

## 2020-10-20 NOTE — PROGRESS NOTES
Physical Therapy Daily Treatment Note     Name: Rodney Gonzalez [facility] Number: [id number]    Therapy Diagnosis:   Encounter Diagnosis   Name Primary?    Chronic left shoulder pain      Physician: Ken Alberto MD    Visit Date: 10/20/2020    Physician Orders: PT Eval and Treat  Medical Diagnosis from Referral: Tendinopathy   Evaluation Date: 8/18/2020  Authorization Period Expiration: 12/31/2020  Plan of Care Expiration: 11/16/2020  Visit # / Visits authorized: 9 / 20     Precautions: Standard and post op sx for hidradenitis under both arms on 12/24/2020     Time In: 9:00 am  Time Out: 9:45 am  Total Billable Time: 45 minutes     SUBJECTIVE     Today, pt reports: continued tightness and difficulty with reaching overhead.  She was compliant with home exercise program.  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 DATE 11/16/2020 11/16/2020 11/16/2020 11/16/2020 11/16/2020 11/16/2020 11/16/2020   FACE-TO-FACE 10/28/2020 10/28/2020 10/28/2020 10/28/2020 10/28/2020 9/18/2020 9/18/2020   FOTO --  -- -- 5/5 4/5 3/5             UBE  L3 x 2' ea L3 x 2' ea L2.5 x 3' ea L2 3' ea 3' frwd/3'back    Pulleys (flex, scap) 2' ea 2' ea 2' ea 2' ea 3' ea 3' ea 3 min each   TABLE:          Wand flexion  NT NT 2 x 10 x 2# bar NT 2 x 10 2 x 10   Scapular protractions  NT NT 3 x 10 x 3# NT 2 x 10 x 2# 2 x 10 x 1#   Reverse Codman's Ball on wall  30 x cw/ccw   500g G ball Ball on wall  30 x cw/ccw   500g G ball Ball on wall  20 x cw/ccw   500g G ball 30 x 3# ea  cw/ccw NT 30 x 2# ea  cw/ccw  20 x cw/ccw                       STANDING:         "  Doorway stretch  NT 3 x 30" 3 x 30" 3 x 30" 3 x 30" 3 x 30"   IR stretch  NT 3 x 20"       Rows  NT 3 x 10 STB 3 x 10 STB 3 x 10 BTB 3 x 10 BTB 3 x 10 BTB   ER at 90 deg abduction 2 x 10 YTB         Carlitos's 3 x 10 BTB (ER/IR) 3 x 10 BTB (ER/IR) NT 3 x 10 GTB 3 x 10 RTB 2 x 10 RTB 1 x 10 RTB   Theraband:  - W  - I  - T   2 x 10 GTB  3 x 10 BTB  3 x 10 GTB   2 x 10 GTB  2 x 10 BTB  2 x 10 GTB   NT  3 x 10 BTB  NT   2 x 10 RTB  2 x 10 BTB  2 x 10 RTB   2 x 10 RTB  2 x 10 GTB  2 x 10 RTB   2 x 10 YTB  2 x 10 GTB  2 x 10 YTB   --  2 x 10 RTB  --   Shldr AROM:  - Flexion  - Scaption  - Abduction thumb up   2 x 10 x 2#  2 x 10 x 2#  2 x 10 x 2#   2 x 10 x 2#  2 x 10 x 2#  2 x 10 x 2#   2 x 10 x 1#  2 x 10 x 1#  2 x 10 x 1#   2 x 10 x 1#  2 x 10 x 1#  2 x 10 x 1# To 90 deg  1 x 10 + 1 x 10 x 1#  2 x 10 x 1#  2 x 10 Next?    PNF D2 flexion 2 x 10 x 1# 2 x 10 x 2# 2 x 10 x 1# 2 x 10 x 1# Next Next?    Serratus wall slides 2 x 10 2 x 10 Next?       Overhead press 2 x 10 x 1# 1 x 10 x 1#        Overhead arc on wall  1 x 10                  Initials MO Stevens received the following manual therapy techniques: Myofacial release and Soft tissue Mobilization were applied to the: left shoulder for 0 minutes, including: NOT TODAY  - STM of left upper trapezius, pectoralis major, pectoralis minor, biceps brachii, deltoid and supraspinatus       Rodney received hot pack for 5 minutes to L shoulder.      Home Exercises Provided and Patient Education Provided     Education/Self-Care provided: (0 minutes)   Patient educated on biomechanical justification for therapeutic exercise and importance of compliance with HEP in order to improve overall impairments and QOL    Patient educated on postural awareness and the use of a lumbar roll when in a seated position to reduce stress and maintain optimal alignment of the spine.    Patient educated on proper ergonomics at the work station in order to maintain optimal " alignment of the musculoskeletal system and improve efficiency in the work environment.   Patient educated on the importance of improved core and upper extremity strength in order to improve alignment of the spine and upper extremities with static positions and dynamic movement.    Patient educated on the importance of strong core and lower extremity musculature in order to improve both static and dynamic balance, improve gait mechanics, reduce fall risk and improve household and community mobility.   - passive stretching techniques for posterior capsule stretching and IR stretching.     Written Home Exercises Provided: Patient instructed to cont prior HEP.  Exercises were reviewed and Rodney was able to demonstrate them prior to the end of the session.  Rodney demonstrated good  understanding of the education provided.     See EMR under Patient Instructions for exercises provided 8/18/2020.    ASSESSMENT     Rodney continues to have complaints of weakness in left shoulder during overhead activities as well as fatigue in both shoulders during prolonged walking.  Pt is progressed with overhead strengthening with addition of ER at 90deg abduction, progression of scapular stabilization exercises, and AROM exercises.  She will continue with current plan of care to improve strength and stability of shoulder and improve tolerance to overhead activities.      Rodney is progressing well towards her goals.   Pt prognosis is Good.     Pt will continue to benefit from skilled outpatient physical therapy to address the deficits listed in the problem list box on initial evaluation, provide pt/family education and to maximize pt's level of independence in the home and community environment.     Pt's spiritual, cultural and educational needs considered and pt agreeable to plan of care and goals.     Anticipated barriers to physical therapy: co-morbidities    Goals:      Short Term Goals:  6 weeks     1. Pain: Pt will demonstrate  improved pain by reports of less than or equal to 8/10 worst pain on the verbal rating scale in order to progress toward maximal functional ability and improve QOL.  MET   2. Function: Patient will demonstrate improved function as indicated by a functional limitation score of less than or equal to 49 out of 100 on FOTO.  MET   3. Mobility: Patient will improve AROM to 50% of stated goals, listed in objective measures above, in order to progress towards independence with functional activities.      4. Strength: Patient will improve strength to 50% of stated goals, listed in objective measures above, in order to progress towards independence with functional activities.      5. Gait: Patient will demonstrate improved gait mechanics in order to improve functional mobility, improve quality of life, and decrease risk of further injury or fall.      6. HEP: Patient will demonstrate independence with HEP in order to progress toward functional independence.  MET   7. Improve postural awareness.            Long Term Goals:  12 weeks     1. Pain: Pt will demonstrate improved pain by reports of less than or equal to 6/10 worst pain on the verbal rating scale in order to progress toward maximal functional ability and improve QOL.    MET   2. Function: Patient will demonstrate improved function as indicated by a functional limitation score of less than or equal to 35 out of 100 on FOTO.     3. Mobility: Patient will improve AROM to stated goals, listed in objective measures above, in order to return to maximal functional potential and improve quality of life.     4. Strength: Patient will improve strength to stated goals, listed in objective measures above, in order to improve functional independence and quality of life.     5. Gait: Patient will demonstrate normalized gait mechanics with minimal compensation in order to return to PLOF.     6. Patient will return to normal ADL's, IADL's, community involvement, recreational  activities, and work-related activities with less than or equal to 3/10 pain and maximal function.      7. Patient will be able to return to work without limitation.   MET          PLAN   Plan of care Certification: 8/18/2020 to 11/16/2020.    Continue Plan of Care (POC) as tolerated.     Douglas Tdod, PT

## 2020-10-21 ENCOUNTER — CLINICAL SUPPORT (OUTPATIENT)
Dept: REHABILITATION | Facility: HOSPITAL | Age: 40
End: 2020-10-21
Payer: COMMERCIAL

## 2020-10-21 DIAGNOSIS — M25.512 CHRONIC LEFT SHOULDER PAIN: Primary | ICD-10-CM

## 2020-10-21 DIAGNOSIS — G89.29 CHRONIC LEFT SHOULDER PAIN: Primary | ICD-10-CM

## 2020-10-21 PROCEDURE — 97110 THERAPEUTIC EXERCISES: CPT | Mod: PO,CQ

## 2020-10-23 ENCOUNTER — TELEPHONE (OUTPATIENT)
Dept: RHEUMATOLOGY | Facility: CLINIC | Age: 40
End: 2020-10-23

## 2020-10-23 NOTE — TELEPHONE ENCOUNTER
----- Message from Alexpatrick  sent at 10/23/2020  1:20 PM CDT -----  Type:  Pharmacy Calling to Clarify an RX    Name of Caller: Arlene   Pharmacy Name: [facility]   Prescription Name:adalimumab (HUMIRA PEN) 40 mg/0.8 mL PnKt  What do they need to clarify?:Pt level authorization  Best Call Back Number: [phone]   Additional Information:   Caller states to reference Invoice number [id number]

## 2020-10-27 ENCOUNTER — DOCUMENTATION ONLY (OUTPATIENT)
Dept: RHEUMATOLOGY | Facility: CLINIC | Age: 40
End: 2020-10-27

## 2020-10-27 NOTE — PROGRESS NOTES
Humira-Pa not required.     Response from Cover my meds  CaseId:[id number];Status:Cancelled;Explanation:PA not Required.The Prescribed limit is below the plan limit;

## 2020-10-29 ENCOUNTER — HOSPITAL ENCOUNTER (OUTPATIENT)
Dept: WOUND CARE | Facility: HOSPITAL | Age: 40
Discharge: HOME | End: 2020-10-29
Attending: SURGERY
Payer: COMMERCIAL

## 2020-10-29 VITALS
HEIGHT: 65 IN | TEMPERATURE: 98 F | SYSTOLIC BLOOD PRESSURE: 119 MMHG | BODY MASS INDEX: 43.82 KG/M2 | DIASTOLIC BLOOD PRESSURE: 80 MMHG | WEIGHT: 263 LBS | HEART RATE: 95 BPM

## 2020-10-29 DIAGNOSIS — E11.622 DIABETES MELLITUS WITH SKIN ULCER: ICD-10-CM

## 2020-10-29 DIAGNOSIS — Z98.84 S/P BARIATRIC SURGERY: ICD-10-CM

## 2020-10-29 DIAGNOSIS — L98.499 DIABETES MELLITUS WITH SKIN ULCER: ICD-10-CM

## 2020-10-29 DIAGNOSIS — L73.2 LEFT AXILLARY HIDRADENITIS: ICD-10-CM

## 2020-10-29 DIAGNOSIS — B96.89 BACTERIAL SKIN INFECTION: ICD-10-CM

## 2020-10-29 DIAGNOSIS — E66.01 MORBID OBESITY WITH BMI OF 40.0-44.9, ADULT: ICD-10-CM

## 2020-10-29 DIAGNOSIS — L02.213 ABSCESS OF CHEST WALL: ICD-10-CM

## 2020-10-29 DIAGNOSIS — L73.2 HIDRADENITIS SUPPURATIVA OF RIGHT AXILLA: Primary | ICD-10-CM

## 2020-10-29 DIAGNOSIS — L08.9 BACTERIAL SKIN INFECTION: ICD-10-CM

## 2020-10-29 PROCEDURE — 87070 CULTURE OTHR SPECIMN AEROBIC: CPT

## 2020-10-29 PROCEDURE — 87076 CULTURE ANAEROBE IDENT EACH: CPT

## 2020-10-29 PROCEDURE — 97597 DBRDMT OPN WND 1ST 20 CM/<: CPT

## 2020-10-29 PROCEDURE — 17250 CHEM CAUT OF GRANLTJ TISSUE: CPT

## 2020-10-29 PROCEDURE — 87186 SC STD MICRODIL/AGAR DIL: CPT

## 2020-10-29 PROCEDURE — 87075 CULTR BACTERIA EXCEPT BLOOD: CPT

## 2020-10-29 PROCEDURE — 87077 CULTURE AEROBIC IDENTIFY: CPT

## 2020-10-29 NOTE — PROGRESS NOTES
"Subjective:       Patient ID: Rodney Infante is a 40 y.o. female.    Chief Complaint: Non-healing Wound (right and left axilla)    F/u for excision bilateral axillary hider adneitis    Client here today with open areas to right and left axilla. Seen by Dr. Pennington, who states sweat glands infected. Rx for clindamycin, bactroban, and tramadol provided. Stop methatrexate ( prescribed for arthritis). Client plans to have abdominal bypass surgery in September. Culture done today of right axilla.  8/7/19: F/U with Dr. Pennington. Culture results negative. Continue POC.  8/14/19: F/U with Dr. Pennington. Clindamycin d/c'd. Bactrim and Diflucan ordered today. Leave left and right axilla open to air. Culture sent to lab today.     9/4/19:  F/U with Dr. Pennington.  Wounds to right axilla healing up.  Patient states "right does not drain as much as left".  Patient feels the left axilla is not healing up like the right.  Patient still taking Bactrim PO.  Will continue for now.  9/18/19: F/U with Dr. Pennington. Both axilla continue to have "sebum - like" exudate. New site noted to left back. Culture done today. RX for lotrisone and Diflucan provided.  9/25/19: F/U with Dr. Pennington. Axillae improved. Client was not able to get Diflucan. Reordered today. Continues lotrisone and clindamycin. Culture results reviewed. Referred to ID for appt. On 10/7/19. Next visit with Dr. Pennington 10/16/19.  10/7/19: Vist today with ID. Augmentin ordered. Clindamycin d/c'd. Complete Diflucan.  11/6/19: Dr Pennington assessed patient.Wounds slowly improving. Patient states that she has had gastric sleeve procedure last week. No complaints voiced. Continuing with present plan of care. Follow up in 2 weeks.  11/20/19: F/U with Dr. Pennington. Wounds continue to improve. Continue POC. Return in 2 weeks.  12/4/19: F/U with Dr. Pennington. Axillae drainage decreased. Client has lost 97 Lbs. As of today. Surgery planned for 12/20/19. Next visit 12/18/19, and " consents to be signed.  12/18/19: F/U with Dr. Pennington. Surgery to axillae scheduled for Monday 12/23/19. Consents signed. Return 1/2/20.   12/26/19  F/U with Dr. pennington for s/p surgery to bilateral axilla.  Removal of glands and placement of theraskin.  Staples intact.  Theraskin not completely  Adhered on both areas.   Start bolster dressing to assist tin the adherence of the graft. Patient to return to clinic in 1 week.  Order homehealth for twice weekly    01/02/20: F/u with Dr. Pennington. Moderate to large amount of drainage from wounds. Staples and sutures removed from both wounds today in clinic per Dr. Pennington. New wound care orders given and routed to [facility]. Rx given for 5 mg norco every 4 hours. Patient given a note to not return to work until further notice.  1/9/20: F/U with Dr. Pennington. Removed 2 remaining staples. Discussed with patient future grafting of sites. Cont. POC. Return in 1 week.  1/16/20: F/U with Dr. Pennington. Client reports decreased drainage today. Rx for Clindamycin and Norco provided. Client prefers to wait a while before having grafting performed. Return in 1 week.  1/23/20: F/U with Dr. Pennington. Right axilla measurements improved. Continue Clindamycin. Return in 1 week.  2/6/20:Patient presents with a new wound to right lateral breast. Dr Pennington assessed patient. Continuing with same wound care orders. Rx for Norco 5/325mg given to patient for pain. F/U in 2 weeks.   2/20/20: F/U with Dr. Pennington. Sites continue to improve. Measurements decreased. Culture reviewed. Rx for Ampicillin provided. Client has taken PCN in past w/o reaction. Rx for Norco provided. Return in 2 weeks.  3/5/20: F/U with Dr. Pennington. Sites decreased in size. Cont. Ampicillin. Cont. POC. Return in 2 weeks.  3/19/20: F/U with Dr. Pennington. Sites improving. Cont. POC. Return in 2 weeks.  5/7/20: F/U with Dr. Pennington. Measurements improved all sites. Culture of left axilla done today. Cont. POC. Return in  2 weeks.  6/4/20: F/U with Dr. Pennington. All sites improved. Cont. Clindamycin. Return in 2 weeks.  6/18/20: F/U with Dr. Pennington. Measurements improved. Silver nitrate x 1 to right and left axilla hypergranulation tissue. Cont. POC. Next visit 7/2/20. Client will be out of time 7/6/20 - 7/11/20. Will notify [facility].  07/15/2020- f/u with Dr. Pennington. Pt presents with new abscess to upper right abd. Pt consented for OR this Friday 07/17/2020 for I&D to abscess. Rx for clindamycin sent to pharmacy. Dakins dc'd and xeroform started to wounds. Aquacel ag extra, mextra and large abd pad to right upper abd. Pt to f/u with Dr. Pennington in 1 week 07/23/2020.  7/23/20: F/U with Dr. Pennington. I&D of right abdomen abscess done 7/17/20 per Dr. Pennington. Site open with sutures. All previous sites improving. Iodoform gauze to sites needing packing. Rx for iodoform gauze and Vibra tabs provided today. Next visit 7/30/20.  7/30/20: F/U with Dr. Pennington. All sites improving. Sutures removed from right abdominal site per Dr. Pennington. Has excoriation under right breast. Rx for mycostatin powder provided. Cont. POC. Return 8/6/20.  08/13/2020- f/u with Dr. Pennington. Pt wounds improving. Silver nitrate x 2 to wounds for hypergranulation. Cont POC. F/u with Dr. Pennington in 2 weeks 08/27/2020  9/3/20: F/U with Dr. Pennington. All measurements improved. Breast site resolved. Wound care orders changed. Cont. Doxycycline and mycostatin powder. Next visit 9/17/20.  9/17/2020: Fu with Dr. Pennington. Wounds hypergranulated.  applied silver nitrate x 1 to each wound, patient tolerated well. Continued with current treatment plan. Fu 2 weeks 10/1/2020.  10/1/20: F/U with Dr. Pennington. Measurements improved. All sites with hypergranulation tissue. Lidocaine 4% to all sites prior to silver nitrate application. Continue POC. Next visit 10/15/20.  10/15/20: F/U with Dr. Pennington. Measurements decreased. Silver nitrate to all sites for  hypergranulation tissue. Cont. POC. Next visit 10/29/20.  10/29/20: F/U with Dr. Pennington. Silver nitrate to hypergranulation tissue at all sites. Culture of lower abdomen site done today. Cont. POC. Next visit 11/12/20.    Review of Systems   Constitutional: Negative.    HENT: Negative.    Eyes: Negative.    Respiratory: Negative.    Cardiovascular: Negative.    Gastrointestinal: Negative.    Genitourinary: Negative.    Musculoskeletal: Negative.    Skin: Negative.    Neurological: Negative.    Psychiatric/Behavioral: Negative.        Objective:      Physical Exam  Constitutional:       Appearance: She is well-developed.   HENT:      Head: Normocephalic.   Eyes:      Conjunctiva/sclera: Conjunctivae normal.      Pupils: Pupils are equal, round, and reactive to light.   Neck:      Musculoskeletal: Normal range of motion and neck supple.   Cardiovascular:      Rate and Rhythm: Normal rate and regular rhythm.      Heart sounds: Normal heart sounds.   Pulmonary:      Effort: Pulmonary effort is normal.      Breath sounds: Normal breath sounds.   Abdominal:      General: Bowel sounds are normal.      Palpations: Abdomen is soft.   Musculoskeletal: Normal range of motion.   Skin:     General: Skin is warm and dry.   Neurological:      Mental Status: She is alert and oriented to person, place, and time.      Deep Tendon Reflexes: Reflexes are normal and symmetric.         Assessment:       1. Hidradenitis suppurativa of right axilla    2. Left axillary hidradenitis    3. Bacterial skin infection    4. S/P bariatric surgery    5. Morbid obesity with BMI of 40.0-44.9, adult    6. Diabetes mellitus with skin ulcer           Wound 07/31/19 1300 Other (comment) Axilla #2 (Active)   07/31/19 1300    Pre-existing: Yes   Primary Wound Type: Other   Side: Left   Orientation:    Location: Axilla   Wound Number (optional): #2   Ankle-Brachial Index:    Pulses:    Removal Indication and Assessment:    Wound Outcome:    (Retired) Wound  Type:    (Retired) Wound Length (cm):    (Retired) Wound Width (cm):    (Retired) Depth (cm):    Wound Description (Comments):    Removal Indications:    Wound Image   10/29/20 1100   Dressing Appearance Moist drainage 10/29/20 1100   Drainage Amount Small 10/29/20 1100   Drainage Characteristics/Odor Serosanguineous 10/29/20 1100   Appearance Red;Moist;Hypergranulation 10/29/20 1100   Tissue loss description Full thickness 10/29/20 1100   Red (%), Wound Tissue Color 100 % 10/29/20 1100   Periwound Area Intact;Dry 10/29/20 1100   Wound Edges Irregular 10/29/20 1100   Wound Length (cm) 1 cm 10/29/20 1100   Wound Width (cm) 0.5 cm 10/29/20 1100   Wound Depth (cm) 0.1 cm 10/29/20 1100   Wound Volume (cm^3) 0.05 cm^3 10/29/20 1100   Wound Surface Area (cm^2) 0.5 cm^2 10/29/20 1100   Care Cleansed with:;Sterile normal saline 10/29/20 1100   Dressing Gauze;Island/border;Non-adherent 10/29/20 1100   Periwound Care Skin barrier film applied 10/29/20 1100   Dressing Change Due 10/30/20 10/29/20 1100            Wound 07/31/19 1300 Other (comment) Axilla #1 (Active)   07/31/19 1300    Pre-existing: Yes   Primary Wound Type: Other   Side: Right   Orientation:    Location: Axilla   Wound Number (optional): #1   Ankle-Brachial Index:    Pulses:    Removal Indication and Assessment:    Wound Outcome:    (Retired) Wound Type:    (Retired) Wound Length (cm):    (Retired) Wound Width (cm):    (Retired) Depth (cm):    Wound Description (Comments):    Removal Indications:    Wound Image   10/29/20 1100   Dressing Appearance Intact;Moist drainage 10/29/20 1100   Drainage Amount Small 10/29/20 1100   Drainage Characteristics/Odor Serosanguineous 10/29/20 1100   Appearance Red;Moist;Hypergranulation 10/29/20 1100   Tissue loss description Full thickness 10/29/20 1100   Red (%), Wound Tissue Color 100 % 10/29/20 1100   Periwound Area Intact;Dry 10/29/20 1100   Wound Edges Irregular 10/29/20 1100   Wound Length (cm) 3 cm 10/29/20 1100    Wound Width (cm) 0.5 cm 10/29/20 1100   Wound Depth (cm) 0.1 cm 10/29/20 1100   Wound Volume (cm^3) 0.15 cm^3 10/29/20 1100   Wound Surface Area (cm^2) 1.5 cm^2 10/29/20 1100   Care Cleansed with:;Sterile normal saline 10/29/20 1100   Dressing Gauze;Island/border;Non-adherent 10/29/20 1100   Periwound Care Skin barrier film applied 10/29/20 1100   Dressing Change Due 10/30/20 10/29/20 1100            Wound 07/15/20 1600 Abscess Right upper Abdomen #4 (Active)   07/15/20 1600    Pre-existing: Yes   Primary Wound Type: Abscess   Side: Right   Orientation: upper   Location: Abdomen   Wound Number (optional): #4   Ankle-Brachial Index:    Pulses:    Removal Indication and Assessment:    Wound Outcome:    (Retired) Wound Type:    (Retired) Wound Length (cm):    (Retired) Wound Width (cm):    (Retired) Depth (cm):    Wound Description (Comments):    Removal Indications:    Wound Image   10/29/20 1100   Dressing Appearance Intact;Moist drainage 10/29/20 1100   Drainage Amount Moderate 10/29/20 1100   Drainage Characteristics/Odor Serosanguineous 10/29/20 1100   Appearance Red;Moist;Hypergranulation 10/29/20 1100   Tissue loss description Partial thickness 10/29/20 1100   Red (%), Wound Tissue Color 100 % 10/29/20 1100   Periwound Area Dry;Intact 10/29/20 1100   Wound Edges Irregular 10/29/20 1100   Wound Length (cm) 3 cm 10/29/20 1100   Wound Width (cm) 1.5 cm 10/29/20 1100   Wound Depth (cm) 0.1 cm 10/29/20 1100   Wound Volume (cm^3) 0.45 cm^3 10/29/20 1100   Wound Surface Area (cm^2) 4.5 cm^2 10/29/20 1100   Care Cleansed with:;Sterile normal saline 10/29/20 1100   Dressing Gauze;Non-adherent;Island/border 10/29/20 1100   Periwound Care Skin barrier film applied 10/29/20 1100   Dressing Change Due 10/30/20 10/29/20 1100            Wound 09/17/20 1031 Right posterior Back #5 (Active)   09/17/20 1031    Pre-existing: Yes   Primary Wound Type:    Side: Right   Orientation: posterior   Location: Back   Wound Number  (optional): #5   Ankle-Brachial Index:    Pulses:    Removal Indication and Assessment:    Wound Outcome:    (Retired) Wound Type:    (Retired) Wound Length (cm):    (Retired) Wound Width (cm):    (Retired) Depth (cm):    Wound Description (Comments):    Removal Indications:    Wound Image   10/29/20 1100   Dressing Appearance Intact;Moist drainage 10/29/20 1100   Drainage Amount Moderate 10/29/20 1100   Drainage Characteristics/Odor Serosanguineous 10/29/20 1100   Appearance Red;Moist;Hypergranulation 10/29/20 1100   Tissue loss description Partial thickness 10/29/20 1100   Red (%), Wound Tissue Color 100 % 10/29/20 1100   Periwound Area Intact;Dry 10/29/20 1100   Wound Edges Irregular 10/29/20 1100   Wound Length (cm) 4 cm 10/29/20 1100   Wound Width (cm) 1.5 cm 10/29/20 1100   Wound Depth (cm) 0.1 cm 10/29/20 1100   Wound Volume (cm^3) 0.6 cm^3 10/29/20 1100   Wound Surface Area (cm^2) 6 cm^2 10/29/20 1100   Care Cleansed with:;Sterile normal saline 10/29/20 1100   Dressing Non-adherent;Island/border;Gauze 10/29/20 1100   Periwound Care Skin barrier film applied 10/29/20 1100   Dressing Change Due 10/30/20 10/29/20 1100            Wound 10/15/20 1030 Abscess Right lower Abdomen (Active)   10/15/20 1030    Pre-existing: Yes   Primary Wound Type: Abscess   Side: Right   Orientation: lower   Location: Abdomen   Wound Number (optional):    Ankle-Brachial Index:    Pulses:    Removal Indication and Assessment:    Wound Outcome:    (Retired) Wound Type:    (Retired) Wound Length (cm):    (Retired) Wound Width (cm):    (Retired) Depth (cm):    Wound Description (Comments):    Removal Indications:    Wound Image   10/29/20 1100   Dressing Appearance Intact;Moist drainage 10/29/20 1100   Drainage Amount Moderate 10/29/20 1100   Drainage Characteristics/Odor Serosanguineous 10/29/20 1100   Appearance Red;Moist;Hypergranulation 10/29/20 1100   Tissue loss description Partial thickness 10/29/20 1100   Red (%), Wound Tissue  Color 100 % 10/29/20 1100   Periwound Area Dry;Intact 10/29/20 1100   Wound Edges Irregular 10/29/20 1100   Wound Length (cm) 2.5 cm 10/29/20 1100   Wound Width (cm) 3.5 cm 10/29/20 1100   Wound Depth (cm) 0.1 cm 10/29/20 1100   Wound Volume (cm^3) 0.88 cm^3 10/29/20 1100   Wound Surface Area (cm^2) 8.75 cm^2 10/29/20 1100   Care Cleansed with:;Sterile normal saline 10/29/20 1100   Dressing Gauze;Non-adherent;Island/border 10/29/20 1100   Periwound Care Skin barrier film applied 10/29/20 1100   Dressing Change Due 10/30/20 10/29/20 1100       Left and Right Axilla   Cleanse with: Rinse with normal saline   Primary dressing:  xeroform to right and left   Secondary dressing: cover with 4x4 gauze and small abd pad, secure with mefix tape   Frequency: daily     Right upper abd, right lower abdomen,and right back:  Cleanse with: Rinse with normal saline   Primary dressing: xeroform   Secondary dressing: gauze, 4 x 4 mepore dressing  Frequency: daily       Follow up with Dr. Pennington in 2 weeks on Thursday 11/12/20    Other orders: Continue Vibra tabs. Mycostatin powder daily under right breast. Culture done 10/29/20.      Home Health: [facility]:  Fax # [phone].  Skilled nurse to perform dressing changes every Monday, Wednesday except when in clinic. Patient will do own wound care of Fridays.  Please provide patient's caregiver with necessary supplies to do wound care: small abd pads, 4x4 gauze, mefix tape.     Plan:                11/12/20 Dr. Pennington

## 2020-11-02 LAB — BACTERIA SPEC AEROBE CULT: ABNORMAL

## 2020-11-03 ENCOUNTER — CLINICAL SUPPORT (OUTPATIENT)
Dept: REHABILITATION | Facility: HOSPITAL | Age: 40
End: 2020-11-03
Payer: COMMERCIAL

## 2020-11-03 ENCOUNTER — LAB VISIT (OUTPATIENT)
Dept: LAB | Facility: HOSPITAL | Age: 40
End: 2020-11-03
Attending: NURSE PRACTITIONER
Payer: COMMERCIAL

## 2020-11-03 ENCOUNTER — DOCUMENTATION ONLY (OUTPATIENT)
Dept: REHABILITATION | Facility: HOSPITAL | Age: 40
End: 2020-11-03

## 2020-11-03 DIAGNOSIS — Z98.84 S/P BARIATRIC SURGERY: ICD-10-CM

## 2020-11-03 DIAGNOSIS — D63.8 ANEMIA OF INFECTION AND CHRONIC DISEASE: ICD-10-CM

## 2020-11-03 DIAGNOSIS — R77.8 ELEVATED TOTAL PROTEIN: ICD-10-CM

## 2020-11-03 DIAGNOSIS — E66.01 MORBID OBESITY WITH BMI OF 40.0-44.9, ADULT: ICD-10-CM

## 2020-11-03 DIAGNOSIS — G89.29 CHRONIC LEFT SHOULDER PAIN: ICD-10-CM

## 2020-11-03 DIAGNOSIS — D50.0 IRON DEFICIENCY ANEMIA DUE TO CHRONIC BLOOD LOSS: ICD-10-CM

## 2020-11-03 DIAGNOSIS — L73.2 HIDRADENITIS AXILLARIS: ICD-10-CM

## 2020-11-03 DIAGNOSIS — B99.9 ANEMIA OF INFECTION AND CHRONIC DISEASE: ICD-10-CM

## 2020-11-03 DIAGNOSIS — M25.512 CHRONIC LEFT SHOULDER PAIN: ICD-10-CM

## 2020-11-03 LAB
ALBUMIN SERPL BCP-MCNC: 3.9 G/DL (ref 3.5–5.2)
ALP SERPL-CCNC: 97 U/L (ref 38–126)
ALT SERPL W/O P-5'-P-CCNC: 12 U/L (ref 10–44)
ANION GAP SERPL CALC-SCNC: 7 MMOL/L (ref 8–16)
AST SERPL-CCNC: 28 U/L (ref 15–46)
BASOPHILS # BLD AUTO: 0.04 K/UL (ref 0–0.2)
BASOPHILS NFR BLD: 0.4 % (ref 0–1.9)
BILIRUB SERPL-MCNC: 0.3 MG/DL (ref 0.1–1)
BUN SERPL-MCNC: 9 MG/DL (ref 7–17)
CALCIUM SERPL-MCNC: 9.1 MG/DL (ref 8.7–10.5)
CHLORIDE SERPL-SCNC: 110 MMOL/L (ref 95–110)
CO2 SERPL-SCNC: 26 MMOL/L (ref 23–29)
CREAT SERPL-MCNC: 0.72 MG/DL (ref 0.5–1.4)
DIFFERENTIAL METHOD: ABNORMAL
EOSINOPHIL # BLD AUTO: 0.4 K/UL (ref 0–0.5)
EOSINOPHIL NFR BLD: 3.6 % (ref 0–8)
ERYTHROCYTE [DISTWIDTH] IN BLOOD BY AUTOMATED COUNT: 13.2 % (ref 11.5–14.5)
ERYTHROCYTE [SEDIMENTATION RATE] IN BLOOD BY WESTERGREN METHOD: 83 MM/HR (ref 0–20)
EST. GFR  (AFRICAN AMERICAN): >60 ML/MIN/1.73 M^2
EST. GFR  (NON AFRICAN AMERICAN): >60 ML/MIN/1.73 M^2
FERRITIN SERPL-MCNC: 252 NG/ML (ref 20–300)
FOLATE SERPL-MCNC: >40 NG/ML (ref 4–24)
GLUCOSE SERPL-MCNC: 74 MG/DL (ref 70–110)
HCT VFR BLD AUTO: 39.4 % (ref 37–48.5)
HGB BLD-MCNC: 12.2 G/DL (ref 12–16)
IMM GRANULOCYTES # BLD AUTO: 0.05 K/UL (ref 0–0.04)
IMM GRANULOCYTES NFR BLD AUTO: 0.5 % (ref 0–0.5)
IRON SERPL-MCNC: 38 UG/DL (ref 30–160)
LYMPHOCYTES # BLD AUTO: 4.2 K/UL (ref 1–4.8)
LYMPHOCYTES NFR BLD: 42 % (ref 18–48)
MCH RBC QN AUTO: 29.5 PG (ref 27–31)
MCHC RBC AUTO-ENTMCNC: 31 G/DL (ref 32–36)
MCV RBC AUTO: 95 FL (ref 82–98)
MONOCYTES # BLD AUTO: 0.7 K/UL (ref 0.3–1)
MONOCYTES NFR BLD: 6.6 % (ref 4–15)
NEUTROPHILS # BLD AUTO: 4.6 K/UL (ref 1.8–7.7)
NEUTROPHILS NFR BLD: 46.9 % (ref 38–73)
NRBC BLD-RTO: 0 /100 WBC
PLATELET # BLD AUTO: 405 K/UL (ref 150–350)
PMV BLD AUTO: 8.5 FL (ref 9.2–12.9)
POTASSIUM SERPL-SCNC: 4.4 MMOL/L (ref 3.5–5.1)
PROT SERPL-MCNC: 8.8 G/DL (ref 6–8.4)
RBC # BLD AUTO: 4.14 M/UL (ref 4–5.4)
SATURATED IRON: 13 % (ref 20–50)
SODIUM SERPL-SCNC: 143 MMOL/L (ref 136–145)
TOTAL IRON BINDING CAPACITY: 292 UG/DL (ref 250–450)
TRANSFERRIN SERPL-MCNC: 197 MG/DL (ref 200–375)
VIT B12 SERPL-MCNC: 1329 PG/ML (ref 210–950)
WBC # BLD AUTO: 9.9 K/UL (ref 3.9–12.7)

## 2020-11-03 PROCEDURE — 83540 ASSAY OF IRON: CPT | Mod: PO

## 2020-11-03 PROCEDURE — 82746 ASSAY OF FOLIC ACID SERUM: CPT | Mod: PO

## 2020-11-03 PROCEDURE — 36415 COLL VENOUS BLD VENIPUNCTURE: CPT | Mod: PO

## 2020-11-03 PROCEDURE — 82607 VITAMIN B-12: CPT | Mod: PO

## 2020-11-03 PROCEDURE — 97110 THERAPEUTIC EXERCISES: CPT | Mod: PO

## 2020-11-03 PROCEDURE — 85652 RBC SED RATE AUTOMATED: CPT

## 2020-11-03 PROCEDURE — 82728 ASSAY OF FERRITIN: CPT

## 2020-11-03 PROCEDURE — 80053 COMPREHEN METABOLIC PANEL: CPT | Mod: PO

## 2020-11-03 PROCEDURE — 85025 COMPLETE CBC W/AUTO DIFF WBC: CPT | Mod: PO

## 2020-11-03 NOTE — PROGRESS NOTES
Face to Face PTA Conference performed with Zabrina Bloom PTA regarding patient's current status, overall progress, and plan of care. Pt will be seen by a physical therapist minimally every 6th visit or every 30 days.    Face to Face PTA Conference performed with Douglas Todd PT regarding patient's current status, overall progress, and plan of care. Pt will be seen by a physical therapist minimally every 6th visit or every 30 days.    Zabrina Bloom PTA  11/3/2020

## 2020-11-03 NOTE — PROGRESS NOTES
Physical Therapy Daily Treatment Note     Name: oRdney Gonzalez [facility] Number: [id number]    Therapy Diagnosis:   Encounter Diagnosis   Name Primary?    Chronic left shoulder pain      Physician: Ken Alberto MD    Visit Date: 11/3/2020    Physician Orders: PT Eval and Treat  Medical Diagnosis from Referral: Tendinopathy   Evaluation Date: 8/18/2020  Authorization Period Expiration: 12/31/2020  Plan of Care Expiration: 11/16/2020  Visit # / Visits authorized: 11 / 20     Precautions: Standard and post op sx for hidradenitis under both arms on 12/24/2020     Time In: 9:05 am  Time Out: 9:45 am  Total Billable Time: 40 minutes     SUBJECTIVE     Today, pt reports: she has noticed improved strength in shoulder when initially reaching up, but continues to fatigue while holding arm up or with repetitive overhead activities .  She was compliant with home exercise program.  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 DATE 11/16/2020 11/16/2020 11/16/2020 11/16/2020 11/16/2020 11/16/2020 11/16/2020 11/16/2020 11/16/2020   FACE-TO-FACE 12/3/2020 10/28/2020 10/28/2020 10/28/2020 10/28/2020 10/28/2020 10/28/2020 9/18/2020 9/18/2020   FOTO  -- --  -- -- 5/5 4/5 3/5               UBE    L3 x 2' ea L3 x 2' ea L2.5 x 3' ea L2 3' ea 3' frwd/3'back    Pulleys (flex, scap) 2' ea 2' ea 2' ea 2' ea 2' ea 2' ea 3' ea 3' ea 3 min each   TABLE:            Wand flexion  NT  NT NT 2 x 10 x 2# bar NT 2 x 10 2 x 10   Scapular protractions  NT  NT NT 3 x 10 x 3# NT 2 x 10 x 2# 2 x 10 x 1#   Reverse Codman's Ball on  "wall  30 x cw/ccw   1 kg G ball Ball on wall  30 x cw/ccw   1 kg G ball Ball on wall  30 x cw/ccw   500g G ball Ball on wall  30 x cw/ccw   500g G ball Ball on wall  20 x cw/ccw   500g G ball 30 x 3# ea  cw/ccw NT 30 x 2# ea  cw/ccw  20 x cw/ccw                           STANDING:            Doorway stretch  --  NT 3 x 30" 3 x 30" 3 x 30" 3 x 30" 3 x 30"   IR stretch  --  NT 3 x 20"       Rows  --  NT 3 x 10 STB 3 x 10 STB 3 x 10 BTB 3 x 10 BTB 3 x 10 BTB   ER at 90 deg abduction 3 x 10 YTB 2 x 10 YTB 2 x 10 YTB         Carlitos's 3 x 10 BTB (ER/IR) 3 x 10 BTB (ER/IR) 3 x 10 BTB (ER/IR) 3 x 10 BTB (ER/IR) NT 3 x 10 GTB 3 x 10 RTB 2 x 10 RTB 1 x 10 RTB   Theraband:  - W  - I  - T   2 x 10 BTB  3 x 10 BTB  2 x 10 BTB   2 x 10 GTB  3 x 10 BTB  3 x 10 GTB   2 x 10 GTB  3 x 10 BTB  3 x 10 GTB   2 x 10 GTB  2 x 10 BTB  2 x 10 GTB   NT  3 x 10 BTB  NT   2 x 10 RTB  2 x 10 BTB  2 x 10 RTB   2 x 10 RTB  2 x 10 GTB  2 x 10 RTB   2 x 10 YTB  2 x 10 GTB  2 x 10 YTB   --  2 x 10 RTB  --   Shldr AROM:  - Flexion  - Scaption  - Abduction thumb up   3 x 10 x 2#  3 x 10 x 2#  3 x 10 x 2#   3 x 10 x 2#  3 x 10 x 2#  3 x 10 x 2#   2 x 10 x 2#  2 x 10 x 2#  2 x 10 x 2#   2 x 10 x 2#  2 x 10 x 2#  2 x 10 x 2#   2 x 10 x 1#  2 x 10 x 1#  2 x 10 x 1#   2 x 10 x 1#  2 x 10 x 1#  2 x 10 x 1# To 90 deg  1 x 10 + 1 x 10 x 1#  2 x 10 x 1#  2 x 10 Next?    PNF D2 flexion 3 x 10 x 1# 2 x 10 x 1# 2 x 10 x 1# 2 x 10 x 2# 2 x 10 x 1# 2 x 10 x 1# Next Next?    Serratus wall slides NT 2 x 10 YTB 2 x 10 2 x 10 Next?       Overhead press 3 x 10 x 1# 2 x 10 x 1# 2 x 10 x 1# 1 x 10 x 1#        Overhead arc on wall NT 1 x 10 1 x 10 1 x 10        Overhead dribble  NT Next?                       Initials MO Stevens received the following manual therapy techniques: Myofacial release and Soft tissue Mobilization were applied to the: left shoulder for 0 minutes, including: NOT TODAY  - STM of left upper trapezius, pectoralis major, " pectoralis minor, biceps brachii, deltoid and supraspinatus       Rodney received hot pack for 5 minutes to L shoulder.       Home Exercises Provided and Patient Education Provided     Education/Self-Care provided: (0 minutes)   Patient educated on biomechanical justification for therapeutic exercise and importance of compliance with HEP in order to improve overall impairments and QOL    Patient educated on postural awareness and the use of a lumbar roll when in a seated position to reduce stress and maintain optimal alignment of the spine.    Patient educated on proper ergonomics at the work station in order to maintain optimal alignment of the musculoskeletal system and improve efficiency in the work environment.   Patient educated on the importance of improved core and upper extremity strength in order to improve alignment of the spine and upper extremities with static positions and dynamic movement.    Patient educated on the importance of strong core and lower extremity musculature in order to improve both static and dynamic balance, improve gait mechanics, reduce fall risk and improve household and community mobility.   - passive stretching techniques for posterior capsule stretching and IR stretching.     Written Home Exercises Provided: Patient instructed to cont prior HEP.  Exercises were reviewed and Rodney was able to demonstrate them prior to the end of the session.  Rodney demonstrated good  understanding of the education provided.     See EMR under Patient Instructions for exercises provided 8/18/2020.    ASSESSMENT     Rodney is progressed to 3 sets of 10 reps to improve endurance and strength during repetitive overhead activities.  She requires verbal cues to avoid upper trap substitution during resisted ER at 90 deg abduction.      Rodney is progressing well towards her goals.   Pt prognosis is Good.     Pt will continue to benefit from skilled outpatient physical therapy to address the  deficits listed in the problem list box on initial evaluation, provide pt/family education and to maximize pt's level of independence in the home and community environment.     Pt's spiritual, cultural and educational needs considered and pt agreeable to plan of care and goals.     Anticipated barriers to physical therapy: co-morbidities    Goals:      Short Term Goals:  6 weeks     1. Pain: Pt will demonstrate improved pain by reports of less than or equal to 8/10 worst pain on the verbal rating scale in order to progress toward maximal functional ability and improve QOL.  MET   2. Function: Patient will demonstrate improved function as indicated by a functional limitation score of less than or equal to 49 out of 100 on FOTO.  MET   3. Mobility: Patient will improve AROM to 50% of stated goals, listed in objective measures above, in order to progress towards independence with functional activities.   MET   4. Strength: Patient will improve strength to 50% of stated goals, listed in objective measures above, in order to progress towards independence with functional activities.      5. Gait: Patient will demonstrate improved gait mechanics in order to improve functional mobility, improve quality of life, and decrease risk of further injury or fall.   MET   6. HEP: Patient will demonstrate independence with HEP in order to progress toward functional independence.  MET   7. Improve postural awareness.            Long Term Goals:  12 weeks     1. Pain: Pt will demonstrate improved pain by reports of less than or equal to 6/10 worst pain on the verbal rating scale in order to progress toward maximal functional ability and improve QOL.    MET   2. Function: Patient will demonstrate improved function as indicated by a functional limitation score of less than or equal to 35 out of 100 on FOTO.     3. Mobility: Patient will improve AROM to stated goals, listed in objective measures above, in order to return to maximal  functional potential and improve quality of life.     4. Strength: Patient will improve strength to stated goals, listed in objective measures above, in order to improve functional independence and quality of life.     5. Gait: Patient will demonstrate normalized gait mechanics with minimal compensation in order to return to PLOF.  MET   6. Patient will return to normal ADL's, IADL's, community involvement, recreational activities, and work-related activities with less than or equal to 3/10 pain and maximal function.      7. Patient will be able to return to work without limitation.   MET          PLAN   Plan of care Certification: 8/18/2020 to 11/16/2020.    Continue Plan of Care (POC) as tolerated. Return to overhead arc and attempt overhead dribble next visit.     Douglas Todd, PT

## 2020-11-04 LAB — BACTERIA SPEC ANAEROBE CULT: ABNORMAL

## 2020-11-05 ENCOUNTER — CLINICAL SUPPORT (OUTPATIENT)
Dept: REHABILITATION | Facility: HOSPITAL | Age: 40
End: 2020-11-05
Payer: COMMERCIAL

## 2020-11-05 DIAGNOSIS — G89.29 CHRONIC LEFT SHOULDER PAIN: ICD-10-CM

## 2020-11-05 DIAGNOSIS — M25.512 CHRONIC LEFT SHOULDER PAIN: ICD-10-CM

## 2020-11-05 PROCEDURE — 97110 THERAPEUTIC EXERCISES: CPT | Mod: PO

## 2020-11-05 NOTE — PROGRESS NOTES
Physical Therapy Daily Treatment Note     Name: Rodney Gonzalez [facility] Number: [id number]    Therapy Diagnosis:   Encounter Diagnosis   Name Primary?   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    She was compliant with home exercise program.  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 DATE 11/16/2020 11/16/2020 11/16/2020 11/16/2020 11/16/2020 11/16/2020 11/16/2020 11/16/2020 11/16/2020 11/16/2020   FACE-TO-FACE 12/3/2020 12/3/2020 10/28/2020 10/28/2020 10/28/2020 10/28/2020 10/28/2020 10/28/2020 9/18/2020 9/18/2020   FOTO   -- --  -- -- 5/5 4/5 3/5                UBE     L3 x 2' ea L3 x 2' ea L2.5 x 3' ea L2 3' ea 3' frwd/3'back    Pulleys (flex, scap) 2' flexion 2' ea 2' ea 2' ea 2' ea 2' ea 2' ea 3' ea 3' ea 3 min each   TABLE:             Wand flexion --  NT  NT NT 2 x 10 x 2# bar NT 2 x 10 2 x 10   Scapular protractions --  NT  NT NT 3 x 10 x 3# NT 2 x 10 x 2# 2 x 10 x 1#   Reverse Codman's Ball  "on wall  30 x cw/ccw   2 kg G ball Ball on wall  30 x cw/ccw   1 kg G ball Ball on wall  30 x cw/ccw   1 kg G ball Ball on wall  30 x cw/ccw   500g G ball Ball on wall  30 x cw/ccw   500g G ball Ball on wall  20 x cw/ccw   500g G ball 30 x 3# ea  cw/ccw NT 30 x 2# ea  cw/ccw  20 x cw/ccw                             STANDING:             Doorway stretch -  --  NT 3 x 30" 3 x 30" 3 x 30" 3 x 30" 3 x 30"   IR stretch --  --  NT 3 x 20"       Rows --  --  NT 3 x 10 STB 3 x 10 STB 3 x 10 BTB 3 x 10 BTB 3 x 10 BTB   ER at 90 deg abduction 3 x 10 RTB 3 x 10 YTB 2 x 10 YTB 2 x 10 YTB         Carlitos's 3 x 10 STB (ER/IR) 3 x 10 BTB (ER/IR) 3 x 10 BTB (ER/IR) 3 x 10 BTB (ER/IR) 3 x 10 BTB (ER/IR) NT 3 x 10 GTB 3 x 10 RTB 2 x 10 RTB 1 x 10 RTB   Theraband:  - W  - I  - T   2 x 10 BTB  3 x 10 STB  2 x 10 BTB   2 x 10 BTB  3 x 10 BTB  2 x 10 BTB   2 x 10 GTB  3 x 10 BTB  3 x 10 GTB   2 x 10 GTB  3 x 10 BTB  3 x 10 GTB   2 x 10 GTB  2 x 10 BTB  2 x 10 GTB   NT  3 x 10 BTB  NT   2 x 10 RTB  2 x 10 BTB  2 x 10 RTB   2 x 10 RTB  2 x 10 GTB  2 x 10 RTB   2 x 10 YTB  2 x 10 GTB  2 x 10 YTB   --  2 x 10 RTB  --   Shldr AROM:  - Flexion  - Scaption  - Abduction thumb up   2 x 10 x 3#  2 x 10 x 3#  2 x 10 x 3#   3 x 10 x 2#  3 x 10 x 2#  3 x 10 x 2#   3 x 10 x 2#  3 x 10 x 2#  3 x 10 x 2#   2 x 10 x 2#  2 x 10 x 2#  2 x 10 x 2#   2 x 10 x 2#  2 x 10 x 2#  2 x 10 x 2#   2 x 10 x 1#  2 x 10 x 1#  2 x 10 x 1#   2 x 10 x 1#  2 x 10 x 1#  2 x 10 x 1# To 90 deg  1 x 10 + 1 x 10 x 1#  2 x 10 x 1#  2 x 10 Next?    PNF D2 flexion 3 x 10 x 1# 3 x 10 x 1# 2 x 10 x 1# 2 x 10 x 1# 2 x 10 x 2# 2 x 10 x 1# 2 x 10 x 1# Next Next?    Serratus wall slides 2 x 10  NT 2 x 10 YTB 2 x 10 2 x 10 Next?       Overhead press 3 x 10 x 1# 3 x 10 x 1# 2 x 10 x 1# 2 x 10 x 1# 1 x 10 x 1#        Overhead arc on wall  NT 1 x 10 1 x 10 1 x 10        Overhead dribble  NT NT Next?           Body blade flex and abd 2 x 15" ea                         Initials MO PARKS MA " MA SB MA SB MA MA     Left shoulder MMT:  Flexion =4+/5  Abduction = 4+/5  ER = 4+/5  IR = 5/5  Elbow flexion = 5/5  Elbow extension = 5/5    Rodney received the following manual therapy techniques: Myofacial release and Soft tissue Mobilization were applied to the: left shoulder for 0 minutes, including: NOT TODAY  - STM of left upper trapezius, pectoralis major, pectoralis minor, biceps brachii, deltoid and supraspinatus       Rodney received hot pack for 5 minutes to L shoulder.       Home Exercises Provided and Patient Education Provided     Education/Self-Care provided: (0 minutes)   Patient educated on biomechanical justification for therapeutic exercise and importance of compliance with HEP in order to improve overall impairments and QOL    Patient educated on postural awareness and the use of a lumbar roll when in a seated position to reduce stress and maintain optimal alignment of the spine.    Patient educated on proper ergonomics at the work station in order to maintain optimal alignment of the musculoskeletal system and improve efficiency in the work environment.   Patient educated on the importance of improved core and upper extremity strength in order to improve alignment of the spine and upper extremities with static positions and dynamic movement.    Patient educated on the importance of strong core and lower extremity musculature in order to improve both static and dynamic balance, improve gait mechanics, reduce fall risk and improve household and community mobility.   - passive stretching techniques for posterior capsule stretching and IR stretching.     Written Home Exercises Provided: Patient instructed to cont prior HEP.  Exercises were reviewed and Rodney was able to demonstrate them prior to the end of the session.  Rodney demonstrated good  understanding of the education provided.     See EMR under Patient Instructions for exercises provided 8/18/2020.    ASSESSMENT     Rodney is noted  to have improved shoulder strength as compared to evaluation and has met all STG's.  She continues to progress with shoulder strengthening and has no increase in symptoms prior to leaving clinic today.    Rodney is progressing well towards her goals.   Pt prognosis is Good.     Pt will continue to benefit from skilled outpatient physical therapy to address the deficits listed in the problem list box on initial evaluation, provide pt/family education and to maximize pt's level of independence in the home and community environment.     Pt's spiritual, cultural and educational needs considered and pt agreeable to plan of care and goals.     Anticipated barriers to physical therapy: co-morbidities    Goals:      Short Term Goals:  6 weeks     1. Pain: Pt will demonstrate improved pain by reports of less than or equal to 8/10 worst pain on the verbal rating scale in order to progress toward maximal functional ability and improve QOL.  MET   2. Function: Patient will demonstrate improved function as indicated by a functional limitation score of less than or equal to 49 out of 100 on FOTO.  MET   3. Mobility: Patient will improve AROM to 50% of stated goals, listed in objective measures above, in order to progress towards independence with functional activities.   MET   4. Strength: Patient will improve strength to 50% of stated goals, listed in objective measures above, in order to progress towards independence with functional activities.   MET   5. Gait: Patient will demonstrate improved gait mechanics in order to improve functional mobility, improve quality of life, and decrease risk of further injury or fall.   MET   6. HEP: Patient will demonstrate independence with HEP in order to progress toward functional independence.  MET   7. Improve postural awareness.            Long Term Goals:  12 weeks     1. Pain: Pt will demonstrate improved pain by reports of less than or equal to 6/10 worst pain on the verbal rating  scale in order to progress toward maximal functional ability and improve QOL.    MET   2. Function: Patient will demonstrate improved function as indicated by a functional limitation score of less than or equal to 35 out of 100 on FOTO.     3. Mobility: Patient will improve AROM to stated goals, listed in objective measures above, in order to return to maximal functional potential and improve quality of life.     4. Strength: Patient will improve strength to stated goals, listed in objective measures above, in order to improve functional independence and quality of life.     5. Gait: Patient will demonstrate normalized gait mechanics with minimal compensation in order to return to PLOF.  MET   6. Patient will return to normal ADL's, IADL's, community involvement, recreational activities, and work-related activities with less than or equal to 3/10 pain and maximal function.      7. Patient will be able to return to work without limitation.   MET          PLAN   Plan of care Certification: 8/18/2020 to 11/16/2020.    Continue Plan of Care (POC) as tolerated.     Douglas Todd, PT

## 2020-11-09 NOTE — PROGRESS NOTES
Physical Therapy Daily Treatment Note     Name: Rodney Gonzalez [facility] Number: [id number]    Therapy Diagnosis:   Encounter Diagnosis   Name Primary?   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 DATE 11/16/2020 11/16/2020 11/16/2020 11/16/2020 11/16/2020 11/16/2020 11/16/2020 11/16/2020 11/16/2020 11/16/2020 11/16/2020   FACE-TO-FACE 12/03/2020 12/3/2020 12/3/2020 10/28/2020 10/28/2020 10/28/2020 10/28/2020 10/28/2020 10/28/2020 9/18/2020 9/18/2020   FOTO --   -- --  -- -- 5/5 4/5 3/5                 UBE      L3 x 2' ea L3 x 2' ea L2.5 x 3' ea L2 3' ea 3' frwd/3'back    Pulleys (flex, scap) 2' flexion 2' flexion 2' ea 2' ea 2' ea 2' ea 2' ea 2' ea 3' ea 3' ea 3 min each   TABLE:              Wand flexion -- --  NT  NT NT 2 x 10 x 2# bar NT 2 x 10 2 x 10   Scapular  "protractions -- --  NT  NT NT 3 x 10 x 3# NT 2 x 10 x 2# 2 x 10 x 1#   Reverse Codman's Ball on wall  30 x cw/ccw   2 kg G ball Ball on wall  30 x cw/ccw   2 kg G ball Ball on wall  30 x cw/ccw   1 kg G ball Ball on wall  30 x cw/ccw   1 kg G ball Ball on wall  30 x cw/ccw   500g G ball Ball on wall  30 x cw/ccw   500g G ball Ball on wall  20 x cw/ccw   500g G ball 30 x 3# ea  cw/ccw NT 30 x 2# ea  cw/ccw  20 x cw/ccw                               STANDING:              Doorway stretch -- -  --  NT 3 x 30" 3 x 30" 3 x 30" 3 x 30" 3 x 30"   IR stretch -- --  --  NT 3 x 20"       Rows -- --  --  NT 3 x 10 STB 3 x 10 STB 3 x 10 BTB 3 x 10 BTB 3 x 10 BTB   ER at 90 deg abduction 3 x 10 RTB 3 x 10 RTB 3 x 10 YTB 2 x 10 YTB 2 x 10 YTB         Carlitos's 3 x 10 STB (ER/IR) 3 x 10 STB (ER/IR) 3 x 10 BTB (ER/IR) 3 x 10 BTB (ER/IR) 3 x 10 BTB (ER/IR) 3 x 10 BTB (ER/IR) NT 3 x 10 GTB 3 x 10 RTB 2 x 10 RTB 1 x 10 RTB   Theraband:  - W  - I  - T   3 x 10 BTB  3 x 10 STB  3 x 10 STB   2 x 10 BTB  3 x 10 STB  2 x 10 BTB   2 x 10 BTB  3 x 10 BTB  2 x 10 BTB   2 x 10 GTB  3 x 10 BTB  3 x 10 GTB   2 x 10 GTB  3 x 10 BTB  3 x 10 GTB   2 x 10 GTB  2 x 10 BTB  2 x 10 GTB   NT  3 x 10 BTB  NT   2 x 10 RTB  2 x 10 BTB  2 x 10 RTB   2 x 10 RTB  2 x 10 GTB  2 x 10 RTB   2 x 10 YTB  2 x 10 GTB  2 x 10 YTB   --  2 x 10 RTB  --   Shldr AROM:  - Flexion  - Scaption  - Abduction thumb up   2 x 20 x 3#  2 x 20 x 3#  2 x 20 x 3#   2 x 10 x 3#  2 x 10 x 3#  2 x 10 x 3#   3 x 10 x 2#  3 x 10 x 2#  3 x 10 x 2#   3 x 10 x 2#  3 x 10 x 2#  3 x 10 x 2#   2 x 10 x 2#  2 x 10 x 2#  2 x 10 x 2#   2 x 10 x 2#  2 x 10 x 2#  2 x 10 x 2#   2 x 10 x 1#  2 x 10 x 1#  2 x 10 x 1#   2 x 10 x 1#  2 x 10 x 1#  2 x 10 x 1# To 90 deg  1 x 10 + 1 x 10 x 1#  2 x 10 x 1#  2 x 10 Next?    PNF D2 flexion NT 3 x 10 x 1# 3 x 10 x 1# 2 x 10 x 1# 2 x 10 x 1# 2 x 10 x 2# 2 x 10 x 1# 2 x 10 x 1# Next Next?    Serratus wall slides 3 x 10 2 x 10  NT 2 x 10 YTB 2 x 10 2 x 10 Next?  " "     Overhead press 3 x 10 x 1# 3 x 10 x 1# 3 x 10 x 1# 2 x 10 x 1# 2 x 10 x 1# 1 x 10 x 1#        Overhead arc on wall --  NT 1 x 10 1 x 10 1 x 10        Overhead dribble  -- NT NT Next?           Body blade flex and abd 2 x 20" ea 2 x 15" ea            Wall push up plus Next?                           Initials KASEY PARKS MA, MA         Rodney received the following manual therapy techniques: Myofacial release and Soft tissue Mobilization were applied to the: left shoulder for 0 minutes, including: NOT TODAY  - STM of left upper trapezius, pectoralis major, pectoralis minor, biceps brachii, deltoid and supraspinatus       Rodney received hot pack for 5 minutes to L shoulder.       Home Exercises Provided and Patient Education Provided     Education/Self-Care provided: (0 minutes)   Patient educated on biomechanical justification for therapeutic exercise and importance of compliance with HEP in order to improve overall impairments and QOL    Patient educated on postural awareness and the use of a lumbar roll when in a seated position to reduce stress and maintain optimal alignment of the spine.    Patient educated on proper ergonomics at the work station in order to maintain optimal alignment of the musculoskeletal system and improve efficiency in the work environment.   Patient educated on the importance of improved core and upper extremity strength in order to improve alignment of the spine and upper extremities with static positions and dynamic movement.    Patient educated on the importance of strong core and lower extremity musculature in order to improve both static and dynamic balance, improve gait mechanics, reduce fall risk and improve household and community mobility.   - passive stretching techniques for posterior capsule stretching and IR stretching.     Written Home Exercises Provided: Patient instructed to cont prior HEP.  Exercises were reviewed and Rodney was able to demonstrate " them prior to the end of the session.  Rodney demonstrated good  understanding of the education provided.     See EMR under Patient Instructions for exercises provided 8/18/2020.    ASSESSMENT     Rodney returns to therapy with no complaints at this time. Pt was able to complete full routine listed above without increase in pain or symptoms reported prior to leaving the clinic. Pt began to fatigue at about 16 reps of 20 for shoulder ROM exercises, although maintained proper form without substitutions till 20th rep. Pt continues to progress with shoulder strength and is progressing into endurance protocol well to improve tolerance to longer driving bouts.     Rodney is progressing well towards her goals.   Pt prognosis is Good.     Pt will continue to benefit from skilled outpatient physical therapy to address the deficits listed in the problem list box on initial evaluation, provide pt/family education and to maximize pt's level of independence in the home and community environment.     Pt's spiritual, cultural and educational needs considered and pt agreeable to plan of care and goals.     Anticipated barriers to physical therapy: co-morbidities    Goals:      Short Term Goals:  6 weeks     1. Pain: Pt will demonstrate improved pain by reports of less than or equal to 8/10 worst pain on the verbal rating scale in order to progress toward maximal functional ability and improve QOL.  MET   2. Function: Patient will demonstrate improved function as indicated by a functional limitation score of less than or equal to 49 out of 100 on FOTO.  MET   3. Mobility: Patient will improve AROM to 50% of stated goals, listed in objective measures above, in order to progress towards independence with functional activities.   MET   4. Strength: Patient will improve strength to 50% of stated goals, listed in objective measures above, in order to progress towards independence with functional activities.   MET   5. Gait: Patient will  demonstrate improved gait mechanics in order to improve functional mobility, improve quality of life, and decrease risk of further injury or fall.   MET   6. HEP: Patient will demonstrate independence with HEP in order to progress toward functional independence.  MET   7. Improve postural awareness.            Long Term Goals:  12 weeks     1. Pain: Pt will demonstrate improved pain by reports of less than or equal to 6/10 worst pain on the verbal rating scale in order to progress toward maximal functional ability and improve QOL.    MET   2. Function: Patient will demonstrate improved function as indicated by a functional limitation score of less than or equal to 35 out of 100 on FOTO.     3. Mobility: Patient will improve AROM to stated goals, listed in objective measures above, in order to return to maximal functional potential and improve quality of life.     4. Strength: Patient will improve strength to stated goals, listed in objective measures above, in order to improve functional independence and quality of life.     5. Gait: Patient will demonstrate normalized gait mechanics with minimal compensation in order to return to PLOF.  MET   6. Patient will return to normal ADL's, IADL's, community involvement, recreational activities, and work-related activities with less than or equal to 3/10 pain and maximal function.      7. Patient will be able to return to work without limitation.   MET          PLAN   Plan of care Certification: 8/18/2020 to 11/16/2020.    Continue Plan of Care (POC) as tolerated. Add wall push ups and overhead dribble next visit.     Zabrina Bloom, PTA 1/6

## 2020-11-10 ENCOUNTER — CLINICAL SUPPORT (OUTPATIENT)
Dept: REHABILITATION | Facility: HOSPITAL | Age: 40
End: 2020-11-10
Payer: COMMERCIAL

## 2020-11-10 DIAGNOSIS — M25.512 CHRONIC LEFT SHOULDER PAIN: Primary | ICD-10-CM

## 2020-11-10 DIAGNOSIS — G89.29 CHRONIC LEFT SHOULDER PAIN: Primary | ICD-10-CM

## 2020-11-10 PROCEDURE — 97110 THERAPEUTIC EXERCISES: CPT | Mod: PO,CQ

## 2020-11-12 ENCOUNTER — CLINICAL SUPPORT (OUTPATIENT)
Dept: REHABILITATION | Facility: HOSPITAL | Age: 40
End: 2020-11-12
Payer: COMMERCIAL

## 2020-11-12 ENCOUNTER — HOSPITAL ENCOUNTER (OUTPATIENT)
Dept: WOUND CARE | Facility: HOSPITAL | Age: 40
Discharge: HOME | End: 2020-11-12
Attending: SURGERY
Payer: COMMERCIAL

## 2020-11-12 VITALS
HEIGHT: 60 IN | WEIGHT: 255 LBS | DIASTOLIC BLOOD PRESSURE: 85 MMHG | BODY MASS INDEX: 50.06 KG/M2 | TEMPERATURE: 98 F | HEART RATE: 92 BPM | SYSTOLIC BLOOD PRESSURE: 133 MMHG

## 2020-11-12 DIAGNOSIS — L73.2 LEFT AXILLARY HIDRADENITIS: ICD-10-CM

## 2020-11-12 DIAGNOSIS — G89.29 CHRONIC LEFT SHOULDER PAIN: ICD-10-CM

## 2020-11-12 DIAGNOSIS — L73.2 HIDRADENITIS SUPPURATIVA OF RIGHT AXILLA: Primary | ICD-10-CM

## 2020-11-12 DIAGNOSIS — M25.512 CHRONIC LEFT SHOULDER PAIN: ICD-10-CM

## 2020-11-12 PROCEDURE — 17250 CHEM CAUT OF GRANLTJ TISSUE: CPT

## 2020-11-12 PROCEDURE — 97110 THERAPEUTIC EXERCISES: CPT | Mod: PO

## 2020-11-12 NOTE — PROGRESS NOTES
"Subjective:       Patient ID: Rodney Infante is a 40 y.o. female.    Chief Complaint: Non-healing Wound (both axilla)    F/u for excision bilateral axillary hider adneitis    Client here today with open areas to right and left axilla. Seen by Dr. Pennington, who states sweat glands infected. Rx for clindamycin, bactroban, and tramadol provided. Stop methatrexate ( prescribed for arthritis). Client plans to have abdominal bypass surgery in September. Culture done today of right axilla.  8/7/19: F/U with Dr. Pennington. Culture results negative. Continue POC.  8/14/19: F/U with Dr. Pennington. Clindamycin d/c'd. Bactrim and Diflucan ordered today. Leave left and right axilla open to air. Culture sent to lab today.     9/4/19:  F/U with Dr. Pennington.  Wounds to right axilla healing up.  Patient states "right does not drain as much as left".  Patient feels the left axilla is not healing up like the right.  Patient still taking Bactrim PO.  Will continue for now.  9/18/19: F/U with Dr. Pennington. Both axilla continue to have "sebum - like" exudate. New site noted to left back. Culture done today. RX for lotrisone and Diflucan provided.  9/25/19: F/U with Dr. Pennington. Axillae improved. Client was not able to get Diflucan. Reordered today. Continues lotrisone and clindamycin. Culture results reviewed. Referred to ID for appt. On 10/7/19. Next visit with Dr. Pennington 10/16/19.  10/7/19: Vist today with ID. Augmentin ordered. Clindamycin d/c'd. Complete Diflucan.  11/6/19: Dr Pennington assessed patient.Wounds slowly improving. Patient states that she has had gastric sleeve procedure last week. No complaints voiced. Continuing with present plan of care. Follow up in 2 weeks.  11/20/19: F/U with Dr. Pennington. Wounds continue to improve. Continue POC. Return in 2 weeks.  12/4/19: F/U with Dr. Pennington. Axillae drainage decreased. Client has lost 97 Lbs. As of today. Surgery planned for 12/20/19. Next visit 12/18/19, and consents to be " signed.  12/18/19: F/U with Dr. Pennington. Surgery to axillae scheduled for Monday 12/23/19. Consents signed. Return 1/2/20.   12/26/19  F/U with Dr. pennington for s/p surgery to bilateral axilla.  Removal of glands and placement of theraskin.  Staples intact.  Theraskin not completely  Adhered on both areas.   Start bolster dressing to assist tin the adherence of the graft. Patient to return to clinic in 1 week.  Order homehealth for twice weekly    01/02/20: F/u with Dr. Pennington. Moderate to large amount of drainage from wounds. Staples and sutures removed from both wounds today in clinic per Dr. Pennington. New wound care orders given and routed to [facility]. Rx given for 5 mg norco every 4 hours. Patient given a note to not return to work until further notice.  1/9/20: F/U with Dr. Pennington. Removed 2 remaining staples. Discussed with patient future grafting of sites. Cont. POC. Return in 1 week.  1/16/20: F/U with Dr. Pennington. Client reports decreased drainage today. Rx for Clindamycin and Norco provided. Client prefers to wait a while before having grafting performed. Return in 1 week.  1/23/20: F/U with Dr. Pennington. Right axilla measurements improved. Continue Clindamycin. Return in 1 week.  2/6/20:Patient presents with a new wound to right lateral breast. Dr Pennington assessed patient. Continuing with same wound care orders. Rx for Norco 5/325mg given to patient for pain. F/U in 2 weeks.   2/20/20: F/U with Dr. Pennington. Sites continue to improve. Measurements decreased. Culture reviewed. Rx for Ampicillin provided. Client has taken PCN in past w/o reaction. Rx for Norco provided. Return in 2 weeks.  3/5/20: F/U with Dr. Pennington. Sites decreased in size. Cont. Ampicillin. Cont. POC. Return in 2 weeks.  3/19/20: F/U with Dr. Pennington. Sites improving. Cont. POC. Return in 2 weeks.  5/7/20: F/U with Dr. Pennington. Measurements improved all sites. Culture of left axilla done today. Cont. POC. Return in 2  weeks.  6/4/20: F/U with Dr. Pennington. All sites improved. Cont. Clindamycin. Return in 2 weeks.  6/18/20: F/U with Dr. Pennington. Measurements improved. Silver nitrate x 1 to right and left axilla hypergranulation tissue. Cont. POC. Next visit 7/2/20. Client will be out of time 7/6/20 - 7/11/20. Will notify [facility].  07/15/2020- f/u with Dr. Pennington. Pt presents with new abscess to upper right abd. Pt consented for OR this Friday 07/17/2020 for I&D to abscess. Rx for clindamycin sent to pharmacy. Dakins dc'd and xeroform started to wounds. Aquacel ag extra, mextra and large abd pad to right upper abd. Pt to f/u with Dr. Pennington in 1 week 07/23/2020.  7/23/20: F/U with Dr. Pennington. I&D of right abdomen abscess done 7/17/20 per Dr. Pennington. Site open with sutures. All previous sites improving. Iodoform gauze to sites needing packing. Rx for iodoform gauze and Vibra tabs provided today. Next visit 7/30/20.  7/30/20: F/U with Dr. Pennington. All sites improving. Sutures removed from right abdominal site per Dr. Pennington. Has excoriation under right breast. Rx for mycostatin powder provided. Cont. POC. Return 8/6/20.  08/13/2020- f/u with Dr. Pennington. Pt wounds improving. Silver nitrate x 2 to wounds for hypergranulation. Cont POC. F/u with Dr. Pennington in 2 weeks 08/27/2020  9/3/20: F/U with Dr. Pennington. All measurements improved. Breast site resolved. Wound care orders changed. Cont. Doxycycline and mycostatin powder. Next visit 9/17/20.  9/17/2020: Fu with Dr. Pennington. Wounds hypergranulated.  applied silver nitrate x 1 to each wound, patient tolerated well. Continued with current treatment plan. Fu 2 weeks 10/1/2020.  10/1/20: F/U with Dr. Pennington. Measurements improved. All sites with hypergranulation tissue. Lidocaine 4% to all sites prior to silver nitrate application. Continue POC. Next visit 10/15/20.  10/15/20: F/U with Dr. Pennington. Measurements decreased. Silver nitrate to all sites for  hypergranulation tissue. Cont. POC. Next visit 10/29/20.  10/29/20: F/U with Dr. Pennington. Silver nitrate to hypergranulation tissue at all sites. Culture of lower abdomen site done today. Cont. POC. Next visit 11/12/20.  11/12/20: F/U with Dr. Pennington. Silver nitrate to all sites. Cont. Augmentin. Refill called to pharmacy. Next visit 12/3/20.    Review of Systems   Constitutional: Negative.    HENT: Negative.    Eyes: Negative.    Respiratory: Negative.    Cardiovascular: Negative.    Gastrointestinal: Negative.    Genitourinary: Negative.    Musculoskeletal: Negative.    Skin: Negative.    Neurological: Negative.    Psychiatric/Behavioral: Negative.        Objective:      Physical Exam  Constitutional:       Appearance: She is well-developed.   HENT:      Head: Normocephalic.   Eyes:      Conjunctiva/sclera: Conjunctivae normal.      Pupils: Pupils are equal, round, and reactive to light.   Neck:      Musculoskeletal: Normal range of motion and neck supple.   Cardiovascular:      Rate and Rhythm: Normal rate and regular rhythm.      Heart sounds: Normal heart sounds.   Pulmonary:      Effort: Pulmonary effort is normal.      Breath sounds: Normal breath sounds.   Abdominal:      General: Bowel sounds are normal.      Palpations: Abdomen is soft.   Musculoskeletal: Normal range of motion.   Skin:     General: Skin is warm and dry.   Neurological:      Mental Status: She is alert and oriented to person, place, and time.      Deep Tendon Reflexes: Reflexes are normal and symmetric.         Assessment:       1. Hidradenitis suppurativa of right axilla    2. Left axillary hidradenitis           Wound 07/31/19 1300 Other (comment) Axilla #2 (Active)   07/31/19 1300    Pre-existing: Yes   Primary Wound Type: Other   Side: Left   Orientation:    Location: Axilla   Wound Number (optional): #2   Ankle-Brachial Index:    Pulses:    Removal Indication and Assessment:    Wound Outcome:    (Retired) Wound Type:    (Retired)  Wound Length (cm):    (Retired) Wound Width (cm):    (Retired) Depth (cm):    Wound Description (Comments):    Removal Indications:    Dressing Appearance Intact;Moist drainage 11/12/20 1000   Drainage Amount Moderate 11/12/20 1000   Drainage Characteristics/Odor Serosanguineous 11/12/20 1000   Appearance Red;Hypergranulation;Moist 11/12/20 1000   Tissue loss description Full thickness 11/12/20 1000   Red (%), Wound Tissue Color 100 % 11/12/20 1000   Periwound Area Dry;Intact 11/12/20 1000   Wound Edges Irregular 11/12/20 1000   Wound Length (cm) 1 cm 11/12/20 1000   Wound Width (cm) 0.5 cm 11/12/20 1000   Wound Depth (cm) 0.1 cm 11/12/20 1000   Wound Volume (cm^3) 0.05 cm^3 11/12/20 1000   Wound Surface Area (cm^2) 0.5 cm^2 11/12/20 1000   Care Cleansed with:;Sterile normal saline 11/12/20 1000   Dressing Abd pad;Non-adherent;Gauze 11/12/20 1000   Periwound Care Dry periwound area maintained 11/12/20 1000   Dressing Change Due 11/13/20 11/12/20 1000            Wound 07/31/19 1300 Other (comment) Axilla #1 (Active)   07/31/19 1300    Pre-existing: Yes   Primary Wound Type: Other   Side: Right   Orientation:    Location: Axilla   Wound Number (optional): #1   Ankle-Brachial Index:    Pulses:    Removal Indication and Assessment:    Wound Outcome:    (Retired) Wound Type:    (Retired) Wound Length (cm):    (Retired) Wound Width (cm):    (Retired) Depth (cm):    Wound Description (Comments):    Removal Indications:    Wound Image   11/12/20 1000   Dressing Appearance Intact;Moist drainage 11/12/20 1000   Drainage Amount Moderate 11/12/20 1000   Drainage Characteristics/Odor Serosanguineous 11/12/20 1000   Appearance Red;Moist;Hypergranulation 11/12/20 1000   Tissue loss description Full thickness 11/12/20 1000   Red (%), Wound Tissue Color 100 % 11/12/20 1000   Periwound Area Dry;Intact 11/12/20 1000   Wound Edges Irregular 11/12/20 1000   Wound Length (cm) 3 cm 11/12/20 1000   Wound Width (cm) 0.5 cm 11/12/20 1000    Wound Depth (cm) 0.1 cm 11/12/20 1000   Wound Volume (cm^3) 0.15 cm^3 11/12/20 1000   Wound Surface Area (cm^2) 1.5 cm^2 11/12/20 1000   Care Cleansed with:;Sterile normal saline 11/12/20 1000   Periwound Care Dry periwound area maintained 11/12/20 1000   Dressing Change Due 11/13/20 11/12/20 1000            Wound 07/15/20 1600 Abscess Right upper Abdomen #4 (Active)   07/15/20 1600    Pre-existing: Yes   Primary Wound Type: Abscess   Side: Right   Orientation: upper   Location: Abdomen   Wound Number (optional): #4   Ankle-Brachial Index:    Pulses:    Removal Indication and Assessment:    Wound Outcome:    (Retired) Wound Type:    (Retired) Wound Length (cm):    (Retired) Wound Width (cm):    (Retired) Depth (cm):    Wound Description (Comments):    Removal Indications:    Wound Image   11/12/20 1000   Dressing Appearance Intact;Moist drainage 11/12/20 1000   Drainage Amount Small 11/12/20 1000   Drainage Characteristics/Odor Serosanguineous 11/12/20 1000   Appearance Red;Moist 11/12/20 1000   Tissue loss description Partial thickness 11/12/20 1000   Red (%), Wound Tissue Color 100 % 11/12/20 1000   Periwound Area Intact;Dry 11/12/20 1000   Wound Edges Defined 11/12/20 1000   Wound Length (cm) 1 cm 11/12/20 1000   Wound Width (cm) 2 cm 11/12/20 1000   Wound Depth (cm) 0.1 cm 11/12/20 1000   Wound Volume (cm^3) 0.2 cm^3 11/12/20 1000   Wound Surface Area (cm^2) 2 cm^2 11/12/20 1000   Care Cleansed with:;Sterile normal saline 11/12/20 1000   Dressing Changed;Gauze;Non-adherent;Island/border 11/12/20 1000   Periwound Care Dry periwound area maintained 11/12/20 1000   Dressing Change Due 11/13/20 11/12/20 1000            Wound 09/17/20 1031 Right posterior Back #5 (Active)   09/17/20 1031    Pre-existing: Yes   Primary Wound Type:    Side: Right   Orientation: posterior   Location: Back   Wound Number (optional): #5   Ankle-Brachial Index:    Pulses:    Removal Indication and Assessment:    Wound Outcome:     (Retired) Wound Type:    (Retired) Wound Length (cm):    (Retired) Wound Width (cm):    (Retired) Depth (cm):    Wound Description (Comments):    Removal Indications:    Wound Image   11/12/20 1000   Dressing Appearance Intact;Moist drainage 11/12/20 1000   Drainage Amount Small 11/12/20 1000   Drainage Characteristics/Odor Serosanguineous 11/12/20 1000   Appearance Red;Hypergranulation;Moist 11/12/20 1000   Tissue loss description Partial thickness 11/12/20 1000   Red (%), Wound Tissue Color 100 % 11/12/20 1000   Periwound Area Intact;Dry 11/12/20 1000   Wound Edges Irregular 11/12/20 1000   Wound Length (cm) 1.5 cm 11/12/20 1000   Wound Width (cm) 4 cm 11/12/20 1000   Wound Depth (cm) 0.1 cm 11/12/20 1000   Wound Volume (cm^3) 0.6 cm^3 11/12/20 1000   Wound Surface Area (cm^2) 6 cm^2 11/12/20 1000   Care Cleansed with:;Sterile normal saline 11/12/20 1000   Dressing Changed;Gauze;Island/border;Non-adherent 11/12/20 1000   Periwound Care Dry periwound area maintained 11/12/20 1000   Dressing Change Due 11/13/20 11/12/20 1000            Wound 10/15/20 1030 Abscess Right lower Abdomen (Active)   10/15/20 1030    Pre-existing: Yes   Primary Wound Type: Abscess   Side: Right   Orientation: lower   Location: Abdomen   Wound Number (optional):    Ankle-Brachial Index:    Pulses:    Removal Indication and Assessment:    Wound Outcome:    (Retired) Wound Type:    (Retired) Wound Length (cm):    (Retired) Wound Width (cm):    (Retired) Depth (cm):    Wound Description (Comments):    Removal Indications:    Wound Image   11/12/20 1000   Dressing Appearance Moist drainage;Intact 11/12/20 1000   Drainage Amount Small 11/12/20 1000   Drainage Characteristics/Odor Serosanguineous 11/12/20 1000   Appearance Red;Moist 11/12/20 1000   Tissue loss description Partial thickness 11/12/20 1000   Red (%), Wound Tissue Color 100 % 11/12/20 1000   Periwound Area Intact;Dry 11/12/20 1000   Wound Edges Irregular 11/12/20 1000   Wound  Length (cm) 2.5 cm 11/12/20 1000   Wound Width (cm) 2.5 cm 11/12/20 1000   Wound Depth (cm) 0.1 cm 11/12/20 1000   Wound Volume (cm^3) 0.62 cm^3 11/12/20 1000   Wound Surface Area (cm^2) 6.25 cm^2 11/12/20 1000   Care Cleansed with:;Sterile normal saline 11/12/20 1000   Dressing Non-adherent;Island/border 11/12/20 1000   Periwound Care Dry periwound area maintained 11/12/20 1000   Dressing Change Due 11/13/20 11/12/20 1000   Silver nitrate to hypergranulation tissue of all sites.    Left and Right Axilla   Cleanse with: Rinse with normal saline   Primary dressing:  xeroform to right and left   Secondary dressing: cover with 4x4 gauze and small abd pad, secure with mefix tape   Frequency: daily     Right upper abd, right lower abdomen,and right back:  Cleanse with: Rinse with normal saline   Primary dressing: xeroform   Secondary dressing: gauze, 4 x 4 mepore dressing  Frequency: daily       Follow up with Dr. Pennington  Thursday 12/3/20    Other orders: Continue Augmentin      Home Health: [facility]:  Fax # [phone].  Skilled nurse to perform dressing changes every Monday, Wednesday except when in clinic. Patient will do own wound care of Fridays.  Please provide patient's caregiver with necessary supplies to do wound care: small abd pads, 4x4 gauze, mefix tape.     Plan:                12/3/20 Dr. Pennington

## 2020-11-12 NOTE — PROGRESS NOTES
Physical Therapy Daily Treatment Note     Name: Rodney Gonzalez [facility] Number: [id number]    Therapy Diagnosis:   Encounter Diagnosis   Name Primary?    Chronic left shoulder pain      Physician: Ken Alberto MD    Visit Date: 11/12/2020    Physician Orders: PT Eval and Treat  Medical Diagnosis from Referral: Tendinopathy   Evaluation Date: 8/18/2020  Authorization Period Expiration: 12/31/2020  Plan of Care Expiration: 11/16/2020  Visit # / Visits authorized: 14 / 20     Precautions: Standard and post op sx for hidradenitis under both arms on 12/24/2020     Time In: 5:45 pm  Time Out: 6:30 pm  Total Billable Time: 40 minutes     SUBJECTIVE     Today, pt reports: she is feeling good, just has the fatigue when reaching for things and driving for more than 2 hours at a time  She was compliant with home exercise program.  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 DATE 11/16/2020 11/16/2020 11/16/2020 11/16/2020 11/16/2020 11/16/2020 11/16/2020 11/16/2020 11/16/2020 11/16/2020 11/16/2020 11/16/2020   FACE-TO-FACE 12/03/2020 12/03/2020 12/3/2020 12/3/2020 10/28/2020 10/28/2020 10/28/2020 10/28/2020 10/28/2020 10/28/2020 9/18/2020 9/18/2020   FOTO  --   -- --  -- -- 5/5 4/5 3/5                  UBE       L3 x 2' ea L3 x 2' ea L2.5 x 3' ea L2 3' ea 3' frwd/3'back    Pulleys (flex, scap) 2' flexion 2' flexion 2' flexion 2' ea 2' ea 2' ea 2' ea 2' ea 2' ea 3' ea 3' ea 3 min each   TABLE:               Wand flexion   "-- --  NT  NT NT 2 x 10 x 2# bar NT 2 x 10 2 x 10   Scapular protractions  -- --  NT  NT NT 3 x 10 x 3# NT 2 x 10 x 2# 2 x 10 x 1#   Reverse Codman's  Ball on wall  30 x cw/ccw   2 kg G ball Ball on wall  30 x cw/ccw   2 kg G ball Ball on wall  30 x cw/ccw   1 kg G ball Ball on wall  30 x cw/ccw   1 kg G ball Ball on wall  30 x cw/ccw   500g G ball Ball on wall  30 x cw/ccw   500g G ball Ball on wall  20 x cw/ccw   500g G ball 30 x 3# ea  cw/ccw NT 30 x 2# ea  cw/ccw  20 x cw/ccw                                 STANDING:               Doorway stretch  -- -  --  NT 3 x 30" 3 x 30" 3 x 30" 3 x 30" 3 x 30"   IR stretch  -- --  --  NT 3 x 20"       Rows  -- --  --  NT 3 x 10 STB 3 x 10 STB 3 x 10 BTB 3 x 10 BTB 3 x 10 BTB   ER at 90 deg abduction 3 x 10 GTB 3 x 10 RTB 3 x 10 RTB 3 x 10 YTB 2 x 10 YTB 2 x 10 YTB         Chamberino's 3 x 10 STB (ER/IR) 3 x 10 STB (ER/IR) 3 x 10 STB (ER/IR) 3 x 10 BTB (ER/IR) 3 x 10 BTB (ER/IR) 3 x 10 BTB (ER/IR) 3 x 10 BTB (ER/IR) NT 3 x 10 GTB 3 x 10 RTB 2 x 10 RTB 1 x 10 RTB   Theraband:  - W  - I  - T   3 x 10 BTB  3 x 10 STB  3 x 10 STB   3 x 10 BTB  3 x 10 STB  3 x 10 STB   2 x 10 BTB  3 x 10 STB  2 x 10 BTB   2 x 10 BTB  3 x 10 BTB  2 x 10 BTB   2 x 10 GTB  3 x 10 BTB  3 x 10 GTB   2 x 10 GTB  3 x 10 BTB  3 x 10 GTB   2 x 10 GTB  2 x 10 BTB  2 x 10 GTB   NT  3 x 10 BTB  NT   2 x 10 RTB  2 x 10 BTB  2 x 10 RTB   2 x 10 RTB  2 x 10 GTB  2 x 10 RTB   2 x 10 YTB  2 x 10 GTB  2 x 10 YTB   --  2 x 10 RTB  --   Shldr AROM:  - Flexion  - Scaption  - Abduction thumb up   2 x 10 x 3#  2 x 10 x 3#  2 x 10 x 3#   2 x 20 x 3#  2 x 20 x 3#  2 x 20 x 3#   2 x 10 x 3#  2 x 10 x 3#  2 x 10 x 3#   3 x 10 x 2#  3 x 10 x 2#  3 x 10 x 2#   3 x 10 x 2#  3 x 10 x 2#  3 x 10 x 2#   2 x 10 x 2#  2 x 10 x 2#  2 x 10 x 2#   2 x 10 x 2#  2 x 10 x 2#  2 x 10 x 2#   2 x 10 x 1#  2 x 10 x 1#  2 x 10 x 1#   2 x 10 x 1#  2 x 10 x 1#  2 x 10 x 1# To 90 deg  1 x 10 + 1 x 10 x 1#  2 x 10 x 1#  2 x 10 Next?    PNF D2 " "flexion NT NT 3 x 10 x 1# 3 x 10 x 1# 2 x 10 x 1# 2 x 10 x 1# 2 x 10 x 2# 2 x 10 x 1# 2 x 10 x 1# Next Next?    Serratus wall slides  3 x 10 2 x 10  NT 2 x 10 YTB 2 x 10 2 x 10 Next?       Overhead press 3 x 10 x 2# 3 x 10 x 1# 3 x 10 x 1# 3 x 10 x 1# 2 x 10 x 1# 2 x 10 x 1# 1 x 10 x 1#        Overhead arc on wall -- --  NT 1 x 10 1 x 10 1 x 10        Overhead dribble  -- -- NT NT Next?           Body blade flex and abd  overhead 2 x 20" ea  2 x 20" 2 x 20" ea 2 x 15" ea            Wall push up plus 2 x 10 Next?                            Initials MA SB MA MA SB MA MA SB MA SB MA MA      Standing AROM of left shoulder:  Flexion: 155 degrees  Abduction: 150 degrees  Functional ER reaches to T3  Functional IR reaches to T7     MMT of left shoulder:  Flexion:  4+/5  Abduction: 4+/5  ER: 4+/5  IR: 5/5    Home Exercises Provided and Patient Education Provided     Education/Self-Care provided: (0 minutes)   Patient educated on biomechanical justification for therapeutic exercise and importance of compliance with HEP in order to improve overall impairments and QOL    Patient educated on postural awareness and the use of a lumbar roll when in a seated position to reduce stress and maintain optimal alignment of the spine.    Patient educated on proper ergonomics at the work station in order to maintain optimal alignment of the musculoskeletal system and improve efficiency in the work environment.   Patient educated on the importance of improved core and upper extremity strength in order to improve alignment of the spine and upper extremities with static positions and dynamic movement.    Patient educated on the importance of strong core and lower extremity musculature in order to improve both static and dynamic balance, improve gait mechanics, reduce fall risk and improve household and community mobility.   - passive stretching techniques for posterior capsule stretching and IR stretching.     Written Home Exercises " Provided: Patient instructed to cont prior HEP.  Exercises were reviewed and Rodney was able to demonstrate them prior to the end of the session.  Rodney demonstrated good  understanding of the education provided.     See EMR under Patient Instructions for exercises provided 8/18/2020.    ASSESSMENT     Rodney is noted to have improved AROM and strength of left shoulder, but continues to have decreased endurance during repetitive lifting, reaching overhead, and reports continued difficulty with lifting heavy objects such as a case of water bottles.  Pt has met all STG's as well as LTG's #1, 5, 6, and 7.  She would benefit from continued therapy to continue with overhead strength and endurance training.    Rodney is progressing well towards her goals.   Pt prognosis is Good.     Pt will continue to benefit from skilled outpatient physical therapy to address the deficits listed in the problem list box on initial evaluation, provide pt/family education and to maximize pt's level of independence in the home and community environment.     Pt's spiritual, cultural and educational needs considered and pt agreeable to plan of care and goals.     Anticipated barriers to physical therapy: co-morbidities    Goals:      Short Term Goals:  6 weeks     1. Pain: Pt will demonstrate improved pain by reports of less than or equal to 8/10 worst pain on the verbal rating scale in order to progress toward maximal functional ability and improve QOL.  MET   2. Function: Patient will demonstrate improved function as indicated by a functional limitation score of less than or equal to 49 out of 100 on FOTO.  MET   3. Mobility: Patient will improve AROM to 50% of stated goals, listed in objective measures above, in order to progress towards independence with functional activities.   MET   4. Strength: Patient will improve strength to 50% of stated goals, listed in objective measures above, in order to progress towards independence with  functional activities.   MET   5. Gait: Patient will demonstrate improved gait mechanics in order to improve functional mobility, improve quality of life, and decrease risk of further injury or fall.   MET   6. HEP: Patient will demonstrate independence with HEP in order to progress toward functional independence.  MET   7. Improve postural awareness.   MET         Long Term Goals:  12 weeks     1. Pain: Pt will demonstrate improved pain by reports of less than or equal to 6/10 worst pain on the verbal rating scale in order to progress toward maximal functional ability and improve QOL.    MET   2. Function: Patient will demonstrate improved function as indicated by a functional limitation score of less than or equal to 35 out of 100 on FOTO.     3. Mobility: Patient will improve AROM to stated goals, listed in objective measures above, in order to return to maximal functional potential and improve quality of life.  Not Met   4. Strength: Patient will improve strength to stated goals, listed in objective measures above, in order to improve functional independence and quality of life.  Not Met   5. Gait: Patient will demonstrate normalized gait mechanics with minimal compensation in order to return to PLOF.  MET   6. Patient will return to normal ADL's, IADL's, community involvement, recreational activities, and work-related activities with less than or equal to 3/10 pain and maximal function.   MET   7. Patient will be able to return to work without limitation.   MET          PLAN   Plan of care Certification: 11/12/2020 to 12/31/2020.    Continue Plan of Care (POC) as tolerated. Attempt progression of wall push ups to table.    Douglas Todd, PT

## 2020-11-13 NOTE — PLAN OF CARE
Outpatient Therapy Updated Plan of Care     Visit Date: 11/12/2020  Name: Rodney Gonzalez [facility] Number: [id number]    Therapy Diagnosis:   Encounter Diagnosis   Name Primary?    Chronic left shoulder pain      Physician: Ken Alberto MD    Physician Orders: PT Eval and Treat  Medical Diagnosis from Referral: Tendinopathy   Evaluation Date: 8/18/2020    Total Visits Received: 14  Cancelled Visits: 8  No Show Visits: 0    Current Certification Period:  8/18/2020 to 11/16/2020  Precautions:  standard  Visits from Evaluation Date:  13  Functional Level Prior to Evaluation:  independent    Subjective     Update: she is feeling good, just has the fatigue when reaching for things and driving for more than 2 hours at a time    Objective     Update:    Standing AROM of left shoulder:  Flexion: 155 degrees  Abduction: 150 degrees  Functional ER reaches to T3  Functional IR reaches to T7     MMT of left shoulder:  Flexion:  4+/5  Abduction: 4+/5  ER: 4+/5  IR: 5/5    Assessment     Update: Rodney is noted to have improved AROM and strength of left shoulder, but continues to have decreased endurance during repetitive lifting, reaching overhead, and reports continued difficulty with lifting heavy objects such as a case of water bottles.  Pt has met all STG's as well as LTG's #1, 5, 6, and 7.  She would benefit from continued therapy to continue with overhead strength and endurance training.      New Short Term Goals Status:        Short Term Goals:  6 weeks     1. Pain: Pt will demonstrate improved pain by reports of less than or equal to 8/10 worst pain on the verbal rating scale in order to progress toward maximal functional ability and improve QOL.  MET   2. Function: Patient will demonstrate improved function as indicated by a functional limitation score of less than or equal to 49 out of 100 on FOTO.  MET   3. Mobility: Patient will improve AROM to 50% of stated goals, listed in objective measures above, in  order to progress towards independence with functional activities.   MET   4. Strength: Patient will improve strength to 50% of stated goals, listed in objective measures above, in order to progress towards independence with functional activities.   MET   5. Gait: Patient will demonstrate improved gait mechanics in order to improve functional mobility, improve quality of life, and decrease risk of further injury or fall.   MET   6. HEP: Patient will demonstrate independence with HEP in order to progress toward functional independence.  MET   7. Improve postural awareness.   MET         Long Term Goals:  12 weeks     1. Pain: Pt will demonstrate improved pain by reports of less than or equal to 6/10 worst pain on the verbal rating scale in order to progress toward maximal functional ability and improve QOL.    MET   2. Function: Patient will demonstrate improved function as indicated by a functional limitation score of less than or equal to 35 out of 100 on FOTO.     3. Mobility: Patient will improve AROM to stated goals, listed in objective measures above, in order to return to maximal functional potential and improve quality of life.  Not Met   4. Strength: Patient will improve strength to stated goals, listed in objective measures above, in order to improve functional independence and quality of life.  Not Met   5. Gait: Patient will demonstrate normalized gait mechanics with minimal compensation in order to return to PLOF.  MET   6. Patient will return to normal ADL's, IADL's, community involvement, recreational activities, and work-related activities with less than or equal to 3/10 pain and maximal function.   MET   7. Patient will be able to return to work without limitation.   MET        Long Term Goal Status:   continue per initial plan of care.  Reasons for Recertification of Therapy:   Continued weakness and decreased endurance with repetitive lifting and overhead activiteis    Plan     Updated  Certification Period: 11/12/2020 to 12/31/2020  Recommended Treatment Plan: 2 times per week for remaining 6 visits: Manual Therapy, Moist Heat/ Ice, Neuromuscular Re-ed, Patient Education, Therapeutic Activites and Therapeutic Exercise  Other Recommendations: none    Douglas Todd, PT  11/12/2020      I CERTIFY THE NEED FOR THESE SERVICES FURNISHED UNDER THIS PLAN OF TREATMENT AND WHILE UNDER MY CARE    Physician's comments:        Physician's Signature: ___________________________________________________

## 2020-11-17 ENCOUNTER — CLINICAL SUPPORT (OUTPATIENT)
Dept: REHABILITATION | Facility: HOSPITAL | Age: 40
End: 2020-11-17
Payer: COMMERCIAL

## 2020-11-17 DIAGNOSIS — G89.29 CHRONIC LEFT SHOULDER PAIN: ICD-10-CM

## 2020-11-17 DIAGNOSIS — M25.512 CHRONIC LEFT SHOULDER PAIN: ICD-10-CM

## 2020-11-17 PROCEDURE — 97110 THERAPEUTIC EXERCISES: CPT | Mod: PO

## 2020-11-17 NOTE — PROGRESS NOTES
Physical Therapy Daily Treatment Note     Name: Rodney Gonzalez [facility] Number: [id number]    Therapy Diagnosis:   Encounter Diagnosis   Name Primary?    Chronic left shoulder pain      Physician: Ken Alberto MD    Visit Date: 11/17/2020    Physician Orders: PT Eval and Treat  Medical Diagnosis from Referral: Tendinopathy   Evaluation Date: 8/18/2020  Authorization Period Expiration: 12/31/2020  Plan of Care Expiration: 12/31/2020  Visit # / Visits authorized: 15 / 20     Precautions: Standard and post op sx for hidradenitis under both arms on 12/24/2020     Time In: 8:45 am  Time Out: 9:30 am  Total Billable Time: 43 minutes     SUBJECTIVE     Today, pt reports: no pain only having weakness when reeaching overhead and when reaching to get something off the shelf. She has only been doing the exercises on the sheet from the first day as her HEP.  She was compliant with home exercise program.  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 DATE 12/31/2020 11/16/2020 11/16/2020 11/16/2020 11/16/2020 11/16/2020 11/16/2020 11/16/2020 11/16/2020 11/16/2020 11/16/2020 11/16/2020 11/16/2020   FACE-TO-FACE 12/03/2020 12/03/2020 12/03/2020 12/3/2020 12/3/2020 10/28/2020 10/28/2020 10/28/2020 10/28/2020 10/28/2020 10/28/2020 9/18/2020 9/18/2020   FOTO --  --   -- --  -- -- 5/5 4/5 3/5                   UBE Not today       L3 x 2' ea L3 x 2' ea L2.5 x 3' ea L2 3' ea 3' frwd/3'back    Pulleys  "(flex, scap) 2' flex 2' flexion 2' flexion 2' flexion 2' ea 2' ea 2' ea 2' ea 2' ea 2' ea 3' ea 3' ea 3 min each   TABLE:                Wand flexion --  -- --  NT  NT NT 2 x 10 x 2# bar NT 2 x 10 2 x 10   Scapular protractions --  -- --  NT  NT NT 3 x 10 x 3# NT 2 x 10 x 2# 2 x 10 x 1#   Reverse Codman's Ball on wall 30x cw/ccw 2kg ball  Ball on wall  30 x cw/ccw   2 kg G ball Ball on wall  30 x cw/ccw   2 kg G ball Ball on wall  30 x cw/ccw   1 kg G ball Ball on wall  30 x cw/ccw   1 kg G ball Ball on wall  30 x cw/ccw   500g G ball Ball on wall  30 x cw/ccw   500g G ball Ball on wall  20 x cw/ccw   500g G ball 30 x 3# ea  cw/ccw NT 30 x 2# ea  cw/ccw  20 x cw/ccw                                   STANDING:                Doorway stretch --  -- -  --  NT 3 x 30" 3 x 30" 3 x 30" 3 x 30" 3 x 30"   IR stretch --  -- --  --  NT 3 x 20"       Rows --  -- --  --  NT 3 x 10 STB 3 x 10 STB 3 x 10 BTB 3 x 10 BTB 3 x 10 BTB   ER at 90 deg abduction 3 x 10 GTB 3 x 10 GTB 3 x 10 RTB 3 x 10 RTB 3 x 10 YTB 2 x 10 YTB 2 x 10 YTB         [facility] 3 x 10 STB (IR/ER) 3 x 10 STB (ER/IR) 3 x 10 STB (ER/IR) 3 x 10 STB (ER/IR) 3 x 10 BTB (ER/IR) 3 x 10 BTB (ER/IR) 3 x 10 BTB (ER/IR) 3 x 10 BTB (ER/IR) NT 3 x 10 GTB 3 x 10 RTB 2 x 10 RTB 1 x 10 RTB   Theraband:  - W  - I  - T   3 x 10 BTB  3 x 10 STB   3 x 10 STB   3 x 10 BTB  3 x 10 STB  3 x 10 STB   3 x 10 BTB  3 x 10 STB  3 x 10 STB   2 x 10 BTB  3 x 10 STB  2 x 10 BTB   2 x 10 BTB  3 x 10 BTB  2 x 10 BTB   2 x 10 GTB  3 x 10 BTB  3 x 10 GTB   2 x 10 GTB  3 x 10 BTB  3 x 10 GTB   2 x 10 GTB  2 x 10 BTB  2 x 10 GTB   NT  3 x 10 BTB  NT   2 x 10 RTB  2 x 10 BTB  2 x 10 RTB   2 x 10 RTB  2 x 10 GTB  2 x 10 RTB   2 x 10 YTB  2 x 10 GTB  2 x 10 YTB   --  2 x 10 RTB  --   Shldr AROM:  - Flexion  - Scaption  - Abduction thumb up   2 x 10 x 3#  2 x 10 x 3#  2 x 10 x 3#   2 x 10 x 3#  2 x 10 x 3#  2 x 10 x 3#   2 x 20 x 3#  2 x 20 x 3#  2 x 20 x 3#   2 x 10 x 3#  2 x 10 x 3#  2 x 10 x 3# " "  3 x 10 x 2#  3 x 10 x 2#  3 x 10 x 2#   3 x 10 x 2#  3 x 10 x 2#  3 x 10 x 2#   2 x 10 x 2#  2 x 10 x 2#  2 x 10 x 2#   2 x 10 x 2#  2 x 10 x 2#  2 x 10 x 2#   2 x 10 x 1#  2 x 10 x 1#  2 x 10 x 1#   2 x 10 x 1#  2 x 10 x 1#  2 x 10 x 1# To 90 deg  1 x 10 + 1 x 10 x 1#  2 x 10 x 1#  2 x 10 Next?    PNF D2 flexion Not today NT NT 3 x 10 x 1# 3 x 10 x 1# 2 x 10 x 1# 2 x 10 x 1# 2 x 10 x 2# 2 x 10 x 1# 2 x 10 x 1# Next Next?    Serratus wall slides   3 x 10 2 x 10  NT 2 x 10 YTB 2 x 10 2 x 10 Next?       Overhead press 3 x 10 x 2# 3 x 10 x 2# 3 x 10 x 1# 3 x 10 x 1# 3 x 10 x 1# 2 x 10 x 1# 2 x 10 x 1# 1 x 10 x 1#        Overhead arc on wall -- -- --  NT 1 x 10 1 x 10 1 x 10        Overhead dribble  -- -- -- NT NT Next?           Body blade flex and abd  overhead 2 x 20" ea  2 x 20" 2 x 20" ea  2 x 20" 2 x 20" ea 2 x 15" ea            Wall push up plus 2 x 10 2 x 10 Next?                             Initials CORRINE PARKS MA, MA     Home Exercises Provided and Patient Education Provided     Education/Self-Care provided: (0 minutes)   Patient educated on biomechanical justification for therapeutic exercise and importance of compliance with HEP in order to improve overall impairments and QOL    Patient educated on postural awareness and the use of a lumbar roll when in a seated position to reduce stress and maintain optimal alignment of the spine.    Patient educated on proper ergonomics at the work station in order to maintain optimal alignment of the musculoskeletal system and improve efficiency in the work environment.   Patient educated on the importance of improved core and upper extremity strength in order to improve alignment of the spine and upper extremities with static positions and dynamic movement.    Patient educated on the importance of strong core and lower extremity musculature in order to improve both static and dynamic balance, improve gait mechanics, reduce fall risk and improve " household and community mobility.   - passive stretching techniques for posterior capsule stretching and IR stretching.     Written Home Exercises Provided: Patient instructed to cont prior HEP.  Exercises were reviewed and Rodney was able to demonstrate them prior to the end of the session.  Rodney demonstrated good  understanding of the education provided.     See EMR under Patient Instructions for exercises provided 8/18/2020.    ASSESSMENT     Rodney was able to perform all of today's exercises with occasional verbal cues for posture and to avoid excessive use of her upper trap as she fatigues. She verbalized understanding instructions to begin overhead press and theraband exercises at home to improve her endurance and strength. She performed all of today's activities with no increase in symptoms prior to leaving the clinic.     Rodney is progressing well towards her goals.   Pt prognosis is Good.     Pt will continue to benefit from skilled outpatient physical therapy to address the deficits listed in the problem list box on initial evaluation, provide pt/family education and to maximize pt's level of independence in the home and community environment.     Pt's spiritual, cultural and educational needs considered and pt agreeable to plan of care and goals.     Anticipated barriers to physical therapy: co-morbidities    Goals:      Short Term Goals:  6 weeks     1. Pain: Pt will demonstrate improved pain by reports of less than or equal to 8/10 worst pain on the verbal rating scale in order to progress toward maximal functional ability and improve QOL.  MET   2. Function: Patient will demonstrate improved function as indicated by a functional limitation score of less than or equal to 49 out of 100 on FOTO.  MET   3. Mobility: Patient will improve AROM to 50% of stated goals, listed in objective measures above, in order to progress towards independence with functional activities.   MET   4. Strength: Patient will  improve strength to 50% of stated goals, listed in objective measures above, in order to progress towards independence with functional activities.   MET   5. Gait: Patient will demonstrate improved gait mechanics in order to improve functional mobility, improve quality of life, and decrease risk of further injury or fall.   MET   6. HEP: Patient will demonstrate independence with HEP in order to progress toward functional independence.  MET   7. Improve postural awareness.   MET         Long Term Goals:  12 weeks     1. Pain: Pt will demonstrate improved pain by reports of less than or equal to 6/10 worst pain on the verbal rating scale in order to progress toward maximal functional ability and improve QOL.    MET   2. Function: Patient will demonstrate improved function as indicated by a functional limitation score of less than or equal to 35 out of 100 on FOTO.     3. Mobility: Patient will improve AROM to stated goals, listed in objective measures above, in order to return to maximal functional potential and improve quality of life.  Not Met   4. Strength: Patient will improve strength to stated goals, listed in objective measures above, in order to improve functional independence and quality of life.  Not Met   5. Gait: Patient will demonstrate normalized gait mechanics with minimal compensation in order to return to PLOF.  MET   6. Patient will return to normal ADL's, IADL's, community involvement, recreational activities, and work-related activities with less than or equal to 3/10 pain and maximal function.   MET   7. Patient will be able to return to work without limitation.   MET          PLAN   Plan of care Certification: 11/12/2020 to 12/31/2020.    Continue Plan of Care (POC) as tolerated. Begin table top push ups next visit.     Henny Rodarte, PT

## 2020-11-17 NOTE — PATIENT INSTRUCTIONS
"    W I T Y standing with band    WITY For Shoulder and Upper Back  W: Elevate arms lifting elbows to shoulder height, externally rotate to form "W"  I: Pull hands down toward hips to form "I"  T: Hands away from body, thumbs pointing behind you form "T"  Y: Raise arms overhead slightly away from vertical, form "Y"     Do 3 sets of 10 reps of each 2 x a day.    Rotation: External in Abduction (Single Arm)        Face anchor in shoulder width stance with elbow bent at 90°, forearm in front. Palm down, pull forearm up.  Repeat 10 times per set. Repeat with other arm. Do 3 sets per session. Do 2 sessions per day.  Blanco Height: Waist     [email or web address]     Copyright © I. All rights reserved.     "

## 2020-11-19 ENCOUNTER — CLINICAL SUPPORT (OUTPATIENT)
Dept: REHABILITATION | Facility: HOSPITAL | Age: 40
End: 2020-11-19
Payer: COMMERCIAL

## 2020-11-19 DIAGNOSIS — G89.29 CHRONIC LEFT SHOULDER PAIN: ICD-10-CM

## 2020-11-19 DIAGNOSIS — M25.512 CHRONIC LEFT SHOULDER PAIN: ICD-10-CM

## 2020-11-19 PROCEDURE — 97110 THERAPEUTIC EXERCISES: CPT | Mod: PO

## 2020-11-19 NOTE — PROGRESS NOTES
Physical Therapy Daily Treatment Note     Name: Rodney Gonzalez [facility] Number: [id number]    Therapy Diagnosis:   Encounter Diagnosis   Name Primary?    Chronic left shoulder pain      Physician: Ken Alberto MD    Visit Date: 11/19/2020    Physician Orders: PT Eval and Treat  Medical Diagnosis from Referral: Tendinopathy   Evaluation Date: 8/18/2020  Authorization Period Expiration: 12/31/2020  Plan of Care Expiration: 12/31/2020  Visit # / Visits authorized: 16 / 20     Precautions: Standard and post op sx for hidradenitis under both arms on 12/24/2020     Time In: 5:45 pm  Time Out: 6:30 pm  Total Billable Time: 43 minutes     SUBJECTIVE     Today, pt reports: she has been able to perform exercises at home with theraband without issue.  She was compliant with home exercise program.  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 DATE 12/31/2020 12/31/2020 11/16/2020 11/16/2020 11/16/2020 11/16/2020 11/16/2020 11/16/2020 11/16/2020 11/16/2020 11/16/2020 11/16/2020 11/16/2020 11/16/2020   FACE-TO-FACE 12/03/2020 12/03/2020 12/03/2020 12/03/2020 12/3/2020 12/3/2020 10/28/2020 10/28/2020 10/28/2020 10/28/2020 10/28/2020 10/28/2020 9/18/2020 9/18/2020   FOTO  --  --   -- --  -- -- 5/5 4/5 3/5                    UBE  Not today       L3 x 2' ea L3 x 2' ea L2.5 x 3' ea L2 3' ea 3' frwd/3'back    Pulleys (flex, scap) 2' flex 2' flex 2' flexion 2' flexion 2'  "flexion 2' ea 2' ea 2' ea 2' ea 2' ea 2' ea 3' ea 3' ea 3 min each   TABLE:                 Wand flexion -- --  -- --  NT  NT NT 2 x 10 x 2# bar NT 2 x 10 2 x 10   Scapular protractions -- --  -- --  NT  NT NT 3 x 10 x 3# NT 2 x 10 x 2# 2 x 10 x 1#   Reverse Codman's Ball on wall 2 x 30x cw/ccw 2kg ball Ball on wall 30x cw/ccw 2kg ball  Ball on wall  30 x cw/ccw   2 kg G ball Ball on wall  30 x cw/ccw   2 kg G ball Ball on wall  30 x cw/ccw   1 kg G ball Ball on wall  30 x cw/ccw   1 kg G ball Ball on wall  30 x cw/ccw   500g G ball Ball on wall  30 x cw/ccw   500g G ball Ball on wall  20 x cw/ccw   500g G ball 30 x 3# ea  cw/ccw NT 30 x 2# ea  cw/ccw  20 x cw/ccw                                     STANDING:                 Doorway stretch  --  -- -  --  NT 3 x 30" 3 x 30" 3 x 30" 3 x 30" 3 x 30"   IR stretch  --  -- --  --  NT 3 x 20"       Rows  --  -- --  --  NT 3 x 10 STB 3 x 10 STB 3 x 10 BTB 3 x 10 BTB 3 x 10 BTB   ER at 90 deg abduction 3 x 10 BTB 3 x 10 GTB 3 x 10 GTB 3 x 10 RTB 3 x 10 RTB 3 x 10 YTB 2 x 10 YTB 2 x 10 YTB         Carlitos's NT 3 x 10 STB (IR/ER) 3 x 10 STB (ER/IR) 3 x 10 STB (ER/IR) 3 x 10 STB (ER/IR) 3 x 10 BTB (ER/IR) 3 x 10 BTB (ER/IR) 3 x 10 BTB (ER/IR) 3 x 10 BTB (ER/IR) NT 3 x 10 GTB 3 x 10 RTB 2 x 10 RTB 1 x 10 RTB   Theraband:  - W  - I  - T   3 x 10 STB  NT  3 x 10 STB   3 x 10 BTB  3 x 10 STB   3 x 10 STB   3 x 10 BTB  3 x 10 STB  3 x 10 STB   3 x 10 BTB  3 x 10 STB  3 x 10 STB   2 x 10 BTB  3 x 10 STB  2 x 10 BTB   2 x 10 BTB  3 x 10 BTB  2 x 10 BTB   2 x 10 GTB  3 x 10 BTB  3 x 10 GTB   2 x 10 GTB  3 x 10 BTB  3 x 10 GTB   2 x 10 GTB  2 x 10 BTB  2 x 10 GTB   NT  3 x 10 BTB  NT   2 x 10 RTB  2 x 10 BTB  2 x 10 RTB   2 x 10 RTB  2 x 10 GTB  2 x 10 RTB   2 x 10 YTB  2 x 10 GTB  2 x 10 YTB   --  2 x 10 RTB  --   High row/ER/overhead press 15 reps with GTB                Shldr AROM:  - Flexion  - Scaption  - Abduction thumb up   2 x 10 x 3#  2 x 10 x 3#  2 x 10 x 3#   2 x 10 x 3#  2 " "x 10 x 3#  2 x 10 x 3#   2 x 10 x 3#  2 x 10 x 3#  2 x 10 x 3#   2 x 20 x 3#  2 x 20 x 3#  2 x 20 x 3#   2 x 10 x 3#  2 x 10 x 3#  2 x 10 x 3#   3 x 10 x 2#  3 x 10 x 2#  3 x 10 x 2#   3 x 10 x 2#  3 x 10 x 2#  3 x 10 x 2#   2 x 10 x 2#  2 x 10 x 2#  2 x 10 x 2#   2 x 10 x 2#  2 x 10 x 2#  2 x 10 x 2#   2 x 10 x 1#  2 x 10 x 1#  2 x 10 x 1#   2 x 10 x 1#  2 x 10 x 1#  2 x 10 x 1# To 90 deg  1 x 10 + 1 x 10 x 1#  2 x 10 x 1#  2 x 10 Next?    PNF D2 flexion  Not today NT NT 3 x 10 x 1# 3 x 10 x 1# 2 x 10 x 1# 2 x 10 x 1# 2 x 10 x 2# 2 x 10 x 1# 2 x 10 x 1# Next Next?    Serratus wall slides    3 x 10 2 x 10  NT 2 x 10 YTB 2 x 10 2 x 10 Next?       Overhead press -- 3 x 10 x 2# 3 x 10 x 2# 3 x 10 x 1# 3 x 10 x 1# 3 x 10 x 1# 2 x 10 x 1# 2 x 10 x 1# 1 x 10 x 1#        Overhead ball toss  2 x 30" -- -- -- NT NT Next?           Body blade flex and abd  overhead 2 x 30" ea  2 x 30" 2 x 20" ea  2 x 20" 2 x 20" ea  2 x 20" 2 x 20" ea 2 x 15" ea            Wall push up plus 2 x 10 2 x 10 2 x 10 Next?                              Initials MO PARKS MA, MA     Home Exercises Provided and Patient Education Provided     Education/Self-Care provided: (0 minutes)   Patient educated on biomechanical justification for therapeutic exercise and importance of compliance with HEP in order to improve overall impairments and QOL    Patient educated on postural awareness and the use of a lumbar roll when in a seated position to reduce stress and maintain optimal alignment of the spine.    Patient educated on proper ergonomics at the work station in order to maintain optimal alignment of the musculoskeletal system and improve efficiency in the work environment.   Patient educated on the importance of improved core and upper extremity strength in order to improve alignment of the spine and upper extremities with static positions and dynamic movement.    Patient educated on the importance of strong core and lower " extremity musculature in order to improve both static and dynamic balance, improve gait mechanics, reduce fall risk and improve household and community mobility.   - passive stretching techniques for posterior capsule stretching and IR stretching.     Written Home Exercises Provided: Patient instructed to cont prior HEP.  Exercises were reviewed and Rodney was able to demonstrate them prior to the end of the session.  Rodney demonstrated good  understanding of the education provided.     See EMR under Patient Instructions for exercises provided 8/18/2020.    ASSESSMENT     Rodney is progressed with overhead strength and endurance training with addition of overhead ball toss, high row ER and overhead press, and progression of bodyblade exercises.  She continues to have tendency for upper trap substitution once she fatigues and is reminded to take breaks during HEP and be aware of fatigue.  Pt will continue with progression of exercises as tolerated.    Rodney is progressing well towards her goals.   Pt prognosis is Good.     Pt will continue to benefit from skilled outpatient physical therapy to address the deficits listed in the problem list box on initial evaluation, provide pt/family education and to maximize pt's level of independence in the home and community environment.     Pt's spiritual, cultural and educational needs considered and pt agreeable to plan of care and goals.     Anticipated barriers to physical therapy: co-morbidities    Goals:      Short Term Goals:  6 weeks     1. Pain: Pt will demonstrate improved pain by reports of less than or equal to 8/10 worst pain on the verbal rating scale in order to progress toward maximal functional ability and improve QOL.  MET   2. Function: Patient will demonstrate improved function as indicated by a functional limitation score of less than or equal to 49 out of 100 on FOTO.  MET   3. Mobility: Patient will improve AROM to 50% of stated goals, listed in  objective measures above, in order to progress towards independence with functional activities.   MET   4. Strength: Patient will improve strength to 50% of stated goals, listed in objective measures above, in order to progress towards independence with functional activities.   MET   5. Gait: Patient will demonstrate improved gait mechanics in order to improve functional mobility, improve quality of life, and decrease risk of further injury or fall.   MET   6. HEP: Patient will demonstrate independence with HEP in order to progress toward functional independence.  MET   7. Improve postural awareness.   MET         Long Term Goals:  12 weeks     1. Pain: Pt will demonstrate improved pain by reports of less than or equal to 6/10 worst pain on the verbal rating scale in order to progress toward maximal functional ability and improve QOL.    MET   2. Function: Patient will demonstrate improved function as indicated by a functional limitation score of less than or equal to 35 out of 100 on FOTO.     3. Mobility: Patient will improve AROM to stated goals, listed in objective measures above, in order to return to maximal functional potential and improve quality of life.  Not Met   4. Strength: Patient will improve strength to stated goals, listed in objective measures above, in order to improve functional independence and quality of life.  Not Met   5. Gait: Patient will demonstrate normalized gait mechanics with minimal compensation in order to return to PLOF.  MET   6. Patient will return to normal ADL's, IADL's, community involvement, recreational activities, and work-related activities with less than or equal to 3/10 pain and maximal function.   MET   7. Patient will be able to return to work without limitation.   MET          PLAN   Plan of care Certification: 11/12/2020 to 12/31/2020.    Continue Plan of Care (POC) as tolerated. Begin table top push ups next visit.     Douglas Todd, PT

## 2020-12-03 ENCOUNTER — HOSPITAL ENCOUNTER (OUTPATIENT)
Dept: WOUND CARE | Facility: HOSPITAL | Age: 40
Discharge: HOME | End: 2020-12-03
Attending: SURGERY
Payer: COMMERCIAL

## 2020-12-03 VITALS
WEIGHT: 255 LBS | HEART RATE: 92 BPM | SYSTOLIC BLOOD PRESSURE: 118 MMHG | HEIGHT: 60 IN | TEMPERATURE: 99 F | DIASTOLIC BLOOD PRESSURE: 84 MMHG | BODY MASS INDEX: 50.06 KG/M2

## 2020-12-03 DIAGNOSIS — L73.2 LEFT AXILLARY HIDRADENITIS: ICD-10-CM

## 2020-12-03 DIAGNOSIS — Z98.84 S/P BARIATRIC SURGERY: ICD-10-CM

## 2020-12-03 DIAGNOSIS — L73.2 HIDRADENITIS SUPPURATIVA OF RIGHT AXILLA: Primary | ICD-10-CM

## 2020-12-03 DIAGNOSIS — L73.2 HIDRADENITIS AXILLARIS: ICD-10-CM

## 2020-12-03 PROCEDURE — 17250 CHEM CAUT OF GRANLTJ TISSUE: CPT

## 2020-12-03 NOTE — PROGRESS NOTES
"Subjective:       Patient ID: Rodney Infante is a 40 y.o. female.    Chief Complaint: Non-healing Wound (both axilla)    F/u for excision bilateral axillary hider adneitis    Client here today with open areas to right and left axilla. Seen by Dr. Pennington, who states sweat glands infected. Rx for clindamycin, bactroban, and tramadol provided. Stop methatrexate ( prescribed for arthritis). Client plans to have abdominal bypass surgery in September. Culture done today of right axilla.  8/7/19: F/U with Dr. Pennington. Culture results negative. Continue POC.  8/14/19: F/U with Dr. Pennington. Clindamycin d/c'd. Bactrim and Diflucan ordered today. Leave left and right axilla open to air. Culture sent to lab today.     9/4/19:  F/U with Dr. Pennington.  Wounds to right axilla healing up.  Patient states "right does not drain as much as left".  Patient feels the left axilla is not healing up like the right.  Patient still taking Bactrim PO.  Will continue for now.  9/18/19: F/U with Dr. Pennington. Both axilla continue to have "sebum - like" exudate. New site noted to left back. Culture done today. RX for lotrisone and Diflucan provided.  9/25/19: F/U with Dr. Pennington. Axillae improved. Client was not able to get Diflucan. Reordered today. Continues lotrisone and clindamycin. Culture results reviewed. Referred to ID for appt. On 10/7/19. Next visit with Dr. Pennington 10/16/19.  10/7/19: Vist today with ID. Augmentin ordered. Clindamycin d/c'd. Complete Diflucan.  11/6/19: Dr Pennington assessed patient.Wounds slowly improving. Patient states that she has had gastric sleeve procedure last week. No complaints voiced. Continuing with present plan of care. Follow up in 2 weeks.  11/20/19: F/U with Dr. Pennington. Wounds continue to improve. Continue POC. Return in 2 weeks.  12/4/19: F/U with Dr. Pennington. Axillae drainage decreased. Client has lost 97 Lbs. As of today. Surgery planned for 12/20/19. Next visit 12/18/19, and consents to be " signed.  12/18/19: F/U with Dr. Pennington. Surgery to axillae scheduled for Monday 12/23/19. Consents signed. Return 1/2/20.   12/26/19  F/U with Dr. pennington for s/p surgery to bilateral axilla.  Removal of glands and placement of theraskin.  Staples intact.  Theraskin not completely  Adhered on both areas.   Start bolster dressing to assist tin the adherence of the graft. Patient to return to clinic in 1 week.  Order homehealth for twice weekly    01/02/20: F/u with Dr. Pennington. Moderate to large amount of drainage from wounds. Staples and sutures removed from both wounds today in clinic per Dr. Pennington. New wound care orders given and routed to [facility]. Rx given for 5 mg norco every 4 hours. Patient given a note to not return to work until further notice.  1/9/20: F/U with Dr. Pennington. Removed 2 remaining staples. Discussed with patient future grafting of sites. Cont. POC. Return in 1 week.  1/16/20: F/U with Dr. Pennington. Client reports decreased drainage today. Rx for Clindamycin and Norco provided. Client prefers to wait a while before having grafting performed. Return in 1 week.  1/23/20: F/U with Dr. Pennington. Right axilla measurements improved. Continue Clindamycin. Return in 1 week.  2/6/20:Patient presents with a new wound to right lateral breast. Dr Pennington assessed patient. Continuing with same wound care orders. Rx for Norco 5/325mg given to patient for pain. F/U in 2 weeks.   2/20/20: F/U with Dr. Pennington. Sites continue to improve. Measurements decreased. Culture reviewed. Rx for Ampicillin provided. Client has taken PCN in past w/o reaction. Rx for Norco provided. Return in 2 weeks.  3/5/20: F/U with Dr. Pennington. Sites decreased in size. Cont. Ampicillin. Cont. POC. Return in 2 weeks.  3/19/20: F/U with Dr. Pennington. Sites improving. Cont. POC. Return in 2 weeks.  5/7/20: F/U with Dr. Pennington. Measurements improved all sites. Culture of left axilla done today. Cont. POC. Return in 2  weeks.  6/4/20: F/U with Dr. Pennington. All sites improved. Cont. Clindamycin. Return in 2 weeks.  6/18/20: F/U with Dr. Pennington. Measurements improved. Silver nitrate x 1 to right and left axilla hypergranulation tissue. Cont. POC. Next visit 7/2/20. Client will be out of time 7/6/20 - 7/11/20. Will notify [employer].  07/15/2020- f/u with Dr. Pennington. Pt presents with new abscess to upper right abd. Pt consented for OR this Friday 07/17/2020 for I&D to abscess. Rx for clindamycin sent to pharmacy. Dakins dc'd and xeroform started to wounds. Aquacel ag extra, mextra and large abd pad to right upper abd. Pt to f/u with Dr. Pennington in 1 week 07/23/2020.  7/23/20: F/U with Dr. Pennington. I&D of right abdomen abscess done 7/17/20 per Dr. Pennington. Site open with sutures. All previous sites improving. Iodoform gauze to sites needing packing. Rx for iodoform gauze and Vibra tabs provided today. Next visit 7/30/20.  7/30/20: F/U with Dr. Pennington. All sites improving. Sutures removed from right abdominal site per Dr. Pennington. Has excoriation under right breast. Rx for mycostatin powder provided. Cont. POC. Return 8/6/20.  08/13/2020- f/u with Dr. Pennington. Pt wounds improving. Silver nitrate x 2 to wounds for hypergranulation. Cont POC. F/u with Dr. Pennington in 2 weeks 08/27/2020  9/3/20: F/U with Dr. Pennington. All measurements improved. Breast site resolved. Wound care orders changed. Cont. Doxycycline and mycostatin powder. Next visit 9/17/20.  9/17/2020: Fu with Dr. Pennington. Wounds hypergranulated.  applied silver nitrate x 1 to each wound, patient tolerated well. Continued with current treatment plan. Fu 2 weeks 10/1/2020.  10/1/20: F/U with Dr. Pennington. Measurements improved. All sites with hypergranulation tissue. Lidocaine 4% to all sites prior to silver nitrate application. Continue POC. Next visit 10/15/20.  10/15/20: F/U with Dr. Pennington. Measurements decreased. Silver nitrate to all sites for  hypergranulation tissue. Cont. POC. Next visit 10/29/20.  10/29/20: F/U with Dr. Pennington. Silver nitrate to hypergranulation tissue at all sites. Culture of lower abdomen site done today. Cont. POC. Next visit 11/12/20.  11/12/20: F/U with Dr. Pennington. Silver nitrate to all sites. Cont. Augmentin. Refill called to pharmacy. Next visit 12/3/20.  12/3/20: F/U with Dr. Pennington. Measurements improving. Allsites hypergranulated and silver nitrate per Dr. Pennington to all. Continue Augmentin and POC. Next visit 12/17/20.    Review of Systems   Constitutional: Negative.    HENT: Negative.    Eyes: Negative.    Respiratory: Negative.    Cardiovascular: Negative.    Gastrointestinal: Negative.    Genitourinary: Negative.    Musculoskeletal: Negative.    Skin: Negative.    Neurological: Negative.    Psychiatric/Behavioral: Negative.        Objective:      Physical Exam  Constitutional:       Appearance: She is well-developed.   HENT:      Head: Normocephalic.   Eyes:      Conjunctiva/sclera: Conjunctivae normal.      Pupils: Pupils are equal, round, and reactive to light.   Neck:      Musculoskeletal: Normal range of motion and neck supple.   Cardiovascular:      Rate and Rhythm: Normal rate and regular rhythm.      Heart sounds: Normal heart sounds.   Pulmonary:      Effort: Pulmonary effort is normal.      Breath sounds: Normal breath sounds.   Abdominal:      General: Bowel sounds are normal.      Palpations: Abdomen is soft.   Musculoskeletal: Normal range of motion.   Skin:     General: Skin is warm and dry.   Neurological:      Mental Status: She is alert and oriented to person, place, and time.      Deep Tendon Reflexes: Reflexes are normal and symmetric.         Assessment:       1. Hidradenitis suppurativa of right axilla    2. Left axillary hidradenitis           Wound 07/31/19 1300 Other (comment) Axilla #2 (Active)   07/31/19 1300    Pre-existing: Yes   Primary Wound Type: Other   Side: Left   Orientation:     Location: Axilla   Wound Number (optional): #2   Ankle-Brachial Index:    Pulses:    Removal Indication and Assessment:    Wound Outcome:    (Retired) Wound Type:    (Retired) Wound Length (cm):    (Retired) Wound Width (cm):    (Retired) Depth (cm):    Wound Description (Comments):    Removal Indications:    Wound Image   12/03/20 1300   Dressing Appearance Intact;Moist drainage 12/03/20 1300   Drainage Amount Small 12/03/20 1300   Drainage Characteristics/Odor Serosanguineous 12/03/20 1300   Appearance Red;Moist;Hypergranulation 12/03/20 1300   Tissue loss description Full thickness 12/03/20 1300   Red (%), Wound Tissue Color 100 % 12/03/20 1300   Periwound Area Intact;Dry 12/03/20 1300   Wound Edges Irregular 12/03/20 1300   Wound Length (cm) 0.5 cm 12/03/20 1300   Wound Width (cm) 1 cm 12/03/20 1300   Wound Depth (cm) 0.1 cm 12/03/20 1300   Wound Volume (cm^3) 0.05 cm^3 12/03/20 1300   Wound Surface Area (cm^2) 0.5 cm^2 12/03/20 1300   Care Cleansed with:;Sterile normal saline;Other (see comments) 12/03/20 1300   Dressing Changed;Non-adherent;Gauze;Abd pad 12/03/20 1300   Periwound Care Absorptive dressing applied 12/03/20 1300   Dressing Change Due 12/04/20 12/03/20 1300            Wound 07/31/19 1300 Other (comment) Axilla #1 (Active)   07/31/19 1300    Pre-existing: Yes   Primary Wound Type: Other   Side: Right   Orientation:    Location: Axilla   Wound Number (optional): #1   Ankle-Brachial Index:    Pulses:    Removal Indication and Assessment:    Wound Outcome:    (Retired) Wound Type:    (Retired) Wound Length (cm):    (Retired) Wound Width (cm):    (Retired) Depth (cm):    Wound Description (Comments):    Removal Indications:    Wound Image   12/03/20 1300   Dressing Appearance Intact;Moist drainage 12/03/20 1300   Drainage Amount Small 12/03/20 1300   Drainage Characteristics/Odor Serosanguineous 12/03/20 1300   Appearance Red;Moist;Hypergranulation 12/03/20 1300   Tissue loss description Full  thickness 12/03/20 1300   Red (%), Wound Tissue Color 100 % 12/03/20 1300   Periwound Area Intact;Dry 12/03/20 1300   Wound Edges Irregular 12/03/20 1300   Wound Length (cm) 2 cm 12/03/20 1300   Wound Width (cm) 0.5 cm 12/03/20 1300   Wound Depth (cm) 0.1 cm 12/03/20 1300   Wound Volume (cm^3) 0.1 cm^3 12/03/20 1300   Wound Surface Area (cm^2) 1 cm^2 12/03/20 1300   Care Cleansed with:;Sterile normal saline;Other (see comments) 12/03/20 1300   Dressing Changed;Abd pad;Non-adherent;Gauze 12/03/20 1300   Periwound Care Absorptive dressing applied 12/03/20 1300   Dressing Change Due 12/04/20 12/03/20 1300            Wound 07/15/20 1600 Abscess Right upper Abdomen #4 (Active)   07/15/20 1600    Pre-existing: Yes   Primary Wound Type: Abscess   Side: Right   Orientation: upper   Location: Abdomen   Wound Number (optional): #4   Ankle-Brachial Index:    Pulses:    Removal Indication and Assessment:    Wound Outcome:    (Retired) Wound Type:    (Retired) Wound Length (cm):    (Retired) Wound Width (cm):    (Retired) Depth (cm):    Wound Description (Comments):    Removal Indications:    Wound Image   12/03/20 1300   Dressing Appearance Moist drainage 12/03/20 1300   Drainage Amount Small 12/03/20 1300   Drainage Characteristics/Odor Serosanguineous 12/03/20 1300   Appearance Red;Moist;Hypergranulation 12/03/20 1300   Tissue loss description Full thickness 12/03/20 1300   Red (%), Wound Tissue Color 100 % 12/03/20 1300   Periwound Area Intact;Dry 12/03/20 1300   Wound Edges Defined 12/03/20 1300   Wound Length (cm) 1.5 cm 12/03/20 1300   Wound Width (cm) 1 cm 12/03/20 1300   Wound Depth (cm) 0.1 cm 12/03/20 1300   Wound Volume (cm^3) 0.15 cm^3 12/03/20 1300   Wound Surface Area (cm^2) 1.5 cm^2 12/03/20 1300   Care Cleansed with:;Sterile normal saline;Other (see comments) 12/03/20 1300   Dressing Changed;Island/border;Gauze;Non-adherent 12/03/20 1300   Periwound Care Dry periwound area maintained 12/03/20 1300   Dressing  Change Due 12/04/20 12/03/20 1300            Wound 09/17/20 1031 Right posterior Back #5 (Active)   09/17/20 1031    Pre-existing: Yes   Primary Wound Type:    Side: Right   Orientation: posterior   Location: Back   Wound Number (optional): #5   Ankle-Brachial Index:    Pulses:    Removal Indication and Assessment:    Wound Outcome:    (Retired) Wound Type:    (Retired) Wound Length (cm):    (Retired) Wound Width (cm):    (Retired) Depth (cm):    Wound Description (Comments):    Removal Indications:    Wound Image   12/03/20 1300   Dressing Appearance Intact;Moist drainage 12/03/20 1300   Drainage Amount Small 12/03/20 1300   Drainage Characteristics/Odor Serosanguineous 12/03/20 1300   Appearance Red;Moist;Hypergranulation 12/03/20 1300   Tissue loss description Full thickness 12/03/20 1300   Red (%), Wound Tissue Color 100 % 12/03/20 1300   Periwound Area Intact;Dry 12/03/20 1300   Wound Edges Irregular 12/03/20 1300   Wound Length (cm) 1 cm 12/03/20 1300   Wound Width (cm) 4 cm 12/03/20 1300   Wound Depth (cm) 0.1 cm 12/03/20 1300   Wound Volume (cm^3) 0.4 cm^3 12/03/20 1300   Wound Surface Area (cm^2) 4 cm^2 12/03/20 1300   Care Cleansed with:;Sterile normal saline;Other (see comments) 12/03/20 1300   Dressing Changed;Gauze;Island/border;Non-adherent 12/03/20 1300   Periwound Care Dry periwound area maintained 12/03/20 1300   Dressing Change Due 12/04/20 12/03/20 1300            Wound 10/15/20 1030 Abscess Right lower Abdomen (Active)   10/15/20 1030    Pre-existing: Yes   Primary Wound Type: Abscess   Side: Right   Orientation: lower   Location: Abdomen   Wound Number (optional):    Ankle-Brachial Index:    Pulses:    Removal Indication and Assessment:    Wound Outcome:    (Retired) Wound Type:    (Retired) Wound Length (cm):    (Retired) Wound Width (cm):    (Retired) Depth (cm):    Wound Description (Comments):    Removal Indications:    Wound Image   12/03/20 1300   Dressing Appearance Intact;Moist drainage  12/03/20 1300   Drainage Amount Moderate 12/03/20 1300   Drainage Characteristics/Odor Serosanguineous 12/03/20 1300   Appearance Red;Moist;Hypergranulation 12/03/20 1300   Tissue loss description Full thickness 12/03/20 1300   Red (%), Wound Tissue Color 100 % 12/03/20 1300   Periwound Area Intact;Dry 12/03/20 1300   Wound Edges Defined 12/03/20 1300   Wound Length (cm) 3 cm 12/03/20 1300   Wound Width (cm) 3 cm 12/03/20 1300   Wound Depth (cm) 0.1 cm 12/03/20 1300   Wound Volume (cm^3) 0.9 cm^3 12/03/20 1300   Wound Surface Area (cm^2) 9 cm^2 12/03/20 1300   Care Cleansed with:;Sterile normal saline;Other (see comments) 12/03/20 1300   Dressing Non-adherent;Island/border;Gauze 12/03/20 1300   Periwound Care Dry periwound area maintained 12/03/20 1300   Dressing Change Due 12/04/20 12/03/20 1300   Silver nitrate to hypergranulation tissue of all sites.    Left and Right Axilla   Cleanse with: Rinse with normal saline   Primary dressing:  xeroform to right and left   Secondary dressing: cover with 4x4 gauze and small abd pad, secure with mefix tape   Frequency: daily     Right upper abd, right lower abdomen,and right back:  Cleanse with: Rinse with normal saline   Primary dressing: xeroform   Secondary dressing: gauze, 4 x 4 mepore dressing  Frequency: daily       Follow up with Dr. Pennington  Thursday 12/17/20    Other orders: Continue Augmentin      Home Health: [facility]:  Fax # [phone].  Skilled nurse to perform dressing changes every Monday, Wednesday except when in clinic. Patient will do own wound care of Fridays.  Please provide patient's caregiver with necessary supplies to do wound care: small abd pads, 4x4 gauze, mefix tape.     Plan:                12/17/20 Dr. Pennington

## 2020-12-11 ENCOUNTER — LAB VISIT (OUTPATIENT)
Dept: LAB | Facility: HOSPITAL | Age: 40
End: 2020-12-11
Attending: INTERNAL MEDICINE
Payer: COMMERCIAL

## 2020-12-11 ENCOUNTER — OFFICE VISIT (OUTPATIENT)
Dept: RHEUMATOLOGY | Facility: CLINIC | Age: 40
End: 2020-12-11
Payer: COMMERCIAL

## 2020-12-11 VITALS
WEIGHT: 263.88 LBS | SYSTOLIC BLOOD PRESSURE: 134 MMHG | DIASTOLIC BLOOD PRESSURE: 87 MMHG | HEIGHT: 60 IN | BODY MASS INDEX: 51.8 KG/M2 | HEART RATE: 80 BPM

## 2020-12-11 DIAGNOSIS — M13.80 SERONEGATIVE ARTHRITIS: ICD-10-CM

## 2020-12-11 DIAGNOSIS — M13.80 SERONEGATIVE ARTHRITIS: Primary | ICD-10-CM

## 2020-12-11 DIAGNOSIS — Z71.89 COUNSELING ON HEALTH PROMOTION AND DISEASE PREVENTION: ICD-10-CM

## 2020-12-11 DIAGNOSIS — Z79.60 LONG-TERM USE OF IMMUNOSUPPRESSANT MEDICATION: ICD-10-CM

## 2020-12-11 DIAGNOSIS — Z79.899 HIGH RISK MEDICATION USE: ICD-10-CM

## 2020-12-11 LAB
ALBUMIN SERPL BCP-MCNC: 3.1 G/DL (ref 3.5–5.2)
ALP SERPL-CCNC: 94 U/L (ref 55–135)
ALT SERPL W/O P-5'-P-CCNC: 9 U/L (ref 10–44)
ANION GAP SERPL CALC-SCNC: 8 MMOL/L (ref 8–16)
AST SERPL-CCNC: 11 U/L (ref 10–40)
BASOPHILS # BLD AUTO: 0.06 K/UL (ref 0–0.2)
BASOPHILS NFR BLD: 0.6 % (ref 0–1.9)
BILIRUB SERPL-MCNC: 0.3 MG/DL (ref 0.1–1)
BUN SERPL-MCNC: 7 MG/DL (ref 6–20)
CALCIUM SERPL-MCNC: 8.9 MG/DL (ref 8.7–10.5)
CHLORIDE SERPL-SCNC: 107 MMOL/L (ref 95–110)
CO2 SERPL-SCNC: 23 MMOL/L (ref 23–29)
CREAT SERPL-MCNC: 0.8 MG/DL (ref 0.5–1.4)
DIFFERENTIAL METHOD: ABNORMAL
EOSINOPHIL # BLD AUTO: 0.2 K/UL (ref 0–0.5)
EOSINOPHIL NFR BLD: 1.6 % (ref 0–8)
ERYTHROCYTE [DISTWIDTH] IN BLOOD BY AUTOMATED COUNT: 13.8 % (ref 11.5–14.5)
EST. GFR  (AFRICAN AMERICAN): >60 ML/MIN/1.73 M^2
EST. GFR  (NON AFRICAN AMERICAN): >60 ML/MIN/1.73 M^2
GLUCOSE SERPL-MCNC: 80 MG/DL (ref 70–110)
HCT VFR BLD AUTO: 38 % (ref 37–48.5)
HGB BLD-MCNC: 12.1 G/DL (ref 12–16)
IMM GRANULOCYTES # BLD AUTO: 0.17 K/UL (ref 0–0.04)
IMM GRANULOCYTES NFR BLD AUTO: 1.6 % (ref 0–0.5)
LYMPHOCYTES # BLD AUTO: 3.6 K/UL (ref 1–4.8)
LYMPHOCYTES NFR BLD: 34.9 % (ref 18–48)
MCH RBC QN AUTO: 30.7 PG (ref 27–31)
MCHC RBC AUTO-ENTMCNC: 31.8 G/DL (ref 32–36)
MCV RBC AUTO: 96 FL (ref 82–98)
MONOCYTES # BLD AUTO: 0.9 K/UL (ref 0.3–1)
MONOCYTES NFR BLD: 8.1 % (ref 4–15)
NEUTROPHILS # BLD AUTO: 5.5 K/UL (ref 1.8–7.7)
NEUTROPHILS NFR BLD: 53.2 % (ref 38–73)
NRBC BLD-RTO: 0 /100 WBC
PLATELET # BLD AUTO: 405 K/UL (ref 150–350)
PMV BLD AUTO: 7.9 FL (ref 9.2–12.9)
POTASSIUM SERPL-SCNC: 4.2 MMOL/L (ref 3.5–5.1)
PROT SERPL-MCNC: 8.7 G/DL (ref 6–8.4)
RBC # BLD AUTO: 3.94 M/UL (ref 4–5.4)
SODIUM SERPL-SCNC: 138 MMOL/L (ref 136–145)
WBC # BLD AUTO: 10.43 K/UL (ref 3.9–12.7)

## 2020-12-11 PROCEDURE — 90471 FLU VACCINE - QUADRIVALENT - ADJUVANTED: ICD-10-PCS | Mod: S$GLB,,, | Performed by: INTERNAL MEDICINE

## 2020-12-11 PROCEDURE — 99999 PR PBB SHADOW E&M-EST. PATIENT-LVL IV: ICD-10-PCS | Mod: PBBFAC,,, | Performed by: INTERNAL MEDICINE

## 2020-12-11 PROCEDURE — 1126F AMNT PAIN NOTED NONE PRSNT: CPT | Mod: S$GLB,,, | Performed by: INTERNAL MEDICINE

## 2020-12-11 PROCEDURE — 85025 COMPLETE CBC W/AUTO DIFF WBC: CPT

## 2020-12-11 PROCEDURE — 99214 PR OFFICE/OUTPT VISIT, EST, LEVL IV, 30-39 MIN: ICD-10-PCS | Mod: 25,S$GLB,, | Performed by: INTERNAL MEDICINE

## 2020-12-11 PROCEDURE — 36415 COLL VENOUS BLD VENIPUNCTURE: CPT

## 2020-12-11 PROCEDURE — 80053 COMPREHEN METABOLIC PANEL: CPT

## 2020-12-11 PROCEDURE — 90694 VACC AIIV4 NO PRSRV 0.5ML IM: CPT | Mod: S$GLB,,, | Performed by: INTERNAL MEDICINE

## 2020-12-11 PROCEDURE — 3008F BODY MASS INDEX DOCD: CPT | Mod: CPTII,S$GLB,, | Performed by: INTERNAL MEDICINE

## 2020-12-11 PROCEDURE — 3008F PR BODY MASS INDEX (BMI) DOCUMENTED: ICD-10-PCS | Mod: CPTII,S$GLB,, | Performed by: INTERNAL MEDICINE

## 2020-12-11 PROCEDURE — 90471 IMMUNIZATION ADMIN: CPT | Mod: S$GLB,,, | Performed by: INTERNAL MEDICINE

## 2020-12-11 PROCEDURE — 99214 OFFICE O/P EST MOD 30 MIN: CPT | Mod: 25,S$GLB,, | Performed by: INTERNAL MEDICINE

## 2020-12-11 PROCEDURE — 99999 PR PBB SHADOW E&M-EST. PATIENT-LVL IV: CPT | Mod: PBBFAC,,, | Performed by: INTERNAL MEDICINE

## 2020-12-11 PROCEDURE — 90694 FLU VACCINE - QUADRIVALENT - ADJUVANTED: ICD-10-PCS | Mod: S$GLB,,, | Performed by: INTERNAL MEDICINE

## 2020-12-11 PROCEDURE — 1126F PR PAIN SEVERITY QUANTIFIED, NO PAIN PRESENT: ICD-10-PCS | Mod: S$GLB,,, | Performed by: INTERNAL MEDICINE

## 2020-12-11 NOTE — PROGRESS NOTES
Administered 0.5 cc InfluenzaAD high dose vaccination to left Deltoid. Pt tolerated well No acute reaction noted to site. Pt instructed on S/S to report. Advised patient to wait in lobby 15 minutes after receiving injection to monitor for any reactions.  Pt verbalized understanding.     Lot: [id number]  Exp: 06/30/2021

## 2020-12-11 NOTE — PROGRESS NOTES
RHEUMATOLOGY OUTPATIENT CLINIC NOTE    12/11/2020    Attending Rheumatologist: Ken Alberto  Primary Care Provider: [facility]   Physician Requesting Consultation: No referring provider defined for this encounter.  Chief Complaint/Reason For Consultation:  Seronegative arthritis.    Subjective:       HPI  Rodney Infante is a 40 y.o. Black or  female with seronegative arthritis comes for follow-up.    Today  Last seen on August.  Received local corticosteroid injection left shoulder with excellent results.  Completed physical therapy with good response as well.  No acute complaints.  No changes to inflammatory arthritis since decreasing sulfasalazine dose.  Currently on Humira and 1500 mg of sulfasalazine per day.  Denies new joint pain or swelling.  Does not have prolonged morning stiffness.  Denies new rash, fever,  or GI complaints.    Review of Systems   Constitutional: Negative for chills, fever and malaise/fatigue.   Eyes: Negative for pain and redness.   Respiratory: Negative for cough, hemoptysis and shortness of breath.    Cardiovascular: Negative for chest pain and leg swelling.   Gastrointestinal: Negative for abdominal pain, blood in stool and melena.   Genitourinary: Negative for dysuria and hematuria.   Musculoskeletal: Negative for falls and joint pain.   Skin: Negative for rash.   Neurological: Negative for tingling and focal weakness.   Psychiatric/Behavioral: Negative for memory loss. The patient does not have insomnia.      Chronic comorbid conditions affecting medical decision making today:  Past Medical History:   Diagnosis Date    Anemia     Arthritis     Diabetes mellitus, type 2     Neuropathy      Past Surgical History:   Procedure Laterality Date    CYST REMOVAL  2014, 2/2018    back    EXCISION OF HIDRADENITIS Bilateral 12/24/2019    Procedure: EXCISION, HIDRADENITIS;  Surgeon: Marcel Pennington MD;  Location: [facility] OR;  Service: General;   "Laterality: Bilateral;    WOUND DEBRIDEMENT Right 7/17/2020    Procedure: DEBRIDEMENT, WOUND;  Surgeon: Marcel Pennington MD;  [facility];  Service: General;  Laterality: Right;     Family History   Problem Relation Age of Onset    No Known Problems Mother     Drug abuse Father     No Known Problems Sister      Social History     Substance and Sexual Activity   Alcohol Use Yes    Comment: occasionally     Social History     Tobacco Use   Smoking Status Never Smoker   Smokeless Tobacco Never Used     Social History     Substance and Sexual Activity   Drug Use No       Current Outpatient Medications:     adalimumab (HUMIRA PEN) 40 mg/0.8 mL PnKt, Inject 1 pen (40 mg total) into the skin every 14 (fourteen) days., Disp: 6 pen, Rfl: 2    clotrimazole-betamethasone 1-0.05% (LOTRISONE) cream, , Disp: , Rfl:     cyanocobalamin (VITAMIN B-12) 1000 MCG tablet, Take 1 tablet (1,000 mcg total) by mouth once daily. This is a prescription for 1 year.  Take 1 tablet Monday, Wednesday, and Friday, Disp: 36 tablet, Rfl: 3    ergocalciferol (ERGOCALCIFEROL) 50,000 unit Cap, , Disp: , Rfl:     escitalopram oxalate (LEXAPRO) 10 MG tablet, , Disp: , Rfl:     estradiol cypionate (DEPO-ESTRADIOL) 5 mg/mL injection, Inject into the muscle every 28 days., Disp: , Rfl:     famotidine (PEPCID) 40 MG tablet, , Disp: , Rfl:     gabapentin (NEURONTIN) 100 MG capsule, Take 3 capsules (300 mg total) by mouth every evening., Disp: 90 capsule, Rfl: 3    iodoform 1/2 X 5 "-yard [facility], change 3 times a week as directed, Disp: 12 each, Rfl: 0    nystatin (MYCOSTATIN) powder, Apply topically locally daily under breasts as directed, Disp: 60 g, Rfl: 0    phentermine (ADIPEX-P) 37.5 mg tablet, TAKE 1 TABLET BY MOUTH ONCE DAILY AT 10AM, Disp: , Rfl:     sulfaSALAzine (AZULFIDINE) 500 MG EC tablet, Take 2 tablets (1,000 mg total) by mouth 2 (two) times daily., Disp: 360 tablet, Rfl: 0    topiramate (TOPAMAX) 50 MG tablet, TAKE 1 " TABLET BY MOUTH ONCE DAILY WITH ADIPEX, Disp: , Rfl:     traMADoL (ULTRAM) 50 mg tablet, , Disp: , Rfl:      Objective:         Vitals:    12/11/20 1142   BP: 134/87   Pulse: 80     Physical Exam   Constitutional: She is oriented to person, place, and time. No distress.   Estimated body mass index is 51.54 kg/m² as calculated from the following:    Height as of this encounter: 5' (1.524 m).    Weight as of this encounter: 119.7 kg (263 lb 14.3 oz).    Wt Readings from Last 1 Encounters:  12/11/20 1142 : 119.7 kg (263 lb 14.3 oz)     HENT:   Head: Normocephalic and atraumatic.   Eyes: Conjunctivae are normal. Pupils are equal, round, and reactive to light.   Neck: Normal range of motion.   Cardiovascular: Normal rate and intact distal pulses.    Pulmonary/Chest: Effort normal. No respiratory distress.   Abdominal: Soft. She exhibits no distension.   Neurological: She is alert and oriented to person, place, and time.   Skin: No rash noted. No erythema.     Musculoskeletal: Normal range of motion. No tenderness.      Comments: : strong  No synovitis or significant squeeze tenderness    AROM: intact  PROM: intact    Devices used by patient: none       Reviewed old and all outside pertinent medical records available.    All lab results personally reviewed and interpreted by me.  Lab Results   Component Value Date    WBC 10.43 12/11/2020    HGB 12.1 12/11/2020    HCT 38.0 12/11/2020    MCV 96 12/11/2020    MCH 30.7 12/11/2020    MCHC 31.8 (L) 12/11/2020    RDW 13.8 12/11/2020     (H) 12/11/2020    MPV 7.9 (L) 12/11/2020    PLTEST Increased (A) 11/12/2018     Lab Results   Component Value Date     12/11/2020    K 4.2 12/11/2020     12/11/2020    CO2 23 12/11/2020    GLU 80 12/11/2020    BUN 7 12/11/2020    CALCIUM 8.9 12/11/2020    PROT 8.7 (H) 12/11/2020    ALBUMIN 3.1 (L) 12/11/2020    BILITOT 0.3 12/11/2020    AST 11 12/11/2020    ALKPHOS 94 12/11/2020    ALT 9 (L) 12/11/2020     Lab Results    Component Value Date    COLORU Yellow 02/27/2018    APPEARANCEUA Cloudy (A) 02/27/2018    SPECGRAV 1.020 02/27/2018    PHUR 5.0 02/27/2018    PROTEINUA Negative 02/27/2018    KETONESU Negative 02/27/2018    LEUKOCYTESUR 3+ (A) 02/27/2018    NITRITE Negative 02/27/2018    UROBILINOGEN Negative 02/27/2018     Lab Results   Component Value Date    CRP 9.6 (H) 08/07/2020       Lab Results   Component Value Date    SEDRATE 83 (H) 11/03/2020       Lab Results   Component Value Date    RF <10.0 04/03/2019    SEDRATE 83 (H) 11/03/2020       No components found for: 25OHVITDTOT, 92DFXMOI4, 53XAMKDT6, METHODNOTE    No results found for: URICACID    No components found for: TSPOTTB    Rheum Labs:  MILTON negative  Rheumatoid factor/CCP negative  SSA negative  ANCA screen negative  HLA B27 negative    Infectious Labs:  HCV antibody nonreactive April 2019   Hepatitis panel nonreactive  QuantiFERON TB negative August 2019    Imaging:  All imaging reviewed and independently  interpreted by me.    MRI hands August 2019  Periarticular enhancement and synovial thickening at the 1st metacarpophalangeal joint and proximal interphalangeal joint of the 2nd digit.  No erosions are visualized.     ASSESSMENT / PLAN:     Rodney Infante is a 40 y.o. Black or  female with:    1. Seronegative arthritis  - CDAI: -> no disease activity.    - excellent response to Humira initiated on 9/23/2020.  Will continue unchanged.  - taper sulfasalazine to discontinue.  May resume at previous dose if refractory inflammatory arthritis  - NSAIDs p.r.n. for breakthrough pain.     2. Immunosuppressed status   - Compromised immune system secondary to autoimmune disease and use of immunosuppressive drugs.   - Monitor carefully for infections and toxicities.   - Advised to get immediate medical care if any infection.   - Also advised strict adherence to age appropriate vaccinations and cancer screenings with PCP.  - CBC, CMP before next  visit  - will update QuantiFERON TB and hepatitis profile on next visit    3. Other specified counseling  - Immunization counseling done. Flu shot today.  - Weight loss counseling done.  - Nutrition and exercise counseling.  - Medication counseling provided.    RTC 4 months    Method of contact with patient concerns: [facility]    Disclaimer:  This note is prepared using voice recognition software and as such is likely to have errors and has not been proof read. Please contact me for questions.     Ken Alberto M.D.  Rheumatology Department   [facility]

## 2020-12-17 ENCOUNTER — HOSPITAL ENCOUNTER (OUTPATIENT)
Dept: WOUND CARE | Facility: HOSPITAL | Age: 40
Discharge: HOME | End: 2020-12-17
Attending: SURGERY
Payer: COMMERCIAL

## 2020-12-17 VITALS
SYSTOLIC BLOOD PRESSURE: 133 MMHG | BODY MASS INDEX: 51.63 KG/M2 | HEART RATE: 99 BPM | DIASTOLIC BLOOD PRESSURE: 79 MMHG | TEMPERATURE: 97 F | WEIGHT: 263 LBS | HEIGHT: 60 IN

## 2020-12-17 DIAGNOSIS — L73.2 HIDRADENITIS SUPPURATIVA OF RIGHT AXILLA: Primary | ICD-10-CM

## 2020-12-17 DIAGNOSIS — Z98.84 S/P BARIATRIC SURGERY: ICD-10-CM

## 2020-12-17 DIAGNOSIS — I87.2 VENOUS STASIS ULCER OF LEFT CALF WITH MUSCLE INVOLVEMENT WITHOUT EVIDENCE OF NECROSIS WITHOUT VARICOSE VEINS: ICD-10-CM

## 2020-12-17 DIAGNOSIS — E66.01 MORBID OBESITY WITH BMI OF 40.0-44.9, ADULT: ICD-10-CM

## 2020-12-17 DIAGNOSIS — L02.213 ABSCESS OF CHEST WALL: ICD-10-CM

## 2020-12-17 DIAGNOSIS — L97.225 VENOUS STASIS ULCER OF LEFT CALF WITH MUSCLE INVOLVEMENT WITHOUT EVIDENCE OF NECROSIS WITHOUT VARICOSE VEINS: ICD-10-CM

## 2020-12-17 DIAGNOSIS — E11.9 DIABETES MELLITUS WITHOUT COMPLICATION: ICD-10-CM

## 2020-12-17 DIAGNOSIS — L73.2 LEFT AXILLARY HIDRADENITIS: ICD-10-CM

## 2020-12-17 PROCEDURE — 17250 CHEM CAUT OF GRANLTJ TISSUE: CPT

## 2020-12-17 NOTE — PROGRESS NOTES
"Subjective:       Patient ID: Rodney Infante is a 40 y.o. female.    Chief Complaint: Non-healing Wound (both axilla)    F/u for excision bilateral axillary hider adneitis    Client here today with open areas to right and left axilla. Seen by Dr. Pennington, who states sweat glands infected. Rx for clindamycin, bactroban, and tramadol provided. Stop methatrexate ( prescribed for arthritis). Client plans to have abdominal bypass surgery in September. Culture done today of right axilla.  8/7/19: F/U with Dr. Pennington. Culture results negative. Continue POC.  8/14/19: F/U with Dr. Pennington. Clindamycin d/c'd. Bactrim and Diflucan ordered today. Leave left and right axilla open to air. Culture sent to lab today.     9/4/19:  F/U with Dr. Pennington.  Wounds to right axilla healing up.  Patient states "right does not drain as much as left".  Patient feels the left axilla is not healing up like the right.  Patient still taking Bactrim PO.  Will continue for now.  9/18/19: F/U with Dr. Pennington. Both axilla continue to have "sebum - like" exudate. New site noted to left back. Culture done today. RX for lotrisone and Diflucan provided.  9/25/19: F/U with Dr. Pennington. Axillae improved. Client was not able to get Diflucan. Reordered today. Continues lotrisone and clindamycin. Culture results reviewed. Referred to ID for appt. On 10/7/19. Next visit with Dr. Pennington 10/16/19.  10/7/19: Vist today with ID. Augmentin ordered. Clindamycin d/c'd. Complete Diflucan.  11/6/19: Dr Pennington assessed patient.Wounds slowly improving. Patient states that she has had gastric sleeve procedure last week. No complaints voiced. Continuing with present plan of care. Follow up in 2 weeks.  11/20/19: F/U with Dr. Pennington. Wounds continue to improve. Continue POC. Return in 2 weeks.  12/4/19: F/U with Dr. Pennington. Axillae drainage decreased. Client has lost 97 Lbs. As of today. Surgery planned for 12/20/19. Next visit 12/18/19, and consents to be " signed.  12/18/19: F/U with Dr. Pennington. Surgery to axillae scheduled for Monday 12/23/19. Consents signed. Return 1/2/20.   12/26/19  F/U with Dr. pennington for s/p surgery to bilateral axilla.  Removal of glands and placement of theraskin.  Staples intact.  Theraskin not completely  Adhered on both areas.   Start bolster dressing to assist tin the adherence of the graft. Patient to return to clinic in 1 week.  Order homehealth for twice weekly    01/02/20: F/u with Dr. Pennington. Moderate to large amount of drainage from wounds. Staples and sutures removed from both wounds today in clinic per Dr. Pennington. New wound care orders given and routed to [facility]. Rx given for 5 mg norco every 4 hours. Patient given a note to not return to work until further notice.  1/9/20: F/U with Dr. Pennington. Removed 2 remaining staples. Discussed with patient future grafting of sites. Cont. POC. Return in 1 week.  1/16/20: F/U with Dr. Pennington. Client reports decreased drainage today. Rx for Clindamycin and Norco provided. Client prefers to wait a while before having grafting performed. Return in 1 week.  1/23/20: F/U with Dr. Pennington. Right axilla measurements improved. Continue Clindamycin. Return in 1 week.  2/6/20:Patient presents with a new wound to right lateral breast. Dr Pennington assessed patient. Continuing with same wound care orders. Rx for Norco 5/325mg given to patient for pain. F/U in 2 weeks.   2/20/20: F/U with Dr. Pennington. Sites continue to improve. Measurements decreased. Culture reviewed. Rx for Ampicillin provided. Client has taken PCN in past w/o reaction. Rx for Norco provided. Return in 2 weeks.  3/5/20: F/U with Dr. Pennington. Sites decreased in size. Cont. Ampicillin. Cont. POC. Return in 2 weeks.  3/19/20: F/U with Dr. Pennington. Sites improving. Cont. POC. Return in 2 weeks.  5/7/20: F/U with Dr. Pennington. Measurements improved all sites. Culture of left axilla done today. Cont. POC. Return in 2  weeks.  6/4/20: F/U with Dr. Pennington. All sites improved. Cont. Clindamycin. Return in 2 weeks.  6/18/20: F/U with Dr. Pennington. Measurements improved. Silver nitrate x 1 to right and left axilla hypergranulation tissue. Cont. POC. Next visit 7/2/20. Client will be out of time 7/6/20 - 7/11/20. Will notify [facility].  07/15/2020- f/u with Dr. Pennington. Pt presents with new abscess to upper right abd. Pt consented for OR this Friday 07/17/2020 for I&D to abscess. Rx for clindamycin sent to pharmacy. Dakins dc'd and xeroform started to wounds. Aquacel ag extra, mextra and large abd pad to right upper abd. Pt to f/u with Dr. Pennington in 1 week 07/23/2020.  7/23/20: F/U with Dr. Pennington. I&D of right abdomen abscess done 7/17/20 per Dr. Pennington. Site open with sutures. All previous sites improving. Iodoform gauze to sites needing packing. Rx for iodoform gauze and Vibra tabs provided today. Next visit 7/30/20.  7/30/20: F/U with Dr. Pennington. All sites improving. Sutures removed from right abdominal site per Dr. Pennington. Has excoriation under right breast. Rx for mycostatin powder provided. Cont. POC. Return 8/6/20.  08/13/2020- f/u with Dr. Pennington. Pt wounds improving. Silver nitrate x 2 to wounds for hypergranulation. Cont POC. F/u with Dr. Pennington in 2 weeks 08/27/2020  9/3/20: F/U with Dr. Pennington. All measurements improved. Breast site resolved. Wound care orders changed. Cont. Doxycycline and mycostatin powder. Next visit 9/17/20.  9/17/2020: Fu with Dr. Pennington. Wounds hypergranulated.  applied silver nitrate x 1 to each wound, patient tolerated well. Continued with current treatment plan. Fu 2 weeks 10/1/2020.  10/1/20: F/U with Dr. Pennington. Measurements improved. All sites with hypergranulation tissue. Lidocaine 4% to all sites prior to silver nitrate application. Continue POC. Next visit 10/15/20.  10/15/20: F/U with Dr. Pennington. Measurements decreased. Silver nitrate to all sites for  hypergranulation tissue. Cont. POC. Next visit 10/29/20.  10/29/20: F/U with Dr. Pennington. Silver nitrate to hypergranulation tissue at all sites. Culture of lower abdomen site done today. Cont. POC. Next visit 11/12/20.  11/12/20: F/U with Dr. Pennington. Silver nitrate to all sites. Cont. Augmentin. Refill called to pharmacy. Next visit 12/3/20.  12/3/20: F/U with Dr. Pennington. Measurements improving. Allsites hypergranulated and silver nitrate per Dr. Pennington to all. Continue Augmentin and POC. Next visit 12/17/20.  12/17/20: F/U with Dr. Pennington. Silver nitrate to all sites for hypergranulation tissue. Rx for Augmentin and Nystatin powder provided. Cont. POC. Next visit 12/31/20.    Review of Systems   Constitutional: Negative.    HENT: Negative.    Eyes: Negative.    Respiratory: Negative.    Cardiovascular: Negative.    Gastrointestinal: Negative.    Genitourinary: Negative.    Musculoskeletal: Negative.    Skin: Negative.    Neurological: Negative.    Psychiatric/Behavioral: Negative.        Objective:      Physical Exam  Constitutional:       Appearance: She is well-developed.   HENT:      Head: Normocephalic.   Eyes:      Conjunctiva/sclera: Conjunctivae normal.      Pupils: Pupils are equal, round, and reactive to light.   Neck:      Musculoskeletal: Normal range of motion and neck supple.   Cardiovascular:      Rate and Rhythm: Normal rate and regular rhythm.      Heart sounds: Normal heart sounds.   Pulmonary:      Effort: Pulmonary effort is normal.      Breath sounds: Normal breath sounds.   Abdominal:      General: Bowel sounds are normal.      Palpations: Abdomen is soft.   Musculoskeletal: Normal range of motion.   Skin:     General: Skin is warm and dry.   Neurological:      Mental Status: She is alert and oriented to person, place, and time.      Deep Tendon Reflexes: Reflexes are normal and symmetric.         Assessment:       1. Hidradenitis suppurativa of right axilla    2. Left axillary  hidradenitis           Wound 07/31/19 1300 Other (comment) Axilla #2 (Active)   07/31/19 1300    Pre-existing: Yes   Primary Wound Type: Other   Side: Left   Orientation:    Location: Axilla   Wound Number (optional): #2   Ankle-Brachial Index:    Pulses:    Removal Indication and Assessment:    Wound Outcome:    (Retired) Wound Type:    (Retired) Wound Length (cm):    (Retired) Wound Width (cm):    (Retired) Depth (cm):    Wound Description (Comments):    Removal Indications:    Wound Image   12/17/20 1000   Dressing Appearance Intact;Moist drainage 12/17/20 1000   Drainage Amount Small 12/17/20 1000   Drainage Characteristics/Odor Serosanguineous 12/17/20 1000   Appearance Red;Moist 12/17/20 1000   Tissue loss description Full thickness 12/17/20 1000   Red (%), Wound Tissue Color 100 % 12/17/20 1000   Periwound Area Intact;Dry 12/17/20 1000   Wound Edges Irregular 12/17/20 1000   Wound Length (cm) 3 cm 12/17/20 1000   Wound Width (cm) 1 cm 12/17/20 1000   Wound Depth (cm) 0.1 cm 12/17/20 1000   Wound Volume (cm^3) 0.3 cm^3 12/17/20 1000   Wound Surface Area (cm^2) 3 cm^2 12/17/20 1000   Care Cleansed with:;Sterile normal saline 12/17/20 1000   Dressing Changed;Abd pad;Non-adherent;Gauze 12/17/20 1000   Periwound Care Absorptive dressing applied 12/17/20 1000   Dressing Change Due 12/18/20 12/17/20 1000            Wound 07/31/19 1300 Other (comment) Axilla #1 (Active)   07/31/19 1300    Pre-existing: Yes   Primary Wound Type: Other   Side: Right   Orientation:    Location: Axilla   Wound Number (optional): #1   Ankle-Brachial Index:    Pulses:    Removal Indication and Assessment:    Wound Outcome:    (Retired) Wound Type:    (Retired) Wound Length (cm):    (Retired) Wound Width (cm):    (Retired) Depth (cm):    Wound Description (Comments):    Removal Indications:    Wound Image   12/17/20 1000   Dressing Appearance Intact;Moist drainage 12/17/20 1000   Drainage Amount Small 12/17/20 1000   Drainage  Characteristics/Odor Serosanguineous 12/17/20 1000   Appearance Red;Moist 12/17/20 1000   Tissue loss description Full thickness 12/17/20 1000   Red (%), Wound Tissue Color 100 % 12/17/20 1000   Periwound Area Intact;Dry 12/17/20 1000   Wound Edges Irregular 12/17/20 1000   Wound Length (cm) 2 cm 12/17/20 1000   Wound Width (cm) 0.5 cm 12/17/20 1000   Wound Depth (cm) 0.1 cm 12/17/20 1000   Wound Volume (cm^3) 0.1 cm^3 12/17/20 1000   Wound Surface Area (cm^2) 1 cm^2 12/17/20 1000   Care Cleansed with:;Sterile normal saline 12/17/20 1000   Dressing Changed;Non-adherent;Abd pad;Gauze 12/17/20 1000   Periwound Care Absorptive dressing applied 12/17/20 1000   Dressing Change Due 12/18/20 12/17/20 1000            Wound 07/15/20 1600 Abscess Right upper Abdomen #4 (Active)   07/15/20 1600    Pre-existing: Yes   Primary Wound Type: Abscess   Side: Right   Orientation: upper   Location: Abdomen   Wound Number (optional): #4   Ankle-Brachial Index:    Pulses:    Removal Indication and Assessment:    Wound Outcome:    (Retired) Wound Type:    (Retired) Wound Length (cm):    (Retired) Wound Width (cm):    (Retired) Depth (cm):    Wound Description (Comments):    Removal Indications:    Wound Image   12/17/20 1000   Dressing Appearance Moist drainage 12/17/20 1000   Drainage Amount Small 12/17/20 1000   Drainage Characteristics/Odor Serosanguineous 12/17/20 1000   Appearance Red;Moist 12/17/20 1000   Tissue loss description Full thickness 12/17/20 1000   Red (%), Wound Tissue Color 100 % 12/17/20 1000   Periwound Area Intact;Dry 12/17/20 1000   Wound Edges Defined 12/17/20 1000   Wound Length (cm) 1.5 cm 12/17/20 1000   Wound Width (cm) 1 cm 12/17/20 1000   Wound Depth (cm) 0.1 cm 12/17/20 1000   Wound Volume (cm^3) 0.15 cm^3 12/17/20 1000   Wound Surface Area (cm^2) 1.5 cm^2 12/17/20 1000   Care Cleansed with:;Sterile normal saline 12/17/20 1000   Dressing Changed;Non-adherent;Island/border;Gauze 12/17/20 1000   Periwound  Care Dry periwound area maintained 12/17/20 1000   Dressing Change Due 12/18/20 12/17/20 1000            Wound 09/17/20 1031 Right posterior Back #5 (Active)   09/17/20 1031    Pre-existing: Yes   Primary Wound Type:    Side: Right   Orientation: posterior   Location: Back   Wound Number (optional): #5   Ankle-Brachial Index:    Pulses:    Removal Indication and Assessment:    Wound Outcome:    (Retired) Wound Type:    (Retired) Wound Length (cm):    (Retired) Wound Width (cm):    (Retired) Depth (cm):    Wound Description (Comments):    Removal Indications:    Wound Image   12/17/20 1000   Dressing Appearance Moist drainage;Intact 12/17/20 1000   Drainage Amount Small 12/17/20 1000   Drainage Characteristics/Odor Serosanguineous 12/17/20 1000   Appearance Red;Moist 12/17/20 1000   Tissue loss description Full thickness 12/17/20 1000   Red (%), Wound Tissue Color 100 % 12/17/20 1000   Periwound Area Intact;Dry 12/17/20 1000   Wound Edges Irregular 12/17/20 1000   Wound Length (cm) 1 cm 12/17/20 1000   Wound Width (cm) 4 cm 12/17/20 1000   Wound Depth (cm) 0.1 cm 12/17/20 1000   Wound Volume (cm^3) 0.4 cm^3 12/17/20 1000   Wound Surface Area (cm^2) 4 cm^2 12/17/20 1000   Care Cleansed with:;Sterile normal saline 12/17/20 1000   Dressing Changed;Island/border;Non-adherent;Gauze 12/17/20 1000   Periwound Care Dry periwound area maintained 12/17/20 1000   Dressing Change Due 12/18/20 12/17/20 1000            Wound 10/15/20 1030 Abscess Right lower Abdomen (Active)   10/15/20 1030    Pre-existing: Yes   Primary Wound Type: Abscess   Side: Right   Orientation: lower   Location: Abdomen   Wound Number (optional):    Ankle-Brachial Index:    Pulses:    Removal Indication and Assessment:    Wound Outcome:    (Retired) Wound Type:    (Retired) Wound Length (cm):    (Retired) Wound Width (cm):    (Retired) Depth (cm):    Wound Description (Comments):    Removal Indications:    Wound Image   12/17/20 1000   Dressing Appearance  Moist drainage;Intact 12/17/20 1000   Drainage Amount Moderate 12/17/20 1000   Drainage Characteristics/Odor Serosanguineous 12/17/20 1000   Appearance Red;Moist 12/17/20 1000   Tissue loss description Full thickness 12/17/20 1000   Red (%), Wound Tissue Color 100 % 12/17/20 1000   Periwound Area Intact;Dry 12/17/20 1000   Wound Edges Defined 12/17/20 1000   Wound Length (cm) 3 cm 12/17/20 1000   Wound Width (cm) 3 cm 12/17/20 1000   Wound Depth (cm) 0.1 cm 12/17/20 1000   Wound Volume (cm^3) 0.9 cm^3 12/17/20 1000   Wound Surface Area (cm^2) 9 cm^2 12/17/20 1000   Care Cleansed with:;Sterile normal saline 12/17/20 1000   Dressing Changed;Gauze;Island/border;Non-adherent 12/17/20 1000   Periwound Care Dry periwound area maintained 12/17/20 1000   Dressing Change Due 12/18/20 12/17/20 1000   Silver nitrate to hypergranulation tissue of all sites.    Left and Right Axilla   Cleanse with: Rinse with normal saline   Primary dressing:  xeroform to right and left   Secondary dressing: cover with 4x4 gauze and small abd pad, secure with mefix tape   Frequency: daily     Right upper abd, right lower abdomen,and right back:  Cleanse with: Rinse with normal saline   Primary dressing: xeroform   Secondary dressing: gauze, 4 x 4 mepore dressing  Frequency: daily       Follow up with Dr. Pennington  Thursday 12/31/20    Other orders: Continue Augmentin  Po and Nystatin powder under breasts and abd folds.      Home Health: [facility]:  Fax # [phone].  Skilled nurse to perform dressing changes every Monday, Wednesday except when in clinic. Patient will do own wound care of Fridays.  Please provide patient's caregiver with necessary supplies to do wound care: small abd pads, 4x4 gauze, mefix tape.     Plan:                12/31/20 Dr. Pennington

## 2020-12-30 ENCOUNTER — HOSPITAL ENCOUNTER (OUTPATIENT)
Dept: WOUND CARE | Facility: HOSPITAL | Age: 40
Discharge: HOME | End: 2020-12-30
Attending: SURGERY
Payer: COMMERCIAL

## 2020-12-30 VITALS
BODY MASS INDEX: 51.63 KG/M2 | DIASTOLIC BLOOD PRESSURE: 77 MMHG | WEIGHT: 263 LBS | HEIGHT: 60 IN | HEART RATE: 77 BPM | SYSTOLIC BLOOD PRESSURE: 123 MMHG | TEMPERATURE: 98 F

## 2020-12-30 DIAGNOSIS — L73.2 HIDRADENITIS SUPPURATIVA OF RIGHT AXILLA: Primary | ICD-10-CM

## 2020-12-30 DIAGNOSIS — L02.213 ABSCESS OF CHEST WALL: ICD-10-CM

## 2020-12-30 DIAGNOSIS — E66.01 MORBID OBESITY WITH BMI OF 40.0-44.9, ADULT: ICD-10-CM

## 2020-12-30 DIAGNOSIS — L73.2 LEFT AXILLARY HIDRADENITIS: ICD-10-CM

## 2020-12-30 PROCEDURE — 17250 CHEM CAUT OF GRANLTJ TISSUE: CPT

## 2020-12-30 NOTE — PROGRESS NOTES
"Subjective:       Patient ID: Rodney Infante is a 40 y.o. female.    Chief Complaint: Wound Care    F/u for excision bilateral axillary hider adneitis    Client here today with open areas to right and left axilla. Seen by Dr. Pennington, who states sweat glands infected. Rx for clindamycin, bactroban, and tramadol provided. Stop methatrexate ( prescribed for arthritis). Client plans to have abdominal bypass surgery in September. Culture done today of right axilla.  8/7/19: F/U with Dr. Pennington. Culture results negative. Continue POC.  8/14/19: F/U with Dr. Pennington. Clindamycin d/c'd. Bactrim and Diflucan ordered today. Leave left and right axilla open to air. Culture sent to lab today.     9/4/19:  F/U with Dr. Pennington.  Wounds to right axilla healing up.  Patient states "right does not drain as much as left".  Patient feels the left axilla is not healing up like the right.  Patient still taking Bactrim PO.  Will continue for now.  9/18/19: F/U with Dr. Pennington. Both axilla continue to have "sebum - like" exudate. New site noted to left back. Culture done today. RX for lotrisone and Diflucan provided.  9/25/19: F/U with Dr. Pennington. Axillae improved. Client was not able to get Diflucan. Reordered today. Continues lotrisone and clindamycin. Culture results reviewed. Referred to ID for appt. On 10/7/19. Next visit with Dr. Pennington 10/16/19.  10/7/19: Vist today with ID. Augmentin ordered. Clindamycin d/c'd. Complete Diflucan.  11/6/19: Dr Pennington assessed patient.Wounds slowly improving. Patient states that she has had gastric sleeve procedure last week. No complaints voiced. Continuing with present plan of care. Follow up in 2 weeks.  11/20/19: F/U with Dr. Pennington. Wounds continue to improve. Continue POC. Return in 2 weeks.  12/4/19: F/U with Dr. Pennington. Axillae drainage decreased. Client has lost 97 Lbs. As of today. Surgery planned for 12/20/19. Next visit 12/18/19, and consents to be signed.  12/18/19: F/U " with Dr. Pennington. Surgery to axillae scheduled for Monday 12/23/19. Consents signed. Return 1/2/20.   12/26/19  F/U with Dr. pennington for s/p surgery to bilateral axilla.  Removal of glands and placement of theraskin.  Staples intact.  Theraskin not completely  Adhered on both areas.   Start bolster dressing to assist tin the adherence of the graft. Patient to return to clinic in 1 week.  Order homehealth for twice weekly    01/02/20: F/u with Dr. Pennington. Moderate to large amount of drainage from wounds. Staples and sutures removed from both wounds today in clinic per Dr. Pennington. New wound care orders given and routed to [facility]. Rx given for 5 mg norco every 4 hours. Patient given a note to not return to work until further notice.  1/9/20: F/U with Dr. Pennington. Removed 2 remaining staples. Discussed with patient future grafting of sites. Cont. POC. Return in 1 week.  1/16/20: F/U with Dr. Pennington. Client reports decreased drainage today. Rx for Clindamycin and Norco provided. Client prefers to wait a while before having grafting performed. Return in 1 week.  1/23/20: F/U with Dr. Pennington. Right axilla measurements improved. Continue Clindamycin. Return in 1 week.  2/6/20:Patient presents with a new wound to right lateral breast. Dr Pennington assessed patient. Continuing with same wound care orders. Rx for Norco 5/325mg given to patient for pain. F/U in 2 weeks.   2/20/20: F/U with Dr. Pennington. Sites continue to improve. Measurements decreased. Culture reviewed. Rx for Ampicillin provided. Client has taken PCN in past w/o reaction. Rx for Norco provided. Return in 2 weeks.  3/5/20: F/U with Dr. Pennington. Sites decreased in size. Cont. Ampicillin. Cont. POC. Return in 2 weeks.  3/19/20: F/U with Dr. Pennington. Sites improving. Cont. POC. Return in 2 weeks.  5/7/20: F/U with Dr. Pennington. Measurements improved all sites. Culture of left axilla done today. Cont. POC. Return in 2 weeks.  6/4/20: F/U with   [facility]. All sites improved. Cont. Clindamycin. Return in 2 weeks.  6/18/20: F/U with Dr. Pennington. Measurements improved. Silver nitrate x 1 to right and left axilla hypergranulation tissue. Cont. POC. Next visit 7/2/20. Client will be out of time 7/6/20 - 7/11/20. Will notify [facility].  07/15/2020- f/u with Dr. Pennington. Pt presents with new abscess to upper right abd. Pt consented for OR this Friday 07/17/2020 for I&D to abscess. Rx for clindamycin sent to pharmacy. Dakins dc'd and xeroform started to wounds. Aquacel ag extra, mextra and large abd pad to right upper abd. Pt to f/u with Dr. Pennington in 1 week 07/23/2020.  7/23/20: F/U with Dr. Pennington. I&D of right abdomen abscess done 7/17/20 per Dr. Pennington. Site open with sutures. All previous sites improving. Iodoform gauze to sites needing packing. Rx for iodoform gauze and Vibra tabs provided today. Next visit 7/30/20.  7/30/20: F/U with Dr. Pennington. All sites improving. Sutures removed from right abdominal site per Dr. Pennington. Has excoriation under right breast. Rx for mycostatin powder provided. Cont. POC. Return 8/6/20.  08/13/2020- f/u with Dr. Pennington. Pt wounds improving. Silver nitrate x 2 to wounds for hypergranulation. Cont POC. F/u with Dr. Pennington in 2 weeks 08/27/2020  9/3/20: F/U with Dr. Pennington. All measurements improved. Breast site resolved. Wound care orders changed. Cont. Doxycycline and mycostatin powder. Next visit 9/17/20.  9/17/2020: Fu with Dr. Pennington. Wounds hypergranulated.  applied silver nitrate x 1 to each wound, patient tolerated well. Continued with current treatment plan. Fu 2 weeks 10/1/2020.  10/1/20: F/U with Dr. Pennington. Measurements improved. All sites with hypergranulation tissue. Lidocaine 4% to all sites prior to silver nitrate application. Continue POC. Next visit 10/15/20.  10/15/20: F/U with Dr. Pennington. Measurements decreased. Silver nitrate to all sites for hypergranulation tissue. Cont. POC. Next  visit 10/29/20.  10/29/20: F/U with Dr. Pennington. Silver nitrate to hypergranulation tissue at all sites. Culture of lower abdomen site done today. Cont. POC. Next visit 11/12/20.  11/12/20: F/U with Dr. Pennington. Silver nitrate to all sites. Cont. Augmentin. Refill called to pharmacy. Next visit 12/3/20.  12/3/20: F/U with Dr. Pennington. Measurements improving. Allsites hypergranulated and silver nitrate per Dr. Pennington to all. Continue Augmentin and POC. Next visit 12/17/20.  12/17/20: F/U with Dr. Pennington. Silver nitrate to all sites for hypergranulation tissue. Rx for Augmentin and Nystatin powder provided. Cont. POC. Next visit 12/31/20.  12/30/2020: Follow up with . All wounds hypergranulated and silver nitrate x2 used to all wounds. Orders will be sent to home health. Follow up in 2 weeks with .     Review of Systems   Constitutional: Negative.    HENT: Negative.    Eyes: Negative.    Respiratory: Negative.    Cardiovascular: Negative.    Gastrointestinal: Negative.    Genitourinary: Negative.    Musculoskeletal: Negative.    Skin: Negative.    Neurological: Negative.    Psychiatric/Behavioral: Negative.        Objective:      Physical Exam  Constitutional:       Appearance: She is well-developed.   HENT:      Head: Normocephalic.   Eyes:      Conjunctiva/sclera: Conjunctivae normal.      Pupils: Pupils are equal, round, and reactive to light.   Neck:      Musculoskeletal: Normal range of motion and neck supple.   Cardiovascular:      Rate and Rhythm: Normal rate and regular rhythm.      Heart sounds: Normal heart sounds.   Pulmonary:      Effort: Pulmonary effort is normal.      Breath sounds: Normal breath sounds.   Abdominal:      General: Bowel sounds are normal.      Palpations: Abdomen is soft.   Musculoskeletal: Normal range of motion.   Skin:     General: Skin is warm and dry.   Neurological:      Mental Status: She is alert and oriented to person, place, and time.      Deep Tendon  Reflexes: Reflexes are normal and symmetric.         Assessment:       1. Hidradenitis suppurativa of right axilla    2. Left axillary hidradenitis           Wound 07/31/19 1300 Other (comment) Axilla #2 (Active)   07/31/19 1300    Pre-existing: Yes   Primary Wound Type: Other   Side: Left   Orientation:    Location: Axilla   Wound Number (optional): #2   Ankle-Brachial Index:    Pulses:    Removal Indication and Assessment:    Wound Outcome:    (Retired) Wound Type:    (Retired) Wound Length (cm):    (Retired) Wound Width (cm):    (Retired) Depth (cm):    Wound Description (Comments):    Removal Indications:    Wound Image   12/30/20 1500   Dressing Appearance Intact;Dried drainage 12/30/20 1500   Drainage Amount Small 12/30/20 1500   Appearance Pink;Red 12/30/20 1500   Tissue loss description Full thickness 12/30/20 1500   Red (%), Wound Tissue Color 100 % 12/30/20 1500   Periwound Area Intact;Dry 12/30/20 1500   Wound Edges Irregular 12/30/20 1500   Wound Length (cm) 0.5 cm 12/30/20 1500   Wound Width (cm) 1.2 cm 12/30/20 1500   Wound Depth (cm) 0.1 cm 12/30/20 1500   Wound Volume (cm^3) 0.06 cm^3 12/30/20 1500   Wound Surface Area (cm^2) 0.6 cm^2 12/30/20 1500   Care Cleansed with:;Sterile normal saline 12/30/20 1500   Dressing Applied;Island/border;Non-adherent 12/30/20 1500   Periwound Care Absorptive dressing applied 12/30/20 1500   Dressing Change Due 01/01/21 12/30/20 1500            Wound 07/31/19 1300 Other (comment) Axilla #1 (Active)   07/31/19 1300    Pre-existing: Yes   Primary Wound Type: Other   Side: Right   Orientation:    Location: Axilla   Wound Number (optional): #1   Ankle-Brachial Index:    Pulses:    Removal Indication and Assessment:    Wound Outcome:    (Retired) Wound Type:    (Retired) Wound Length (cm):    (Retired) Wound Width (cm):    (Retired) Depth (cm):    Wound Description (Comments):    Removal Indications:    Wound Image   12/30/20 1500   Dressing Appearance Intact;Moist  drainage 12/30/20 1500   Drainage Amount Small 12/30/20 1500   Drainage Characteristics/Odor Serosanguineous 12/30/20 1500   Appearance Pink;Moist 12/30/20 1500   Tissue loss description Full thickness 12/30/20 1500   Red (%), Wound Tissue Color 100 % 12/30/20 1500   Periwound Area Intact;Dry 12/30/20 1500   Wound Edges Irregular 12/30/20 1500   Wound Length (cm) 1.5 cm 12/30/20 1500   Wound Width (cm) 0.5 cm 12/30/20 1500   Wound Depth (cm) 0.6 cm 12/30/20 1500   Wound Volume (cm^3) 0.45 cm^3 12/30/20 1500   Wound Surface Area (cm^2) 0.75 cm^2 12/30/20 1500   Care Cleansed with:;Sterile normal saline 12/30/20 1500   Dressing Applied;Island/border;Non-adherent 12/30/20 1500   Periwound Care Absorptive dressing applied 12/30/20 1500   Dressing Change Due 01/01/21 12/30/20 1500            Wound 07/15/20 1600 Abscess Right upper Abdomen #4 (Active)   07/15/20 1600    Pre-existing: Yes   Primary Wound Type: Abscess   Side: Right   Orientation: upper   Location: Abdomen   Wound Number (optional): #4   Ankle-Brachial Index:    Pulses:    Removal Indication and Assessment:    Wound Outcome:    (Retired) Wound Type:    (Retired) Wound Length (cm):    (Retired) Wound Width (cm):    (Retired) Depth (cm):    Wound Description (Comments):    Removal Indications:    Wound Image   12/30/20 1500   Dressing Appearance Intact;Moist drainage 12/30/20 1500   Drainage Amount Small 12/30/20 1500   Drainage Characteristics/Odor Serosanguineous 12/30/20 1500   Appearance Pink;Red 12/30/20 1500   Tissue loss description Full thickness 12/30/20 1500   Red (%), Wound Tissue Color 100 % 12/30/20 1500   Periwound Area Intact;Dry 12/30/20 1500   Wound Edges Defined 12/30/20 1500   Wound Length (cm) 1.6 cm 12/30/20 1500   Wound Width (cm) 2 cm 12/30/20 1500   Wound Depth (cm) 0.1 cm 12/30/20 1500   Wound Volume (cm^3) 0.32 cm^3 12/30/20 1500   Wound Surface Area (cm^2) 3.2 cm^2 12/30/20 1500   Care Cleansed with:;Sterile normal saline 12/30/20  1500   Dressing Applied;Non-adherent 12/30/20 1500   Periwound Care Dry periwound area maintained 12/30/20 1500   Dressing Change Due 01/01/21 12/30/20 1500            Wound 09/17/20 1031 Right posterior Back #5 (Active)   09/17/20 1031    Pre-existing: Yes   Primary Wound Type:    Side: Right   Orientation: posterior   Location: Back   Wound Number (optional): #5   Ankle-Brachial Index:    Pulses:    Removal Indication and Assessment:    Wound Outcome:    (Retired) Wound Type:    (Retired) Wound Length (cm):    (Retired) Wound Width (cm):    (Retired) Depth (cm):    Wound Description (Comments):    Removal Indications:    Wound Image   12/30/20 1500   Dressing Appearance Intact;Moist drainage 12/30/20 1500   Drainage Amount Small 12/30/20 1500   Drainage Characteristics/Odor Sanguineous;Serosanguineous 12/30/20 1500   Appearance Pink;Moist 12/30/20 1500   Tissue loss description Full thickness 12/30/20 1500   Red (%), Wound Tissue Color 100 % 12/30/20 1500   Periwound Area Intact;Dry 12/30/20 1500   Wound Edges Irregular 12/30/20 1500   Wound Length (cm) 1.2 cm 12/30/20 1500   Wound Width (cm) 3.5 cm 12/30/20 1500   Wound Depth (cm) 0.1 cm 12/30/20 1500   Wound Volume (cm^3) 0.42 cm^3 12/30/20 1500   Wound Surface Area (cm^2) 4.2 cm^2 12/30/20 1500   Care Cleansed with:;Sterile normal saline 12/30/20 1500   Dressing Applied;Non-adherent 12/30/20 1500   Periwound Care Dry periwound area maintained 12/30/20 1500   Dressing Change Due 01/01/21 12/30/20 1500            Wound 10/15/20 1030 Abscess Right lower Abdomen (Active)   10/15/20 1030    Pre-existing: Yes   Primary Wound Type: Abscess   Side: Right   Orientation: lower   Location: Abdomen   Wound Number (optional):    Ankle-Brachial Index:    Pulses:    Removal Indication and Assessment:    Wound Outcome:    (Retired) Wound Type:    (Retired) Wound Length (cm):    (Retired) Wound Width (cm):    (Retired) Depth (cm):    Wound Description (Comments):    Removal  Indications:    Wound Image   12/30/20 1500   Dressing Appearance Intact;Moist drainage 12/30/20 1500   Drainage Amount Small 12/30/20 1500   Drainage Characteristics/Odor Serosanguineous 12/30/20 1500   Appearance Pink;Red;Moist 12/30/20 1500   Tissue loss description Full thickness 12/30/20 1500   Red (%), Wound Tissue Color 100 % 12/30/20 1500   Periwound Area Intact;Dry 12/30/20 1500   Wound Edges Defined 12/30/20 1500   Wound Length (cm) 2.3 cm 12/30/20 1500   Wound Width (cm) 3 cm 12/30/20 1500   Wound Depth (cm) 0.1 cm 12/30/20 1500   Wound Volume (cm^3) 0.69 cm^3 12/30/20 1500   Wound Surface Area (cm^2) 6.9 cm^2 12/30/20 1500   Care Cleansed with:;Sterile normal saline 12/30/20 1500   Dressing Applied;Non-adherent 12/30/20 1500   Periwound Care Dry periwound area maintained 12/30/20 1500   Dressing Change Due 01/01/21 12/30/20 1500         Left and Right Axilla   Cleanse with: Rinse with normal saline   Primary dressing:  xeroform to right and left   Secondary dressing: cover with 4x4 gauze and small abd pad, secure with mefix tape   Frequency: daily     Right upper abd, right lower abd, and right back:   Cleanse with: Rinse with normal saline   Primary dressing: xeroform   Secondary dressing: gauze, 4 x 4 mepore dressing, secure with mefix tape   Frequency: daily       Follow up with Dr. Pennington  Thursday 1/13/2020        Home Health: [facility]:  Fax # [phone].  Skilled nurse to perform dressing changes every Monday, Wednesday except when in clinic. Patient will do own wound care on Fridays.  Please provide patient's caregiver with necessary supplies to do wound care: small abd pads, 4x4 gauze, mefix tape. Next visit 1/13/2020        Plan:                Follow up in 2 weeks

## 2021-01-14 ENCOUNTER — HOSPITAL ENCOUNTER (OUTPATIENT)
Dept: WOUND CARE | Facility: HOSPITAL | Age: 41
Discharge: HOME | End: 2021-01-14
Attending: SURGERY
Payer: COMMERCIAL

## 2021-01-14 VITALS
HEIGHT: 60 IN | SYSTOLIC BLOOD PRESSURE: 128 MMHG | BODY MASS INDEX: 51.63 KG/M2 | TEMPERATURE: 98 F | DIASTOLIC BLOOD PRESSURE: 83 MMHG | WEIGHT: 263 LBS | HEART RATE: 78 BPM

## 2021-01-14 DIAGNOSIS — E11.622 DIABETES MELLITUS WITH SKIN ULCER: ICD-10-CM

## 2021-01-14 DIAGNOSIS — G89.29 CHRONIC LEFT SHOULDER PAIN: ICD-10-CM

## 2021-01-14 DIAGNOSIS — L98.499 DIABETES MELLITUS WITH SKIN ULCER: ICD-10-CM

## 2021-01-14 DIAGNOSIS — M25.512 CHRONIC LEFT SHOULDER PAIN: ICD-10-CM

## 2021-01-14 DIAGNOSIS — L73.2 LEFT AXILLARY HIDRADENITIS: ICD-10-CM

## 2021-01-14 DIAGNOSIS — L73.2 HIDRADENITIS AXILLARIS: ICD-10-CM

## 2021-01-14 DIAGNOSIS — Z98.84 S/P BARIATRIC SURGERY: ICD-10-CM

## 2021-01-14 DIAGNOSIS — L73.2 HIDRADENITIS SUPPURATIVA OF RIGHT AXILLA: Primary | ICD-10-CM

## 2021-01-14 PROCEDURE — 17250 CHEM CAUT OF GRANLTJ TISSUE: CPT

## 2021-02-02 ENCOUNTER — LAB VISIT (OUTPATIENT)
Dept: LAB | Facility: HOSPITAL | Age: 41
End: 2021-02-02
Attending: NURSE PRACTITIONER
Payer: COMMERCIAL

## 2021-02-02 DIAGNOSIS — G56.03 BILATERAL CARPAL TUNNEL SYNDROME: ICD-10-CM

## 2021-02-02 DIAGNOSIS — B99.9 ANEMIA OF INFECTION AND CHRONIC DISEASE: ICD-10-CM

## 2021-02-02 DIAGNOSIS — Z98.84 S/P BARIATRIC SURGERY: ICD-10-CM

## 2021-02-02 DIAGNOSIS — E66.01 MORBID OBESITY WITH BMI OF 45.0-49.9, ADULT: ICD-10-CM

## 2021-02-02 DIAGNOSIS — D50.0 IRON DEFICIENCY ANEMIA DUE TO CHRONIC BLOOD LOSS: ICD-10-CM

## 2021-02-02 DIAGNOSIS — D63.8 ANEMIA OF INFECTION AND CHRONIC DISEASE: ICD-10-CM

## 2021-02-02 DIAGNOSIS — L73.2 HIDRADENITIS AXILLARIS: ICD-10-CM

## 2021-02-02 DIAGNOSIS — R77.8 ELEVATED TOTAL PROTEIN: ICD-10-CM

## 2021-02-02 LAB
ALBUMIN SERPL BCP-MCNC: 3.8 G/DL (ref 3.5–5.2)
ALP SERPL-CCNC: 110 U/L (ref 38–126)
ALT SERPL W/O P-5'-P-CCNC: 12 U/L (ref 10–44)
ANION GAP SERPL CALC-SCNC: 7 MMOL/L (ref 8–16)
AST SERPL-CCNC: 23 U/L (ref 15–46)
BASOPHILS # BLD AUTO: 0.05 K/UL (ref 0–0.2)
BASOPHILS NFR BLD: 0.6 % (ref 0–1.9)
BILIRUB SERPL-MCNC: 0.4 MG/DL (ref 0.1–1)
CALCIUM SERPL-MCNC: 9 MG/DL (ref 8.7–10.5)
CHLORIDE SERPL-SCNC: 109 MMOL/L (ref 95–110)
CO2 SERPL-SCNC: 26 MMOL/L (ref 23–29)
CREAT SERPL-MCNC: 0.67 MG/DL (ref 0.5–1.4)
DIFFERENTIAL METHOD: ABNORMAL
EOSINOPHIL # BLD AUTO: 0.2 K/UL (ref 0–0.5)
EOSINOPHIL NFR BLD: 2.1 % (ref 0–8)
ERYTHROCYTE [DISTWIDTH] IN BLOOD BY AUTOMATED COUNT: 12.9 % (ref 11.5–14.5)
ERYTHROCYTE [SEDIMENTATION RATE] IN BLOOD BY WESTERGREN METHOD: 97 MM/HR (ref 0–20)
EST. GFR  (AFRICAN AMERICAN): >60 ML/MIN/1.73 M^2
EST. GFR  (NON AFRICAN AMERICAN): >60 ML/MIN/1.73 M^2
FERRITIN SERPL-MCNC: 141 NG/ML (ref 20–300)
GLUCOSE SERPL-MCNC: 81 MG/DL (ref 70–110)
HCT VFR BLD AUTO: 40.4 % (ref 37–48.5)
HGB BLD-MCNC: 12.7 G/DL (ref 12–16)
IMM GRANULOCYTES # BLD AUTO: 0.13 K/UL (ref 0–0.04)
IMM GRANULOCYTES NFR BLD AUTO: 1.5 % (ref 0–0.5)
IRON SERPL-MCNC: 53 UG/DL (ref 30–160)
LYMPHOCYTES # BLD AUTO: 3.6 K/UL (ref 1–4.8)
LYMPHOCYTES NFR BLD: 42.4 % (ref 18–48)
MCH RBC QN AUTO: 31.1 PG (ref 27–31)
MCHC RBC AUTO-ENTMCNC: 31.4 G/DL (ref 32–36)
MCV RBC AUTO: 99 FL (ref 82–98)
MONOCYTES # BLD AUTO: 0.7 K/UL (ref 0.3–1)
MONOCYTES NFR BLD: 7.8 % (ref 4–15)
NEUTROPHILS # BLD AUTO: 3.9 K/UL (ref 1.8–7.7)
NEUTROPHILS NFR BLD: 45.6 % (ref 38–73)
NRBC BLD-RTO: 0 /100 WBC
PLATELET # BLD AUTO: 405 K/UL (ref 150–350)
PMV BLD AUTO: 8.1 FL (ref 9.2–12.9)
POTASSIUM SERPL-SCNC: 4.3 MMOL/L (ref 3.5–5.1)
PROT SERPL-MCNC: 8.9 G/DL (ref 6–8.4)
RBC # BLD AUTO: 4.09 M/UL (ref 4–5.4)
SATURATED IRON: 15 % (ref 20–50)
SODIUM SERPL-SCNC: 142 MMOL/L (ref 136–145)
TOTAL IRON BINDING CAPACITY: 354 UG/DL (ref 250–450)
TRANSFERRIN SERPL-MCNC: 239 MG/DL (ref 200–375)
UUN UR-MCNC: 6 MG/DL (ref 7–17)
WBC # BLD AUTO: 8.47 K/UL (ref 3.9–12.7)

## 2021-02-02 PROCEDURE — 83540 ASSAY OF IRON: CPT | Mod: PO

## 2021-02-02 PROCEDURE — 85025 COMPLETE CBC W/AUTO DIFF WBC: CPT | Mod: PO

## 2021-02-02 PROCEDURE — 82728 ASSAY OF FERRITIN: CPT

## 2021-02-02 PROCEDURE — 85652 RBC SED RATE AUTOMATED: CPT

## 2021-02-02 PROCEDURE — 80053 COMPREHEN METABOLIC PANEL: CPT | Mod: PO

## 2021-02-02 PROCEDURE — 36415 COLL VENOUS BLD VENIPUNCTURE: CPT | Mod: PO

## 2021-02-03 RX ORDER — GABAPENTIN 100 MG/1
300 CAPSULE ORAL NIGHTLY
Qty: 90 CAPSULE | Refills: 3 | Status: SHIPPED | OUTPATIENT
Start: 2021-02-03 | End: 2021-04-16

## 2021-02-11 ENCOUNTER — HOSPITAL ENCOUNTER (OUTPATIENT)
Dept: WOUND CARE | Facility: HOSPITAL | Age: 41
Discharge: HOME | End: 2021-02-11
Attending: SURGERY
Payer: COMMERCIAL

## 2021-02-11 VITALS
DIASTOLIC BLOOD PRESSURE: 77 MMHG | HEIGHT: 60 IN | WEIGHT: 275 LBS | HEART RATE: 77 BPM | TEMPERATURE: 98 F | BODY MASS INDEX: 53.99 KG/M2 | SYSTOLIC BLOOD PRESSURE: 134 MMHG

## 2021-02-11 DIAGNOSIS — Z98.84 S/P BARIATRIC SURGERY: ICD-10-CM

## 2021-02-11 DIAGNOSIS — L73.2 HIDRADENITIS AXILLARIS: ICD-10-CM

## 2021-02-11 DIAGNOSIS — E66.01 MORBID OBESITY WITH BMI OF 40.0-44.9, ADULT: Primary | ICD-10-CM

## 2021-02-11 DIAGNOSIS — L73.2 LEFT AXILLARY HIDRADENITIS: ICD-10-CM

## 2021-02-11 DIAGNOSIS — L08.9 BACTERIAL SKIN INFECTION: ICD-10-CM

## 2021-02-11 DIAGNOSIS — L73.2 HIDRADENITIS SUPPURATIVA OF RIGHT AXILLA: ICD-10-CM

## 2021-02-11 DIAGNOSIS — B96.89 BACTERIAL SKIN INFECTION: ICD-10-CM

## 2021-02-11 DIAGNOSIS — M25.649 STIFFNESS OF HAND JOINT, UNSPECIFIED LATERALITY: ICD-10-CM

## 2021-02-11 PROCEDURE — 99214 OFFICE O/P EST MOD 30 MIN: CPT

## 2021-02-25 ENCOUNTER — HOSPITAL ENCOUNTER (OUTPATIENT)
Dept: WOUND CARE | Facility: HOSPITAL | Age: 41
Discharge: HOME | End: 2021-02-25
Attending: SURGERY
Payer: COMMERCIAL

## 2021-02-25 VITALS
HEIGHT: 60 IN | SYSTOLIC BLOOD PRESSURE: 141 MMHG | WEIGHT: 275 LBS | HEART RATE: 83 BPM | BODY MASS INDEX: 53.99 KG/M2 | DIASTOLIC BLOOD PRESSURE: 83 MMHG | TEMPERATURE: 98 F

## 2021-02-25 DIAGNOSIS — L02.213 ABSCESS OF CHEST WALL: ICD-10-CM

## 2021-02-25 DIAGNOSIS — E11.622 DIABETES MELLITUS WITH SKIN ULCER: ICD-10-CM

## 2021-02-25 DIAGNOSIS — Z98.84 S/P BARIATRIC SURGERY: ICD-10-CM

## 2021-02-25 DIAGNOSIS — L73.2 HIDRADENITIS AXILLARIS: Primary | ICD-10-CM

## 2021-02-25 DIAGNOSIS — L98.499 DIABETES MELLITUS WITH SKIN ULCER: ICD-10-CM

## 2021-02-25 PROCEDURE — 17250 CHEM CAUT OF GRANLTJ TISSUE: CPT

## 2021-03-01 RX ORDER — AMOXICILLIN AND CLAVULANATE POTASSIUM 875; 125 MG/1; MG/1
TABLET, FILM COATED ORAL
Qty: 28 TABLET | Refills: 2 | OUTPATIENT
Start: 2021-03-01

## 2021-03-11 ENCOUNTER — HOSPITAL ENCOUNTER (OUTPATIENT)
Dept: WOUND CARE | Facility: HOSPITAL | Age: 41
Discharge: HOME | End: 2021-03-11
Attending: SURGERY
Payer: COMMERCIAL

## 2021-03-11 VITALS
SYSTOLIC BLOOD PRESSURE: 115 MMHG | BODY MASS INDEX: 53.99 KG/M2 | WEIGHT: 275 LBS | HEIGHT: 60 IN | TEMPERATURE: 97 F | HEART RATE: 92 BPM | DIASTOLIC BLOOD PRESSURE: 79 MMHG

## 2021-03-11 DIAGNOSIS — E11.622 DIABETES MELLITUS WITH SKIN ULCER: ICD-10-CM

## 2021-03-11 DIAGNOSIS — E66.01 MORBID OBESITY WITH BMI OF 40.0-44.9, ADULT: ICD-10-CM

## 2021-03-11 DIAGNOSIS — Z98.84 S/P BARIATRIC SURGERY: ICD-10-CM

## 2021-03-11 DIAGNOSIS — L98.499 DIABETES MELLITUS WITH SKIN ULCER: ICD-10-CM

## 2021-03-11 DIAGNOSIS — L73.2 HIDRADENITIS AXILLARIS: Primary | ICD-10-CM

## 2021-03-11 PROCEDURE — 99214 OFFICE O/P EST MOD 30 MIN: CPT

## 2021-03-25 ENCOUNTER — HOSPITAL ENCOUNTER (OUTPATIENT)
Dept: WOUND CARE | Facility: HOSPITAL | Age: 41
Discharge: HOME | End: 2021-03-25
Attending: SURGERY
Payer: COMMERCIAL

## 2021-03-25 VITALS
DIASTOLIC BLOOD PRESSURE: 85 MMHG | BODY MASS INDEX: 53.99 KG/M2 | HEART RATE: 81 BPM | HEIGHT: 60 IN | SYSTOLIC BLOOD PRESSURE: 117 MMHG | TEMPERATURE: 96 F | WEIGHT: 275 LBS

## 2021-03-25 DIAGNOSIS — E11.9 DIABETES MELLITUS WITHOUT COMPLICATION: ICD-10-CM

## 2021-03-25 DIAGNOSIS — E66.01 MORBID OBESITY WITH BMI OF 40.0-44.9, ADULT: ICD-10-CM

## 2021-03-25 DIAGNOSIS — Z98.84 S/P BARIATRIC SURGERY: ICD-10-CM

## 2021-03-25 DIAGNOSIS — L73.2 HIDRADENITIS AXILLARIS: Primary | ICD-10-CM

## 2021-03-25 PROCEDURE — 17250 CHEM CAUT OF GRANLTJ TISSUE: CPT

## 2021-04-08 ENCOUNTER — HOSPITAL ENCOUNTER (OUTPATIENT)
Dept: WOUND CARE | Facility: HOSPITAL | Age: 41
Discharge: HOME | End: 2021-04-08
Attending: SURGERY
Payer: COMMERCIAL

## 2021-04-08 VITALS
HEIGHT: 60 IN | TEMPERATURE: 98 F | BODY MASS INDEX: 53.99 KG/M2 | SYSTOLIC BLOOD PRESSURE: 128 MMHG | DIASTOLIC BLOOD PRESSURE: 91 MMHG | WEIGHT: 275 LBS | HEART RATE: 75 BPM

## 2021-04-08 DIAGNOSIS — G89.29 CHRONIC LEFT SHOULDER PAIN: ICD-10-CM

## 2021-04-08 DIAGNOSIS — L73.2 HIDRADENITIS AXILLARIS: Primary | ICD-10-CM

## 2021-04-08 DIAGNOSIS — M25.512 CHRONIC LEFT SHOULDER PAIN: ICD-10-CM

## 2021-04-08 DIAGNOSIS — E11.9 DIABETES MELLITUS WITHOUT COMPLICATION: ICD-10-CM

## 2021-04-08 PROCEDURE — 17250 CHEM CAUT OF GRANLTJ TISSUE: CPT

## 2021-04-16 ENCOUNTER — LAB VISIT (OUTPATIENT)
Dept: LAB | Facility: HOSPITAL | Age: 41
End: 2021-04-16
Attending: INTERNAL MEDICINE
Payer: COMMERCIAL

## 2021-04-16 ENCOUNTER — OFFICE VISIT (OUTPATIENT)
Dept: RHEUMATOLOGY | Facility: CLINIC | Age: 41
End: 2021-04-16
Payer: COMMERCIAL

## 2021-04-16 VITALS
SYSTOLIC BLOOD PRESSURE: 132 MMHG | BODY MASS INDEX: 57.52 KG/M2 | HEIGHT: 60 IN | WEIGHT: 293 LBS | DIASTOLIC BLOOD PRESSURE: 84 MMHG | HEART RATE: 79 BPM

## 2021-04-16 DIAGNOSIS — Z79.899 HIGH RISK MEDICATION USE: ICD-10-CM

## 2021-04-16 DIAGNOSIS — G56.03 BILATERAL CARPAL TUNNEL SYNDROME: ICD-10-CM

## 2021-04-16 DIAGNOSIS — Z79.60 LONG-TERM USE OF IMMUNOSUPPRESSANT MEDICATION: ICD-10-CM

## 2021-04-16 DIAGNOSIS — M13.80 SERONEGATIVE ARTHRITIS: ICD-10-CM

## 2021-04-16 DIAGNOSIS — Z71.89 COUNSELING ON HEALTH PROMOTION AND DISEASE PREVENTION: ICD-10-CM

## 2021-04-16 DIAGNOSIS — M13.80 SERONEGATIVE ARTHRITIS: Primary | ICD-10-CM

## 2021-04-16 LAB
ALBUMIN SERPL BCP-MCNC: 3 G/DL (ref 3.5–5.2)
ALP SERPL-CCNC: 83 U/L (ref 55–135)
ALT SERPL W/O P-5'-P-CCNC: 14 U/L (ref 10–44)
ANION GAP SERPL CALC-SCNC: 6 MMOL/L (ref 8–16)
AST SERPL-CCNC: 14 U/L (ref 10–40)
BASOPHILS # BLD AUTO: 0.06 K/UL (ref 0–0.2)
BASOPHILS NFR BLD: 0.6 % (ref 0–1.9)
BILIRUB SERPL-MCNC: 0.2 MG/DL (ref 0.1–1)
BUN SERPL-MCNC: 10 MG/DL (ref 6–20)
CALCIUM SERPL-MCNC: 8.4 MG/DL (ref 8.7–10.5)
CHLORIDE SERPL-SCNC: 110 MMOL/L (ref 95–110)
CO2 SERPL-SCNC: 24 MMOL/L (ref 23–29)
CREAT SERPL-MCNC: 0.8 MG/DL (ref 0.5–1.4)
CRP SERPL-MCNC: 1 MG/L (ref 0–8.2)
DIFFERENTIAL METHOD: ABNORMAL
EOSINOPHIL # BLD AUTO: 0.4 K/UL (ref 0–0.5)
EOSINOPHIL NFR BLD: 4.1 % (ref 0–8)
ERYTHROCYTE [DISTWIDTH] IN BLOOD BY AUTOMATED COUNT: 13.1 % (ref 11.5–14.5)
ERYTHROCYTE [SEDIMENTATION RATE] IN BLOOD BY WESTERGREN METHOD: 17 MM/HR (ref 0–20)
EST. GFR  (AFRICAN AMERICAN): >60 ML/MIN/1.73 M^2
EST. GFR  (NON AFRICAN AMERICAN): >60 ML/MIN/1.73 M^2
GLUCOSE SERPL-MCNC: 103 MG/DL (ref 70–110)
HCT VFR BLD AUTO: 39.9 % (ref 37–48.5)
HGB BLD-MCNC: 13.2 G/DL (ref 12–16)
IMM GRANULOCYTES # BLD AUTO: 0.16 K/UL (ref 0–0.04)
IMM GRANULOCYTES NFR BLD AUTO: 1.5 % (ref 0–0.5)
LYMPHOCYTES # BLD AUTO: 4.9 K/UL (ref 1–4.8)
LYMPHOCYTES NFR BLD: 46.8 % (ref 18–48)
MCH RBC QN AUTO: 31.4 PG (ref 27–31)
MCHC RBC AUTO-ENTMCNC: 33.1 G/DL (ref 32–36)
MCV RBC AUTO: 95 FL (ref 82–98)
MONOCYTES # BLD AUTO: 0.7 K/UL (ref 0.3–1)
MONOCYTES NFR BLD: 7 % (ref 4–15)
NEUTROPHILS # BLD AUTO: 4.2 K/UL (ref 1.8–7.7)
NEUTROPHILS NFR BLD: 40 % (ref 38–73)
NRBC BLD-RTO: 0 /100 WBC
PLATELET # BLD AUTO: 372 K/UL (ref 150–450)
PMV BLD AUTO: 8.1 FL (ref 9.2–12.9)
POTASSIUM SERPL-SCNC: 3.5 MMOL/L (ref 3.5–5.1)
PROT SERPL-MCNC: 8.4 G/DL (ref 6–8.4)
RBC # BLD AUTO: 4.2 M/UL (ref 4–5.4)
SODIUM SERPL-SCNC: 140 MMOL/L (ref 136–145)
WBC # BLD AUTO: 10.46 K/UL (ref 3.9–12.7)

## 2021-04-16 PROCEDURE — 1126F AMNT PAIN NOTED NONE PRSNT: CPT | Mod: S$GLB,,, | Performed by: INTERNAL MEDICINE

## 2021-04-16 PROCEDURE — 99999 PR PBB SHADOW E&M-EST. PATIENT-LVL IV: ICD-10-PCS | Mod: PBBFAC,,, | Performed by: INTERNAL MEDICINE

## 2021-04-16 PROCEDURE — 80053 COMPREHEN METABOLIC PANEL: CPT | Performed by: INTERNAL MEDICINE

## 2021-04-16 PROCEDURE — 36415 COLL VENOUS BLD VENIPUNCTURE: CPT | Performed by: INTERNAL MEDICINE

## 2021-04-16 PROCEDURE — 99214 OFFICE O/P EST MOD 30 MIN: CPT | Mod: S$GLB,,, | Performed by: INTERNAL MEDICINE

## 2021-04-16 PROCEDURE — 85025 COMPLETE CBC W/AUTO DIFF WBC: CPT | Performed by: INTERNAL MEDICINE

## 2021-04-16 PROCEDURE — 99999 PR PBB SHADOW E&M-EST. PATIENT-LVL IV: CPT | Mod: PBBFAC,,, | Performed by: INTERNAL MEDICINE

## 2021-04-16 PROCEDURE — 99214 PR OFFICE/OUTPT VISIT, EST, LEVL IV, 30-39 MIN: ICD-10-PCS | Mod: S$GLB,,, | Performed by: INTERNAL MEDICINE

## 2021-04-16 PROCEDURE — 1126F PR PAIN SEVERITY QUANTIFIED, NO PAIN PRESENT: ICD-10-PCS | Mod: S$GLB,,, | Performed by: INTERNAL MEDICINE

## 2021-04-16 PROCEDURE — 3008F PR BODY MASS INDEX (BMI) DOCUMENTED: ICD-10-PCS | Mod: CPTII,S$GLB,, | Performed by: INTERNAL MEDICINE

## 2021-04-16 PROCEDURE — 86140 C-REACTIVE PROTEIN: CPT | Performed by: INTERNAL MEDICINE

## 2021-04-16 PROCEDURE — 85651 RBC SED RATE NONAUTOMATED: CPT | Performed by: INTERNAL MEDICINE

## 2021-04-16 PROCEDURE — 3008F BODY MASS INDEX DOCD: CPT | Mod: CPTII,S$GLB,, | Performed by: INTERNAL MEDICINE

## 2021-04-16 RX ORDER — GABAPENTIN 300 MG/1
300 CAPSULE ORAL NIGHTLY
Qty: 90 CAPSULE | Refills: 3 | Status: SHIPPED | OUTPATIENT
Start: 2021-04-16 | End: 2021-10-14 | Stop reason: SDUPTHER

## 2021-04-16 RX ORDER — ADALIMUMAB 40MG/0.8ML
40 KIT SUBCUTANEOUS
Qty: 6 PEN | Refills: 4 | Status: SHIPPED | OUTPATIENT
Start: 2021-04-16 | End: 2021-11-08 | Stop reason: SDUPTHER

## 2021-04-20 ENCOUNTER — TELEPHONE (OUTPATIENT)
Dept: PHARMACY | Facility: CLINIC | Age: 41
End: 2021-04-20

## 2021-04-22 ENCOUNTER — HOSPITAL ENCOUNTER (OUTPATIENT)
Dept: WOUND CARE | Facility: HOSPITAL | Age: 41
Discharge: HOME | End: 2021-04-22
Attending: SURGERY
Payer: COMMERCIAL

## 2021-04-22 VITALS
HEART RATE: 99 BPM | WEIGHT: 293 LBS | HEIGHT: 60 IN | DIASTOLIC BLOOD PRESSURE: 85 MMHG | TEMPERATURE: 97 F | SYSTOLIC BLOOD PRESSURE: 129 MMHG | BODY MASS INDEX: 57.52 KG/M2

## 2021-04-22 DIAGNOSIS — E66.01 MORBID OBESITY WITH BMI OF 40.0-44.9, ADULT: ICD-10-CM

## 2021-04-22 DIAGNOSIS — L73.2 HIDRADENITIS AXILLARIS: Primary | ICD-10-CM

## 2021-04-22 DIAGNOSIS — Z98.84 S/P BARIATRIC SURGERY: ICD-10-CM

## 2021-04-22 DIAGNOSIS — L73.2 LEFT AXILLARY HIDRADENITIS: ICD-10-CM

## 2021-04-22 DIAGNOSIS — L02.213 ABSCESS OF CHEST WALL: ICD-10-CM

## 2021-04-22 PROCEDURE — 99214 OFFICE O/P EST MOD 30 MIN: CPT

## 2021-05-05 ENCOUNTER — LAB VISIT (OUTPATIENT)
Dept: LAB | Facility: HOSPITAL | Age: 41
End: 2021-05-05
Attending: NURSE PRACTITIONER
Payer: COMMERCIAL

## 2021-05-05 DIAGNOSIS — L73.2 HIDRADENITIS AXILLARIS: ICD-10-CM

## 2021-05-05 DIAGNOSIS — E55.9 VITAMIN D INSUFFICIENCY: ICD-10-CM

## 2021-05-05 DIAGNOSIS — B99.9 ANEMIA OF INFECTION AND CHRONIC DISEASE: ICD-10-CM

## 2021-05-05 DIAGNOSIS — R77.8 ELEVATED TOTAL PROTEIN: ICD-10-CM

## 2021-05-05 DIAGNOSIS — D50.0 IRON DEFICIENCY ANEMIA DUE TO CHRONIC BLOOD LOSS: ICD-10-CM

## 2021-05-05 DIAGNOSIS — D63.8 ANEMIA OF INFECTION AND CHRONIC DISEASE: ICD-10-CM

## 2021-05-05 DIAGNOSIS — Z98.84 S/P BARIATRIC SURGERY: ICD-10-CM

## 2021-05-05 DIAGNOSIS — R53.83 OTHER FATIGUE: ICD-10-CM

## 2021-05-05 DIAGNOSIS — E66.01 MORBID OBESITY WITH BMI OF 50.0-59.9, ADULT: ICD-10-CM

## 2021-05-05 LAB
25(OH)D3+25(OH)D2 SERPL-MCNC: 35 NG/ML (ref 30–96)
ALBUMIN SERPL BCP-MCNC: 3.8 G/DL (ref 3.5–5.2)
ALP SERPL-CCNC: 94 U/L (ref 38–126)
ALT SERPL W/O P-5'-P-CCNC: 16 U/L (ref 10–44)
ANION GAP SERPL CALC-SCNC: 8 MMOL/L (ref 8–16)
AST SERPL-CCNC: 24 U/L (ref 15–46)
BASOPHILS # BLD AUTO: 0.05 K/UL (ref 0–0.2)
BASOPHILS NFR BLD: 0.5 % (ref 0–1.9)
BILIRUB SERPL-MCNC: 0.2 MG/DL (ref 0.1–1)
CALCIUM SERPL-MCNC: 8.7 MG/DL (ref 8.7–10.5)
CHLORIDE SERPL-SCNC: 109 MMOL/L (ref 95–110)
CO2 SERPL-SCNC: 26 MMOL/L (ref 23–29)
CREAT SERPL-MCNC: 0.71 MG/DL (ref 0.5–1.4)
DIFFERENTIAL METHOD: ABNORMAL
EOSINOPHIL # BLD AUTO: 0.3 K/UL (ref 0–0.5)
EOSINOPHIL NFR BLD: 3.3 % (ref 0–8)
ERYTHROCYTE [DISTWIDTH] IN BLOOD BY AUTOMATED COUNT: 13 % (ref 11.5–14.5)
ERYTHROCYTE [SEDIMENTATION RATE] IN BLOOD BY WESTERGREN METHOD: 57 MM/HR (ref 0–20)
EST. GFR  (AFRICAN AMERICAN): >60 ML/MIN/1.73 M^2
EST. GFR  (NON AFRICAN AMERICAN): >60 ML/MIN/1.73 M^2
FERRITIN SERPL-MCNC: 101 NG/ML (ref 20–300)
GLUCOSE SERPL-MCNC: 74 MG/DL (ref 70–110)
HCT VFR BLD AUTO: 39.3 % (ref 37–48.5)
HGB BLD-MCNC: 12.4 G/DL (ref 12–16)
IMM GRANULOCYTES # BLD AUTO: 0.19 K/UL (ref 0–0.04)
IMM GRANULOCYTES NFR BLD AUTO: 2 % (ref 0–0.5)
IRON SERPL-MCNC: 45 UG/DL (ref 30–160)
LYMPHOCYTES # BLD AUTO: 4.3 K/UL (ref 1–4.8)
LYMPHOCYTES NFR BLD: 43.7 % (ref 18–48)
MCH RBC QN AUTO: 30.9 PG (ref 27–31)
MCHC RBC AUTO-ENTMCNC: 31.6 G/DL (ref 32–36)
MCV RBC AUTO: 98 FL (ref 82–98)
MONOCYTES # BLD AUTO: 0.9 K/UL (ref 0.3–1)
MONOCYTES NFR BLD: 9 % (ref 4–15)
NEUTROPHILS # BLD AUTO: 4 K/UL (ref 1.8–7.7)
NEUTROPHILS NFR BLD: 41.5 % (ref 38–73)
NRBC BLD-RTO: 0 /100 WBC
PLATELET # BLD AUTO: 356 K/UL (ref 150–450)
PMV BLD AUTO: 8.4 FL (ref 9.2–12.9)
POTASSIUM SERPL-SCNC: 3.8 MMOL/L (ref 3.5–5.1)
PROT SERPL-MCNC: 8.3 G/DL (ref 6–8.4)
RBC # BLD AUTO: 4.01 M/UL (ref 4–5.4)
SATURATED IRON: 13 % (ref 20–50)
SODIUM SERPL-SCNC: 143 MMOL/L (ref 136–145)
TOTAL IRON BINDING CAPACITY: 349 UG/DL (ref 250–450)
TRANSFERRIN SERPL-MCNC: 236 MG/DL (ref 200–375)
UUN UR-MCNC: 8 MG/DL (ref 7–17)
WBC # BLD AUTO: 9.74 K/UL (ref 3.9–12.7)

## 2021-05-05 PROCEDURE — 83540 ASSAY OF IRON: CPT | Mod: PO | Performed by: NURSE PRACTITIONER

## 2021-05-05 PROCEDURE — 36415 COLL VENOUS BLD VENIPUNCTURE: CPT | Mod: PO | Performed by: NURSE PRACTITIONER

## 2021-05-05 PROCEDURE — 80053 COMPREHEN METABOLIC PANEL: CPT | Mod: PO | Performed by: NURSE PRACTITIONER

## 2021-05-05 PROCEDURE — 82306 VITAMIN D 25 HYDROXY: CPT | Mod: PO | Performed by: NURSE PRACTITIONER

## 2021-05-05 PROCEDURE — 85025 COMPLETE CBC W/AUTO DIFF WBC: CPT | Mod: PO | Performed by: NURSE PRACTITIONER

## 2021-05-05 PROCEDURE — 85652 RBC SED RATE AUTOMATED: CPT | Performed by: NURSE PRACTITIONER

## 2021-05-05 PROCEDURE — 82728 ASSAY OF FERRITIN: CPT | Performed by: NURSE PRACTITIONER

## 2021-05-06 ENCOUNTER — PATIENT MESSAGE (OUTPATIENT)
Dept: RESEARCH | Facility: HOSPITAL | Age: 41
End: 2021-05-06

## 2021-05-20 ENCOUNTER — HOSPITAL ENCOUNTER (OUTPATIENT)
Dept: WOUND CARE | Facility: HOSPITAL | Age: 41
Discharge: HOME | End: 2021-05-20
Attending: SURGERY
Payer: COMMERCIAL

## 2021-05-20 VITALS — HEART RATE: 75 BPM | DIASTOLIC BLOOD PRESSURE: 88 MMHG | TEMPERATURE: 98 F | SYSTOLIC BLOOD PRESSURE: 150 MMHG

## 2021-05-20 DIAGNOSIS — L73.2 HIDRADENITIS AXILLARIS: Primary | ICD-10-CM

## 2021-05-20 PROCEDURE — 99214 OFFICE O/P EST MOD 30 MIN: CPT

## 2021-06-16 ENCOUNTER — HOSPITAL ENCOUNTER (OUTPATIENT)
Dept: WOUND CARE | Facility: HOSPITAL | Age: 41
Discharge: HOME | End: 2021-06-16
Attending: SURGERY
Payer: COMMERCIAL

## 2021-06-16 VITALS
DIASTOLIC BLOOD PRESSURE: 72 MMHG | BODY MASS INDEX: 48.82 KG/M2 | SYSTOLIC BLOOD PRESSURE: 125 MMHG | TEMPERATURE: 97 F | HEIGHT: 65 IN | WEIGHT: 293 LBS | HEART RATE: 99 BPM

## 2021-06-16 DIAGNOSIS — E11.9 DIABETES MELLITUS WITHOUT COMPLICATION: ICD-10-CM

## 2021-06-16 DIAGNOSIS — L73.2 HIDRADENITIS AXILLARIS: Primary | ICD-10-CM

## 2021-06-16 PROCEDURE — 99213 OFFICE O/P EST LOW 20 MIN: CPT

## 2021-07-01 ENCOUNTER — HOSPITAL ENCOUNTER (OUTPATIENT)
Dept: WOUND CARE | Facility: HOSPITAL | Age: 41
Discharge: HOME | End: 2021-07-01
Attending: SURGERY
Payer: COMMERCIAL

## 2021-07-01 VITALS
DIASTOLIC BLOOD PRESSURE: 80 MMHG | HEIGHT: 65 IN | WEIGHT: 293 LBS | TEMPERATURE: 98 F | HEART RATE: 68 BPM | BODY MASS INDEX: 48.82 KG/M2 | SYSTOLIC BLOOD PRESSURE: 132 MMHG

## 2021-07-01 DIAGNOSIS — L08.9 BACTERIAL SKIN INFECTION: ICD-10-CM

## 2021-07-01 DIAGNOSIS — L73.2 HIDRADENITIS AXILLARIS: Primary | ICD-10-CM

## 2021-07-01 DIAGNOSIS — L73.2 LEFT AXILLARY HIDRADENITIS: ICD-10-CM

## 2021-07-01 DIAGNOSIS — E66.01 MORBID OBESITY WITH BMI OF 40.0-44.9, ADULT: ICD-10-CM

## 2021-07-01 DIAGNOSIS — E11.9 DIABETES MELLITUS WITHOUT COMPLICATION: ICD-10-CM

## 2021-07-01 DIAGNOSIS — B96.89 BACTERIAL SKIN INFECTION: ICD-10-CM

## 2021-07-01 PROCEDURE — 17250 CHEM CAUT OF GRANLTJ TISSUE: CPT

## 2021-07-15 ENCOUNTER — HOSPITAL ENCOUNTER (OUTPATIENT)
Dept: WOUND CARE | Facility: HOSPITAL | Age: 41
Discharge: HOME | End: 2021-07-15
Attending: SURGERY
Payer: COMMERCIAL

## 2021-07-15 VITALS
HEART RATE: 76 BPM | BODY MASS INDEX: 48.82 KG/M2 | SYSTOLIC BLOOD PRESSURE: 130 MMHG | WEIGHT: 293 LBS | DIASTOLIC BLOOD PRESSURE: 85 MMHG | TEMPERATURE: 98 F | HEIGHT: 65 IN

## 2021-07-15 DIAGNOSIS — L73.2 HIDRADENITIS AXILLARIS: Primary | ICD-10-CM

## 2021-07-15 DIAGNOSIS — L02.213 ABSCESS OF CHEST WALL: ICD-10-CM

## 2021-07-15 DIAGNOSIS — E11.622 DIABETES MELLITUS WITH SKIN ULCER: ICD-10-CM

## 2021-07-15 DIAGNOSIS — L98.499 DIABETES MELLITUS WITH SKIN ULCER: ICD-10-CM

## 2021-07-15 PROCEDURE — 27201912 HC WOUND CARE DEBRIDEMENT SUPPLIES

## 2021-07-15 PROCEDURE — 10060 I&D ABSCESS SIMPLE/SINGLE: CPT

## 2021-07-15 PROCEDURE — 11042 DBRDMT SUBQ TIS 1ST 20SQCM/<: CPT

## 2021-07-29 ENCOUNTER — HOSPITAL ENCOUNTER (OUTPATIENT)
Dept: WOUND CARE | Facility: HOSPITAL | Age: 41
Discharge: HOME | End: 2021-07-29
Attending: SURGERY
Payer: COMMERCIAL

## 2021-07-29 VITALS — SYSTOLIC BLOOD PRESSURE: 130 MMHG | HEART RATE: 77 BPM | DIASTOLIC BLOOD PRESSURE: 69 MMHG

## 2021-07-29 DIAGNOSIS — L73.2 HIDRADENITIS AXILLARIS: Primary | ICD-10-CM

## 2021-07-29 DIAGNOSIS — Z98.84 S/P BARIATRIC SURGERY: ICD-10-CM

## 2021-07-29 DIAGNOSIS — E66.01 MORBID OBESITY WITH BMI OF 40.0-44.9, ADULT: ICD-10-CM

## 2021-07-29 PROCEDURE — 99213 OFFICE O/P EST LOW 20 MIN: CPT

## 2021-07-29 PROCEDURE — 17250 CHEM CAUT OF GRANLTJ TISSUE: CPT

## 2021-08-12 ENCOUNTER — HOSPITAL ENCOUNTER (OUTPATIENT)
Dept: WOUND CARE | Facility: HOSPITAL | Age: 41
Discharge: HOME | End: 2021-08-12
Attending: SURGERY
Payer: COMMERCIAL

## 2021-08-12 VITALS
SYSTOLIC BLOOD PRESSURE: 122 MMHG | TEMPERATURE: 97 F | BODY MASS INDEX: 48.82 KG/M2 | WEIGHT: 293 LBS | HEART RATE: 76 BPM | HEIGHT: 65 IN | DIASTOLIC BLOOD PRESSURE: 83 MMHG

## 2021-08-12 DIAGNOSIS — B96.89 BACTERIAL SKIN INFECTION: ICD-10-CM

## 2021-08-12 DIAGNOSIS — L73.2 HIDRADENITIS SUPPURATIVA OF RIGHT AXILLA: ICD-10-CM

## 2021-08-12 DIAGNOSIS — L73.2 HIDRADENITIS AXILLARIS: Primary | ICD-10-CM

## 2021-08-12 DIAGNOSIS — Z98.84 S/P BARIATRIC SURGERY: ICD-10-CM

## 2021-08-12 DIAGNOSIS — D50.0 IRON DEFICIENCY ANEMIA DUE TO CHRONIC BLOOD LOSS: ICD-10-CM

## 2021-08-12 DIAGNOSIS — L08.9 BACTERIAL SKIN INFECTION: ICD-10-CM

## 2021-08-12 DIAGNOSIS — E66.01 MORBID OBESITY WITH BMI OF 40.0-44.9, ADULT: ICD-10-CM

## 2021-08-12 PROCEDURE — 17250 CHEM CAUT OF GRANLTJ TISSUE: CPT

## 2021-08-12 PROCEDURE — 99213 OFFICE O/P EST LOW 20 MIN: CPT

## 2021-08-16 ENCOUNTER — LAB VISIT (OUTPATIENT)
Dept: LAB | Facility: HOSPITAL | Age: 41
End: 2021-08-16
Attending: INTERNAL MEDICINE
Payer: COMMERCIAL

## 2021-08-16 DIAGNOSIS — M13.80 SERONEGATIVE ARTHRITIS: ICD-10-CM

## 2021-08-16 DIAGNOSIS — Z79.899 HIGH RISK MEDICATION USE: ICD-10-CM

## 2021-08-16 LAB
ALBUMIN SERPL BCP-MCNC: 3.8 G/DL (ref 3.5–5.2)
ALP SERPL-CCNC: 88 U/L (ref 38–126)
ALT SERPL W/O P-5'-P-CCNC: 11 U/L (ref 10–44)
ANION GAP SERPL CALC-SCNC: 7 MMOL/L (ref 8–16)
AST SERPL-CCNC: 21 U/L (ref 15–46)
BASOPHILS # BLD AUTO: 0.04 K/UL (ref 0–0.2)
BASOPHILS NFR BLD: 0.5 % (ref 0–1.9)
BILIRUB SERPL-MCNC: 0.3 MG/DL (ref 0.1–1)
CALCIUM SERPL-MCNC: 9.1 MG/DL (ref 8.7–10.5)
CHLORIDE SERPL-SCNC: 108 MMOL/L (ref 95–110)
CO2 SERPL-SCNC: 27 MMOL/L (ref 23–29)
CREAT SERPL-MCNC: 0.69 MG/DL (ref 0.5–1.4)
DIFFERENTIAL METHOD: ABNORMAL
EOSINOPHIL # BLD AUTO: 0.2 K/UL (ref 0–0.5)
EOSINOPHIL NFR BLD: 2.5 % (ref 0–8)
ERYTHROCYTE [DISTWIDTH] IN BLOOD BY AUTOMATED COUNT: 12.4 % (ref 11.5–14.5)
EST. GFR  (AFRICAN AMERICAN): >60 ML/MIN/1.73 M^2
EST. GFR  (NON AFRICAN AMERICAN): >60 ML/MIN/1.73 M^2
GLUCOSE SERPL-MCNC: 116 MG/DL (ref 70–110)
HCT VFR BLD AUTO: 40.6 % (ref 37–48.5)
HGB BLD-MCNC: 13 G/DL (ref 12–16)
IMM GRANULOCYTES # BLD AUTO: 0.08 K/UL (ref 0–0.04)
IMM GRANULOCYTES NFR BLD AUTO: 1 % (ref 0–0.5)
LYMPHOCYTES # BLD AUTO: 3.1 K/UL (ref 1–4.8)
LYMPHOCYTES NFR BLD: 37.6 % (ref 18–48)
MCH RBC QN AUTO: 31.3 PG (ref 27–31)
MCHC RBC AUTO-ENTMCNC: 32 G/DL (ref 32–36)
MCV RBC AUTO: 98 FL (ref 82–98)
MONOCYTES # BLD AUTO: 0.6 K/UL (ref 0.3–1)
MONOCYTES NFR BLD: 7.4 % (ref 4–15)
NEUTROPHILS # BLD AUTO: 4.3 K/UL (ref 1.8–7.7)
NEUTROPHILS NFR BLD: 51 % (ref 38–73)
NRBC BLD-RTO: 0 /100 WBC
PLATELET # BLD AUTO: 376 K/UL (ref 150–450)
PMV BLD AUTO: 8.7 FL (ref 9.2–12.9)
POTASSIUM SERPL-SCNC: 4 MMOL/L (ref 3.5–5.1)
PROT SERPL-MCNC: 8.5 G/DL (ref 6–8.4)
RBC # BLD AUTO: 4.15 M/UL (ref 4–5.4)
SODIUM SERPL-SCNC: 142 MMOL/L (ref 136–145)
UUN UR-MCNC: 9 MG/DL (ref 7–17)
WBC # BLD AUTO: 8.33 K/UL (ref 3.9–12.7)

## 2021-08-16 PROCEDURE — 36415 COLL VENOUS BLD VENIPUNCTURE: CPT | Mod: PO | Performed by: INTERNAL MEDICINE

## 2021-08-16 PROCEDURE — 85025 COMPLETE CBC W/AUTO DIFF WBC: CPT | Mod: PO | Performed by: INTERNAL MEDICINE

## 2021-08-16 PROCEDURE — 80053 COMPREHEN METABOLIC PANEL: CPT | Mod: PO | Performed by: INTERNAL MEDICINE

## 2021-08-16 PROCEDURE — 86480 TB TEST CELL IMMUN MEASURE: CPT | Mod: PO | Performed by: INTERNAL MEDICINE

## 2021-08-16 PROCEDURE — 80074 ACUTE HEPATITIS PANEL: CPT | Mod: PO | Performed by: INTERNAL MEDICINE

## 2021-08-17 LAB
GAMMA INTERFERON BACKGROUND BLD IA-ACNC: 0.07 IU/ML
HAV IGM SERPL QL IA: NEGATIVE
HBV CORE IGM SERPL QL IA: NEGATIVE
HBV SURFACE AG SERPL QL IA: NEGATIVE
HCV AB SERPL QL IA: NEGATIVE
M TB IFN-G CD4+ BCKGRND COR BLD-ACNC: 0 IU/ML
MITOGEN IGNF BCKGRD COR BLD-ACNC: >10 IU/ML
TB GOLD PLUS: NEGATIVE
TB2 - NIL: 0.01 IU/ML

## 2021-08-26 ENCOUNTER — HOSPITAL ENCOUNTER (OUTPATIENT)
Dept: WOUND CARE | Facility: HOSPITAL | Age: 41
Discharge: HOME | End: 2021-08-26
Attending: SURGERY
Payer: COMMERCIAL

## 2021-08-26 VITALS
SYSTOLIC BLOOD PRESSURE: 119 MMHG | HEIGHT: 65 IN | BODY MASS INDEX: 48.82 KG/M2 | DIASTOLIC BLOOD PRESSURE: 80 MMHG | WEIGHT: 293 LBS | HEART RATE: 99 BPM | TEMPERATURE: 98 F

## 2021-08-26 DIAGNOSIS — L73.2 HIDRADENITIS AXILLARIS: Primary | ICD-10-CM

## 2021-08-26 DIAGNOSIS — Z98.84 S/P BARIATRIC SURGERY: ICD-10-CM

## 2021-08-26 DIAGNOSIS — E11.622 DIABETES MELLITUS WITH SKIN ULCER: ICD-10-CM

## 2021-08-26 DIAGNOSIS — L98.499 DIABETES MELLITUS WITH SKIN ULCER: ICD-10-CM

## 2021-08-26 PROCEDURE — 99213 OFFICE O/P EST LOW 20 MIN: CPT | Mod: 25

## 2021-08-26 PROCEDURE — 17250 CHEM CAUT OF GRANLTJ TISSUE: CPT

## 2021-09-09 ENCOUNTER — HOSPITAL ENCOUNTER (OUTPATIENT)
Dept: WOUND CARE | Facility: HOSPITAL | Age: 41
Discharge: HOME | End: 2021-09-09
Attending: SURGERY
Payer: COMMERCIAL

## 2021-09-09 VITALS
BODY MASS INDEX: 48.82 KG/M2 | WEIGHT: 293 LBS | SYSTOLIC BLOOD PRESSURE: 124 MMHG | HEIGHT: 65 IN | TEMPERATURE: 98 F | HEART RATE: 80 BPM | DIASTOLIC BLOOD PRESSURE: 86 MMHG

## 2021-09-09 DIAGNOSIS — L73.2 HIDRADENITIS AXILLARIS: Primary | ICD-10-CM

## 2021-09-09 DIAGNOSIS — E11.622 DIABETES MELLITUS WITH SKIN ULCER: ICD-10-CM

## 2021-09-09 DIAGNOSIS — L98.499 DIABETES MELLITUS WITH SKIN ULCER: ICD-10-CM

## 2021-09-09 DIAGNOSIS — B96.89 BACTERIAL SKIN INFECTION: ICD-10-CM

## 2021-09-09 DIAGNOSIS — Z98.84 S/P BARIATRIC SURGERY: ICD-10-CM

## 2021-09-09 DIAGNOSIS — L08.9 BACTERIAL SKIN INFECTION: ICD-10-CM

## 2021-09-09 PROCEDURE — 99213 OFFICE O/P EST LOW 20 MIN: CPT

## 2021-09-23 ENCOUNTER — HOSPITAL ENCOUNTER (OUTPATIENT)
Dept: WOUND CARE | Facility: HOSPITAL | Age: 41
Discharge: HOME | End: 2021-09-23
Attending: SURGERY
Payer: COMMERCIAL

## 2021-09-23 VITALS
WEIGHT: 293 LBS | TEMPERATURE: 98 F | SYSTOLIC BLOOD PRESSURE: 134 MMHG | HEART RATE: 79 BPM | BODY MASS INDEX: 48.82 KG/M2 | DIASTOLIC BLOOD PRESSURE: 73 MMHG | HEIGHT: 65 IN

## 2021-09-23 DIAGNOSIS — L73.2 HIDRADENITIS AXILLARIS: Primary | ICD-10-CM

## 2021-09-23 DIAGNOSIS — Z98.84 S/P BARIATRIC SURGERY: ICD-10-CM

## 2021-09-23 PROCEDURE — 99213 OFFICE O/P EST LOW 20 MIN: CPT

## 2021-09-23 PROCEDURE — 87070 CULTURE OTHR SPECIMN AEROBIC: CPT | Performed by: SURGERY

## 2021-09-23 PROCEDURE — 87075 CULTR BACTERIA EXCEPT BLOOD: CPT | Performed by: SURGERY

## 2021-09-27 LAB — BACTERIA SPEC AEROBE CULT: NORMAL

## 2021-09-30 ENCOUNTER — HOSPITAL ENCOUNTER (OUTPATIENT)
Dept: WOUND CARE | Facility: HOSPITAL | Age: 41
Discharge: HOME | End: 2021-09-30
Attending: SURGERY
Payer: COMMERCIAL

## 2021-09-30 VITALS
HEIGHT: 65 IN | DIASTOLIC BLOOD PRESSURE: 87 MMHG | BODY MASS INDEX: 48.82 KG/M2 | HEART RATE: 82 BPM | SYSTOLIC BLOOD PRESSURE: 137 MMHG | TEMPERATURE: 99 F | WEIGHT: 293 LBS

## 2021-09-30 DIAGNOSIS — E11.9 DIABETES MELLITUS WITHOUT COMPLICATION: ICD-10-CM

## 2021-09-30 DIAGNOSIS — L73.2 HIDRADENITIS AXILLARIS: Primary | ICD-10-CM

## 2021-09-30 DIAGNOSIS — Z98.84 S/P BARIATRIC SURGERY: ICD-10-CM

## 2021-09-30 PROCEDURE — 99213 OFFICE O/P EST LOW 20 MIN: CPT

## 2021-10-04 LAB — BACTERIA SPEC ANAEROBE CULT: NORMAL

## 2021-10-13 ENCOUNTER — TELEPHONE (OUTPATIENT)
Dept: RHEUMATOLOGY | Facility: CLINIC | Age: 41
End: 2021-10-13

## 2021-10-13 DIAGNOSIS — Z79.899 HIGH RISK MEDICATION USE: ICD-10-CM

## 2021-10-13 DIAGNOSIS — G56.03 BILATERAL CARPAL TUNNEL SYNDROME: ICD-10-CM

## 2021-10-14 RX ORDER — GABAPENTIN 300 MG/1
300 CAPSULE ORAL NIGHTLY
Qty: 90 CAPSULE | Refills: 3 | Status: SHIPPED | OUTPATIENT
Start: 2021-10-14 | End: 2022-05-03 | Stop reason: SDUPTHER

## 2021-11-08 DIAGNOSIS — M13.80 SERONEGATIVE ARTHRITIS: ICD-10-CM

## 2021-11-09 ENCOUNTER — TELEPHONE (OUTPATIENT)
Dept: RHEUMATOLOGY | Facility: CLINIC | Age: 41
End: 2021-11-09
Payer: COMMERCIAL

## 2021-11-09 RX ORDER — ADALIMUMAB 40MG/0.8ML
40 KIT SUBCUTANEOUS
Qty: 6 PEN | Refills: 4 | Status: SHIPPED | OUTPATIENT
Start: 2021-11-09 | End: 2022-05-06 | Stop reason: SDUPTHER

## 2021-11-11 ENCOUNTER — TELEPHONE (OUTPATIENT)
Dept: RHEUMATOLOGY | Facility: CLINIC | Age: 41
End: 2021-11-11
Payer: COMMERCIAL

## 2022-04-26 NOTE — PROGRESS NOTES
----- Message from Gloria Diez sent at 4/25/2022  4:46 PM EDT -----  Subject: Refill Request    QUESTIONS  Name of Medication? albuterol sulfate HFA (PROAIR HFA) 108 (90 Base)   MCG/ACT inhaler  Patient-reported dosage and instructions? every four hours as needed. How many days do you have left? 10  [facility]? [id number]  Pharmacy phone number (if available)? [phone]  ---------------------------------------------------------------------------  --------------  CALL BACK INFO  What is the best way for the office to contact you? OK to leave message on   voicemail  Preferred Call Back Phone Number? [phone]  ---------------------------------------------------------------------------  --------------  SCRIPT ANSWERS  Relationship to Patient?  Self Much of the documentation for this visit was completed in the [employer] system. Please see the attached documentation for further details about this patient's care.     Esther Prieto RN

## 2022-05-03 DIAGNOSIS — G56.03 BILATERAL CARPAL TUNNEL SYNDROME: ICD-10-CM

## 2022-05-03 DIAGNOSIS — Z79.899 HIGH RISK MEDICATION USE: ICD-10-CM

## 2022-05-04 RX ORDER — GABAPENTIN 300 MG/1
300 CAPSULE ORAL NIGHTLY
Qty: 90 CAPSULE | Refills: 3 | Status: SHIPPED | OUTPATIENT
Start: 2022-05-04 | End: 2023-05-12

## 2022-05-06 ENCOUNTER — OFFICE VISIT (OUTPATIENT)
Dept: RHEUMATOLOGY | Facility: CLINIC | Age: 42
End: 2022-05-06
Payer: COMMERCIAL

## 2022-05-06 ENCOUNTER — LAB VISIT (OUTPATIENT)
Dept: LAB | Facility: HOSPITAL | Age: 42
End: 2022-05-06
Attending: INTERNAL MEDICINE
Payer: COMMERCIAL

## 2022-05-06 VITALS
SYSTOLIC BLOOD PRESSURE: 132 MMHG | HEIGHT: 65 IN | HEART RATE: 107 BPM | BODY MASS INDEX: 48.82 KG/M2 | WEIGHT: 293 LBS | DIASTOLIC BLOOD PRESSURE: 93 MMHG

## 2022-05-06 DIAGNOSIS — Z79.899 HIGH RISK MEDICATION USE: ICD-10-CM

## 2022-05-06 DIAGNOSIS — E66.01 MORBID OBESITY WITH BMI OF 40.0-44.9, ADULT: ICD-10-CM

## 2022-05-06 DIAGNOSIS — M25.561 CHRONIC PAIN OF BOTH KNEES: ICD-10-CM

## 2022-05-06 DIAGNOSIS — M13.80 SERONEGATIVE ARTHRITIS: Primary | ICD-10-CM

## 2022-05-06 DIAGNOSIS — M25.562 CHRONIC PAIN OF BOTH KNEES: ICD-10-CM

## 2022-05-06 DIAGNOSIS — G89.29 CHRONIC PAIN OF BOTH KNEES: ICD-10-CM

## 2022-05-06 DIAGNOSIS — Z71.89 COUNSELING ON HEALTH PROMOTION AND DISEASE PREVENTION: ICD-10-CM

## 2022-05-06 LAB
ALBUMIN SERPL BCP-MCNC: 3.1 G/DL (ref 3.5–5.2)
ALP SERPL-CCNC: 89 U/L (ref 55–135)
ALT SERPL W/O P-5'-P-CCNC: 14 U/L (ref 10–44)
ANION GAP SERPL CALC-SCNC: 7 MMOL/L (ref 8–16)
AST SERPL-CCNC: 13 U/L (ref 10–40)
BASOPHILS # BLD AUTO: 0.05 K/UL (ref 0–0.2)
BASOPHILS NFR BLD: 0.5 % (ref 0–1.9)
BILIRUB SERPL-MCNC: 0.2 MG/DL (ref 0.1–1)
BUN SERPL-MCNC: 6 MG/DL (ref 6–20)
CALCIUM SERPL-MCNC: 9 MG/DL (ref 8.7–10.5)
CHLORIDE SERPL-SCNC: 111 MMOL/L (ref 95–110)
CO2 SERPL-SCNC: 21 MMOL/L (ref 23–29)
CREAT SERPL-MCNC: 0.9 MG/DL (ref 0.5–1.4)
DIFFERENTIAL METHOD: ABNORMAL
EOSINOPHIL # BLD AUTO: 0.3 K/UL (ref 0–0.5)
EOSINOPHIL NFR BLD: 2.5 % (ref 0–8)
ERYTHROCYTE [DISTWIDTH] IN BLOOD BY AUTOMATED COUNT: 13.4 % (ref 11.5–14.5)
EST. GFR  (AFRICAN AMERICAN): >60 ML/MIN/1.73 M^2
EST. GFR  (NON AFRICAN AMERICAN): >60 ML/MIN/1.73 M^2
GLUCOSE SERPL-MCNC: 104 MG/DL (ref 70–110)
HCT VFR BLD AUTO: 41.3 % (ref 37–48.5)
HGB BLD-MCNC: 13 G/DL (ref 12–16)
IMM GRANULOCYTES # BLD AUTO: 0.12 K/UL (ref 0–0.04)
IMM GRANULOCYTES NFR BLD AUTO: 1.1 % (ref 0–0.5)
LYMPHOCYTES # BLD AUTO: 4.8 K/UL (ref 1–4.8)
LYMPHOCYTES NFR BLD: 44.1 % (ref 18–48)
MCH RBC QN AUTO: 29.4 PG (ref 27–31)
MCHC RBC AUTO-ENTMCNC: 31.5 G/DL (ref 32–36)
MCV RBC AUTO: 93 FL (ref 82–98)
MONOCYTES # BLD AUTO: 0.8 K/UL (ref 0.3–1)
MONOCYTES NFR BLD: 7 % (ref 4–15)
NEUTROPHILS # BLD AUTO: 4.8 K/UL (ref 1.8–7.7)
NEUTROPHILS NFR BLD: 44.8 % (ref 38–73)
NRBC BLD-RTO: 0 /100 WBC
PLATELET # BLD AUTO: 400 K/UL (ref 150–450)
PMV BLD AUTO: 8.1 FL (ref 9.2–12.9)
POTASSIUM SERPL-SCNC: 3.9 MMOL/L (ref 3.5–5.1)
PROT SERPL-MCNC: 8.7 G/DL (ref 6–8.4)
RBC # BLD AUTO: 4.42 M/UL (ref 4–5.4)
SODIUM SERPL-SCNC: 139 MMOL/L (ref 136–145)
WBC # BLD AUTO: 10.76 K/UL (ref 3.9–12.7)

## 2022-05-06 PROCEDURE — 20610 LARGE JOINT ASPIRATION/INJECTION: R KNEE: ICD-10-PCS | Mod: RT,S$GLB,, | Performed by: INTERNAL MEDICINE

## 2022-05-06 PROCEDURE — 1159F PR MEDICATION LIST DOCUMENTED IN MEDICAL RECORD: ICD-10-PCS | Mod: CPTII,S$GLB,, | Performed by: INTERNAL MEDICINE

## 2022-05-06 PROCEDURE — 1159F MED LIST DOCD IN RCRD: CPT | Mod: CPTII,S$GLB,, | Performed by: INTERNAL MEDICINE

## 2022-05-06 PROCEDURE — 80053 COMPREHEN METABOLIC PANEL: CPT | Performed by: INTERNAL MEDICINE

## 2022-05-06 PROCEDURE — 20610 DRAIN/INJ JOINT/BURSA W/O US: CPT | Mod: RT,S$GLB,, | Performed by: INTERNAL MEDICINE

## 2022-05-06 PROCEDURE — 99999 PR PBB SHADOW E&M-EST. PATIENT-LVL IV: ICD-10-PCS | Mod: PBBFAC,,, | Performed by: INTERNAL MEDICINE

## 2022-05-06 PROCEDURE — 36415 COLL VENOUS BLD VENIPUNCTURE: CPT | Performed by: INTERNAL MEDICINE

## 2022-05-06 PROCEDURE — 99214 OFFICE O/P EST MOD 30 MIN: CPT | Mod: 25,S$GLB,, | Performed by: INTERNAL MEDICINE

## 2022-05-06 PROCEDURE — 3008F BODY MASS INDEX DOCD: CPT | Mod: CPTII,S$GLB,, | Performed by: INTERNAL MEDICINE

## 2022-05-06 PROCEDURE — 3008F PR BODY MASS INDEX (BMI) DOCUMENTED: ICD-10-PCS | Mod: CPTII,S$GLB,, | Performed by: INTERNAL MEDICINE

## 2022-05-06 PROCEDURE — 99214 PR OFFICE/OUTPT VISIT, EST, LEVL IV, 30-39 MIN: ICD-10-PCS | Mod: 25,S$GLB,, | Performed by: INTERNAL MEDICINE

## 2022-05-06 PROCEDURE — 85025 COMPLETE CBC W/AUTO DIFF WBC: CPT | Performed by: INTERNAL MEDICINE

## 2022-05-06 PROCEDURE — 99999 PR PBB SHADOW E&M-EST. PATIENT-LVL IV: CPT | Mod: PBBFAC,,, | Performed by: INTERNAL MEDICINE

## 2022-05-06 RX ORDER — TRIAMCINOLONE ACETONIDE 40 MG/ML
40 INJECTION, SUSPENSION INTRA-ARTICULAR; INTRAMUSCULAR
Status: DISCONTINUED | OUTPATIENT
Start: 2022-05-06 | End: 2022-05-06 | Stop reason: HOSPADM

## 2022-05-06 RX ORDER — ADALIMUMAB 40MG/0.8ML
40 KIT SUBCUTANEOUS
Qty: 6 PEN | Refills: 3 | Status: SHIPPED | OUTPATIENT
Start: 2022-05-06 | End: 2023-04-18

## 2022-05-06 RX ADMIN — TRIAMCINOLONE ACETONIDE 40 MG: 40 INJECTION, SUSPENSION INTRA-ARTICULAR; INTRAMUSCULAR at 10:05

## 2022-05-06 NOTE — PROCEDURES
Large Joint Aspiration/Injection: R knee    Date/Time: 5/6/2022 10:30 AM  Performed by: Ken Alberto MD  Authorized by: Ken Alberto MD     Consent Done?:  Yes (Verbal)  Indications:  Pain  Site marked: the procedure site was marked    Timeout: prior to procedure the correct patient, procedure, and site was verified    Prep: patient was prepped and draped in usual sterile fashion    Local anesthetic:  Lidocaine 2% without epinephrine    Details:  Needle Size:  25 G  Ultrasonic Guidance for needle placement?: No    Approach:  Anterolateral  Location:  Knee  Site:  R knee  Medications:  40 mg triamcinolone acetonide 40 mg/mL  Patient tolerance:  Patient tolerated the procedure well with no immediate complications

## 2022-05-06 NOTE — PROGRESS NOTES
RHEUMATOLOGY OUTPATIENT CLINIC NOTE    5/6/2022    Attending Rheumatologist: Ken Alberto  Primary Care Provider/Physician Requesting Consultation: ADVANCED TISSUE   Chief Complaint/Reason For Consultation:  Arthritis      Subjective:     Rodney Infante is a 41 y.o. Black or  female with seronegative arthritis and hidradenitis suppurativa for follow-up visit.    Main concern of chronic bilateral knee pain.  Worse on right side, denies association with falls or trauma.  Adherence with Humira Q 2 weeks with no side effects to therapy.  Denies refractory skin lesions.    Review of Systems   Constitutional: Negative for fever.   Eyes: Negative for photophobia and pain.   Respiratory: Negative for shortness of breath.    Gastrointestinal: Negative for abdominal pain and blood in stool.   Genitourinary: Negative for dysuria, hematuria and urgency.   Musculoskeletal: Positive for joint pain. Negative for falls.   Skin: Negative for rash.   Neurological: Negative for sensory change and focal weakness.       Chronic comorbid conditions affecting medical decision making today:  Past Medical History:   Diagnosis Date    Anemia     Arthritis     Diabetes mellitus, type 2     Neuropathy      Past Surgical History:   Procedure Laterality Date    CYST REMOVAL  2014, 2/2018    back    EXCISION OF HIDRADENITIS Bilateral 12/24/2019    Procedure: EXCISION, HIDRADENITIS;  Surgeon: Marcel Pennington MD;  Location: [facility] OR;  Service: General;  Laterality: Bilateral;    WOUND DEBRIDEMENT Right 7/17/2020    Procedure: DEBRIDEMENT, WOUND;  Surgeon: Marcel Pennington MD;  Location: [facility] OR;  Service: General;  Laterality: Right;     Family History   Problem Relation Age of Onset    No Known Problems Mother     Drug abuse Father     No Known Problems Sister      Social History     Substance and Sexual Activity   Alcohol Use Yes    Comment: occasionally     Social History     Tobacco Use   Smoking  "Status Never Smoker   Smokeless Tobacco Never Used     Social History     Substance and Sexual Activity   Drug Use No       Current Outpatient Medications:     clotrimazole-betamethasone 1-0.05% (LOTRISONE) cream, , Disp: , Rfl:     cyanocobalamin (VITAMIN B-12) 1000 MCG tablet, Take 1 tablet (1,000 mcg total) by mouth once daily. This is a prescription for 1 year.  Take 1 tablet Monday, Wednesday, and Friday, Disp: 36 tablet, Rfl: 3    ergocalciferol (ERGOCALCIFEROL) 50,000 unit Cap, , Disp: , Rfl:     escitalopram oxalate (LEXAPRO) 10 MG tablet, , Disp: , Rfl:     estradiol cypionate (DEPO-ESTRADIOL) 5 mg/mL injection, Inject into the muscle every 28 days., Disp: , Rfl:     famotidine (PEPCID) 40 MG tablet, , Disp: , Rfl:     gabapentin (NEURONTIN) 300 MG capsule, Take 1 capsule (300 mg total) by mouth every evening., Disp: 90 capsule, Rfl: 3    mupirocin (BACTROBAN) 2 % ointment, apply daily to affected area as directed, Disp: 22 g, Rfl: 0    nystatin (MYCOSTATIN) powder, Apply topically locally daily under breasts as directed, Disp: 60 g, Rfl: 0    phentermine (ADIPEX-P) 37.5 mg tablet, TAKE 1 TABLET BY MOUTH ONCE DAILY AT 10AM, Disp: , Rfl:     topiramate (TOPAMAX) 50 MG tablet, TAKE 1 TABLET BY MOUTH ONCE DAILY WITH ADIPEX, Disp: , Rfl:     adalimumab (HUMIRA PEN) PnKt injection, Inject 1 pen (40 mg total) into the skin every 14 (fourteen) days., Disp: 6 pen, Rfl: 4    iodoform 1/2 X 5 "-yard Bndg, change 3 times a week as directed, Disp: 12 each, Rfl: 0    mupirocin (BACTROBAN) 2 % ointment, Apply topically nasally  daily, Disp: 22 g, Rfl: 0    nystatin (MYCOSTATIN) powder, APPLY TOPICALLY TWICE DAILY TO AFFECTED AREA, Disp: 60 g, Rfl: 2    traMADoL (ULTRAM) 50 mg tablet, , Disp: , Rfl:      Objective:     Vitals:    05/06/22 1001   BP: (!) 132/93   Pulse: 107     Physical Exam   Constitutional: She appears obese.   Eyes: Conjunctivae are normal.   Pulmonary/Chest: Effort normal. No " respiratory distress.   Musculoskeletal:         General: Normal range of motion.   Neurological: Gait normal.       Reviewed available old and all outside pertinent medical records available.    All lab results personally reviewed and interpreted by me.  Lab Results   Component Value Date    WBC 10.76 05/06/2022    HGB 13.0 05/06/2022    HCT 41.3 05/06/2022    MCV 93 05/06/2022    RDW 13.4 05/06/2022     05/06/2022    PLTEST Increased (A) 11/12/2018       Lab Results   Component Value Date     08/16/2021    K 4.0 08/16/2021     08/16/2021    CO2 27 08/16/2021     (H) 08/16/2021    BUN 9 08/16/2021    CALCIUM 9.1 08/16/2021    PROT 8.5 (H) 08/16/2021    ALBUMIN 3.8 08/16/2021    BILITOT 0.3 08/16/2021    AST 21 08/16/2021    ALKPHOS 88 08/16/2021    ALT 11 08/16/2021       Lab Results   Component Value Date    COLORU Yellow 02/27/2018    APPEARANCEUA Cloudy (A) 02/27/2018    SPECGRAV 1.020 02/27/2018    PHUR 5.0 02/27/2018    PROTEINUA Negative 02/27/2018    KETONESU Negative 02/27/2018    LEUKOCYTESUR 3+ (A) 02/27/2018    NITRITE Negative 02/27/2018    UROBILINOGEN Negative 02/27/2018       No results found for: PTH    No results found for: URICACID    Lab Results   Component Value Date    CRP 1.0 04/16/2021       No results found for: ANATITER    No components found for: TSPOTTB,  QUANTIFERON     ASSESSMENT:     Seronegative arthritis and hidradenitis suppurativa that appears to be clinically quiescent with monotherapy with Humira (9/2020-).  Main complaint today of knee pain with prominent mechanical pattern.  Will provide local corticosteroid injection of right knee and and refer to physical therapy.  Once knee pain improves, recommend decreasing frequency of Humira:  Continue once every 3 weeks and monitor response.  Repeat labs prior next visit.    PLAN:     1. Seronegative arthritis    2. High risk medication use    3. Knee osteoarthritis    4. Other specified  counseling      Disclaimer: This note is prepared using voice recognition software and as such is likely to have errors and has not been proof read. Please contact me for questions.     Large Joint Aspiration/Injection: R knee    Date/Time: 5/6/2022 10:30 AM  Performed by: Ken Alberto MD  Authorized by: Ken Alberto MD     Consent Done?:  Yes (Verbal)  Indications:  Pain  Site marked: the procedure site was marked    Timeout: prior to procedure the correct patient, procedure, and site was verified    Prep: patient was prepped and draped in usual sterile fashion    Local anesthetic:  Lidocaine 2% without epinephrine    Details:  Needle Size:  25 G  Ultrasonic Guidance for needle placement?: No    Approach:  Anterolateral  Location:  Knee  Site:  R knee  Medications:  40 mg triamcinolone acetonide 40 mg/mL  Patient tolerance:  Patient tolerated the procedure well with no immediate complications        Ken Alberto M.D.

## 2022-05-12 ENCOUNTER — SPECIALTY PHARMACY (OUTPATIENT)
Dept: PHARMACY | Facility: CLINIC | Age: 42
End: 2022-05-12
Payer: COMMERCIAL

## 2022-05-13 ENCOUNTER — PATIENT MESSAGE (OUTPATIENT)
Dept: PHARMACY | Facility: CLINIC | Age: 42
End: 2022-05-13
Payer: COMMERCIAL

## 2022-05-13 NOTE — TELEPHONE ENCOUNTER
Pt called and said [facility] is very difficult to deal with and wants to know if there is anyway she can fill at [facility]. Advised her that BI confirmed [facility] is preferred but I am not sure if she is locked into [facility].  We can look into it more.   Requesting for assigned RPh to review + f/u with patient.

## 2022-05-13 NOTE — TELEPHONE ENCOUNTER
Jayden, this is Amaya Pimentel with [facility].  We are working on your prescription that your doctor has sent us. We will be working with your insurance to get this approved for you. We will be calling you along the way with updates on your medication.  If you have any questions, you can reach us at [phone].    Welcome call outcome: Left voicemail     LVM to let patient know [facility] preferred. Looks like she has been filling with [facility] previously. Sent [facility] also.

## 2022-05-13 NOTE — TELEPHONE ENCOUNTER
Spoke with patient to let her know [facility] is out of network and unable to fill rx. Finding other pharmacies patient can fill with

## 2022-05-13 NOTE — TELEPHONE ENCOUNTER
Benefits Investigation -Humira    Insurance name: [employer]/ [employer]  Rep name: Maria L NOAH     Copay amount: $25 for 1 month; $75 for 3-month   Deductible: $175  OOPmax: $0  [facility] in network? [facility] preferred  Tier level (if applicable): N/A

## 2022-05-31 ENCOUNTER — TELEPHONE (OUTPATIENT)
Dept: BARIATRICS | Facility: CLINIC | Age: 42
End: 2022-05-31
Payer: COMMERCIAL

## 2022-06-27 ENCOUNTER — CLINICAL SUPPORT (OUTPATIENT)
Dept: REHABILITATION | Facility: HOSPITAL | Age: 42
End: 2022-06-27
Payer: COMMERCIAL

## 2022-06-27 DIAGNOSIS — G89.29 CHRONIC PAIN OF BOTH KNEES: ICD-10-CM

## 2022-06-27 DIAGNOSIS — R52 PAIN AGGRAVATED BY ACTIVITIES OF DAILY LIVING: ICD-10-CM

## 2022-06-27 DIAGNOSIS — M25.562 CHRONIC PAIN OF BOTH KNEES: ICD-10-CM

## 2022-06-27 DIAGNOSIS — M25.561 CHRONIC PAIN OF BOTH KNEES: ICD-10-CM

## 2022-06-27 PROBLEM — M62.81 MUSCLE WEAKNESS: Status: RESOLVED | Noted: 2019-04-15 | Resolved: 2022-06-27

## 2022-06-27 PROBLEM — R27.9 INCOORDINATION: Status: RESOLVED | Noted: 2019-04-15 | Resolved: 2022-06-27

## 2022-06-27 PROBLEM — M25.512 CHRONIC LEFT SHOULDER PAIN: Status: RESOLVED | Noted: 2020-08-18 | Resolved: 2022-06-27

## 2022-06-27 PROBLEM — M79.641 RIGHT HAND PAIN: Status: RESOLVED | Noted: 2019-04-15 | Resolved: 2022-06-27

## 2022-06-27 PROBLEM — M25.641 JOINT STIFFNESS OF HAND, RIGHT: Status: RESOLVED | Noted: 2019-04-15 | Resolved: 2022-06-27

## 2022-06-27 PROCEDURE — 97162 PT EVAL MOD COMPLEX 30 MIN: CPT | Mod: PO | Performed by: PHYSICAL THERAPIST

## 2022-06-27 NOTE — PATIENT INSTRUCTIONS
"Access Code: [email or web address]  URL: [email or web address]  Date: 06/27/2022  Prepared by: Stanley German    Exercises  Prone Quadriceps Stretch with Strap - 1-2 x daily - 5-7 x weekly - 3 sets - 30" hold  Sidelying Hip Abduction with Resistance at Thighs - 1-2 x daily - 5-7 x weekly - 3 sets - 10 reps - 3" hold    "

## 2022-06-27 NOTE — PLAN OF CARE
[facility]  Physical Therapy Initial Evaluation    Name: Rodney Gonzalez [facility] Number: [id number]    Therapy Diagnosis:   Encounter Diagnoses   Name Primary?    Chronic pain of both knees     Pain aggravated by activities of daily living      Physician: Ken Alberto MD    Physician Orders: PT Eval and Treat   Medical Diagnosis from Referral: Chronic pain of both knees [M25.561, M25.562, G89.29]  Evaluation Date: 6/27/2022  Authorization Period Expiration: 5/6/23  Plan of Care Expiration: 9/27/22  Visit # / Visits authorized: 1/ 1    Time In: 5:02  Time Out: 5:45  Total Billable Time: 43 minutes    Precautions: Standard, diabetes    Subjective   Date of onset: Ongoing problem, increase in pain since March  History of current condition - Rodney reports: Pain in both knees, R>L. At first it was just aching. Pain has progressed to constant pain. She has a lot of pain with driving, and she drives for a living. She has issues walking after being seated for long periods of time. Her doctor told her that she might have arthritis. She had an injection in the R knee which helped, but the pain came back after 3 weeks. Patient has lost a lot of weight after weight loss surgery, although she gained back about 80 lbs. She tries to exercise 2 times per week.     Imaging, none:     Prior Therapy: Yes- shoulder and hand at this location  Social History:    Occupation:   Prior Level of Function: No functional limitations, but she did have some aching  Current Level of Function: Trouble walking up steps, pain with prolonged sitting, sometimes has issues with walking, unable to bend down to pick something up    Pain:  Current 4/10, worst 10/10, best 0/10   Location: bilateral knee   Description: Not asked  Aggravating Factors: Sitting, stairs, walking  Easing Factors: Rest, legs propped up in a straight position    Pts goals: Get back to driving without pain    Medical History:    Past Medical History:   Diagnosis Date    Anemia     Arthritis     Diabetes mellitus, type 2     Neuropathy        Surgical History:   Rodney Infante  has a past surgical history that includes Cyst Removal (2014, 2/2018); Excision of hidradenitis (Bilateral, 12/24/2019); and Wound debridement (Right, 7/17/2020).    Medications:   Rodney has a current medication list which includes the following prescription(s): humira pen, clotrimazole-betamethasone 1-0.05%, cyanocobalamin, ergocalciferol, escitalopram oxalate, estradiol cypionate, famotidine, gabapentin, iodoform, mupirocin, mupirocin, nystatin, nystatin, phentermine, topiramate, and tramadol.    Allergies:   Review of patient's allergies indicates:   Allergen Reactions    Pcn [penicillins] Hives and Swelling     Patient reports allergic reaction to liquid PCN as a child.  She reports tolerating Amoxacillin 4 years ago without reaction.        Objective     Observation:     Posture: Increased valgus position on R    Gait: No deficits noted    Knee Range of Motion (Active / Passive):  Knee Left  Right    Flexion 120 120   Extension 0 0       Lower Extremity Strength   Left Right   Knee extension: 4+/5 4+/5   Knee flexion: 4+/5 4+/5   Hip extension:  4-/5 4-/5   Hip abduction: 4/5 4/5       Special Tests:   Left Right   Florentino's Test +  + medial meniscus   Joint line tenderness - +       Function:    - SLS R: 1s  - SLS L: 3s  - Squat: Able to partial squat and rise to standing with subjective tightness in the knee   - Single Leg Squat: Unable      Joint Mobility: No deficits noted in knee joint mobility. Soft tissue approximation end feel with flexion bilaterally. Crepitus noted with patellar glide    Palpation: Tenderness in R peripatellar structures, R retropatellar surfaces, R medial and lateral knee joint line. Minimal tenderness on L    Flexibility:   Quad  R- 90 degrees flexion  L- 100 degrees flexion             TREATMENT     No treatment  provided today    Total Treatment time separate from Evaluation: 0 minutes      Home Exercises and Patient Education Provided    Education provided:   - Role of PT, PT POC  - Benefit of weight loss in reduction joint load  - Importance of regular exercise in managing knee pain    Written Home Exercises Provided: yes.  Exercises were reviewed and Rodney was able to demonstrate them prior to the end of the session.  Rodney demonstrated good  understanding of the education provided.     See EMR under Patient Instructions for exercises provided 6/27/2022.    Assessment   Rodney is a 41 y.o. female referred to outpatient Physical Therapy with a medical diagnosis of B chronic knee pain. Patient noted to have pain with end range positions, impaired single leg stability, lower extremity weakness, and flexibility impairments upon clinical examination.This pt is a good candidate for skilled PT tx and stands to benefit from a combination of manual therapy, therapeutic exercise/activities, neuromuscular re-education for kinesthetic awareness, and modalities prn. The pt has been educated on their dx/POC and consents to further PT tx.      Pt prognosis is Fair.   Pt will benefit from skilled outpatient Physical Therapy to address the deficits stated above and in the chart below, provide pt/family education, and to maximize pt's level of independence.     Plan of care discussed with patient: Yes  Pt's spiritual, cultural and educational needs considered and patient is agreeable to the plan of care and goals as stated below:     Anticipated Barriers for therapy: None    Medical Necessity is demonstrated by the following  History  Co-morbidities and personal factors that may impact the plan of care Co-morbidities:   Past Medical History:   Diagnosis Date    Anemia     Arthritis     Diabetes mellitus, type 2     Neuropathy          Personal Factors:   no deficits     moderate   Examination  Body Structures and Functions, activity  limitations and participation restrictions that may impact the plan of care Body Regions:   lower extremities    Body Systems:    ROM  strength  balance  gait    Participation Restrictions:   Limited in participating in regular exercise routine    Activity limitations:   Learning and applying knowledge  no deficits    General Tasks and Commands  no deficits    Communication  no deficits    Mobility  lifting and carrying objects  walking  driving (bike, car, motorcycle)  stairs    Self care  no deficits    Domestic Life  no deficits    Interactions/Relationships  no deficits    Life Areas  employment    Community and Social Life  community life  recreation and leisure         moderate   Clinical Presentation evolving clinical presentation with changing clinical characteristics moderate   Decision Making/ Complexity Score: moderate     Goals:  Short-Term Goals: 4 weeks  - The patient will be independent with initial home exercise program.  - The patient will increase strength to at least 4/5 to perform functional mobility including walking and squatting to pick something up  - The patient will increase R quad flexibility to be equal to L to perform work with pain < 3/10.    Long-Term Goals: 8 weeks  - Pt to achieve <31% limitation as measured by the FOTO to demonstrate decreased disability.  - The patient will be independent amb with no assistive device on all surfaces for community distances.  - The patient will increase strength to at least 4+/5 to perform functional mobility including stair negotiation  - The patient will improve SLS time to 20s bilaterally to improve stability in gait        Plan   Plan of care Certification: 6/27/2022 to 9/27/22.    Outpatient Physical Therapy 2 times weekly for 8 weeks to include the following interventions: Electrical Stimulation PRN, Manual Therapy, Moist Heat/ Ice, Neuromuscular Re-ed, Patient Education, Therapeutic Activities and Therapeutic Exercise.     Harsh Rowell  PT

## 2022-07-05 NOTE — PROGRESS NOTES
"[facility]   Physical Therapy Treatment Note     Name: Rodney Gonzalez [place]  Clinic Number: [id number]    Therapy Diagnosis:   Encounter Diagnosis   Name Primary?    Pain aggravated by activities of daily living Yes     Physician: Ken Alberto MD    Visit Date: 7/6/2022    Physician Orders: PT Eval and Treat   Medical Diagnosis from Referral: Chronic pain of both knees [M25.561, M25.562, G89.29]  Evaluation Date: 6/27/2022  Authorization Period Expiration: 5/6/23  Plan of Care Expiration: 9/27/22  Visit # / Visits authorized: 1/ 20        Precautions: Standard, diabetes    PTA Visit #: 1/5     Time In: 3:00  Time Out: 4:00  Total Billable Time: 60 minutes    SUBJECTIVE     Pt reports: virgilio I went for a walk today and that helped .  She was compliant with home exercise program.  Response to previous treatment: none  Functional change: none    Pain: 4/10  Location: bilateral knee      OBJECTIVE     Objective Measures updated at progress report unless specified.     Treatment     Rodney received the treatments listed below:      therapeutic exercises to develop strength, endurance and ROM for 60 minutes including:    Nustep  7 min L3   HS Stretch 3 x 20"  B  SAQ 2 x 10   QS 10x B   SLR 2 x 10 B   Supine Hip Abd 2 x 10 GTB   Tandem stance foot on step w ball rotations 10x B   LAQ  2 x 10 4# B   Squats 3  x 10   Tandem stance 2 x 30" B   Hip 3 way 10x B ea             Patient Education and Home Exercises     Home Exercises and Patient Education Provided     Education provided:   - Role of PT, PT POC  - Benefit of weight loss in reduction joint load  - Importance of regular exercise in managing knee pain     Written Home Exercises Provided: yes.  Exercises were reviewed and Kybrooklynloraine was able to demonstrate them prior to the end of the session.  Rodney demonstrated good  understanding of the education provided.      See EMR under Patient Instructions for exercises provided " 6/27/2022.    ASSESSMENT     Patient arrived to session with moderate pain in B knees. Patient stated some days are better than others but she walked earlier today and it has helped with her pain. Patient able to progress through session with no increases in pain. Patient ended session stating she felt fine, she notes she wants to work on steps due to difficulty at work.      Rodney Is progressing well towards her goals.   Pt prognosis is Good.     Pt will continue to benefit from skilled outpatient physical therapy to address the deficits listed in the problem list box on initial evaluation, provide pt/family education and to maximize pt's level of independence in the home and community environment.     Pt's spiritual, cultural and educational needs considered and pt agreeable to plan of care and goals.     Anticipated barriers to physical therapy: none    Goals:  Short-Term Goals: 4 weeks  - The patient will be independent with initial home exercise program.  - The patient will increase strength to at least 4/5 to perform functional mobility including walking and squatting to pick something up  - The patient will increase R quad flexibility to be equal to L to perform work with pain < 3/10.     Long-Term Goals: 8 weeks  - Pt to achieve <31% limitation as measured by the FOTO to demonstrate decreased disability.  - The patient will be independent amb with no assistive device on all surfaces for community distances.  - The patient will increase strength to at least 4+/5 to perform functional mobility including stair negotiation  - The patient will improve SLS time to 20s bilaterally to improve stability in gait       PLAN     Follow up on patient response to session    Justin Dumont, PTA

## 2022-07-06 ENCOUNTER — CLINICAL SUPPORT (OUTPATIENT)
Dept: REHABILITATION | Facility: HOSPITAL | Age: 42
End: 2022-07-06
Payer: COMMERCIAL

## 2022-07-06 DIAGNOSIS — R52 PAIN AGGRAVATED BY ACTIVITIES OF DAILY LIVING: Primary | ICD-10-CM

## 2022-07-06 PROCEDURE — 97110 THERAPEUTIC EXERCISES: CPT | Mod: PO,CQ

## 2022-07-08 ENCOUNTER — CLINICAL SUPPORT (OUTPATIENT)
Dept: REHABILITATION | Facility: HOSPITAL | Age: 42
End: 2022-07-08
Payer: COMMERCIAL

## 2022-07-08 DIAGNOSIS — R52 PAIN AGGRAVATED BY ACTIVITIES OF DAILY LIVING: Primary | ICD-10-CM

## 2022-07-08 PROCEDURE — 97110 THERAPEUTIC EXERCISES: CPT | Mod: PO | Performed by: PHYSICAL THERAPIST

## 2022-07-08 NOTE — PROGRESS NOTES
"[facility]   Physical Therapy Treatment Note     Name: Rodney Gonzalez [place]  Clinic Number: [id number]    Therapy Diagnosis:   Encounter Diagnosis   Name Primary?    Pain aggravated by activities of daily living Yes     Physician: Ken Alberto MD    Visit Date: 7/8/2022    Physician Orders: PT Eval and Treat   Medical Diagnosis from Referral: Chronic pain of both knees [M25.561, M25.562, G89.29]  Evaluation Date: 6/27/2022  Authorization Period Expiration: 12/31/22  Plan of Care Expiration: 9/27/22  Visit # / Visits authorized: 2/ 20        Precautions: Standard, diabetes    PTA Visit #: 1/5     Time In: 11:01  Time Out: 11:56  Total Billable Time: 55 minutes    SUBJECTIVE     Pt reports: She felt a little tight after last visit. It seemed to be a result of the muscles being tired, not her knee.  She was compliant with home exercise program.  Response to previous treatment: none  Functional change: none    Pain: 3/10  Location: bilateral knee      OBJECTIVE     Objective Measures updated at progress report unless specified.     FOTO: 3/5  -Intake: Done 6/27  -Status: incomplete  -Discharge: incomplete      Treatment     Rodney received the treatments listed below:      therapeutic exercises to develop strength, endurance and ROM for 53 minutes including:    Nustep  8 min L5   HS Stretch 3 x 30"  B  Quad stretch 3 x 30" B    LAQ 4# 3 x 10    SLR 2 x 10 B   Side lying clams- 2 x 10 ea  Bridge- 2 x 10 with 5s hold  Modified SLS- back foot on foam, front foot on step. 3 x 30s ea  LAQ 3 x 10 4# B   Standing HS curls- 3 x 10 4# B  Shuttle squats- 3.0 3 x 15      Patient Education and Home Exercises     Home Exercises and Patient Education Provided     Education provided:   - Role of PT, PT POC  - Benefit of weight loss in reduction joint load  - Importance of regular exercise in managing knee pain     Written Home Exercises Provided: yes.  Exercises were reviewed and Rodney was able to " demonstrate them prior to the end of the session.  Rodney demonstrated good  understanding of the education provided.      See EMR under Patient Instructions for exercises provided 6/27/2022.    ASSESSMENT     Patient tolerated progression of exercises today without increase in symptoms. I emphasized the importance of continued consistency with exercise.    Rodney Is progressing well towards her goals.   Pt prognosis is Good.     Pt will continue to benefit from skilled outpatient physical therapy to address the deficits listed in the problem list box on initial evaluation, provide pt/family education and to maximize pt's level of independence in the home and community environment.     Pt's spiritual, cultural and educational needs considered and pt agreeable to plan of care and goals.     Anticipated barriers to physical therapy: none    Goals:  Short-Term Goals: 4 weeks In progress, not met  - The patient will be independent with initial home exercise program.  - The patient will increase strength to at least 4/5 to perform functional mobility including walking and squatting to pick something up  - The patient will increase R quad flexibility to be equal to L to perform work with pain < 3/10.     Long-Term Goals: 8 weeks In progress, not met  - Pt to achieve <31% limitation as measured by the FOTO to demonstrate decreased disability.  - The patient will be independent amb with no assistive device on all surfaces for community distances.  - The patient will increase strength to at least 4+/5 to perform functional mobility including stair negotiation  - The patient will improve SLS time to 20s bilaterally to improve stability in gait       PLAN     Progress exercise per patient tolerance.    Harsh Rowell, PT

## 2022-07-08 NOTE — PROGRESS NOTES
"[facility]   Physical Therapy Treatment Note     Name: Rodney Gonzalez [place]  Clinic Number: [id number]    Therapy Diagnosis:   No diagnosis found.  Physician: Ken Alberto MD    Visit Date: 7/11/2022    Physician Orders: PT Eval and Treat   Medical Diagnosis from Referral: Chronic pain of both knees [M25.561, M25.562, G89.29]  Evaluation Date: 6/27/2022  Authorization Period Expiration: 5/6/23  Plan of Care Expiration: 9/27/22  Visit # / Visits authorized: 2/ 20        Precautions: Standard, diabetes    PTA Visit #: 2/5     Time In: 11:00  Time Out: 12:00  Total Billable Time: 60 minutes    SUBJECTIVE     Pt reports: collinht I went for a walk today and that helped .  She was compliant with home exercise program.  Response to previous treatment: none  Functional change: none    Pain: 4/10  Location: bilateral knee      OBJECTIVE     Objective Measures updated at progress report unless specified.     Treatment     Rodney received the treatments listed below:      therapeutic exercises to develop strength, endurance and ROM for 60 minutes including:    Nustep  7 min L3   HS Stretch 3 x 20"  B  SAQ 2 x 10   QS 10x B   SLR 2 x 10 B   Supine Hip Abd 2 x 10 GTB   Tandem stance foot on step w ball rotations 10x B   LAQ  2 x 10 4# B   Squats 3  x 10   Tandem stance 2 x 30" B   Hip 3 way 10x B ea             Patient Education and Home Exercises     Home Exercises and Patient Education Provided     Education provided:   - Role of PT, PT POC  - Benefit of weight loss in reduction joint load  - Importance of regular exercise in managing knee pain     Written Home Exercises Provided: yes.  Exercises were reviewed and Kybrooklynloraine was able to demonstrate them prior to the end of the session.  Rodney demonstrated good  understanding of the education provided.      See EMR under Patient Instructions for exercises provided 6/27/2022.    ASSESSMENT     Patient arrived to session with moderate pain in B knees. " Patient stated some days are better than others but she walked earlier today and it has helped with her pain. Patient able to progress through session with no increases in pain. Patient ended session stating she felt fine, she notes she wants to work on steps due to difficulty at work.      Rodney Is progressing well towards her goals.   Pt prognosis is Good.     Pt will continue to benefit from skilled outpatient physical therapy to address the deficits listed in the problem list box on initial evaluation, provide pt/family education and to maximize pt's level of independence in the home and community environment.     Pt's spiritual, cultural and educational needs considered and pt agreeable to plan of care and goals.     Anticipated barriers to physical therapy: none    Goals:  Short-Term Goals: 4 weeks  - The patient will be independent with initial home exercise program.  - The patient will increase strength to at least 4/5 to perform functional mobility including walking and squatting to pick something up  - The patient will increase R quad flexibility to be equal to L to perform work with pain < 3/10.     Long-Term Goals: 8 weeks  - Pt to achieve <31% limitation as measured by the FOTO to demonstrate decreased disability.  - The patient will be independent amb with no assistive device on all surfaces for community distances.  - The patient will increase strength to at least 4+/5 to perform functional mobility including stair negotiation  - The patient will improve SLS time to 20s bilaterally to improve stability in gait       PLAN     Follow up on patient response to session    Justin Dumont, PTA

## 2022-07-11 ENCOUNTER — CLINICAL SUPPORT (OUTPATIENT)
Dept: REHABILITATION | Facility: HOSPITAL | Age: 42
End: 2022-07-11
Payer: COMMERCIAL

## 2022-07-11 DIAGNOSIS — R52 PAIN AGGRAVATED BY ACTIVITIES OF DAILY LIVING: Primary | ICD-10-CM

## 2022-07-11 PROCEDURE — 97110 THERAPEUTIC EXERCISES: CPT | Mod: PO,CQ

## 2022-07-11 NOTE — PROGRESS NOTES
"[facility]   Physical Therapy Treatment Note     Name: Rodney Gonzalez [place]  Clinic Number: [id number]    Therapy Diagnosis:   Encounter Diagnosis   Name Primary?    Pain aggravated by activities of daily living Yes     Physician: Ken Alberto MD    Visit Date: 7/11/2022    Physician Orders: PT Eval and Treat   Medical Diagnosis from Referral: Chronic pain of both knees [M25.561, M25.562, G89.29]  Evaluation Date: 6/27/2022  Authorization Period Expiration: 12/31/22  Plan of Care Expiration: 9/27/22  Visit # / Visits authorized: 3/ 20        Precautions: Standard, diabetes    PTA Visit #: 1/5     Time In: 10:01  Time Out: 10:56  Total Billable Time: 55 minutes    SUBJECTIVE     Pt reports: Sore from last time   She was compliant with home exercise program.  Response to previous treatment: none  Functional change: none    Pain: 3/10  Location: bilateral knee      OBJECTIVE     Objective Measures updated at progress report unless specified.     FOTO: 3/5  -Intake: Done 6/27  -Status: incomplete  -Discharge: incomplete      Treatment     Rodney received the treatments listed below:      therapeutic exercises to develop strength, endurance and ROM for 53 minutes including:    Nustep  8 min L5 *  HS Stretch 3 x 30"  B*  Quad stretch 3 x 30" B*    LAQ 5# 3 x 10  *  SLR 2 x 10 1.5# B   Side lying clams- 2 x 10 ea *  Bridge- 2 x 10 with 5s hold  Modified SLS- back foot on foam, front foot on step. 3 x 30s ea  Standing HS curls- 3 x 10 4# B  Shuttle squats- 3.0 3 x 15  Sit to Stand 19 in  2 x 10     *performed with rehab technician     Patient Education and Home Exercises     Home Exercises and Patient Education Provided     Education provided:   - Role of PT, PT POC  - Benefit of weight loss in reduction joint load  - Importance of regular exercise in managing knee pain     Written Home Exercises Provided: yes.  Exercises were reviewed and Rodney was able to demonstrate them prior to the end " of the session.  Rodney demonstrated good  understanding of the education provided.      See EMR under Patient Instructions for exercises provided 6/27/2022.    ASSESSMENT     Patient arrived with no change in status. Patient able to increases exercises with slight discomforts in knees. Patient ended session stating she felt fine.     Rodney Is progressing well towards her goals.   Pt prognosis is Good.     Pt will continue to benefit from skilled outpatient physical therapy to address the deficits listed in the problem list box on initial evaluation, provide pt/family education and to maximize pt's level of independence in the home and community environment.     Pt's spiritual, cultural and educational needs considered and pt agreeable to plan of care and goals.     Anticipated barriers to physical therapy: none    Goals:  Short-Term Goals: 4 weeks In progress, not met  - The patient will be independent with initial home exercise program.  - The patient will increase strength to at least 4/5 to perform functional mobility including walking and squatting to pick something up  - The patient will increase R quad flexibility to be equal to L to perform work with pain < 3/10.     Long-Term Goals: 8 weeks In progress, not met  - Pt to achieve <31% limitation as measured by the FOTO to demonstrate decreased disability.  - The patient will be independent amb with no assistive device on all surfaces for community distances.  - The patient will increase strength to at least 4+/5 to perform functional mobility including stair negotiation  - The patient will improve SLS time to 20s bilaterally to improve stability in gait       PLAN   Increase side lying clams to 2 x 10 ea with RTB  Progress exercise per patient tolerance.    Justin Dumont, PTA

## 2022-07-15 ENCOUNTER — CLINICAL SUPPORT (OUTPATIENT)
Dept: REHABILITATION | Facility: HOSPITAL | Age: 42
End: 2022-07-15
Payer: COMMERCIAL

## 2022-07-15 DIAGNOSIS — R52 PAIN AGGRAVATED BY ACTIVITIES OF DAILY LIVING: Primary | ICD-10-CM

## 2022-07-15 PROCEDURE — 97110 THERAPEUTIC EXERCISES: CPT | Mod: PO | Performed by: PHYSICAL THERAPIST

## 2022-07-15 PROCEDURE — 97112 NEUROMUSCULAR REEDUCATION: CPT | Mod: PO | Performed by: PHYSICAL THERAPIST

## 2022-07-15 NOTE — PROGRESS NOTES
"[facility]   Physical Therapy Treatment Note     Name: Rodney Gonzalez [place]  Clinic Number: [id number]    Therapy Diagnosis:   Encounter Diagnosis   Name Primary?    Pain aggravated by activities of daily living Yes     Physician: Ken Alberto MD    Visit Date: 7/15/2022    Physician Orders: PT Eval and Treat   Medical Diagnosis from Referral: Chronic pain of both knees [M25.561, M25.562, G89.29]  Evaluation Date: 6/27/2022  Authorization Period Expiration: 12/31/22  Plan of Care Expiration: 9/27/22  Visit # / Visits authorized: 5/ 20        Precautions: Standard, diabetes    PTA Visit #: 1/5     Time In: 11:00  Time Out: 11:53  Total Billable Time: 53 minutes    SUBJECTIVE     Pt reports: Still has some soreness, but continues to try to walk regularly  She was compliant with home exercise program.  Response to previous treatment: none  Functional change: none    Pain: 3/10  Location: bilateral knee      OBJECTIVE     Objective Measures updated at progress report unless specified.     FOTO: 4/5  -Intake: Done 6/27  -Status: incomplete  -Discharge: incomplete      Treatment     Rodney received the treatments listed below:      therapeutic exercises to develop strength, endurance and ROM for 30 minutes including:    Nustep  8 min L5   HS Stretch 3 x 30"  B  Quad stretch 3 x 30" B    LAQ 5# 3 x 10    SLR 2 x 10 1.5# B   Shuttle squats- 3.0 3 x 15  Sit to Stand 19 in  2 x 10     Not performed today:  Standing HS curls- 3 x 10 4# B      Rodney participated in neuromuscular re-education activities to improve: Balance, Coordination, Kinesthetic, Sense and Proprioception for 15 minutes. The following activities were included:    Side lying clams- 2 x 10 ea   Bridge- 2 x 10 with 5s hold  Single leg balance- max holds 3 min between R+L      Patient Education and Home Exercises     Home Exercises and Patient Education Provided     Education provided:   - Role of PT, PT POC  - Benefit of weight " loss in reduction joint load  - Importance of regular exercise in managing knee pain     Written Home Exercises Provided: yes.  Exercises were reviewed and Rodney was able to demonstrate them prior to the end of the session.  Rodney demonstrated good  understanding of the education provided.      See EMR under Patient Instructions for exercises provided 6/27/2022.    ASSESSMENT     Patient demonstrates improved single stability compared to initial evaluation. She tolerates strengthening program well, without significant increase in symptoms. Continue to progress and work toward home management.    Rodney Is progressing well towards her goals.   Pt prognosis is Good.     Pt will continue to benefit from skilled outpatient physical therapy to address the deficits listed in the problem list box on initial evaluation, provide pt/family education and to maximize pt's level of independence in the home and community environment.     Pt's spiritual, cultural and educational needs considered and pt agreeable to plan of care and goals.     Anticipated barriers to physical therapy: none    Goals:  Short-Term Goals: 4 weeks In progress, not met  - The patient will be independent with initial home exercise program.  - The patient will increase strength to at least 4/5 to perform functional mobility including walking and squatting to pick something up  - The patient will increase R quad flexibility to be equal to L to perform work with pain < 3/10.     Long-Term Goals: 8 weeks In progress, not met  - Pt to achieve <31% limitation as measured by the FOTO to demonstrate decreased disability.  - The patient will be independent amb with no assistive device on all surfaces for community distances.  - The patient will increase strength to at least 4+/5 to perform functional mobility including stair negotiation  - The patient will improve SLS time to 20s bilaterally to improve stability in gait       PLAN     FOTO Due    Progress  exercise per patient tolerance.    Harsh Rowell, PT

## 2022-07-18 ENCOUNTER — CLINICAL SUPPORT (OUTPATIENT)
Dept: REHABILITATION | Facility: HOSPITAL | Age: 42
End: 2022-07-18
Payer: COMMERCIAL

## 2022-07-18 DIAGNOSIS — R52 PAIN AGGRAVATED BY ACTIVITIES OF DAILY LIVING: Primary | ICD-10-CM

## 2022-07-18 PROCEDURE — 97112 NEUROMUSCULAR REEDUCATION: CPT | Mod: PO,CQ

## 2022-07-18 PROCEDURE — 97110 THERAPEUTIC EXERCISES: CPT | Mod: PO,CQ

## 2022-07-18 NOTE — PROGRESS NOTES
"[facility]   Physical Therapy Treatment Note     Name: Rodney Gonzalez [place]  Clinic Number: [id number]    Therapy Diagnosis:   Encounter Diagnosis   Name Primary?    Pain aggravated by activities of daily living Yes     Physician: Ken Alberto MD    Visit Date: 7/18/2022    Physician Orders: PT Eval and Treat   Medical Diagnosis from Referral: Chronic pain of both knees [M25.561, M25.562, G89.29]  Evaluation Date: 6/27/2022  Authorization Period Expiration: 12/31/22  Plan of Care Expiration: 9/27/22  Visit # / Visits authorized: 4/ 20        Precautions: Standard, diabetes    PTA Visit #: 1/5     Time In: 3:00  Time Out: 3:56  Total Billable Time: 55 minutes    SUBJECTIVE     Pt reports: feel fine   She was compliant with home exercise program.  Response to previous treatment: none  Functional change: none    Pain: 3/10  Location: bilateral knee      OBJECTIVE     Objective Measures updated at progress report unless specified.     FOTO: 4/5  -Intake: Done 6/27  -Status: incomplete  -Discharge: incomplete      Treatment     Rodney received the treatments listed below:      therapeutic exercises to develop strength, endurance and ROM for 55 minutes including:    Nustep  8 min L5   HS Stretch 3 x 30"  B  Quad stretch 3 x 30" B    LAQ 7.5# 3 x 10    SLR 2 x 10 1.5# B   Shuttle squats- 3.0 3 x 15  Sit to Stand 20 in  2 x 10   Heel/Toe raises 3 x 10   Hip abduction 2 x 10 RTB B  Hip extension 2 x 10 RTB B   Monster walks 3 ft x 12 YTB         Not performed today:  Standing HS curls- 3 x 10 4# B      Rodney participated in neuromuscular re-education activities to improve: Balance, Coordination, Kinesthetic, Sense and Proprioception for 10 minutes. The following activities were included:    Side lying clams- 2 x 10 ea   Bridge- 2 x 10 with 5s hold  Single leg balance- max holds 3 min between R+L      Patient Education and Home Exercises     Home Exercises and Patient Education " "Provided     Education provided:   - Role of PT, PT POC  - Benefit of weight loss in reduction joint load  - Importance of regular exercise in managing knee pain     Written Home Exercises Provided: yes.  Exercises were reviewed and Rodney was able to demonstrate them prior to the end of the session.  Rodney demonstrated good  understanding of the education provided.      See EMR under Patient Instructions for exercises provided 6/27/2022.    ASSESSMENT     Patient arrived with no change in status. Patient able to progress through exercises with no increases in pain. Patient notes that last session she felt the side of her L LE had a "weakness/numb feeling" but that she does not have that feeling today/ Patient ended session stating she felt fine.     Rodney Is progressing well towards her goals.   Pt prognosis is Good.     Pt will continue to benefit from skilled outpatient physical therapy to address the deficits listed in the problem list box on initial evaluation, provide pt/family education and to maximize pt's level of independence in the home and community environment.     Pt's spiritual, cultural and educational needs considered and pt agreeable to plan of care and goals.     Anticipated barriers to physical therapy: none    Goals:  Short-Term Goals: 4 weeks In progress, not met  - The patient will be independent with initial home exercise program.  - The patient will increase strength to at least 4/5 to perform functional mobility including walking and squatting to pick something up  - The patient will increase R quad flexibility to be equal to L to perform work with pain < 3/10.     Long-Term Goals: 8 weeks In progress, not met  - Pt to achieve <31% limitation as measured by the FOTO to demonstrate decreased disability.  - The patient will be independent amb with no assistive device on all surfaces for community distances.  - The patient will increase strength to at least 4+/5 to perform functional " mobility including stair negotiation  - The patient will improve SLS time to 20s bilaterally to improve stability in gait       PLAN   Increase side lying clams to 2 x 10 ea with RTB  Progress exercise per patient tolerance.    Justin Dumont, PTA

## 2022-07-22 ENCOUNTER — CLINICAL SUPPORT (OUTPATIENT)
Dept: REHABILITATION | Facility: HOSPITAL | Age: 42
End: 2022-07-22
Payer: COMMERCIAL

## 2022-07-22 DIAGNOSIS — R52 PAIN AGGRAVATED BY ACTIVITIES OF DAILY LIVING: Primary | ICD-10-CM

## 2022-07-22 PROCEDURE — 97110 THERAPEUTIC EXERCISES: CPT | Mod: PO | Performed by: PHYSICAL THERAPIST

## 2022-07-22 NOTE — PROGRESS NOTES
"[facility]   Physical Therapy Treatment Note     Name: Rodney Gonzalez [place]  Clinic Number: [id number]    Therapy Diagnosis:   Encounter Diagnosis   Name Primary?    Pain aggravated by activities of daily living Yes     Physician: Ken Alberto MD    Visit Date: 7/22/2022    Physician Orders: PT Eval and Treat   Medical Diagnosis from Referral: Chronic pain of both knees [M25.561, M25.562, G89.29]  Evaluation Date: 6/27/2022  Authorization Period Expiration: 12/31/22  Plan of Care Expiration: 9/27/22  Visit # / Visits authorized: 6/ 20        Precautions: Standard, diabetes    PTA Visit #: 1/5     Time In: 11:04  Time Out: 12:00  Total Billable Time: 56 minutes    SUBJECTIVE     Pt reports: Her knees have been feeling better overall. Less soreness when getting out of bed in the morning  She was compliant with home exercise program.  Response to previous treatment: none  Functional change: none    Pain: 0/10  Location: bilateral knee      OBJECTIVE     Objective Measures updated at progress report unless specified.     FOTO: 4/5  -Intake: Done 6/27  -Status: Done 7/22  -Discharge: incomplete      Treatment     Rodney received the treatments listed below:      therapeutic exercises to develop strength, endurance and ROM for 55 minutes including:    Nustep  8 min L8   HS Stretch 3 x 30"  B  Quad stretch 3 x 30" B    LAQ 7.5# 3 x 10    SLR 2 x 10 1.5# B   Shuttle squats- 3.0 3 x 15  Sit to Stand 20 in  2 x 10   Heel/Toe raises 3 x 10   Hip abduction 2 x 10 RTB B  Hip extension 2 x 10 RTB B   Monster walks 3 ft x 12 YTB       Not performed today:  Standing HS curls- 3 x 10 4# B      Rodney participated in neuromuscular re-education activities to improve: Balance, Coordination, Kinesthetic, Sense and Proprioception for 0 minutes Not performed today. The following activities were included:    Side lying clams- 2 x 10 ea   Bridge- 2 x 10 with 5s hold  Single leg balance- max holds 3 min " between R+L      Patient Education and Home Exercises     Home Exercises and Patient Education Provided     Education provided:   - Role of PT, PT POC  - Benefit of weight loss in reduction joint load  - Importance of regular exercise in managing knee pain     Written Home Exercises Provided: yes.  Exercises were reviewed and Rodney was able to demonstrate them prior to the end of the session.  Rodney demonstrated good  understanding of the education provided.      See EMR under Patient Instructions for exercises provided 6/27/2022.    ASSESSMENT     Patient is progressing very well and hasn't had any pain with introduction of load. I educated patient regarding importance of regular exercise in managing lumbar symptoms.    Rodney Is progressing well towards her goals.   Pt prognosis is Good.     Pt will continue to benefit from skilled outpatient physical therapy to address the deficits listed in the problem list box on initial evaluation, provide pt/family education and to maximize pt's level of independence in the home and community environment.     Pt's spiritual, cultural and educational needs considered and pt agreeable to plan of care and goals.     Anticipated barriers to physical therapy: none    Goals:  Short-Term Goals: 4 weeks In progress, not met  - The patient will be independent with initial home exercise program.  - The patient will increase strength to at least 4/5 to perform functional mobility including walking and squatting to pick something up  - The patient will increase R quad flexibility to be equal to L to perform work with pain < 3/10.     Long-Term Goals: 8 weeks In progress, not met  - Pt to achieve <31% limitation as measured by the FOTO to demonstrate decreased disability.  - The patient will be independent amb with no assistive device on all surfaces for community distances.  - The patient will increase strength to at least 4+/5 to perform functional mobility including stair  negotiation  - The patient will improve SLS time to 20s bilaterally to improve stability in gait       PLAN     Increase load on shuttle  Re-introduce balance and mat hip strengthening exercises    Harsh Rowell, PT

## 2022-07-25 ENCOUNTER — CLINICAL SUPPORT (OUTPATIENT)
Dept: REHABILITATION | Facility: HOSPITAL | Age: 42
End: 2022-07-25
Payer: COMMERCIAL

## 2022-07-25 DIAGNOSIS — R52 PAIN AGGRAVATED BY ACTIVITIES OF DAILY LIVING: Primary | ICD-10-CM

## 2022-07-25 PROCEDURE — 97110 THERAPEUTIC EXERCISES: CPT | Mod: PO,CQ

## 2022-07-25 NOTE — PROGRESS NOTES
"[facility]   Physical Therapy Treatment Note     Name: Rodney Gonzalez [place]  Clinic Number: [id number]    Therapy Diagnosis:   Encounter Diagnosis   Name Primary?    Pain aggravated by activities of daily living Yes     Physician: Ken Alberto MD    Visit Date: 7/25/2022    Physician Orders: PT Eval and Treat   Medical Diagnosis from Referral: Chronic pain of both knees [M25.561, M25.562, G89.29]  Evaluation Date: 6/27/2022  Authorization Period Expiration: 12/31/22  Plan of Care Expiration: 9/27/22  Visit # / Visits authorized: 7/ 20        Precautions: Standard, diabetes    PTA Visit #: 1/5     Time In: 3:00  Time Out: 4:00  Total Billable Time: 56 minutes    SUBJECTIVE     Pt reports: Feel alright  She was compliant with home exercise program.  Response to previous treatment: none  Functional change: none    Pain: 0/10  Location: bilateral knee      OBJECTIVE     Objective Measures updated at progress report unless specified.     FOTO: 4/5  -Intake: Done 6/27  -Status: Done 7/22  -Discharge: incomplete      Treatment     Rodney received the treatments listed below:      therapeutic exercises to develop strength, endurance and ROM for 55 minutes including:    Nustep  10 min L8   HS Stretch 3 x 30"  B  Quad stretch 3 x 30" B    LAQ 7.5# 3 x 10    SLR 2 x 10 1.5# B   Shuttle squats- 3.0 3 x 15   Sit to Stand 20 in  2 x 10   Heel/Toe raises 3 x 10   Hip abduction 3 x 10 RTB B  Hip extension 3 x 10 RTB B   Monster walks 3 ft x 12 YTB   Step Ups  2 x 10 L2 B   QL Stretch  5 x 5" hold B      Not performed today:  Standing HS curls- 3 x 10 4# B      Rodney participated in neuromuscular re-education activities to improve: Balance, Coordination, Kinesthetic, Sense and Proprioception for 0 minutes Not performed today. The following activities were included:    Side lying clams- 2 x 10 ea   Bridge- 2 x 10 with 5s hold  Single leg balance- max holds 3 min between R+L      Patient Education and " Home Exercises     Home Exercises and Patient Education Provided     Education provided:   - Role of PT, PT POC  - Benefit of weight loss in reduction joint load  - Importance of regular exercise in managing knee pain     Written Home Exercises Provided: yes.  Exercises were reviewed and Rodney was able to demonstrate them prior to the end of the session.  Rodney demonstrated good  understanding of the education provided.      See EMR under Patient Instructions for exercises provided 6/27/2022.    ASSESSMENT     Patient arrived to session reporting no change in status. Patient was able to progress through exercises with no increases in discomfort. Patient ended session stating she felt fine.     Rodney Is progressing well towards her goals.   Pt prognosis is Good.     Pt will continue to benefit from skilled outpatient physical therapy to address the deficits listed in the problem list box on initial evaluation, provide pt/family education and to maximize pt's level of independence in the home and community environment.     Pt's spiritual, cultural and educational needs considered and pt agreeable to plan of care and goals.     Anticipated barriers to physical therapy: none    Goals:  Short-Term Goals: 4 weeks In progress, not met  - The patient will be independent with initial home exercise program.  - The patient will increase strength to at least 4/5 to perform functional mobility including walking and squatting to pick something up  - The patient will increase R quad flexibility to be equal to L to perform work with pain < 3/10.     Long-Term Goals: 8 weeks In progress, not met  - Pt to achieve <31% limitation as measured by the FOTO to demonstrate decreased disability.  - The patient will be independent amb with no assistive device on all surfaces for community distances.  - The patient will increase strength to at least 4+/5 to perform functional mobility including stair negotiation  - The patient will improve  SLS time to 20s bilaterally to improve stability in gait       PLAN     Increase load on shuttle  Re-introduce balance and mat hip strengthening exercises    Justin Dumont, PTA

## 2022-07-29 ENCOUNTER — CLINICAL SUPPORT (OUTPATIENT)
Dept: REHABILITATION | Facility: HOSPITAL | Age: 42
End: 2022-07-29
Payer: COMMERCIAL

## 2022-07-29 DIAGNOSIS — R52 PAIN AGGRAVATED BY ACTIVITIES OF DAILY LIVING: Primary | ICD-10-CM

## 2022-07-29 PROCEDURE — 97110 THERAPEUTIC EXERCISES: CPT | Mod: PO | Performed by: PHYSICAL THERAPIST

## 2022-07-29 NOTE — PROGRESS NOTES
"[facility]   Physical Therapy Treatment Note     Name: Rodney Gonzalez [place]  Clinic Number: [id number]    Therapy Diagnosis:   Encounter Diagnosis   Name Primary?    Pain aggravated by activities of daily living Yes     Physician: Ken Alberto MD    Visit Date: 7/29/2022    Physician Orders: PT Eval and Treat   Medical Diagnosis from Referral: Chronic pain of both knees [M25.561, M25.562, G89.29]  Evaluation Date: 6/27/2022  Authorization Period Expiration: 12/31/22  Plan of Care Expiration: 9/27/22  Visit # / Visits authorized: 8/ 20        Precautions: Standard, diabetes    PTA Visit #: 1/5     Time In: 11:04  Time Out: 12:02  Total Billable Time: 58 minutes    SUBJECTIVE     Pt reports: A little bit of aching in the knees  She was compliant with home exercise program.  Response to previous treatment: none  Functional change: none    Pain: 2/10  Location: R knee    OBJECTIVE     Objective Measures updated at progress report unless specified.     FOTO:   -Intake: Done 6/27  -Status: Done 7/22  -Discharge: incomplete      Treatment     Rodney received the treatments listed below:      therapeutic exercises to develop strength, endurance and ROM for 55 minutes including:    Recumbent bike- 10 min L8   HS Stretch 3 x 30"  B  Quad stretch 3 x 30" B    LAQ GTB 3 x 10    Shuttle squats- 4.0 3 x 15   Sit to Stand 20 in  2 x 10   Hip abduction 3 x 10 GTB B  Hip extension 3 x 10 GTB B   Step Ups 3 x 10 L3 B     Not performed today:  Heel/Toe raises 3 x 10     Rodney participated in neuromuscular re-education activities to improve: Balance, Coordination, Kinesthetic, Sense and Proprioception for 5 minutes. The following activities were included:    Single leg balance- 5 x 15 ea    Not performed today:  Side lying clams- 2 x 10 ea   Bridge- 2 x 10 with 5s hold  Monster walks 3 ft x 12 YTB         Patient Education and Home Exercises     Home Exercises and Patient Education " Provided     Education provided:   - Role of PT, PT POC  - Discharge plan including transitioning to home program to help manage symptoms     Written Home Exercises Provided: yes.  Exercises were reviewed and Rodney was able to demonstrate them prior to the end of the session.  Rodney demonstrated good  understanding of the education provided.      See EMR under Patient Instructions for exercises provided 6/27/2022.    ASSESSMENT     Patient is doing really well with strengthening exercise. I educated patient regarding the importance of continued exercise in managing knee symptoms.    Rodney Is progressing well towards her goals.   Pt prognosis is Good.     Pt will continue to benefit from skilled outpatient physical therapy to address the deficits listed in the problem list box on initial evaluation, provide pt/family education and to maximize pt's level of independence in the home and community environment.     Pt's spiritual, cultural and educational needs considered and pt agreeable to plan of care and goals.     Anticipated barriers to physical therapy: none    Goals:  Short-Term Goals: 4 weeks In progress, not met  - The patient will be independent with initial home exercise program.  - The patient will increase strength to at least 4/5 to perform functional mobility including walking and squatting to pick something up  - The patient will increase R quad flexibility to be equal to L to perform work with pain < 3/10.     Long-Term Goals: 8 weeks In progress, not met  - Pt to achieve <31% limitation as measured by the FOTO to demonstrate decreased disability.  - The patient will be independent amb with no assistive device on all surfaces for community distances.  - The patient will increase strength to at least 4+/5 to perform functional mobility including stair negotiation  - The patient will improve SLS time to 20s bilaterally to improve stability in gait       PLAN       Re-evaluate progress. Discuss  discharge with HEP    Harsh Rowell, PT

## 2022-08-01 ENCOUNTER — CLINICAL SUPPORT (OUTPATIENT)
Dept: REHABILITATION | Facility: HOSPITAL | Age: 42
End: 2022-08-01
Payer: COMMERCIAL

## 2022-08-01 DIAGNOSIS — R52 PAIN AGGRAVATED BY ACTIVITIES OF DAILY LIVING: Primary | ICD-10-CM

## 2022-08-01 PROCEDURE — 97110 THERAPEUTIC EXERCISES: CPT | Mod: PO,CQ

## 2022-08-01 NOTE — PROGRESS NOTES
"[facility]   Physical Therapy Treatment Note     Name: Rodney Gonzalez [place]  Clinic Number: [id number]    Therapy Diagnosis:   Encounter Diagnosis   Name Primary?    Pain aggravated by activities of daily living Yes     Physician: Ken Alberto MD    Visit Date: 8/1/2022    Physician Orders: PT Eval and Treat   Medical Diagnosis from Referral: Chronic pain of both knees [M25.561, M25.562, G89.29]  Evaluation Date: 6/27/2022  Authorization Period Expiration: 12/31/22  Plan of Care Expiration: 9/27/22  Visit # / Visits authorized: 9/ 20        Precautions: Standard, diabetes    PTA Visit #: 2/5     Time In: 5:00  Time Out: 6:00  Total Billable Time: 58 minutes    SUBJECTIVE     Pt reports: A little bit of aching in the knees  She was compliant with home exercise program.  Response to previous treatment: none  Functional change: none    Pain: 2/10  Location: R knee    OBJECTIVE     Objective Measures updated at progress report unless specified.     FOTO:   -Intake: Done 6/27  -Status: Done 7/22  -Discharge: incomplete      Treatment     Rodney received the treatments listed below:      therapeutic exercises to develop strength, endurance and ROM for 55 minutes including:    Recumbent bike- 10 min L8   HS Stretch 3 x 30"  B  Quad stretch 3 x 30" B    LAQ GTB 3 x 10    Shuttle squats- 4.0 3 x 15   Sit to Stand 20 in  2 x 10   Hip abduction 3 x 10 GTB B  Hip extension 3 x 10 GTB B   Step Ups 3 x 10 L3 B   Lateral Steps   3 rounds RTB  Monster walks 3 rounds RTB   Step down L1 2 x 10  B     Not performed today:  Heel/Toe raises 3 x 10     Rodney participated in neuromuscular re-education activities to improve: Balance, Coordination, Kinesthetic, Sense and Proprioception for 5 minutes. The following activities were included:    Single leg balance- 5 x 15 ea    Not performed today:  Side lying clams- 2 x 10 ea   Bridge- 2 x 10 with 5s hold  Monster walks 3 ft x 12 YTB         Patient Education " and Home Exercises     Home Exercises and Patient Education Provided     Education provided:   - Role of PT, PT POC  - Discharge plan including transitioning to home program to help manage symptoms     Written Home Exercises Provided: yes.  Exercises were reviewed and Rodney was able to demonstrate them prior to the end of the session.  Rodney demonstrated good  understanding of the education provided.      See EMR under Patient Instructions for exercises provided 6/27/2022.    ASSESSMENT     Patient arrived reporting increased ache in the knees do to preparation for work this week. Patient was able to progress through exercises with no increases in discomfort. Patient ended session stating she felt fine.     Rodney Is progressing well towards her goals.   Pt prognosis is Good.     Pt will continue to benefit from skilled outpatient physical therapy to address the deficits listed in the problem list box on initial evaluation, provide pt/family education and to maximize pt's level of independence in the home and community environment.     Pt's spiritual, cultural and educational needs considered and pt agreeable to plan of care and goals.     Anticipated barriers to physical therapy: none    Goals:  Short-Term Goals: 4 weeks In progress, not met  - The patient will be independent with initial home exercise program.  - The patient will increase strength to at least 4/5 to perform functional mobility including walking and squatting to pick something up  - The patient will increase R quad flexibility to be equal to L to perform work with pain < 3/10.     Long-Term Goals: 8 weeks In progress, not met  - Pt to achieve <31% limitation as measured by the FOTO to demonstrate decreased disability.  - The patient will be independent amb with no assistive device on all surfaces for community distances.  - The patient will increase strength to at least 4+/5 to perform functional mobility including stair negotiation  - The  patient will improve SLS time to 20s bilaterally to improve stability in gait       PLAN       Re-evaluate progress. Discuss discharge with HEP    Justin Dumont, TYRELL

## 2022-08-03 ENCOUNTER — CLINICAL SUPPORT (OUTPATIENT)
Dept: REHABILITATION | Facility: HOSPITAL | Age: 42
End: 2022-08-03
Payer: COMMERCIAL

## 2022-08-03 DIAGNOSIS — R52 PAIN AGGRAVATED BY ACTIVITIES OF DAILY LIVING: Primary | ICD-10-CM

## 2022-08-03 PROCEDURE — 97110 THERAPEUTIC EXERCISES: CPT | Mod: PO | Performed by: PHYSICAL THERAPIST

## 2022-08-03 PROCEDURE — 97140 MANUAL THERAPY 1/> REGIONS: CPT | Mod: PO | Performed by: PHYSICAL THERAPIST

## 2022-08-03 NOTE — PROGRESS NOTES
[facility]   Physical Therapy Treatment Note     Name: Rodney Gonzalez [facility] Number: [id number]    Therapy Diagnosis:   Encounter Diagnosis   Name Primary?    Pain aggravated by activities of daily living Yes     Physician: Ken Alberto MD    Visit Date: 8/3/2022    Physician Orders: PT Eval and Treat   Medical Diagnosis from Referral: Chronic pain of both knees [M25.561, M25.562, G89.29]  Evaluation Date: 6/27/2022  Authorization Period Expiration: 12/31/22  Plan of Care Expiration: 9/27/22  Visit # / Visits authorized: 10/ 20        Precautions: Standard, diabetes    PTA Visit #: 2/5     Time In: 9:02  Time Out: 10:03  Total Billable Time: 61 minutes    SUBJECTIVE     Pt reports: Knees feel good today, a little bit better than usual  She was compliant with home exercise program.  Response to previous treatment: none  Functional change: none    Pain: 2/10  Location: R knee    OBJECTIVE     Objective Measures updated at progress report unless specified.     FOTO:   -Intake: Done 6/27  -Status: Done 7/22  -Discharge: incomplete    Observation:      Posture: Increased valgus position on R     Gait: No deficits noted     Knee Range of Motion (Active / Passive):  Knee Left  Right    Flexion 120 110   Extension 0 0         Lower Extremity Strength    Left Right   Knee extension: 5/5 5/5   Knee flexion: 5/5 5/5   Hip flexion 4+/5 4/5   Hip extension:  4-/5 4-/5   Hip abduction: 4/5 4/5         Special Tests:    Left Right   Florentino's Test +  + medial meniscus   Joint line tenderness - +         Function:     - SLS R: 7s  - SLS L: 3s  - Squat: Able to partial squat and rise to standing with subjective tightness in the knee         Joint Mobility: R knee hypomobile to posterior glide     Palpation: No tenderness in peripatellar region, pain with palpation to lateral joint line     Flexibility:   Quad  R- 90 degrees flexion  L- 100 degrees flexion      Treatment     Rodney received the  "treatments listed below:      therapeutic exercises to develop strength, endurance and ROM for 45 minutes including:    Strength and range of motion assessment x 15 minutes  Updated HEP and discussion regarding long-term management of symptoms x 10 minutes    Recumbent bike- 10 min L8   HS Stretch 3 x 30"  B  Quad stretch 3 x 30" B  Shuttle squats- 4.0 3 x 15   Lateral Steps   3 rounds RTB    Not performed today:  LAQ GTB 3 x 10    Sit to Stand 20 in  2 x 10   Hip abduction 3 x 10 GTB B  Step Ups 3 x 10 L3 B   Step down L1 2 x 10  B   Heel/Toe raises 3 x 10     Rodney received the following manual therapy techniques: Joint mobilizations were applied to the: knee for 10 minutes, including:  Knee mobility assessment  Posterior knee mobilization in flexed position      Rodney participated in neuromuscular re-education activities to improve: Balance, Coordination, Kinesthetic, Sense and Proprioception for 5 minutes. The following activities were included:  Bridge- 2 x 10 with 5s hold    Not performed today:  Single leg balance- 5 x 15 ea  Monster walks 3 ft x 12 YTB         Patient Education and Home Exercises     Home Exercises and Patient Education Provided     Education provided:   - Role of PT, PT POC  - Discharge plan including transitioning to home program to help manage symptoms  - Updated HEP with current exercise program     Written Home Exercises Provided: yes.  Exercises were reviewed and Rodney was able to demonstrate them prior to the end of the session.  Rodney demonstrated good  understanding of the education provided.      See EMR under Patient Instructions for exercises provided 6/27/2022.    ASSESSMENT     Patient noted to have limitations in R knee range of motion which may contribute to continued symptoms. Patient has demonstrated good improvement in strength and LE stability overall, but continues to have limitations in hip strength and single leg balance that would benefit from continued skilled " care. We will switch frequency to 1x weekly and progress toward discharge.    Rodney Is progressing well towards her goals.   Pt prognosis is Good.     Pt will continue to benefit from skilled outpatient physical therapy to address the deficits listed in the problem list box on initial evaluation, provide pt/family education and to maximize pt's level of independence in the home and community environment.     Pt's spiritual, cultural and educational needs considered and pt agreeable to plan of care and goals.     Anticipated barriers to physical therapy: none    Goals:  Short-Term Goals: 4 weeks   - The patient will be independent with initial home exercise program. MET  - The patient will increase strength to at least 4/5 to perform functional mobility including walking and squatting to pick something up MET  - The patient will increase R quad flexibility to be equal to L to perform work with pain < 3/10. In progress, not met     Long-Term Goals: 8 weeks In progress, not met  - Pt to achieve <31% limitation as measured by the FOTO to demonstrate decreased disability. In progress, not met  - The patient will be independent amb with no assistive device on all surfaces for community distances. MET  - The patient will increase strength to at least 4+/5 to perform functional mobility including stair negotiation In progress, not met  - The patient will improve SLS time to 20s bilaterally to improve stability in gait In progress, not met       PLAN     Continue at 1 day per week. Emphasis on continued function strengthening and R knee flexion range of motion and posterior glide mobilizations.    Harsh Rowell, PT

## 2022-08-10 ENCOUNTER — CLINICAL SUPPORT (OUTPATIENT)
Dept: REHABILITATION | Facility: HOSPITAL | Age: 42
End: 2022-08-10
Payer: COMMERCIAL

## 2022-08-10 DIAGNOSIS — R52 PAIN AGGRAVATED BY ACTIVITIES OF DAILY LIVING: Primary | ICD-10-CM

## 2022-08-10 PROCEDURE — 97112 NEUROMUSCULAR REEDUCATION: CPT | Mod: PO | Performed by: PHYSICAL THERAPIST

## 2022-08-10 PROCEDURE — 97110 THERAPEUTIC EXERCISES: CPT | Mod: PO | Performed by: PHYSICAL THERAPIST

## 2022-08-10 NOTE — PROGRESS NOTES
"[facility]   Physical Therapy Treatment Note     Name: Rodney Gonzalez [place]  Clinic Number: [id number]    Therapy Diagnosis:   Encounter Diagnosis   Name Primary?   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  Response to previous treatment: none  Functional change: none    Pain: 4/10  Location: R knee    OBJECTIVE     Objective Measures updated at progress report unless specified.     FOTO:   -Intake: Done 6/27  -Status: Done 7/22  -Discharge: incomplete        Treatment     Rodney received the treatments listed below:      therapeutic exercises to develop strength, endurance and ROM for 25 minutes including:    Recumbent bike- 10 min L8   HS Stretch 3 x 30"  B  Quad stretch 3 x 30" B  Shuttle squats- 4.0 3 x 15     Not performed today:  LAQ GTB 3 x 10    Sit to Stand 20 in  2 x 10   Step down L1 2 x 10  B   Heel/Toe raises 3 x 10     Rodney received the following manual therapy techniques: Joint mobilizations were applied to the: knee for 5 minutes, including:  Posterior knee mobilization in flexed position      Rodney participated in neuromuscular re-education activities to improve: Balance, Coordination, Kinesthetic, Sense and Proprioception for 25 minutes. The following activities " were included:  Bridge with RTB- 2 x 10 with 5s hold  Single leg balance- 5 x 15 ea  Monster walks 3 ft x 12 YTB   Lateral Steps   3 rounds RTB  Step Ups 3 x 10 L3 B - cuing for LE positioning to avoid valgus collapse        Patient Education and Home Exercises     Home Exercises and Patient Education Provided     Education provided:   - Role of PT, PT POC  - LE mechanics during step up including toes forward, knees forward positioning to avoid valgus collapse.     Written Home Exercises Provided: yes.  Exercises were reviewed and Rodney was able to demonstrate them prior to the end of the session.  Rodney demonstrated good  understanding of the education provided.      See EMR under Patient Instructions for exercises provided 6/27/2022.    ASSESSMENT     Patient presents today with increase in symptoms due to work-related activities. She continues to have pain with stairs, so we worked on LE mechanics during stair negotiattion today to minimize load at the knee.     Rodney Is progressing well towards her goals.   Pt prognosis is Good.     Pt will continue to benefit from skilled outpatient physical therapy to address the deficits listed in the problem list box on initial evaluation, provide pt/family education and to maximize pt's level of independence in the home and community environment.     Pt's spiritual, cultural and educational needs considered and pt agreeable to plan of care and goals.     Anticipated barriers to physical therapy: none    Goals:  Short-Term Goals: 4 weeks   - The patient will be independent with initial home exercise program. MET  - The patient will increase strength to at least 4/5 to perform functional mobility including walking and squatting to pick something up MET  - The patient will increase R quad flexibility to be equal to L to perform work with pain < 3/10. In progress, not met     Long-Term Goals: 8 weeks In progress, not met  - Pt to achieve <31% limitation as measured by the  FOTO to demonstrate decreased disability. In progress, not met  - The patient will be independent amb with no assistive device on all surfaces for community distances. MET  - The patient will increase strength to at least 4+/5 to perform functional mobility including stair negotiation In progress, not met  - The patient will improve SLS time to 20s bilaterally to improve stability in gait In progress, not met       PLAN     Continue at 1 day per week. Emphasis on continued function strengthening and R knee flexion range of motion and posterior glide mobilizations.  FOTO next visit    Harsh Rowell, PT

## 2022-09-19 ENCOUNTER — CLINICAL SUPPORT (OUTPATIENT)
Dept: REHABILITATION | Facility: HOSPITAL | Age: 42
End: 2022-09-19
Payer: COMMERCIAL

## 2022-09-19 DIAGNOSIS — R52 PAIN AGGRAVATED BY ACTIVITIES OF DAILY LIVING: Primary | ICD-10-CM

## 2022-09-19 PROCEDURE — 97112 NEUROMUSCULAR REEDUCATION: CPT | Mod: PO,CQ

## 2022-09-19 PROCEDURE — 97110 THERAPEUTIC EXERCISES: CPT | Mod: PO,CQ

## 2022-09-19 NOTE — PROGRESS NOTES
"[facility]   Physical Therapy Treatment Note     Name: Rodney Gonzalez [place]  Clinic Number: [id number]    Therapy Diagnosis:   Encounter Diagnosis   Name Primary?    Pain aggravated by activities of daily living Yes       Physician: Ken Alberto MD    Visit Date: 9/19/2022    Physician Orders: PT Eval and Treat   Medical Diagnosis from Referral: Chronic pain of both knees [M25.561, M25.562, G89.29]  Evaluation Date: 6/27/2022  Authorization Period Expiration: 12/31/22  Plan of Care Expiration: 9/27/22  Visit # / Visits authorized: 12/ 20     Progress due: 9/3/22    Precautions: Standard, diabetes    PTA Visit #: 3/5     Time In: 9:02  Time Out: 9:43  Total Billable Time: 41 minutes    SUBJECTIVE     Pt reports: Her knee is aching more than actual pain.  Response to previous treatment: feeling pretty good  Functional change: none    Pain: 1/10  Location: R knee    OBJECTIVE     Objective Measures updated at progress report unless specified.     FOTO:   -Intake: Done 6/27  -Status: Done 7/22  -Discharge: incomplete        Treatment     Rodney received the treatments listed below:      therapeutic exercises to develop strength, endurance and ROM for 15 minutes including:    Recumbent bike- 10 min L8   HS Stretch 3 x 30"  B  Quad stretch 3 x 30" B*  Shuttle squats- 4.0 3 x 15     Not performed today:  LAQ GTB 3 x 10    Sit to Stand 20 in  2 x 10   Step down L1 2 x 10  B   Heel/Toe raises 3 x 10     Rodney received the following manual therapy techniques: Joint mobilizations were applied to the: knee for 6 minutes, including:  Posterior knee mobilization in flexed position      Rodney participated in neuromuscular re-education activities to improve: Balance, Coordination, Kinesthetic, Sense and Proprioception for 20 minutes. The following activities were included:  Bridge with RTB- 2 x 10 with 5s hold*  Single leg balance- 5 x 15 ea  Monster walks 3 ft x 12 YTB *  Lateral Steps   3 rounds " RTB*  Step Ups 3 x 10 L3 B - cuing for LE positioning to avoid valgus collapse    *Performed with technician    Patient Education and Home Exercises     Home Exercises and Patient Education Provided     Education provided:   - Role of PT, PT POC  - LE mechanics during step up including toes forward, knees forward positioning to avoid valgus collapse.     Written Home Exercises Provided: yes.  Exercises were reviewed and Rodney was able to demonstrate them prior to the end of the session.  Rodney demonstrated good  understanding of the education provided.      See EMR under Patient Instructions for exercises provided 6/27/2022.    ASSESSMENT   Patient presents today with minimal symptoms that she states is more soreness than pain. Pt requires minimal vc for appropriate performance of exercises and demonstrates strength increases with monsterwalks with focus to prevent knee valgum. Pt states that she relaxed for most of the weekend as to avoid pain, however is experiencing minimal soreness on arrival for this session.    Rodney Is progressing well towards her goals.   Pt prognosis is Good.     Pt will continue to benefit from skilled outpatient physical therapy to address the deficits listed in the problem list box on initial evaluation, provide pt/family education and to maximize pt's level of independence in the home and community environment.     Pt's spiritual, cultural and educational needs considered and pt agreeable to plan of care and goals.     Anticipated barriers to physical therapy: none    Goals:  Short-Term Goals: 4 weeks   - The patient will be independent with initial home exercise program. MET  - The patient will increase strength to at least 4/5 to perform functional mobility including walking and squatting to pick something up MET  - The patient will increase R quad flexibility to be equal to L to perform work with pain < 3/10. In progress, not met     Long-Term Goals: 8 weeks In progress, not  met  - Pt to achieve <31% limitation as measured by the FOTO to demonstrate decreased disability. In progress, not met  - The patient will be independent amb with no assistive device on all surfaces for community distances. MET  - The patient will increase strength to at least 4+/5 to perform functional mobility including stair negotiation In progress, not met  - The patient will improve SLS time to 20s bilaterally to improve stability in gait In progress, not met       PLAN     Continue at 1 day per week. Emphasis on continued function strengthening and R knee flexion range of motion and posterior glide mobilizations.  FOTO next visit    Nancy Braga (Becca), PTA

## 2022-09-21 ENCOUNTER — CLINICAL SUPPORT (OUTPATIENT)
Dept: REHABILITATION | Facility: HOSPITAL | Age: 42
End: 2022-09-21
Payer: COMMERCIAL

## 2022-09-21 DIAGNOSIS — R52 PAIN AGGRAVATED BY ACTIVITIES OF DAILY LIVING: Primary | ICD-10-CM

## 2022-09-21 PROCEDURE — 97110 THERAPEUTIC EXERCISES: CPT | Mod: PO | Performed by: PHYSICAL THERAPIST

## 2022-09-21 PROCEDURE — 97140 MANUAL THERAPY 1/> REGIONS: CPT | Mod: PO | Performed by: PHYSICAL THERAPIST

## 2022-09-21 NOTE — PROGRESS NOTES
"[facility]   Physical Therapy Treatment Note     Name: Rodney Gonzalez Ozone  [facility] Number: [id number]    Therapy Diagnosis:   Encounter Diagnosis   Name Primary?    Pain aggravated by activities of daily living Yes         Physician: Ken Alberto MD    Visit Date: 9/21/2022    Physician Orders: PT Eval and Treat   Medical Diagnosis from Referral: Chronic pain of both knees [M25.561, M25.562, G89.29]  Evaluation Date: 6/27/2022  Authorization Period Expiration: 12/31/22  Plan of Care Expiration:  10/21/22  Visit # / Visits authorized: 13/ 20     Progress due: 9/3/22    Precautions: Standard, diabetes    PTA Visit #: 3/5     Time In: 8:55 am  Time Out: 9:50 am  Total Billable Time: 55 minutes    SUBJECTIVE     Pt reports: Still having tightness in the R anterior/lateral knee. She has good days and bad days  Response to previous treatment: feeling pretty good  Functional change: none    Pain: 1/10  Location: R knee    OBJECTIVE     Objective Measures updated at progress report unless specified.     FOTO:   -Intake: Done 6/27  -Status: Done 7/22  -Discharge: incomplete    Posture: Increased valgus position on R     Gait: No deficits noted     Knee Range of Motion (Active / Passive):  Knee Left  Right    Flexion 120 110   Extension 0 0         Lower Extremity Strength    Left Right   Knee extension: 5/5 4+/5 "tightness"   Knee flexion: 5/5 5/5   Hip flexion 4+/5 4/5   Hip extension:  4/5 4/5   Hip abduction: 4/5 4/5         Special Tests:    Left Right   Florentino's Test +  + medial meniscus   Joint line tenderness - +         Function:     - SLS R: 4s  - SLS L: 3s  - Squat: Able to full squat and rise to standing with subjective tightness. Increased protation bilaterally        Joint Mobility: R knee hypomobile to posterior glide. Pain with patellar glide on R  R TC joint 2/6 to posterior glide     Palpation: No tenderness in peripatellar region, pain with palpation to lateral joint line   " "  Flexibility:   Quad  R- 90 degrees flexion  L- 100 degrees flexion    Treatment     Rodney received the treatments listed below:      therapeutic exercises to develop strength, endurance and ROM for 30 minutes including:    Strength and range of motion testing x 20 minutes      Quad stretch 3 x 30" B    LAQ GTB 3 x 10    Knee to wall ankle DF mobs- 20x ea  Sit to Stand 20 in  2 x 10     Not performed today:  Shuttle squats- 4.0 3 x 15   Step down L1 2 x 10  B   Heel/Toe raises 3 x 10     Rodney received the following manual therapy techniques: Joint mobilizations were applied to the: knee for 10 minutes, including:  Mobility assessment x 5 minutes  TC joint manipulation  AP TC mob    Rodney participated in neuromuscular re-education activities to improve: Balance, Coordination, Kinesthetic, Sense and Proprioception for 0 minutes. The following activities were included: Not performed today  Bridge with RTB- 2 x 10 with 5s hold*  Single leg balance- 5 x 15 ea  Monster walks 3 ft x 12 YTB *  Lateral Steps   3 rounds RTB*  Step Ups 3 x 10 L3 B - cuing for LE positioning to avoid valgus collapse      Patient Education and Home Exercises     Home Exercises and Patient Education Provided     Education provided:   - Role of PT, PT POC  - Updated HEP     Written Home Exercises Provided: yes.  Exercises were reviewed and Rodney was able to demonstrate them prior to the end of the session.  Rodney demonstrated good  understanding of the education provided.      See EMR under Patient Instructions for exercises provided 6/27/2022.    ASSESSMENT     Patient continues to have impaired mobility of the R knee, impaired quad and hip strength, and impaired LE mechanics that contribute to patellofemoral symptoms. She would benefit from continued interventions to address the deficits, but we discussed transition to home program for long-term management.    Rodney Is progressing well towards her goals.   Pt prognosis is Good.     Pt " will continue to benefit from skilled outpatient physical therapy to address the deficits listed in the problem list box on initial evaluation, provide pt/family education and to maximize pt's level of independence in the home and community environment.     Pt's spiritual, cultural and educational needs considered and pt agreeable to plan of care and goals.     Anticipated barriers to physical therapy: none    Goals:  Short-Term Goals: 4 weeks   - The patient will be independent with initial home exercise program. MET  - The patient will increase strength to at least 4/5 to perform functional mobility including walking and squatting to pick something up MET  - The patient will increase R quad flexibility to be equal to L to perform work with pain < 3/10. In progress, not met     Long-Term Goals: 8 weeks In progress, not met  - Pt to achieve <31% limitation as measured by the FOTO to demonstrate decreased disability. In progress, not met  - The patient will be independent amb with no assistive device on all surfaces for community distances. MET  - The patient will increase strength to at least 4+/5 to perform functional mobility including stair negotiation In progress, not met  - The patient will improve SLS time to 20s bilaterally to improve stability in gait In progress, not met       PLAN     Continue at 1 day per week until early to mid October. Re-inforce importance of HEP  Continue to address R quad flexibility deficits, ankle DF mobility deficits, LE strength    Harsh Rowell, PT

## 2022-09-21 NOTE — PLAN OF CARE
"  Outpatient Therapy Updated Plan of Care     Visit Date: 9/21/2022  Name: Rodney Zhangster  [facility] Number: [id number]    Therapy Diagnosis:   Encounter Diagnosis   Name Primary?    Pain aggravated by activities of daily living Yes     Physician: Ken Alberto MD    Physician Orders: PT Eval and Treat   Medical Diagnosis from Referral: Chronic pain of both knees [M25.561, M25.562, G89.29]  Evaluation Date: 6/27/2022    Total Visits Received: 14  Cancelled Visits: 8  No Show Visits: 0    Current Certification Period:  6/27/22 to 9/27/22  Precautions:  Standard, diabetes  Visits from Evaluation Date:  13  Functional Level Prior to Evaluation:  Trouble walking up steps, pain with prolonged sitting, sometimes has issues with walking, unable to bend down to pick something up    Subjective     Pt reports: Still having tightness in the R anterior/lateral knee. She has good days and bad days    Objective     Update: Posture: Increased valgus position on R     Gait: No deficits noted     Knee Range of Motion (Active / Passive):  Knee Left  Right    Flexion 120 110   Extension 0 0         Lower Extremity Strength    Left Right   Knee extension: 5/5 4+/5 "tightness"   Knee flexion: 5/5 5/5   Hip flexion 4+/5 4/5   Hip extension:  4/5 4/5   Hip abduction: 4/5 4/5         Special Tests:    Left Right   Florentino's Test +  + medial meniscus   Joint line tenderness - +         Function:     - SLS R: 4s  - SLS L: 3s  - Squat: Able to full squat and rise to standing with subjective tightness. Increased protation bilaterally        Joint Mobility: R knee hypomobile to posterior glide. Pain with patellar glide on R  R TC joint 2/6 to posterior glide     Palpation: No tenderness in peripatellar region, pain with palpation to lateral joint line     Flexibility:   Quad  R- 90 degrees flexion  L- 100 degrees flexion    Assessment     Update: Patient continues to have impaired mobility of the R knee, impaired quad and hip strength, and " impaired LE mechanics that contribute to patellofemoral symptoms. She would benefit from continued interventions to address the deficits, but we discussed transition to home program for long-term management.    Previous Short Term Goals Status:   Partially met  New Short Term Goals Status:   Short-Term Goals: 4 weeks   - The patient will be independent with initial home exercise program. MET  - The patient will increase strength to at least 4/5 to perform functional mobility including walking and squatting to pick something up MET  - The patient will increase R quad flexibility to be equal to L to perform work with pain < 3/10. In progress, not met  Long Term Goal Status:   continue per initial plan of care.  Reasons for Recertification of Therapy:   Goals not fully met, transitioning to HEP    Plan     Updated Certification Period: 9/21/2022 to 10/21/22  Recommended Treatment Plan: 1 times per week for 3-6 weeks: Gait Training, Manual Therapy, Moist Heat/ Ice, Neuromuscular Re-ed, Patient Education, Therapeutic Activities, and Therapeutic Exercise  Other Recommendations: None    Harsh Rowell, PT  9/21/2022      I CERTIFY THE NEED FOR THESE SERVICES FURNISHED UNDER THIS PLAN OF TREATMENT AND WHILE UNDER MY CARE    Physician's comments:        Physician's Signature: ___________________________________________________

## 2022-09-28 ENCOUNTER — CLINICAL SUPPORT (OUTPATIENT)
Dept: REHABILITATION | Facility: HOSPITAL | Age: 42
End: 2022-09-28
Payer: COMMERCIAL

## 2022-09-28 DIAGNOSIS — R52 PAIN AGGRAVATED BY ACTIVITIES OF DAILY LIVING: Primary | ICD-10-CM

## 2022-09-28 PROCEDURE — 97112 NEUROMUSCULAR REEDUCATION: CPT | Mod: PO | Performed by: PHYSICAL THERAPIST

## 2022-09-28 PROCEDURE — 97110 THERAPEUTIC EXERCISES: CPT | Mod: PO | Performed by: PHYSICAL THERAPIST

## 2022-09-28 PROCEDURE — 97140 MANUAL THERAPY 1/> REGIONS: CPT | Mod: PO | Performed by: PHYSICAL THERAPIST

## 2022-11-11 ENCOUNTER — HOSPITAL ENCOUNTER (OUTPATIENT)
Dept: RADIOLOGY | Facility: HOSPITAL | Age: 42
Discharge: HOME | End: 2022-11-11
Attending: INTERNAL MEDICINE
Payer: COMMERCIAL

## 2022-11-11 ENCOUNTER — OFFICE VISIT (OUTPATIENT)
Dept: RHEUMATOLOGY | Facility: CLINIC | Age: 42
End: 2022-11-11
Payer: COMMERCIAL

## 2022-11-11 VITALS
HEART RATE: 90 BPM | BODY MASS INDEX: 48.82 KG/M2 | WEIGHT: 293 LBS | SYSTOLIC BLOOD PRESSURE: 143 MMHG | HEIGHT: 65 IN | DIASTOLIC BLOOD PRESSURE: 85 MMHG

## 2022-11-11 DIAGNOSIS — Z51.81 MEDICATION MONITORING ENCOUNTER: ICD-10-CM

## 2022-11-11 DIAGNOSIS — G89.29 CHRONIC PAIN OF RIGHT KNEE: ICD-10-CM

## 2022-11-11 DIAGNOSIS — M25.561 CHRONIC PAIN OF RIGHT KNEE: ICD-10-CM

## 2022-11-11 DIAGNOSIS — Z79.899 HIGH RISK MEDICATION USE: ICD-10-CM

## 2022-11-11 DIAGNOSIS — M13.80 SERONEGATIVE ARTHRITIS: Primary | ICD-10-CM

## 2022-11-11 PROCEDURE — 1159F MED LIST DOCD IN RCRD: CPT | Mod: CPTII,S$GLB,, | Performed by: INTERNAL MEDICINE

## 2022-11-11 PROCEDURE — 3079F PR MOST RECENT DIASTOLIC BLOOD PRESSURE 80-89 MM HG: ICD-10-PCS | Mod: CPTII,S$GLB,, | Performed by: INTERNAL MEDICINE

## 2022-11-11 PROCEDURE — 1159F PR MEDICATION LIST DOCUMENTED IN MEDICAL RECORD: ICD-10-PCS | Mod: CPTII,S$GLB,, | Performed by: INTERNAL MEDICINE

## 2022-11-11 PROCEDURE — 99214 PR OFFICE/OUTPT VISIT, EST, LEVL IV, 30-39 MIN: ICD-10-PCS | Mod: 25,S$GLB,, | Performed by: INTERNAL MEDICINE

## 2022-11-11 PROCEDURE — 3077F SYST BP >= 140 MM HG: CPT | Mod: CPTII,S$GLB,, | Performed by: INTERNAL MEDICINE

## 2022-11-11 PROCEDURE — 73565 X-RAY EXAM OF KNEES: CPT | Mod: TC

## 2022-11-11 PROCEDURE — 3008F BODY MASS INDEX DOCD: CPT | Mod: CPTII,S$GLB,, | Performed by: INTERNAL MEDICINE

## 2022-11-11 PROCEDURE — 99999 PR PBB SHADOW E&M-EST. PATIENT-LVL IV: CPT | Mod: PBBFAC,,, | Performed by: INTERNAL MEDICINE

## 2022-11-11 PROCEDURE — 3077F PR MOST RECENT SYSTOLIC BLOOD PRESSURE >= 140 MM HG: ICD-10-PCS | Mod: CPTII,S$GLB,, | Performed by: INTERNAL MEDICINE

## 2022-11-11 PROCEDURE — 20610 LARGE JOINT ASPIRATION/INJECTION: R KNEE: ICD-10-PCS | Mod: RT,S$GLB,, | Performed by: INTERNAL MEDICINE

## 2022-11-11 PROCEDURE — 3008F PR BODY MASS INDEX (BMI) DOCUMENTED: ICD-10-PCS | Mod: CPTII,S$GLB,, | Performed by: INTERNAL MEDICINE

## 2022-11-11 PROCEDURE — 99214 OFFICE O/P EST MOD 30 MIN: CPT | Mod: 25,S$GLB,, | Performed by: INTERNAL MEDICINE

## 2022-11-11 PROCEDURE — 20610 DRAIN/INJ JOINT/BURSA W/O US: CPT | Mod: RT,S$GLB,, | Performed by: INTERNAL MEDICINE

## 2022-11-11 PROCEDURE — 99999 PR PBB SHADOW E&M-EST. PATIENT-LVL IV: ICD-10-PCS | Mod: PBBFAC,,, | Performed by: INTERNAL MEDICINE

## 2022-11-11 PROCEDURE — 3079F DIAST BP 80-89 MM HG: CPT | Mod: CPTII,S$GLB,, | Performed by: INTERNAL MEDICINE

## 2022-11-11 PROCEDURE — 73565 XR KNEE AP STANDING BILATERAL: ICD-10-PCS | Mod: 26,,, | Performed by: STUDENT IN AN ORGANIZED HEALTH CARE EDUCATION/TRAINING PROGRAM

## 2022-11-11 PROCEDURE — 73565 X-RAY EXAM OF KNEES: CPT | Mod: 26,,, | Performed by: STUDENT IN AN ORGANIZED HEALTH CARE EDUCATION/TRAINING PROGRAM

## 2022-11-11 RX ORDER — TRIAMCINOLONE ACETONIDE 40 MG/ML
40 INJECTION, SUSPENSION INTRA-ARTICULAR; INTRAMUSCULAR
Status: DISCONTINUED | OUTPATIENT
Start: 2022-11-11 | End: 2022-11-11 | Stop reason: HOSPADM

## 2022-11-11 RX ADMIN — TRIAMCINOLONE ACETONIDE 40 MG: 40 INJECTION, SUSPENSION INTRA-ARTICULAR; INTRAMUSCULAR at 10:11

## 2022-11-11 NOTE — PROCEDURES
Large Joint Aspiration/Injection: R knee    Date/Time: 11/11/2022 10:30 AM  Performed by: Ken Alberto MD  Authorized by: Ken Alberto MD     Consent Done?:  Yes (Verbal)  Indications:  Pain  Site marked: the procedure site was marked    Timeout: prior to procedure the correct patient, procedure, and site was verified    Prep: patient was prepped and draped in usual sterile fashion    Local anesthetic:  Lidocaine 2% without epinephrine    Details:  Needle Size:  25 G  Ultrasonic Guidance for needle placement?: No    Approach:  Anterolateral  Location:  Knee  Site:  R knee  Medications:  40 mg triamcinolone acetonide 40 mg/mL  Patient tolerance:  Patient tolerated the procedure well with no immediate complications

## 2022-11-11 NOTE — PROGRESS NOTES
RHEUMATOLOGY OUTPATIENT CLINIC NOTE    11/11/2022    Attending Rheumatologist: Ken Alberto  Primary Care Provider/Physician Requesting Consultation: ADVANCED TISSUE   Chief Complaint/Reason For Consultation:  Arthritis      Subjective:     Rodney Infante is a 42 y.o. Black or  female with seronegative arthritis for follow-up encounter.    Main concern of right knee pain.  Mechanical pattern, no association with recent trauma or fall.  Adherence with Humira every 2 weeks without refractory arthralgias with inflammatory features or new onset rashes.    Review of Systems   Constitutional:  Negative for fever.   Eyes:  Negative for photophobia and pain.   Respiratory:  Negative for shortness of breath.    Gastrointestinal:  Negative for blood in stool and melena.   Musculoskeletal:  Positive for joint pain.   Skin:  Negative for rash.   Neurological:  Negative for focal weakness.     Chronic comorbid conditions affecting medical decision making today:  Past Medical History:   Diagnosis Date    Anemia     Arthritis     Diabetes mellitus, type 2     Neuropathy      Past Surgical History:   Procedure Laterality Date    CYST REMOVAL  2014, 2/2018    back    EXCISION OF HIDRADENITIS Bilateral 12/24/2019    Procedure: EXCISION, HIDRADENITIS;  Surgeon: Marcel Pennington MD;  Location: [facility] OR;  Service: General;  Laterality: Bilateral;    WOUND DEBRIDEMENT Right 7/17/2020    Procedure: DEBRIDEMENT, WOUND;  Surgeon: Marcel Pennington MD;  Location: [facility] OR;  Service: General;  Laterality: Right;     Family History   Problem Relation Age of Onset    No Known Problems Mother     Drug abuse Father     No Known Problems Sister      Social History     Tobacco Use   Smoking Status Never   Smokeless Tobacco Never       Current Outpatient Medications:     clotrimazole-betamethasone 1-0.05% (LOTRISONE) cream, , Disp: , Rfl:     cyanocobalamin (VITAMIN B-12) 1000 MCG tablet, Take 1 tablet (1,000 mcg  "total) by mouth once daily. This is a prescription for 1 year.  Take 1 tablet Monday, Wednesday, and Friday, Disp: 36 tablet, Rfl: 3    ergocalciferol (ERGOCALCIFEROL) 50,000 unit Cap, , Disp: , Rfl:     escitalopram oxalate (LEXAPRO) 10 MG tablet, , Disp: , Rfl:     estradiol cypionate (DEPO-ESTRADIOL) 5 mg/mL injection, Inject into the muscle every 28 days., Disp: , Rfl:     famotidine (PEPCID) 40 MG tablet, , Disp: , Rfl:     gabapentin (NEURONTIN) 300 MG capsule, Take 1 capsule (300 mg total) by mouth every evening., Disp: 90 capsule, Rfl: 3    iodoform 1/2 X 5 "-yard Bndg, change 3 times a week as directed, Disp: 12 each, Rfl: 0    mupirocin (BACTROBAN) 2 % ointment, apply daily to affected area as directed, Disp: 22 g, Rfl: 0    mupirocin (BACTROBAN) 2 % ointment, Apply topically nasally  daily, Disp: 22 g, Rfl: 0    nystatin (MYCOSTATIN) powder, Apply topically locally daily under breasts as directed, Disp: 60 g, Rfl: 0    nystatin (MYCOSTATIN) powder, APPLY TOPICALLY TWICE DAILY TO AFFECTED AREA, Disp: 60 g, Rfl: 2    phentermine (ADIPEX-P) 37.5 mg tablet, TAKE 1 TABLET BY MOUTH ONCE DAILY AT 10AM, Disp: , Rfl:     topiramate (TOPAMAX) 50 MG tablet, TAKE 1 TABLET BY MOUTH ONCE DAILY WITH ADIPEX, Disp: , Rfl:     traMADoL (ULTRAM) 50 mg tablet, , Disp: , Rfl:     adalimumab (HUMIRA PEN) PnKt injection, Inject 1 pen (40 mg total) into the skin every 14 (fourteen) days., Disp: 6 pen, Rfl: 3     Objective:     Vitals:    11/11/22 1005   BP: (!) 143/85   Pulse: 90     Physical Exam   Eyes: Conjunctivae are normal.   Pulmonary/Chest: Effort normal. No respiratory distress.   Musculoskeletal:         General: No swelling or tenderness. Normal range of motion.   Neurological: She displays no weakness.   Skin: No rash noted.     Reviewed available old and all outside pertinent medical records available.    All lab results personally reviewed and interpreted by me.       ASSESSMENT:     Seronegative " arthritis    Chronic knee pain    High risk medication use    Medical monitoring encounter    PLAN:     Follow-up visit seronegative arthritis in context of hidradenitis suppurativa.  Excellent response to regimen of Humira (2020-).  Main concern of refractory right knee pain with mechanical pattern.  Review of systemic negative for new rashes, other significant arthralgias, IBD symptoms, or visual complaints.  Exam without reproducible muscle weakness or obvious synovitis present on exam.  Squeeze tenderness absent.  Features of right anserine bursitis.  Labs without evidence of toxicity related to medication use.  Impression of refractory knee pain from osteoarthritis.  May benefit from decreasing at least 10% of body weight.  Will provide local corticosteroid injection for symptomatic relief and refer to Orthopedics.  Continue current regimen of Humira for seronegative arthritis unchanged.  Repeat labs prior follow-up encounter.  Knee X-rays earliest convenience.      Disclaimer: This note is prepared using voice recognition software and as such is likely to have errors and has not been proof read. Please contact me for questions.     Large Joint Aspiration/Injection: R knee    Date/Time: 11/11/2022 10:30 AM  Performed by: Ken Alebrto MD  Authorized by: Ken Alberto MD     Consent Done?:  Yes (Verbal)  Indications:  Pain  Site marked: the procedure site was marked    Timeout: prior to procedure the correct patient, procedure, and site was verified    Prep: patient was prepped and draped in usual sterile fashion    Local anesthetic:  Lidocaine 2% without epinephrine    Details:  Needle Size:  25 G  Ultrasonic Guidance for needle placement?: No    Approach:  Anterolateral  Location:  Knee  Site:  R knee  Medications:  40 mg triamcinolone acetonide 40 mg/mL  Patient tolerance:  Patient tolerated the procedure well with no immediate complications      Ken Alberto M.D.

## 2022-11-21 DIAGNOSIS — G89.29 CHRONIC PAIN OF RIGHT KNEE: Primary | ICD-10-CM

## 2022-11-21 DIAGNOSIS — M25.561 CHRONIC PAIN OF RIGHT KNEE: Primary | ICD-10-CM

## 2022-11-23 ENCOUNTER — HOSPITAL ENCOUNTER (OUTPATIENT)
Dept: RADIOLOGY | Facility: HOSPITAL | Age: 42
Discharge: HOME | End: 2022-11-23
Attending: STUDENT IN AN ORGANIZED HEALTH CARE EDUCATION/TRAINING PROGRAM
Payer: COMMERCIAL

## 2022-11-23 ENCOUNTER — PATIENT MESSAGE (OUTPATIENT)
Dept: PRIMARY CARE CLINIC | Facility: CLINIC | Age: 42
End: 2022-11-23
Payer: COMMERCIAL

## 2022-11-23 ENCOUNTER — OFFICE VISIT (OUTPATIENT)
Dept: SPORTS MEDICINE | Facility: CLINIC | Age: 42
End: 2022-11-23
Payer: COMMERCIAL

## 2022-11-23 VITALS — HEIGHT: 65 IN | BODY MASS INDEX: 48.82 KG/M2 | RESPIRATION RATE: 20 BRPM | WEIGHT: 293 LBS

## 2022-11-23 DIAGNOSIS — M25.561 CHRONIC PAIN OF RIGHT KNEE: ICD-10-CM

## 2022-11-23 DIAGNOSIS — E66.01 CLASS 3 SEVERE OBESITY DUE TO EXCESS CALORIES WITH SERIOUS COMORBIDITY AND BODY MASS INDEX (BMI) OF 50.0 TO 59.9 IN ADULT: ICD-10-CM

## 2022-11-23 DIAGNOSIS — M17.11 ARTHRITIS OF RIGHT KNEE: ICD-10-CM

## 2022-11-23 DIAGNOSIS — G89.29 CHRONIC PAIN OF RIGHT KNEE: ICD-10-CM

## 2022-11-23 PROCEDURE — 73562 XR KNEE ORTHO RIGHT WITH FLEXION: ICD-10-PCS | Mod: 26,LT,, | Performed by: RADIOLOGY

## 2022-11-23 PROCEDURE — 99999 PR PBB SHADOW E&M-EST. PATIENT-LVL V: CPT | Mod: PBBFAC,,, | Performed by: STUDENT IN AN ORGANIZED HEALTH CARE EDUCATION/TRAINING PROGRAM

## 2022-11-23 PROCEDURE — 1159F MED LIST DOCD IN RCRD: CPT | Mod: CPTII,S$GLB,, | Performed by: STUDENT IN AN ORGANIZED HEALTH CARE EDUCATION/TRAINING PROGRAM

## 2022-11-23 PROCEDURE — 73564 X-RAY EXAM KNEE 4 OR MORE: CPT | Mod: 26,RT,, | Performed by: RADIOLOGY

## 2022-11-23 PROCEDURE — 73562 X-RAY EXAM OF KNEE 3: CPT | Mod: TC,LT

## 2022-11-23 PROCEDURE — 3008F BODY MASS INDEX DOCD: CPT | Mod: CPTII,S$GLB,, | Performed by: STUDENT IN AN ORGANIZED HEALTH CARE EDUCATION/TRAINING PROGRAM

## 2022-11-23 PROCEDURE — 73562 X-RAY EXAM OF KNEE 3: CPT | Mod: 26,LT,, | Performed by: RADIOLOGY

## 2022-11-23 PROCEDURE — 1159F PR MEDICATION LIST DOCUMENTED IN MEDICAL RECORD: ICD-10-PCS | Mod: CPTII,S$GLB,, | Performed by: STUDENT IN AN ORGANIZED HEALTH CARE EDUCATION/TRAINING PROGRAM

## 2022-11-23 PROCEDURE — 99203 PR OFFICE/OUTPT VISIT, NEW, LEVL III, 30-44 MIN: ICD-10-PCS | Mod: S$GLB,,, | Performed by: STUDENT IN AN ORGANIZED HEALTH CARE EDUCATION/TRAINING PROGRAM

## 2022-11-23 PROCEDURE — 73564 XR KNEE ORTHO RIGHT WITH FLEXION: ICD-10-PCS | Mod: 26,RT,, | Performed by: RADIOLOGY

## 2022-11-23 PROCEDURE — 3008F PR BODY MASS INDEX (BMI) DOCUMENTED: ICD-10-PCS | Mod: CPTII,S$GLB,, | Performed by: STUDENT IN AN ORGANIZED HEALTH CARE EDUCATION/TRAINING PROGRAM

## 2022-11-23 PROCEDURE — 99999 PR PBB SHADOW E&M-EST. PATIENT-LVL V: ICD-10-PCS | Mod: PBBFAC,,, | Performed by: STUDENT IN AN ORGANIZED HEALTH CARE EDUCATION/TRAINING PROGRAM

## 2022-11-23 PROCEDURE — 99203 OFFICE O/P NEW LOW 30 MIN: CPT | Mod: S$GLB,,, | Performed by: STUDENT IN AN ORGANIZED HEALTH CARE EDUCATION/TRAINING PROGRAM

## 2022-11-23 NOTE — PROGRESS NOTES
Patient ID: Rodney Infante  YOB: 1980  MRN: [id number]    Chief Complaint: Pain of the Right Knee    Referred By: Ken Alberto MD for right knee pain    History of Present Illness: Rodney Infante is a  42 y.o. female who presents today with right knee pain.     The patient is active in none.  Occupation:     Rodney Infante states it is Chronic in nature and there was not a specific mechanism. She has had long standing knee pain due to OA and obesity that was improved with weight loss. He has regained weight as of 2020 following mother, grandmother, and grandfather deaths.  Rodney Infante describes the pain as a continuous ache, stabbing. Treatment to date includes OTC tyelnol arthritis, topical creams (icy hot, Biofreeze), CSI (11/11/2022). They believe that they were  better with this CSI. Current pain level at rest is 6/10, pain level at worst is 10/10 and pain level at best is 0/10 (Numeric Pain Rating Scale).  Associated symptoms include: Swelling Yes, Instability Yes, Pain that affects your sleep Yes, Mechanical Yes, locking/catching No, Neurological Yes numbness anterior knee, limited range of motion Yes.     Aggravating activities include movement, walking downstairs, prolonged seating, .   Alleviating activities include tylenol, HEP.     They admits to formal physical therapy for this. Last physical therapy was July-August 2022 and believes that better following this. Previous pertinent orthopedic injuries include none.    Hemoglobin A1C   Date Value Ref Range Status   10/27/2022 5.7 (H) 4.8 - 5.6 % Final     Comment:              Prediabetes: 5.7 - 6.4           Diabetes: >6.4           Glycemic control for adults with diabetes: <7.0   07/18/2016 8.4 (H) 4.5 - 6.2 % Final     Comment:     According to ADA guidelines, hemoglobin A1C <7.0% represents  optimal control in non-pregnant diabetic patients.  Different  metrics may apply to  specific populations.   Standards of Medical Care in Diabetes - 2016.  For the purpose of screening for the presence of diabetes:  <5.7%     Consistent with the absence of diabetes  5.7-6.4%  Consistent with increasing risk for diabetes   (prediabetes)  >or=6.5%  Consistent with diabetes  Currently no consensus exists for use of hemoglobin A1C  for diagnosis of diabetes for children.       Past Medical History:   Past Medical History:   Diagnosis Date    Anemia     Arthritis     Diabetes mellitus, type 2     Neuropathy      Past Surgical History:   Procedure Laterality Date    CYST REMOVAL  2014, 2/2018    back    EXCISION OF HIDRADENITIS Bilateral 12/24/2019    Procedure: EXCISION, HIDRADENITIS;  Surgeon: Marcel Pennington MD;  Location: [facility] OR;  Service: General;  Laterality: Bilateral;    WOUND DEBRIDEMENT Right 7/17/2020    Procedure: DEBRIDEMENT, WOUND;  Surgeon: Marcel Pennington MD;  Location: [facility] OR;  Service: General;  Laterality: Right;     Family History   Problem Relation Age of Onset    No Known Problems Mother     Drug abuse Father     No Known Problems Sister      Social History     Socioeconomic History    Marital status: Single   Tobacco Use    Smoking status: Never    Smokeless tobacco: Never   Substance and Sexual Activity    Alcohol use: Yes     Comment: occasionally    Drug use: No    Sexual activity: Not Currently     Medication List with Changes/Refills   Current Medications    ADALIMUMAB (HUMIRA PEN) PNKT INJECTION    Inject 1 pen (40 mg total) into the skin every 14 (fourteen) days.    CLOTRIMAZOLE-BETAMETHASONE 1-0.05% (LOTRISONE) CREAM        CYANOCOBALAMIN (VITAMIN B-12) 1000 MCG TABLET    Take 1 tablet (1,000 mcg total) by mouth once daily. This is a prescription for 1 year.  Take 1 tablet Monday, Wednesday, and Friday    ERGOCALCIFEROL (ERGOCALCIFEROL) 50,000 UNIT CAP        ESCITALOPRAM OXALATE (LEXAPRO) 10 MG TABLET        ESTRADIOL CYPIONATE (DEPO-ESTRADIOL) 5 MG/ML INJECTION  "   Inject into the muscle every 28 days.    FAMOTIDINE (PEPCID) 40 MG TABLET        GABAPENTIN (NEURONTIN) 300 MG CAPSULE    Take 1 capsule (300 mg total) by mouth every evening.    IODOFORM 1/2 X 5 "-YARD BNDG    change 3 times a week as directed    MUPIROCIN (BACTROBAN) 2 % OINTMENT    apply daily to affected area as directed    MUPIROCIN (BACTROBAN) 2 % OINTMENT    Apply topically nasally  daily    NYSTATIN (MYCOSTATIN) POWDER    Apply topically locally daily under breasts as directed    NYSTATIN (MYCOSTATIN) POWDER    APPLY TOPICALLY TWICE DAILY TO AFFECTED AREA    PHENTERMINE (ADIPEX-P) 37.5 MG TABLET    TAKE 1 TABLET BY MOUTH ONCE DAILY AT 10AM    TOPIRAMATE (TOPAMAX) 50 MG TABLET    TAKE 1 TABLET BY MOUTH ONCE DAILY WITH ADIPEX    TRAMADOL (ULTRAM) 50 MG TABLET         Review of patient's allergies indicates:   Allergen Reactions    Pcn [penicillins] Hives and Swelling     Patient reports allergic reaction to liquid PCN as a child.  She reports tolerating Amoxacillin 4 years ago without reaction.       Physical Exam:   Body mass index is 58.59 kg/m².    GENERAL: Well appearing, in no acute distress.  HEAD: Normocephalic and atraumatic.  ENT: External ears and nose grossly normal.  EYES: EOMI bilaterally  PULMONARY: Respirations are grossly even and non-labored.  NEURO: Awake, alert, and oriented x 3.  SKIN: No obvious rashes appreciated.  PSYCH: Mood & affect are appropriate.    Detailed MSK exam:     No large effusion crepitance with active range of motion mild tenderness over the medial joint line    Imaging:  X-Ray Knee AP Standing Bilateral  Narrative: EXAMINATION:  Single radiographic views of the KNEE.    CLINICAL HISTORY:  Pain in right knee    TECHNIQUE:  Single radiographic views of the KNEE    COMPARISON:  None.    FINDINGS:  A single AP view of the bilateral knees demonstrate moderate right and mild left osteoarthritis.  There is no fracture.  There is no soft tissue swelling.  Impression: Moderate " right and mild left knee osteoarthritis.    Electronically signed by: Dionisio Ely MD  Date:    11/11/2022  Time:    12:00      Relevant imaging results were reviewed and interpreted by me and per my read as above.  This was discussed with the patient and / or family today.     Assessment:  Rodney Infante is a 42 y.o. female presents today for right knee pain status post CSI 4 weeks ago with overall good relief, we discussed the diagnosis prognosis as well as conservative treatment options moving forward for osteoarthritis.  We discussed weight loss and low-impact exercise the mainstays of treatment.  She just finished physical therapy and continues to do her home exercises at home.  Discussed moving forward with lifestyle and wellness referral as she was requesting an account ability partner to help work with some of the weight loss that she is been struggling with.  Continue with low-impact activity home exercises and she will follow up with me if her corticosteroid starts to wear off.  Consider Visco in the future.    Chronic pain of right knee  -     Ambulatory referral/consult to Orthopedics  -     Ambulatory referral/consult to [facility]; Future; Expected date: 11/30/2022    Arthritis of right knee  -     Ambulatory referral/consult to [facility]; Future; Expected date: 11/30/2022    Class 3 severe obesity due to excess calories with serious comorbidity and body mass index (BMI) of 50.0 to 59.9 in adult  -     Ambulatory referral/consult to [facility]; Future; Expected date: 11/30/2022       A copy of today's visit note has been sent to the referring provider.       Jose A Sams MD    Disclaimer: This note was prepared using a voice recognition system and is likely to have sound alike errors within the text.

## 2022-11-25 ENCOUNTER — TELEPHONE (OUTPATIENT)
Dept: RHEUMATOLOGY | Facility: CLINIC | Age: 42
End: 2022-11-25
Payer: COMMERCIAL

## 2022-11-25 NOTE — TELEPHONE ENCOUNTER
"Returned phone call. PA for Humira started over the phone. PA has gone to initial review. Once that review is complete we will receive a determination of whether it was approved or not and what the next steps are. All questions answered.      Eli Melara (Allye), [facility]  Rheumatology Department    "

## 2022-11-25 NOTE — TELEPHONE ENCOUNTER
----- Message from Clair Uriostegui sent at 11/25/2022 10:34 AM CST -----  Contact: [facility]  Type:  Pharmacy Calling to Clarify an RX    Name of Caller:Lucy  Pharmacy Name: [facility]  Prescription Name: Humira Pen Kit  What do they need to clarify?: They are needing an authorization faxed to [phone]  [facility] Call Back Number: [phone]  Additional Information:

## 2022-11-29 ENCOUNTER — DOCUMENTATION ONLY (OUTPATIENT)
Dept: RHEUMATOLOGY | Facility: CLINIC | Age: 42
End: 2022-11-29
Payer: COMMERCIAL

## 2023-05-05 ENCOUNTER — LAB VISIT (OUTPATIENT)
Dept: LAB | Facility: HOSPITAL | Age: 43
End: 2023-05-05
Attending: INTERNAL MEDICINE
Payer: COMMERCIAL

## 2023-05-05 DIAGNOSIS — M13.80 SERONEGATIVE ARTHRITIS: ICD-10-CM

## 2023-05-05 DIAGNOSIS — Z79.899 HIGH RISK MEDICATION USE: ICD-10-CM

## 2023-05-05 LAB
ALBUMIN SERPL BCP-MCNC: 3.9 G/DL (ref 3.5–5.2)
ALP SERPL-CCNC: 103 U/L (ref 38–126)
ALT SERPL W/O P-5'-P-CCNC: 15 U/L (ref 10–44)
ANION GAP SERPL CALC-SCNC: 10 MMOL/L (ref 8–16)
AST SERPL-CCNC: 22 U/L (ref 15–46)
BASOPHILS # BLD AUTO: 0.04 K/UL (ref 0–0.2)
BASOPHILS NFR BLD: 0.4 % (ref 0–1.9)
BILIRUB SERPL-MCNC: 0.4 MG/DL (ref 0.1–1)
CALCIUM SERPL-MCNC: 9.4 MG/DL (ref 8.7–10.5)
CHLORIDE SERPL-SCNC: 115 MMOL/L (ref 95–110)
CO2 SERPL-SCNC: 22 MMOL/L (ref 23–29)
CREAT SERPL-MCNC: 0.65 MG/DL (ref 0.5–1.4)
CRP SERPL-MCNC: 0.44 MG/DL (ref 0–1)
DIFFERENTIAL METHOD: ABNORMAL
EOSINOPHIL # BLD AUTO: 0.3 K/UL (ref 0–0.5)
EOSINOPHIL NFR BLD: 2.5 % (ref 0–8)
ERYTHROCYTE [DISTWIDTH] IN BLOOD BY AUTOMATED COUNT: 13.2 % (ref 11.5–14.5)
ERYTHROCYTE [SEDIMENTATION RATE] IN BLOOD BY PHOTOMETRIC METHOD: 107 MM/HR (ref 0–36)
EST. GFR  (NO RACE VARIABLE): >60 ML/MIN/1.73 M^2
GLUCOSE SERPL-MCNC: 91 MG/DL (ref 70–110)
HCT VFR BLD AUTO: 39.8 % (ref 37–48.5)
HGB BLD-MCNC: 12.9 G/DL (ref 12–16)
IMM GRANULOCYTES # BLD AUTO: 0.08 K/UL (ref 0–0.04)
IMM GRANULOCYTES NFR BLD AUTO: 0.8 % (ref 0–0.5)
LYMPHOCYTES # BLD AUTO: 3.4 K/UL (ref 1–4.8)
LYMPHOCYTES NFR BLD: 33.7 % (ref 18–48)
MCH RBC QN AUTO: 30.1 PG (ref 27–31)
MCHC RBC AUTO-ENTMCNC: 32.4 G/DL (ref 32–36)
MCV RBC AUTO: 93 FL (ref 82–98)
MONOCYTES # BLD AUTO: 0.8 K/UL (ref 0.3–1)
MONOCYTES NFR BLD: 7.9 % (ref 4–15)
NEUTROPHILS # BLD AUTO: 5.5 K/UL (ref 1.8–7.7)
NEUTROPHILS NFR BLD: 54.7 % (ref 38–73)
NRBC BLD-RTO: 0 /100 WBC
PLATELET # BLD AUTO: 357 K/UL (ref 150–450)
PMV BLD AUTO: 8.4 FL (ref 9.2–12.9)
POTASSIUM SERPL-SCNC: 3.8 MMOL/L (ref 3.5–5.1)
PROT SERPL-MCNC: 8.6 G/DL (ref 6–8.4)
RBC # BLD AUTO: 4.29 M/UL (ref 4–5.4)
SODIUM SERPL-SCNC: 147 MMOL/L (ref 136–145)
UUN UR-MCNC: 8 MG/DL (ref 7–17)
WBC # BLD AUTO: 9.97 K/UL (ref 3.9–12.7)

## 2023-05-05 PROCEDURE — 80053 COMPREHEN METABOLIC PANEL: CPT | Mod: PO | Performed by: INTERNAL MEDICINE

## 2023-05-05 PROCEDURE — 86480 TB TEST CELL IMMUN MEASURE: CPT | Mod: PO | Performed by: INTERNAL MEDICINE

## 2023-05-05 PROCEDURE — 86140 C-REACTIVE PROTEIN: CPT | Mod: PO | Performed by: INTERNAL MEDICINE

## 2023-05-05 PROCEDURE — 85652 RBC SED RATE AUTOMATED: CPT | Mod: PO | Performed by: INTERNAL MEDICINE

## 2023-05-05 PROCEDURE — 85025 COMPLETE CBC W/AUTO DIFF WBC: CPT | Mod: PO | Performed by: INTERNAL MEDICINE

## 2023-05-05 PROCEDURE — 36415 COLL VENOUS BLD VENIPUNCTURE: CPT | Mod: PO | Performed by: INTERNAL MEDICINE

## 2023-05-06 LAB
GAMMA INTERFERON BACKGROUND BLD IA-ACNC: 0.02 IU/ML
M TB IFN-G CD4+ BCKGRND COR BLD-ACNC: 0 IU/ML
MITOGEN IGNF BCKGRD COR BLD-ACNC: 9.98 IU/ML
TB GOLD PLUS: NEGATIVE
TB2 - NIL: 0.01 IU/ML

## 2023-05-12 ENCOUNTER — OFFICE VISIT (OUTPATIENT)
Dept: RHEUMATOLOGY | Facility: CLINIC | Age: 43
End: 2023-05-12
Payer: COMMERCIAL

## 2023-05-12 VITALS
BODY MASS INDEX: 48.82 KG/M2 | DIASTOLIC BLOOD PRESSURE: 82 MMHG | SYSTOLIC BLOOD PRESSURE: 137 MMHG | WEIGHT: 293 LBS | HEART RATE: 95 BPM | HEIGHT: 65 IN

## 2023-05-12 DIAGNOSIS — M13.80 SERONEGATIVE ARTHRITIS: Primary | ICD-10-CM

## 2023-05-12 DIAGNOSIS — Z79.899 HIGH RISK MEDICATION USE: ICD-10-CM

## 2023-05-12 DIAGNOSIS — G89.29 CHRONIC PAIN OF RIGHT KNEE: ICD-10-CM

## 2023-05-12 DIAGNOSIS — Z51.81 MEDICATION MONITORING ENCOUNTER: ICD-10-CM

## 2023-05-12 DIAGNOSIS — E11.9 DIABETES MELLITUS WITHOUT COMPLICATION: ICD-10-CM

## 2023-05-12 DIAGNOSIS — L73.2 HIDRADENITIS SUPPURATIVA OF RIGHT AXILLA: ICD-10-CM

## 2023-05-12 DIAGNOSIS — M25.561 CHRONIC PAIN OF RIGHT KNEE: ICD-10-CM

## 2023-05-12 DIAGNOSIS — E66.01 MORBID OBESITY WITH BMI OF 40.0-44.9, ADULT: ICD-10-CM

## 2023-05-12 PROCEDURE — 99999 PR PBB SHADOW E&M-EST. PATIENT-LVL IV: CPT | Mod: PBBFAC,,, | Performed by: INTERNAL MEDICINE

## 2023-05-12 PROCEDURE — 3075F SYST BP GE 130 - 139MM HG: CPT | Mod: CPTII,S$GLB,, | Performed by: INTERNAL MEDICINE

## 2023-05-12 PROCEDURE — 3075F PR MOST RECENT SYSTOLIC BLOOD PRESS GE 130-139MM HG: ICD-10-PCS | Mod: CPTII,S$GLB,, | Performed by: INTERNAL MEDICINE

## 2023-05-12 PROCEDURE — 99214 PR OFFICE/OUTPT VISIT, EST, LEVL IV, 30-39 MIN: ICD-10-PCS | Mod: S$GLB,,, | Performed by: INTERNAL MEDICINE

## 2023-05-12 PROCEDURE — 3079F DIAST BP 80-89 MM HG: CPT | Mod: CPTII,S$GLB,, | Performed by: INTERNAL MEDICINE

## 2023-05-12 PROCEDURE — 99214 OFFICE O/P EST MOD 30 MIN: CPT | Mod: S$GLB,,, | Performed by: INTERNAL MEDICINE

## 2023-05-12 PROCEDURE — 1159F PR MEDICATION LIST DOCUMENTED IN MEDICAL RECORD: ICD-10-PCS | Mod: CPTII,S$GLB,, | Performed by: INTERNAL MEDICINE

## 2023-05-12 PROCEDURE — 4010F ACE/ARB THERAPY RXD/TAKEN: CPT | Mod: CPTII,S$GLB,, | Performed by: INTERNAL MEDICINE

## 2023-05-12 PROCEDURE — 3079F PR MOST RECENT DIASTOLIC BLOOD PRESSURE 80-89 MM HG: ICD-10-PCS | Mod: CPTII,S$GLB,, | Performed by: INTERNAL MEDICINE

## 2023-05-12 PROCEDURE — 1159F MED LIST DOCD IN RCRD: CPT | Mod: CPTII,S$GLB,, | Performed by: INTERNAL MEDICINE

## 2023-05-12 PROCEDURE — 99999 PR PBB SHADOW E&M-EST. PATIENT-LVL IV: ICD-10-PCS | Mod: PBBFAC,,, | Performed by: INTERNAL MEDICINE

## 2023-05-12 PROCEDURE — 3008F PR BODY MASS INDEX (BMI) DOCUMENTED: ICD-10-PCS | Mod: CPTII,S$GLB,, | Performed by: INTERNAL MEDICINE

## 2023-05-12 PROCEDURE — 3008F BODY MASS INDEX DOCD: CPT | Mod: CPTII,S$GLB,, | Performed by: INTERNAL MEDICINE

## 2023-05-12 PROCEDURE — 4010F PR ACE/ARB THEARPY RXD/TAKEN: ICD-10-PCS | Mod: CPTII,S$GLB,, | Performed by: INTERNAL MEDICINE

## 2023-05-12 RX ORDER — PREGABALIN 50 MG/1
50 CAPSULE ORAL 3 TIMES DAILY
Qty: 270 CAPSULE | Refills: 1 | Status: SHIPPED | OUTPATIENT
Start: 2023-05-12 | End: 2023-11-10

## 2023-05-12 NOTE — PROGRESS NOTES
RHEUMATOLOGY OUTPATIENT CLINIC NOTE    5/12/2023    Attending Rheumatologist: Ken Alberto  Primary Care Provider/Physician Requesting Consultation: ADVANCED TISSUE   Chief Complaint/Reason For Consultation:  Arthritis      Subjective:     Rodney Infante is a 42 y.o. Black or  female with SNRA    Main concern of refractory Rt. Knee pain w/ predominant mechanical pattern.  Recent URI sx, refers improving gradually.    Review of Systems   Constitutional:  Negative for fever.   Eyes:  Negative for photophobia and pain.   Respiratory:  Negative for shortness of breath.    Gastrointestinal:  Negative for blood in stool.   Genitourinary:  Negative for hematuria.   Musculoskeletal:  Positive for joint pain. Negative for back pain.   Skin:  Negative for rash.   Neurological:  Negative for focal weakness and weakness.     Chronic comorbid conditions affecting medical decision making today:  Past Medical History:   Diagnosis Date    Anemia     Arthritis     Diabetes mellitus, type 2     Neuropathy      Past Surgical History:   Procedure Laterality Date    CYST REMOVAL  2014, 2/2018    back    EXCISION OF HIDRADENITIS Bilateral 12/24/2019    Procedure: EXCISION, HIDRADENITIS;  Surgeon: Marcel Pennington MD;  Location: [facility] OR;  Service: General;  Laterality: Bilateral;    WOUND DEBRIDEMENT Right 7/17/2020    Procedure: DEBRIDEMENT, WOUND;  Surgeon: Marcel Pennington MD;  Location: [facility] OR;  Service: General;  Laterality: Right;     Family History   Problem Relation Age of Onset    No Known Problems Mother     Drug abuse Father     No Known Problems Sister      Social History     Tobacco Use   Smoking Status Never   Smokeless Tobacco Never       Current Outpatient Medications:     adalimumab (HUMIRA PEN) PnKt injection, Inject 1 pen (40 mg total) into the skin every 14 (fourteen) days., Disp: 6 pen, Rfl: 2    clotrimazole-betamethasone 1-0.05% (LOTRISONE) cream, , Disp: , Rfl:      "cyanocobalamin (VITAMIN B-12) 1000 MCG tablet, Take 1 tablet (1,000 mcg total) by mouth once daily. This is a prescription for 1 year.  Take 1 tablet Monday, Wednesday, and Friday, Disp: 36 tablet, Rfl: 3    ergocalciferol (ERGOCALCIFEROL) 50,000 unit Cap, , Disp: , Rfl:     escitalopram oxalate (LEXAPRO) 10 MG tablet, , Disp: , Rfl:     estradiol cypionate (DEPO-ESTRADIOL) 5 mg/mL injection, Inject into the muscle every 28 days., Disp: , Rfl:     famotidine (PEPCID) 40 MG tablet, , Disp: , Rfl:     mupirocin (BACTROBAN) 2 % ointment, apply daily to affected area as directed, Disp: 22 g, Rfl: 0    gabapentin (NEURONTIN) 300 MG capsule, Take 1 capsule (300 mg total) by mouth every evening., Disp: 90 capsule, Rfl: 3    iodoform 1/2 X 5 "-yard Bn, change 3 times a week as directed, Disp: 12 each, Rfl: 0    mupirocin (BACTROBAN) 2 % ointment, Apply topically nasally  daily, Disp: 22 g, Rfl: 0    nystatin (MYCOSTATIN) powder, Apply topically locally daily under breasts as directed, Disp: 60 g, Rfl: 0    nystatin (MYCOSTATIN) powder, APPLY TOPICALLY TWICE DAILY TO AFFECTED AREA, Disp: 60 g, Rfl: 2    phentermine (ADIPEX-P) 37.5 mg tablet, 22 mg., Disp: , Rfl:     topiramate (TOPAMAX) 50 MG tablet, TAKE 1 TABLET BY MOUTH ONCE DAILY WITH ADIPEX, Disp: , Rfl:     traMADoL (ULTRAM) 50 mg tablet, , Disp: , Rfl:      Objective:     Vitals:    05/12/23 1306   BP: 137/82   Pulse: 95     Physical Exam   Constitutional: She appears obese.   Eyes: Conjunctivae are normal.   Pulmonary/Chest: Effort normal. No respiratory distress.   Musculoskeletal:         General: No swelling or tenderness. Normal range of motion.   Neurological: She displays no weakness.   Skin: No rash noted.     Reviewed available old and all outside pertinent medical records available.    All lab results personally reviewed and interpreted by me.       ASSESSMENT      Encounter Diagnoses   Name Primary?    Seronegative arthritis Yes    Chronic pain of right " knee     High risk medication use     Medication monitoring encounter     Morbid obesity with BMI of 40.0-44.9, adult     Hidradenitis suppurativa of right axilla     Diabetes mellitus without complication       PLAN     Recurrent Rt knee pain w/ mechanical pattern.  Excellent response to Humira otherwise.  Denies refractory HS or inflammatory pain otherwise.  No squeeze tenderness on exam.  Labs w/o toxicity from biologic use.  Very high ESR, CRP WNR.  Consider related to active URI sx, gradually improving per patient.  C/w Humira unchanged.  Trial of Lyrica instead of gabapentin.  Side effects discussed.  Voltaren gel and knee brace PRN.  F/u orthopedics if refractory knee pain.  Repeat ESR in 2 weeks.  Rest of labs close to f/u visit.     Ken Alberto M.D.

## 2023-05-26 ENCOUNTER — LAB VISIT (OUTPATIENT)
Dept: LAB | Facility: HOSPITAL | Age: 43
End: 2023-05-26
Attending: INTERNAL MEDICINE
Payer: COMMERCIAL

## 2023-05-26 DIAGNOSIS — E66.01 MORBID OBESITY WITH BMI OF 40.0-44.9, ADULT: ICD-10-CM

## 2023-05-26 DIAGNOSIS — M13.80 SERONEGATIVE ARTHRITIS: ICD-10-CM

## 2023-05-26 DIAGNOSIS — G89.29 CHRONIC PAIN OF RIGHT KNEE: ICD-10-CM

## 2023-05-26 DIAGNOSIS — M25.561 CHRONIC PAIN OF RIGHT KNEE: ICD-10-CM

## 2023-05-26 DIAGNOSIS — Z79.899 HIGH RISK MEDICATION USE: ICD-10-CM

## 2023-05-26 DIAGNOSIS — Z51.81 MEDICATION MONITORING ENCOUNTER: ICD-10-CM

## 2023-05-26 LAB — ERYTHROCYTE [SEDIMENTATION RATE] IN BLOOD BY PHOTOMETRIC METHOD: 47 MM/HR (ref 0–36)

## 2023-05-26 PROCEDURE — 36415 COLL VENOUS BLD VENIPUNCTURE: CPT | Mod: PO | Performed by: INTERNAL MEDICINE

## 2023-05-26 PROCEDURE — 85652 RBC SED RATE AUTOMATED: CPT | Mod: PO | Performed by: INTERNAL MEDICINE

## 2023-09-08 NOTE — PROGRESS NOTES
"[facility]   Physical Therapy Treatment Note     Name: Rodney Gonzalez Loring  Clinic Number: [id number]    Therapy Diagnosis:   Encounter Diagnosis   Name Primary?    Pain aggravated by activities of daily living Yes         Physician: Ken Alberto MD    Visit Date: 9/28/2022    Physician Orders: PT Eval and Treat   Medical Diagnosis from Referral: Chronic pain of both knees [M25.561, M25.562, G89.29]  Evaluation Date: 6/27/2022  Authorization Period Expiration: 12/31/22  Plan of Care Expiration:  10/21/22  Visit # / Visits authorized: 14/ 20     Progress due: 10/21/22    Precautions: Standard, diabetes    PTA Visit #: 3/5     Time In: 9:50 am  Time Out: 10:55 am  Total Billable Time: 65 minutes    SUBJECTIVE     Pt reports: She has been working on her exercises at home without issues  Response to previous treatment: feeling pretty good  Functional change: none    Pain: 1/10  Location: R knee    OBJECTIVE     Objective Measures updated at progress report unless specified.     FOTO:   -Intake: Done 6/27  -Status: Done 7/22, 9/28  -Discharge: incomplete        Treatment     Rodney received the treatments listed below:      therapeutic exercises to develop strength, endurance and ROM for 25 minutes including:    Recumbent bike- 8 min L8     Quad stretch 5 x 30" B    LAQ STB 3 x 10    Knee to wall ankle DF mobs- 20x ea  Air squat 3 x 10     Not performed today:  Shuttle squats- 4.0 3 x 15   Step down L1 2 x 10  B   Heel/Toe raises 3 x 10     Rodney received the following manual therapy techniques: Joint mobilizations were applied to the: knee for 8 minutes, including:    AP TC mob    Rodney participated in neuromuscular re-education activities to improve: Balance, Coordination, Kinesthetic, Sense and Proprioception for 20 minutes. The following activities were included:     Bridge with RTB- 2 x 10 with 5s hold  Single leg balance- 5 x 15 ea  Lateral Steps   3 rounds RTB  Step Ups 3 x 10 L3 " Writer met with Patient so we could finish figuring out her Insurance situation. It was decided that she is going to switch her MEDICARE over to [facility] as well so she has everything under the same Plan. Her Insurance will start on 10/01/2023. B - cuing for LE positioning to avoid valgus collapse    Patient Education and Home Exercises     Home Exercises and Patient Education Provided     Education provided:   - Role of PT, PT POC  - Updated HEP     Written Home Exercises Provided: yes.  Exercises were reviewed and Rodney was able to demonstrate them prior to the end of the session.  Rodney demonstrated good  understanding of the education provided.      See EMR under Patient Instructions for exercises provided 6/27/2022.    ASSESSMENT     Patient continues to have impaired mobility of the R knee, impaired quad and hip strength, and impaired LE mechanics that contribute to patellofemoral symptoms. Patient had minor tightness in the knee with exercises today but no increase in pain.    Rodney Is progressing well towards her goals.   Pt prognosis is Good.     Pt will continue to benefit from skilled outpatient physical therapy to address the deficits listed in the problem list box on initial evaluation, provide pt/family education and to maximize pt's level of independence in the home and community environment.     Pt's spiritual, cultural and educational needs considered and pt agreeable to plan of care and goals.     Anticipated barriers to physical therapy: none    Goals:  Short-Term Goals: 4 weeks   - The patient will be independent with initial home exercise program. MET  - The patient will increase strength to at least 4/5 to perform functional mobility including walking and squatting to pick something up MET  - The patient will increase R quad flexibility to be equal to L to perform work with pain < 3/10. In progress, not met     Long-Term Goals: 8 weeks In progress, not met  - Pt to achieve <31% limitation as measured by the FOTO to demonstrate decreased disability. In progress, not met  - The patient will be independent amb with no assistive device on all surfaces for community distances. MET  - The patient will increase strength to at least 4+/5  to perform functional mobility including stair negotiation In progress, not met  - The patient will improve SLS time to 20s bilaterally to improve stability in gait In progress, not met       PLAN     Continue at 1 day per week until early to mid October. Re-inforce importance of HEP  Continue to address R quad flexibility deficits, ankle DF mobility deficits, LE strength    Harsh Rowell, PT

## 2023-09-27 ENCOUNTER — LAB VISIT (OUTPATIENT)
Dept: LAB | Facility: HOSPITAL | Age: 43
End: 2023-09-27
Attending: PODIATRIST
Payer: COMMERCIAL

## 2023-09-27 DIAGNOSIS — B35.1 DERMATOPHYTOSIS OF NAIL: Primary | ICD-10-CM

## 2023-09-27 LAB
ALT SERPL W/O P-5'-P-CCNC: 12 U/L (ref 10–44)
AST SERPL-CCNC: 22 U/L (ref 15–46)

## 2023-09-27 PROCEDURE — 84460 ALANINE AMINO (ALT) (SGPT): CPT | Mod: PN | Performed by: PODIATRIST

## 2023-09-27 PROCEDURE — 84450 TRANSFERASE (AST) (SGOT): CPT | Mod: PN | Performed by: PODIATRIST

## 2023-09-27 PROCEDURE — 36415 COLL VENOUS BLD VENIPUNCTURE: CPT | Mod: PN | Performed by: PODIATRIST

## 2023-11-01 ENCOUNTER — LAB VISIT (OUTPATIENT)
Dept: LAB | Facility: HOSPITAL | Age: 43
End: 2023-11-01
Attending: PODIATRIST
Payer: COMMERCIAL

## 2023-11-01 DIAGNOSIS — B35.1 DERMATOPHYTOSIS OF NAIL: Primary | ICD-10-CM

## 2023-11-01 LAB
ALT SERPL W/O P-5'-P-CCNC: 14 U/L (ref 10–44)
AST SERPL-CCNC: 21 U/L (ref 15–46)

## 2023-11-01 PROCEDURE — 36415 COLL VENOUS BLD VENIPUNCTURE: CPT | Mod: PN | Performed by: PODIATRIST

## 2023-11-01 PROCEDURE — 84450 TRANSFERASE (AST) (SGOT): CPT | Mod: PN | Performed by: PODIATRIST

## 2023-11-01 PROCEDURE — 84460 ALANINE AMINO (ALT) (SGPT): CPT | Mod: PN | Performed by: PODIATRIST

## 2023-11-09 DIAGNOSIS — G89.29 CHRONIC PAIN OF RIGHT KNEE: ICD-10-CM

## 2023-11-09 DIAGNOSIS — M25.561 CHRONIC PAIN OF RIGHT KNEE: ICD-10-CM

## 2023-11-10 RX ORDER — PREGABALIN 50 MG/1
50 CAPSULE ORAL 3 TIMES DAILY
Qty: 270 CAPSULE | Refills: 0 | Status: SHIPPED | OUTPATIENT
Start: 2023-11-10 | End: 2023-12-15 | Stop reason: SDUPTHER

## 2023-12-04 DIAGNOSIS — M13.80 SERONEGATIVE ARTHRITIS: ICD-10-CM

## 2023-12-04 RX ORDER — ADALIMUMAB 40MG/0.8ML
KIT SUBCUTANEOUS
Qty: 4 PEN | Refills: 3 | Status: SHIPPED | OUTPATIENT
Start: 2023-12-04 | End: 2024-01-03

## 2023-12-08 ENCOUNTER — TELEPHONE (OUTPATIENT)
Dept: RHEUMATOLOGY | Facility: CLINIC | Age: 43
End: 2023-12-08
Payer: COMMERCIAL

## 2023-12-11 ENCOUNTER — LAB VISIT (OUTPATIENT)
Dept: LAB | Facility: HOSPITAL | Age: 43
End: 2023-12-11
Attending: INTERNAL MEDICINE
Payer: COMMERCIAL

## 2023-12-11 DIAGNOSIS — Z79.899 HIGH RISK MEDICATION USE: ICD-10-CM

## 2023-12-11 DIAGNOSIS — G89.29 CHRONIC PAIN OF RIGHT KNEE: ICD-10-CM

## 2023-12-11 DIAGNOSIS — E66.01 MORBID OBESITY WITH BMI OF 40.0-44.9, ADULT: ICD-10-CM

## 2023-12-11 DIAGNOSIS — M13.80 SERONEGATIVE ARTHRITIS: ICD-10-CM

## 2023-12-11 DIAGNOSIS — M25.561 CHRONIC PAIN OF RIGHT KNEE: ICD-10-CM

## 2023-12-11 DIAGNOSIS — Z51.81 MEDICATION MONITORING ENCOUNTER: ICD-10-CM

## 2023-12-11 LAB
ALBUMIN SERPL BCP-MCNC: 3.7 G/DL (ref 3.5–5.2)
ALP SERPL-CCNC: 108 U/L (ref 38–126)
ALT SERPL W/O P-5'-P-CCNC: 12 U/L (ref 10–44)
ANION GAP SERPL CALC-SCNC: 11 MMOL/L (ref 8–16)
AST SERPL-CCNC: 22 U/L (ref 15–46)
BASOPHILS # BLD AUTO: 0.06 K/UL (ref 0–0.2)
BASOPHILS NFR BLD: 0.7 % (ref 0–1.9)
BILIRUB SERPL-MCNC: 0.2 MG/DL (ref 0.1–1)
CALCIUM SERPL-MCNC: 8.8 MG/DL (ref 8.7–10.5)
CHLORIDE SERPL-SCNC: 112 MMOL/L (ref 95–110)
CO2 SERPL-SCNC: 23 MMOL/L (ref 23–29)
CREAT SERPL-MCNC: 0.79 MG/DL (ref 0.5–1.4)
CRP SERPL-MCNC: 0.56 MG/DL (ref 0–1)
DIFFERENTIAL METHOD: ABNORMAL
EOSINOPHIL # BLD AUTO: 0.3 K/UL (ref 0–0.5)
EOSINOPHIL NFR BLD: 3.3 % (ref 0–8)
ERYTHROCYTE [DISTWIDTH] IN BLOOD BY AUTOMATED COUNT: 14.3 % (ref 11.5–14.5)
ERYTHROCYTE [SEDIMENTATION RATE] IN BLOOD BY PHOTOMETRIC METHOD: 50 MM/HR (ref 0–36)
EST. GFR  (NO RACE VARIABLE): >60 ML/MIN/1.73 M^2
GLUCOSE SERPL-MCNC: 121 MG/DL (ref 70–110)
HCT VFR BLD AUTO: 39.3 % (ref 37–48.5)
HGB BLD-MCNC: 12.4 G/DL (ref 12–16)
IMM GRANULOCYTES # BLD AUTO: 0.14 K/UL (ref 0–0.04)
IMM GRANULOCYTES NFR BLD AUTO: 1.7 % (ref 0–0.5)
LYMPHOCYTES # BLD AUTO: 2.7 K/UL (ref 1–4.8)
LYMPHOCYTES NFR BLD: 33 % (ref 18–48)
MCH RBC QN AUTO: 29.7 PG (ref 27–31)
MCHC RBC AUTO-ENTMCNC: 31.6 G/DL (ref 32–36)
MCV RBC AUTO: 94 FL (ref 82–98)
MONOCYTES # BLD AUTO: 0.6 K/UL (ref 0.3–1)
MONOCYTES NFR BLD: 7.2 % (ref 4–15)
NEUTROPHILS # BLD AUTO: 4.5 K/UL (ref 1.8–7.7)
NEUTROPHILS NFR BLD: 54.1 % (ref 38–73)
NRBC BLD-RTO: 0 /100 WBC
PLATELET # BLD AUTO: 411 K/UL (ref 150–450)
PMV BLD AUTO: 8.2 FL (ref 9.2–12.9)
POTASSIUM SERPL-SCNC: 3.6 MMOL/L (ref 3.5–5.1)
PROT SERPL-MCNC: 8.8 G/DL (ref 6–8.4)
RBC # BLD AUTO: 4.18 M/UL (ref 4–5.4)
SODIUM SERPL-SCNC: 146 MMOL/L (ref 136–145)
URATE SERPL-MCNC: 3.3 MG/DL (ref 2.4–5.7)
UUN UR-MCNC: 10 MG/DL (ref 7–17)
WBC # BLD AUTO: 8.25 K/UL (ref 3.9–12.7)

## 2023-12-11 PROCEDURE — 84550 ASSAY OF BLOOD/URIC ACID: CPT | Performed by: INTERNAL MEDICINE

## 2023-12-11 PROCEDURE — 85025 COMPLETE CBC W/AUTO DIFF WBC: CPT | Mod: PN | Performed by: INTERNAL MEDICINE

## 2023-12-11 PROCEDURE — 80053 COMPREHEN METABOLIC PANEL: CPT | Mod: PN | Performed by: INTERNAL MEDICINE

## 2023-12-11 PROCEDURE — 86140 C-REACTIVE PROTEIN: CPT | Mod: PN | Performed by: INTERNAL MEDICINE

## 2023-12-11 PROCEDURE — 36415 COLL VENOUS BLD VENIPUNCTURE: CPT | Mod: PN | Performed by: INTERNAL MEDICINE

## 2023-12-11 PROCEDURE — 85652 RBC SED RATE AUTOMATED: CPT | Mod: PN | Performed by: INTERNAL MEDICINE

## 2023-12-12 ENCOUNTER — TELEPHONE (OUTPATIENT)
Dept: RHEUMATOLOGY | Facility: CLINIC | Age: 43
End: 2023-12-12
Payer: COMMERCIAL

## 2023-12-12 NOTE — TELEPHONE ENCOUNTER
----- Message from Carola Begum sent at 12/12/2023 10:20 AM CST -----  Regarding: RX Auth [employer] calling  Contact: Vinny  Type:  Needs Medical Advice    Who Called: Vinny   Symptoms (please be specific):    How long has patient had these symptoms:    Pharmacy name and phone #:    Would the patient rather a call back or a response via [facility]? call  Best Call Back Number: [phone] opt  3 ref [phone]   Additional Information: iVn[employer] from [employer] is rcalling because RX: Humira need an auth and they have 24 hours to keep it open or it will be placed on hold. Plan phone number is [phone].

## 2023-12-12 NOTE — TELEPHONE ENCOUNTER
Spoke with [facility]- Prior auth form with submitted to [facility] on 11-8. We ar waiting for response.

## 2023-12-15 ENCOUNTER — OFFICE VISIT (OUTPATIENT)
Dept: RHEUMATOLOGY | Facility: CLINIC | Age: 43
End: 2023-12-15
Payer: COMMERCIAL

## 2023-12-15 VITALS
SYSTOLIC BLOOD PRESSURE: 142 MMHG | DIASTOLIC BLOOD PRESSURE: 93 MMHG | HEIGHT: 65 IN | BODY MASS INDEX: 48.82 KG/M2 | WEIGHT: 293 LBS | HEART RATE: 86 BPM

## 2023-12-15 DIAGNOSIS — E66.01 CLASS 3 SEVERE OBESITY WITH BODY MASS INDEX (BMI) OF 50.0 TO 59.9 IN ADULT, UNSPECIFIED OBESITY TYPE, UNSPECIFIED WHETHER SERIOUS COMORBIDITY PRESENT: ICD-10-CM

## 2023-12-15 DIAGNOSIS — M13.80 SERONEGATIVE ARTHRITIS: Primary | ICD-10-CM

## 2023-12-15 DIAGNOSIS — Z79.60 ENCOUNTER FOR MONITORING IMMUNOSUPPRESSIVE MEDICATION THERAPY CAUSING IMMUNODEFICIENCY: ICD-10-CM

## 2023-12-15 DIAGNOSIS — M17.0 PRIMARY OSTEOARTHRITIS OF BOTH KNEES: ICD-10-CM

## 2023-12-15 DIAGNOSIS — D84.821 ENCOUNTER FOR MONITORING IMMUNOSUPPRESSIVE MEDICATION THERAPY CAUSING IMMUNODEFICIENCY: ICD-10-CM

## 2023-12-15 DIAGNOSIS — L73.2 HIDRADENITIS AXILLARIS: ICD-10-CM

## 2023-12-15 DIAGNOSIS — G89.29 CHRONIC PAIN OF RIGHT KNEE: ICD-10-CM

## 2023-12-15 DIAGNOSIS — Z51.81 ENCOUNTER FOR MONITORING IMMUNOSUPPRESSIVE MEDICATION THERAPY CAUSING IMMUNODEFICIENCY: ICD-10-CM

## 2023-12-15 DIAGNOSIS — E55.9 VITAMIN D INSUFFICIENCY: ICD-10-CM

## 2023-12-15 DIAGNOSIS — M25.561 CHRONIC PAIN OF RIGHT KNEE: ICD-10-CM

## 2023-12-15 DIAGNOSIS — L73.2 HIDRADENITIS SUPPURATIVA OF RIGHT AXILLA: ICD-10-CM

## 2023-12-15 DIAGNOSIS — E11.9 DIABETES MELLITUS WITHOUT COMPLICATION: ICD-10-CM

## 2023-12-15 PROCEDURE — 99214 PR OFFICE/OUTPT VISIT, EST, LEVL IV, 30-39 MIN: ICD-10-PCS | Mod: S$GLB,,, | Performed by: INTERNAL MEDICINE

## 2023-12-15 PROCEDURE — 3080F DIAST BP >= 90 MM HG: CPT | Mod: CPTII,S$GLB,, | Performed by: INTERNAL MEDICINE

## 2023-12-15 PROCEDURE — 3008F BODY MASS INDEX DOCD: CPT | Mod: CPTII,S$GLB,, | Performed by: INTERNAL MEDICINE

## 2023-12-15 PROCEDURE — 4010F ACE/ARB THERAPY RXD/TAKEN: CPT | Mod: CPTII,S$GLB,, | Performed by: INTERNAL MEDICINE

## 2023-12-15 PROCEDURE — 99214 OFFICE O/P EST MOD 30 MIN: CPT | Mod: S$GLB,,, | Performed by: INTERNAL MEDICINE

## 2023-12-15 PROCEDURE — 3077F PR MOST RECENT SYSTOLIC BLOOD PRESSURE >= 140 MM HG: ICD-10-PCS | Mod: CPTII,S$GLB,, | Performed by: INTERNAL MEDICINE

## 2023-12-15 PROCEDURE — 1159F PR MEDICATION LIST DOCUMENTED IN MEDICAL RECORD: ICD-10-PCS | Mod: CPTII,S$GLB,, | Performed by: INTERNAL MEDICINE

## 2023-12-15 PROCEDURE — 4010F PR ACE/ARB THEARPY RXD/TAKEN: ICD-10-PCS | Mod: CPTII,S$GLB,, | Performed by: INTERNAL MEDICINE

## 2023-12-15 PROCEDURE — 3080F PR MOST RECENT DIASTOLIC BLOOD PRESSURE >= 90 MM HG: ICD-10-PCS | Mod: CPTII,S$GLB,, | Performed by: INTERNAL MEDICINE

## 2023-12-15 PROCEDURE — 99999 PR PBB SHADOW E&M-EST. PATIENT-LVL III: CPT | Mod: PBBFAC,,, | Performed by: INTERNAL MEDICINE

## 2023-12-15 PROCEDURE — 3077F SYST BP >= 140 MM HG: CPT | Mod: CPTII,S$GLB,, | Performed by: INTERNAL MEDICINE

## 2023-12-15 PROCEDURE — 99999 PR PBB SHADOW E&M-EST. PATIENT-LVL III: ICD-10-PCS | Mod: PBBFAC,,, | Performed by: INTERNAL MEDICINE

## 2023-12-15 PROCEDURE — 3008F PR BODY MASS INDEX (BMI) DOCUMENTED: ICD-10-PCS | Mod: CPTII,S$GLB,, | Performed by: INTERNAL MEDICINE

## 2023-12-15 PROCEDURE — 1159F MED LIST DOCD IN RCRD: CPT | Mod: CPTII,S$GLB,, | Performed by: INTERNAL MEDICINE

## 2023-12-15 RX ORDER — PREGABALIN 50 MG/1
50 CAPSULE ORAL 3 TIMES DAILY
Qty: 270 CAPSULE | Refills: 1 | Status: SHIPPED | OUTPATIENT
Start: 2023-12-15 | End: 2024-06-12

## 2023-12-15 NOTE — PROGRESS NOTES
RHEUMATOLOGY OUTPATIENT CLINIC NOTE    12/15/2023    Attending Rheumatologist: Ken Alberto  Primary Care Provider/Physician Requesting Consultation: Tissue, Advanced   Chief Complaint/Reason For Consultation:  Pain      Subjective:     Rodney Infante is a 43 y.o. Black or  female with SNRA    Good response to Lyrica.  Recurrent knee ain w/ mechanical pattern.  Denies RA flares since last visit.  Adherence w/ Humira w/o side effects.     Review of Systems   Constitutional:  Negative for fever.   Eyes:  Negative for photophobia and pain.   Respiratory:  Negative for cough.    Cardiovascular:  Negative for chest pain.   Gastrointestinal:  Negative for blood in stool and melena.   Genitourinary:  Negative for dysuria and urgency.   Musculoskeletal:  Positive for joint pain.   Skin:  Negative for rash.   Neurological:  Negative for focal weakness.       Chronic comorbid conditions affecting medical decision making today:  Past Medical History:   Diagnosis Date    Anemia     Arthritis     Diabetes mellitus, type 2     Neuropathy      Past Surgical History:   Procedure Laterality Date    CYST REMOVAL  2014, 2/2018    back    EXCISION OF HIDRADENITIS Bilateral 12/24/2019    Procedure: EXCISION, HIDRADENITIS;  Surgeon: Marcel Pennington MD;  Location: [facility] OR;  Service: General;  Laterality: Bilateral;    WOUND DEBRIDEMENT Right 7/17/2020    Procedure: DEBRIDEMENT, WOUND;  Surgeon: Marcel Pennington MD;  Location: [facility] OR;  Service: General;  Laterality: Right;     Family History   Problem Relation Age of Onset    No Known Problems Mother     Drug abuse Father     No Known Problems Sister      Social History     Tobacco Use   Smoking Status Never   Smokeless Tobacco Never       Current Outpatient Medications:     adalimumab (HUMIRA PEN) PnKt injection, INJECT 1 PEN (40 MG) UNDER THE SKIN EVERY 14 (FOURTEEN) DAYS., Disp: 4 Pen, Rfl: 3    clotrimazole-betamethasone 1-0.05% (LOTRISONE) cream,  ", Disp: , Rfl:     cyanocobalamin (VITAMIN B-12) 1000 MCG tablet, Take 1 tablet (1,000 mcg total) by mouth once daily. This is a prescription for 1 year.  Take 1 tablet Monday, Wednesday, and Friday, Disp: 36 tablet, Rfl: 3    ergocalciferol (ERGOCALCIFEROL) 50,000 unit Cap, , Disp: , Rfl:     escitalopram oxalate (LEXAPRO) 10 MG tablet, , Disp: , Rfl:     estradiol cypionate (DEPO-ESTRADIOL) 5 mg/mL injection, Inject into the muscle every 28 days., Disp: , Rfl:     famotidine (PEPCID) 40 MG tablet, , Disp: , Rfl:     iodoform 1/2 X 5 "-yard [facility], change 3 times a week as directed, Disp: 12 each, Rfl: 0    mupirocin (BACTROBAN) 2 % ointment, apply daily to affected area as directed, Disp: 22 g, Rfl: 0    mupirocin (BACTROBAN) 2 % ointment, Apply topically nasally  daily, Disp: 22 g, Rfl: 0    pregabalin (LYRICA) 50 MG capsule, TAKE ONE CAPSULE BY MOUTH THREE TIMES DAILY, Disp: 270 capsule, Rfl: 0    traMADoL (ULTRAM) 50 mg tablet, , Disp: , Rfl:     nystatin (MYCOSTATIN) powder, Apply topically locally daily under breasts as directed (Patient not taking: Reported on 12/15/2023), Disp: 60 g, Rfl: 0    nystatin (MYCOSTATIN) powder, APPLY TOPICALLY TWICE DAILY TO AFFECTED AREA (Patient not taking: Reported on 12/15/2023), Disp: 60 g, Rfl: 2    phentermine (ADIPEX-P) 37.5 mg tablet, 22 mg., Disp: , Rfl:     topiramate (TOPAMAX) 50 MG tablet, TAKE 1 TABLET BY MOUTH ONCE DAILY WITH ADIPEX, Disp: , Rfl:      Objective:     Vitals:    12/15/23 1008   BP: (!) 142/93   Pulse: 86     Physical Exam   Constitutional: She appears obese.   Eyes: Conjunctivae are normal.   Pulmonary/Chest: Effort normal. No respiratory distress.   Musculoskeletal:         General: No swelling or tenderness. Normal range of motion.   Neurological: She displays no weakness.   Skin: No rash noted.       Reviewed available old and all outside pertinent medical records available.    All lab results personally reviewed and interpreted by me.       " ASSESSMENT      Encounter Diagnoses   Name Primary?    Hidradenitis axillaris     Hidradenitis suppurativa of right axilla     Seronegative arthritis Yes    Chronic pain of right knee     Vitamin D insufficiency     Primary osteoarthritis of both knees     Encounter for monitoring immunosuppressive medication therapy causing immunodeficiency     Class 3 severe obesity with body mass index (BMI) of 50.0 to 59.9 in adult, unspecified obesity type, unspecified whether serious comorbidity present     Diabetes mellitus without complication       PLAN     CDAI; low disease activity.  Recurrent knee pian from OA.  Adequate response to Lyrica.  No squeeze tenderness on exam.  No concerning cytopenias.  Liver and kidney function stable.  Lyrica refills provided.  Plan to c/w monotherapy Humira unchanged.  Consider decreased frequency of Humira next visit if remains in remission IA wise. Repeat labs close to f/u.    Ken Alberto M.D.

## 2024-03-12 ENCOUNTER — TELEPHONE (OUTPATIENT)
Dept: RHEUMATOLOGY | Facility: CLINIC | Age: 44
End: 2024-03-12
Payer: COMMERCIAL

## 2024-03-12 DIAGNOSIS — M13.80 SERONEGATIVE ARTHRITIS: ICD-10-CM

## 2024-05-07 ENCOUNTER — TELEPHONE (OUTPATIENT)
Dept: RHEUMATOLOGY | Facility: CLINIC | Age: 44
End: 2024-05-07
Payer: COMMERCIAL

## 2024-05-07 NOTE — TELEPHONE ENCOUNTER
----- Message from Kierra Wing MA sent at 5/6/2024  3:59 PM CDT -----  Regarding: FW: RX Auth  Contact: Rodney    ----- Message -----  From: Carola Begum  Sent: 5/6/2024  11:43 AM CDT  To: Remy Rogers Staff  Subject: RX Auth                                          Type:  Needs Medical Advice    Who Called: Rodney  Symptoms (please be specific):    How long has patient had these symptoms:    Pharmacy name and phone #:      [facility] -   [address] [address]  Phone: [phone] Fax: [phone]     Would the patient rather a call back or a response via [facility]? call  Best Call Back Number:  [phone] (home)   Additional Information:  Rodney says the pharmacy is requesting an authorization for this prescription: RX: adalimumab (HUMIRA PEN) PnKt injection

## 2024-06-14 ENCOUNTER — LAB VISIT (OUTPATIENT)
Dept: LAB | Facility: HOSPITAL | Age: 44
End: 2024-06-14
Attending: INTERNAL MEDICINE
Payer: COMMERCIAL

## 2024-06-14 DIAGNOSIS — M13.80 SERONEGATIVE ARTHRITIS: ICD-10-CM

## 2024-06-14 DIAGNOSIS — E55.9 VITAMIN D INSUFFICIENCY: ICD-10-CM

## 2024-06-14 LAB
25(OH)D3+25(OH)D2 SERPL-MCNC: 35 NG/ML (ref 30–96)
ALBUMIN SERPL BCP-MCNC: 3.8 G/DL (ref 3.5–5.2)
ALP SERPL-CCNC: 102 U/L (ref 38–126)
ALT SERPL W/O P-5'-P-CCNC: 13 U/L (ref 10–44)
ANION GAP SERPL CALC-SCNC: 11 MMOL/L (ref 8–16)
AST SERPL-CCNC: 21 U/L (ref 15–46)
BASOPHILS # BLD AUTO: 0.05 K/UL (ref 0–0.2)
BASOPHILS NFR BLD: 0.5 % (ref 0–1.9)
BILIRUB SERPL-MCNC: 0.2 MG/DL (ref 0.1–1)
CALCIUM SERPL-MCNC: 8.8 MG/DL (ref 8.7–10.5)
CHLORIDE SERPL-SCNC: 116 MMOL/L (ref 95–110)
CO2 SERPL-SCNC: 20 MMOL/L (ref 23–29)
CREAT SERPL-MCNC: 0.84 MG/DL (ref 0.5–1.4)
CRP SERPL-MCNC: 1.12 MG/DL (ref 0–1)
DIFFERENTIAL METHOD BLD: ABNORMAL
EOSINOPHIL # BLD AUTO: 0.4 K/UL (ref 0–0.5)
EOSINOPHIL NFR BLD: 3.8 % (ref 0–8)
ERYTHROCYTE [DISTWIDTH] IN BLOOD BY AUTOMATED COUNT: 14.2 % (ref 11.5–14.5)
EST. GFR  (NO RACE VARIABLE): >60 ML/MIN/1.73 M^2
GLUCOSE SERPL-MCNC: 74 MG/DL (ref 70–110)
HCT VFR BLD AUTO: 34.9 % (ref 37–48.5)
HGB BLD-MCNC: 10.8 G/DL (ref 12–16)
IMM GRANULOCYTES # BLD AUTO: 0.12 K/UL (ref 0–0.04)
IMM GRANULOCYTES NFR BLD AUTO: 1.2 % (ref 0–0.5)
LYMPHOCYTES # BLD AUTO: 2.9 K/UL (ref 1–4.8)
LYMPHOCYTES NFR BLD: 28.6 % (ref 18–48)
MCH RBC QN AUTO: 28.5 PG (ref 27–31)
MCHC RBC AUTO-ENTMCNC: 30.9 G/DL (ref 32–36)
MCV RBC AUTO: 92 FL (ref 82–98)
MONOCYTES # BLD AUTO: 0.9 K/UL (ref 0.3–1)
MONOCYTES NFR BLD: 8.8 % (ref 4–15)
NEUTROPHILS # BLD AUTO: 5.9 K/UL (ref 1.8–7.7)
NEUTROPHILS NFR BLD: 57.1 % (ref 38–73)
NRBC BLD-RTO: 0 /100 WBC
PLATELET # BLD AUTO: 475 K/UL (ref 150–450)
PMV BLD AUTO: 8.4 FL (ref 9.2–12.9)
POTASSIUM SERPL-SCNC: 3.8 MMOL/L (ref 3.5–5.1)
PROT SERPL-MCNC: 8.7 G/DL (ref 6–8.4)
RBC # BLD AUTO: 3.79 M/UL (ref 4–5.4)
SODIUM SERPL-SCNC: 147 MMOL/L (ref 136–145)
UUN UR-MCNC: 9 MG/DL (ref 7–17)
WBC # BLD AUTO: 10.29 K/UL (ref 3.9–12.7)

## 2024-06-14 PROCEDURE — 86140 C-REACTIVE PROTEIN: CPT | Mod: PN | Performed by: INTERNAL MEDICINE

## 2024-06-14 PROCEDURE — 82306 VITAMIN D 25 HYDROXY: CPT | Mod: PN | Performed by: INTERNAL MEDICINE

## 2024-06-14 PROCEDURE — 85025 COMPLETE CBC W/AUTO DIFF WBC: CPT | Mod: PN | Performed by: INTERNAL MEDICINE

## 2024-06-14 PROCEDURE — 36415 COLL VENOUS BLD VENIPUNCTURE: CPT | Mod: PN | Performed by: INTERNAL MEDICINE

## 2024-06-14 PROCEDURE — 80053 COMPREHEN METABOLIC PANEL: CPT | Mod: PN | Performed by: INTERNAL MEDICINE

## 2024-06-21 ENCOUNTER — OFFICE VISIT (OUTPATIENT)
Dept: RHEUMATOLOGY | Facility: CLINIC | Age: 44
End: 2024-06-21
Payer: COMMERCIAL

## 2024-06-21 ENCOUNTER — LAB VISIT (OUTPATIENT)
Dept: LAB | Facility: HOSPITAL | Age: 44
End: 2024-06-21
Attending: INTERNAL MEDICINE
Payer: COMMERCIAL

## 2024-06-21 VITALS
SYSTOLIC BLOOD PRESSURE: 138 MMHG | WEIGHT: 293 LBS | HEART RATE: 82 BPM | BODY MASS INDEX: 48.82 KG/M2 | HEIGHT: 65 IN | DIASTOLIC BLOOD PRESSURE: 81 MMHG

## 2024-06-21 DIAGNOSIS — Z51.81 MEDICATION MONITORING ENCOUNTER: ICD-10-CM

## 2024-06-21 DIAGNOSIS — Z79.899 IMMUNOSUPPRESSION DUE TO DRUG THERAPY: ICD-10-CM

## 2024-06-21 DIAGNOSIS — D84.821 IMMUNOSUPPRESSION DUE TO DRUG THERAPY: ICD-10-CM

## 2024-06-21 DIAGNOSIS — L98.499 DIABETES MELLITUS WITH SKIN ULCER: ICD-10-CM

## 2024-06-21 DIAGNOSIS — L73.2 HIDRADENITIS AXILLARIS: ICD-10-CM

## 2024-06-21 DIAGNOSIS — E11.622 DIABETES MELLITUS WITH SKIN ULCER: ICD-10-CM

## 2024-06-21 DIAGNOSIS — M25.561 CHRONIC PAIN OF RIGHT KNEE: ICD-10-CM

## 2024-06-21 DIAGNOSIS — G89.29 CHRONIC PAIN OF RIGHT KNEE: ICD-10-CM

## 2024-06-21 DIAGNOSIS — M13.80 SERONEGATIVE ARTHRITIS: Primary | ICD-10-CM

## 2024-06-21 LAB
HBV CORE AB SERPL QL IA: NORMAL
HBV SURFACE AG SERPL QL IA: NORMAL

## 2024-06-21 PROCEDURE — 86480 TB TEST CELL IMMUN MEASURE: CPT | Performed by: INTERNAL MEDICINE

## 2024-06-21 PROCEDURE — 86704 HEP B CORE ANTIBODY TOTAL: CPT | Performed by: INTERNAL MEDICINE

## 2024-06-21 PROCEDURE — 99999 PR PBB SHADOW E&M-EST. PATIENT-LVL III: CPT | Mod: PBBFAC,,, | Performed by: INTERNAL MEDICINE

## 2024-06-21 PROCEDURE — 36415 COLL VENOUS BLD VENIPUNCTURE: CPT | Performed by: INTERNAL MEDICINE

## 2024-06-21 PROCEDURE — 87340 HEPATITIS B SURFACE AG IA: CPT | Performed by: INTERNAL MEDICINE

## 2024-06-21 RX ORDER — PREGABALIN 50 MG/1
50 CAPSULE ORAL 3 TIMES DAILY
Qty: 270 CAPSULE | Refills: 1 | Status: SHIPPED | OUTPATIENT
Start: 2024-06-21 | End: 2024-12-18

## 2024-06-21 RX ORDER — ADALIMUMAB 40MG/0.8ML
40 KIT SUBCUTANEOUS
Qty: 6 PEN | Refills: 1 | Status: SHIPPED | OUTPATIENT
Start: 2024-06-21 | End: 2024-12-18

## 2024-06-21 NOTE — Clinical Note
Please help me resuming Humira or alternative biologic for this patient.  SNRA, evidence of synovitis on past MRI.  Insurance change led to discontinuation of coverage.  Progressively worsening since off TNFi (10/2019-2/2024)

## 2024-06-21 NOTE — PROGRESS NOTES
RHEUMATOLOGY OUTPATIENT CLINIC NOTE    6/21/2024    Attending Rheumatologist: Ken Alberto  Primary Care Provider/Physician Requesting Consultation: Tissue, Advanced   Chief Complaint/Reason For Consultation:  Arthritis and Osteoarthritis      Subjective:     Rodney Infante is a 43 y.o. Black or  female with SNRA    Progressive pain with inflammatory characteristics, since off Humira (2/2024-) after medical insurance change and unfortunate decision to stop coverage of biologic therapy.      Review of Systems   Constitutional:  Negative for fever.   Eyes:  Negative for photophobia and pain.   Respiratory:  Negative for cough and shortness of breath.    Cardiovascular:  Negative for chest pain.   Gastrointestinal:  Negative for blood in stool and melena.   Genitourinary:  Negative for dysuria, hematuria and urgency.   Musculoskeletal:  Positive for back pain, joint pain and neck pain.   Skin:  Negative for rash.        Worsening HS since off TNFi   Neurological:  Negative for focal weakness and weakness.       Chronic comorbid conditions affecting medical decision making today:  Past Medical History:   Diagnosis Date    Anemia     Arthritis     Diabetes mellitus, type 2     Neuropathy      Past Surgical History:   Procedure Laterality Date    CYST REMOVAL  2014, 2/2018    back    EXCISION OF HIDRADENITIS Bilateral 12/24/2019    Procedure: EXCISION, HIDRADENITIS;  Surgeon: Marcel Pennington MD;  Location: [facility] OR;  Service: General;  Laterality: Bilateral;    WOUND DEBRIDEMENT Right 7/17/2020    Procedure: DEBRIDEMENT, WOUND;  Surgeon: Marcel Pennington MD;  Location: [facility] OR;  Service: General;  Laterality: Right;     Family History   Problem Relation Name Age of Onset    No Known Problems Mother      Drug abuse Father      No Known Problems Sister       Social History     Tobacco Use   Smoking Status Never   Smokeless Tobacco Never       Current Outpatient Medications:      Patient Information     Patient Name MRN Sex Sirisha Singh [id number] Female 1984      Nursing Note by Eliz Minaya at 2017  8:30 AM     Author:  Eliz Minaya Service:  (none) Author Type:  (none)     Filed:  2017  9:06 AM Encounter Date:  2017 Status:  Signed     :  Eliz Minaya            [place] Protocol    A. Pre-procedure verification complete yes  1-relevant information / documentation available, reviewed and properly matched to the patient; 2-consent accurate and complete, 3-equipment and supplies available    B. Site marking complete N/A  Site marked if not in continuous attendance with patient    C. TIME OUT completed yes  Time Out was conducted just prior to starting procedure to verify the eight required elements: 1-patient identity, 2-consent accurate and complete, 3-position, 4-correct side/site marked (if applicable), 5-procedure, 6-relevant images / results properly labeled and displayed (if applicable), 7-antibiotics / irrigation fluids (if applicable), 8-safety precautions.    Kenzie Minaya LPN ....................  2017   8:37 AM           "clotrimazole-betamethasone 1-0.05% (LOTRISONE) cream, , Disp: , Rfl:     cyanocobalamin (VITAMIN B-12) 1000 MCG tablet, Take 1 tablet (1,000 mcg total) by mouth once daily. This is a prescription for 1 year.  Take 1 tablet Monday, Wednesday, and Friday, Disp: 36 tablet, Rfl: 3    ergocalciferol (ERGOCALCIFEROL) 50,000 unit Cap, , Disp: , Rfl:     escitalopram oxalate (LEXAPRO) 10 MG tablet, , Disp: , Rfl:     estradiol cypionate (DEPO-ESTRADIOL) 5 mg/mL injection, Inject into the muscle every 28 days., Disp: , Rfl:     famotidine (PEPCID) 40 MG tablet, , Disp: , Rfl:     mupirocin (BACTROBAN) 2 % ointment, Apply topically nasally  daily, Disp: 22 g, Rfl: 0    traMADoL (ULTRAM) 50 mg tablet, , Disp: , Rfl:     adalimumab (HUMIRA PEN) PnKt injection, INJECT 1 PEN (40 MG) UNDER THE SKIN EVERY 14 (FOURTEEN) DAYS., Disp: 4 Pen, Rfl: 3    iodoform 1/2 X 5 "-yard [facility], change 3 times a week as directed, Disp: 12 each, Rfl: 0    mupirocin (BACTROBAN) 2 % ointment, apply daily to affected area as directed, Disp: 22 g, Rfl: 0    nystatin (MYCOSTATIN) powder, Apply topically locally daily under breasts as directed, Disp: 60 g, Rfl: 0    nystatin (MYCOSTATIN) powder, APPLY TOPICALLY TWICE DAILY TO AFFECTED AREA, Disp: 60 g, Rfl: 2    phentermine (ADIPEX-P) 37.5 mg tablet, 22 mg., Disp: , Rfl:     pregabalin (LYRICA) 50 MG capsule, Take 1 capsule (50 mg total) by mouth 3 (three) times daily., Disp: 270 capsule, Rfl: 1    topiramate (TOPAMAX) 50 MG tablet, TAKE 1 TABLET BY MOUTH ONCE DAILY WITH ADIPEX, Disp: , Rfl:      Objective:     Vitals:    06/21/24 1314   BP: 138/81   Pulse: 82     Physical Exam   Eyes: Conjunctivae are normal.   Pulmonary/Chest: Effort normal. No respiratory distress.   Musculoskeletal:         General: Swelling and tenderness present. Normal range of motion.   Neurological: She displays no weakness.   Skin: No rash noted.       Reviewed available old and all outside pertinent medical records " available.    All lab results personally reviewed and interpreted by me.       ASSESSMENT / PLAN     1. Seronegative arthritis  CDAI: high disease activity.  Synovitis present on past MRI.  Failure to DMARDs, was well controlled with Humira until discontinuation on 2/2024.  Resume at past frequency q14 days.  adalimumab (HUMIRA PEN) PnKt injection    C-Reactive Protein    Cyclic Citrullinated Peptide Antibody, IgG    Rheumatoid Factor      2. Medication monitoring encounter  Comprehensive Metabolic Panel      3. Immunosuppression due to drug therapy  Hold humira in event of active infections or surgery.  Hepatitis B Surface Antigen    Hepatitis B Core Antibody, Total    Quantiferon Gold TB    CBC Auto Differential      4. Chronic pain of right knee  pregabalin (LYRICA) 50 MG capsule      5. Hidradenitis axillaris  Follow with ID      6. Diabetes mellitus with skin ulcer  Risk of worsening with systemic steroids.                Ken Alberto M.D.

## 2024-06-24 ENCOUNTER — TELEPHONE (OUTPATIENT)
Dept: RHEUMATOLOGY | Facility: CLINIC | Age: 44
End: 2024-06-24
Payer: COMMERCIAL

## 2024-06-24 NOTE — TELEPHONE ENCOUNTER
----- Message from Ken Alberto MD sent at 6/21/2024  2:09 PM CDT -----  Please help me resuming Humira or alternative biologic for this patient.  SNRA, evidence of synovitis on past MRI.  Insurance change led to discontinuation of coverage.  Progressively worsening since off TNFi (10/2019-2/2024)

## 2024-06-25 PROBLEM — M13.80 SERONEGATIVE INFLAMMATORY ARTHRITIS: Status: ACTIVE | Noted: 2019-07-31

## 2024-06-25 LAB
GAMMA INTERFERON BACKGROUND BLD IA-ACNC: 0.01 IU/ML
M TB IFN-G CD4+ BCKGRND COR BLD-ACNC: 0 IU/ML
M TB IFN-G CD4+ BCKGRND COR BLD-ACNC: 0.01 IU/ML
MITOGEN IGNF BCKGRD COR BLD-ACNC: 9.99 IU/ML
TB GOLD PLUS: NEGATIVE

## 2024-07-01 ENCOUNTER — TELEPHONE (OUTPATIENT)
Dept: RHEUMATOLOGY | Facility: CLINIC | Age: 44
End: 2024-07-01
Payer: COMMERCIAL

## 2024-07-01 DIAGNOSIS — M06.00 SERONEGATIVE RHEUMATOID ARTHRITIS: Primary | ICD-10-CM

## 2024-07-01 NOTE — TELEPHONE ENCOUNTER
Outgoing call returning patient's call to discuss adalimumab biosimilar. Patient voiced understanding and had no further questions.

## 2024-07-02 RX ORDER — ADALIMUMAB-ADAZ 40 MG/.4ML
40 INJECTION, SOLUTION SUBCUTANEOUS
Qty: 2 PEN | Refills: 6 | Status: ACTIVE | OUTPATIENT
Start: 2024-07-02

## 2024-07-03 NOTE — TELEPHONE ENCOUNTER
Jodie approved w/ $80 copay. Copay card for branded Hyrimoz should also apply to unbranded. Pending notification from [facility] to confirm.

## 2024-07-29 ENCOUNTER — PATIENT MESSAGE (OUTPATIENT)
Dept: ADMINISTRATIVE | Facility: OTHER | Age: 44
End: 2024-07-29
Payer: COMMERCIAL

## 2024-08-21 ENCOUNTER — PATIENT MESSAGE (OUTPATIENT)
Dept: ADMINISTRATIVE | Facility: OTHER | Age: 44
End: 2024-08-21
Payer: COMMERCIAL

## 2024-11-25 ENCOUNTER — LAB VISIT (OUTPATIENT)
Dept: LAB | Facility: HOSPITAL | Age: 44
End: 2024-11-25
Attending: INTERNAL MEDICINE
Payer: COMMERCIAL

## 2024-11-25 ENCOUNTER — TELEPHONE (OUTPATIENT)
Dept: RHEUMATOLOGY | Facility: CLINIC | Age: 44
End: 2024-11-25
Payer: COMMERCIAL

## 2024-11-25 DIAGNOSIS — Z79.899 IMMUNOSUPPRESSION DUE TO DRUG THERAPY: ICD-10-CM

## 2024-11-25 DIAGNOSIS — D84.821 IMMUNOSUPPRESSION DUE TO DRUG THERAPY: ICD-10-CM

## 2024-11-25 DIAGNOSIS — Z51.81 MEDICATION MONITORING ENCOUNTER: ICD-10-CM

## 2024-11-25 DIAGNOSIS — M13.80 SERONEGATIVE ARTHRITIS: ICD-10-CM

## 2024-11-25 LAB
ALBUMIN SERPL BCP-MCNC: 4 G/DL (ref 3.5–5.2)
ALP SERPL-CCNC: 96 U/L (ref 38–126)
ALT SERPL W/O P-5'-P-CCNC: 11 U/L (ref 10–44)
ANION GAP SERPL CALC-SCNC: 9 MMOL/L (ref 8–16)
AST SERPL-CCNC: 16 U/L (ref 15–46)
BASOPHILS # BLD AUTO: 0.05 K/UL (ref 0–0.2)
BASOPHILS NFR BLD: 0.5 % (ref 0–1.9)
BILIRUB SERPL-MCNC: 0.1 MG/DL (ref 0.1–1)
CALCIUM SERPL-MCNC: 9.3 MG/DL (ref 8.7–10.5)
CCP AB SER IA-ACNC: 4 U/ML
CHLORIDE SERPL-SCNC: 109 MMOL/L (ref 95–110)
CO2 SERPL-SCNC: 24 MMOL/L (ref 23–29)
CREAT SERPL-MCNC: 0.77 MG/DL (ref 0.5–1.4)
CRP SERPL-MCNC: 1.13 MG/DL (ref 0–1)
DIFFERENTIAL METHOD BLD: ABNORMAL
EOSINOPHIL # BLD AUTO: 0.4 K/UL (ref 0–0.5)
EOSINOPHIL NFR BLD: 3.9 % (ref 0–8)
ERYTHROCYTE [DISTWIDTH] IN BLOOD BY AUTOMATED COUNT: 14.6 % (ref 11.5–14.5)
EST. GFR  (NO RACE VARIABLE): >60 ML/MIN/1.73 M^2
GLUCOSE SERPL-MCNC: 120 MG/DL (ref 70–110)
HCT VFR BLD AUTO: 38.5 % (ref 37–48.5)
HGB BLD-MCNC: 12 G/DL (ref 12–16)
IMM GRANULOCYTES # BLD AUTO: 0.1 K/UL (ref 0–0.04)
IMM GRANULOCYTES NFR BLD AUTO: 1 % (ref 0–0.5)
LYMPHOCYTES # BLD AUTO: 3.4 K/UL (ref 1–4.8)
LYMPHOCYTES NFR BLD: 33.4 % (ref 18–48)
MCH RBC QN AUTO: 28.4 PG (ref 27–31)
MCHC RBC AUTO-ENTMCNC: 31.2 G/DL (ref 32–36)
MCV RBC AUTO: 91 FL (ref 82–98)
MONOCYTES # BLD AUTO: 0.8 K/UL (ref 0.3–1)
MONOCYTES NFR BLD: 8.1 % (ref 4–15)
NEUTROPHILS # BLD AUTO: 5.4 K/UL (ref 1.8–7.7)
NEUTROPHILS NFR BLD: 53.1 % (ref 38–73)
NRBC BLD-RTO: 0 /100 WBC
PLATELET # BLD AUTO: 432 K/UL (ref 150–450)
PMV BLD AUTO: 8.5 FL (ref 9.2–12.9)
POTASSIUM SERPL-SCNC: 3.9 MMOL/L (ref 3.5–5.1)
PROT SERPL-MCNC: 8.8 G/DL (ref 6–8.4)
RBC # BLD AUTO: 4.23 M/UL (ref 4–5.4)
RHEUMATOID FACT SERPL-ACNC: <13 IU/ML (ref 0–15)
SODIUM SERPL-SCNC: 142 MMOL/L (ref 136–145)
UUN UR-MCNC: 8 MG/DL (ref 7–17)
WBC # BLD AUTO: 10.17 K/UL (ref 3.9–12.7)

## 2024-11-25 PROCEDURE — 36415 COLL VENOUS BLD VENIPUNCTURE: CPT | Mod: PN | Performed by: INTERNAL MEDICINE

## 2024-11-25 PROCEDURE — 80053 COMPREHEN METABOLIC PANEL: CPT | Mod: PN | Performed by: INTERNAL MEDICINE

## 2024-11-25 PROCEDURE — 86200 CCP ANTIBODY: CPT | Mod: PN | Performed by: INTERNAL MEDICINE

## 2024-11-25 PROCEDURE — 86431 RHEUMATOID FACTOR QUANT: CPT | Mod: PN | Performed by: INTERNAL MEDICINE

## 2024-11-25 PROCEDURE — 86140 C-REACTIVE PROTEIN: CPT | Mod: PN | Performed by: INTERNAL MEDICINE

## 2024-11-25 PROCEDURE — 85025 COMPLETE CBC W/AUTO DIFF WBC: CPT | Mod: PN | Performed by: INTERNAL MEDICINE

## 2024-11-25 NOTE — TELEPHONE ENCOUNTER
"Attempt to contact patient . Per recording " not able to received calls at this time ." Message sent through protal   "

## 2024-11-26 ENCOUNTER — TELEPHONE (OUTPATIENT)
Dept: RHEUMATOLOGY | Facility: CLINIC | Age: 44
End: 2024-11-26
Payer: COMMERCIAL

## 2024-11-26 NOTE — TELEPHONE ENCOUNTER
Attempt to contact patient regarding 11-29 appointment .  Dr. Alberto is out of clinic and appointment needs to be rescheduled .  Per recording she can not be reached at this time . Message sent through portal

## 2024-12-12 ENCOUNTER — OFFICE VISIT (OUTPATIENT)
Dept: RHEUMATOLOGY | Facility: CLINIC | Age: 44
End: 2024-12-12
Payer: COMMERCIAL

## 2024-12-12 DIAGNOSIS — D84.821 IMMUNOSUPPRESSION DUE TO DRUG THERAPY: ICD-10-CM

## 2024-12-12 DIAGNOSIS — M13.80 SERONEGATIVE INFLAMMATORY ARTHRITIS: Primary | ICD-10-CM

## 2024-12-12 DIAGNOSIS — Z51.81 MEDICATION MONITORING ENCOUNTER: ICD-10-CM

## 2024-12-12 DIAGNOSIS — L73.2 HIDRADENITIS SUPPURATIVA OF RIGHT AXILLA: ICD-10-CM

## 2024-12-12 DIAGNOSIS — Z79.899 IMMUNOSUPPRESSION DUE TO DRUG THERAPY: ICD-10-CM

## 2024-12-12 NOTE — PROGRESS NOTES
The patient location is: [place]  The chief complaint leading to consultation is: [place]    Visit type: audiovisual    Face to Face time with patient: 30  30 minutes of total time spent on the encounter, which includes face to face time and non-face to face time preparing to see the patient (eg, review of tests), Obtaining and/or reviewing separately obtained history, Documenting clinical information in the electronic or other health record, Independently interpreting results (not separately reported) and communicating results to the patient/family/caregiver, or Care coordination (not separately reported).         Each patient to whom he or she provides medical services by telemedicine is:  (1) informed of the relationship between the physician and patient and the respective role of any other health care provider with respect to management of the patient; and (2) notified that he or she may decline to receive medical services by telemedicine and may withdraw from such care at any time.       RHEUMATOLOGY OUTPATIENT CLINIC NOTE    12/12/2024    Attending Rheumatologist: Ken Alberto  Primary Care Provider/Physician Requesting Consultation: No, Primary Doctor   Chief Complaint/Reason For Consultation:  No chief complaint on file.      Subjective:     Rodney Infante is a 44 y.o. Black or  female with SNRA    Resolution of inflammatory pattern arthralgias since interval resuming TNFi SC (currently on humira biosimilar).  Denies active rashes or joint pain at present.    Review of Systems   Constitutional:  Negative for fever.   Eyes:  Negative for photophobia and pain.   Respiratory:  Negative for cough and shortness of breath.    Cardiovascular:  Negative for chest pain.   Gastrointestinal:  Negative for blood in stool and melena.   Genitourinary:  Negative for dysuria and hematuria.   Musculoskeletal:  Negative for back pain and joint pain.   Skin:  Negative for rash.   Neurological:  Negative for  "focal weakness and weakness.       Chronic comorbid conditions affecting medical decision making today:  Past Medical History:   Diagnosis Date    Anemia     Arthritis     Diabetes mellitus, type 2     Neuropathy      Past Surgical History:   Procedure Laterality Date    CYST REMOVAL  2014, 2/2018    back    EXCISION OF HIDRADENITIS Bilateral 12/24/2019    Procedure: EXCISION, HIDRADENITIS;  Surgeon: Marcel Pennington MD;  Location: [facility] OR;  Service: General;  Laterality: Bilateral;    WOUND DEBRIDEMENT Right 7/17/2020    Procedure: DEBRIDEMENT, WOUND;  Surgeon: Marcel Pennington MD;  Location: [facility] OR;  Service: General;  Laterality: Right;     Family History   Problem Relation Name Age of Onset    No Known Problems Mother      Drug abuse Father      No Known Problems Sister       Social History     Tobacco Use   Smoking Status Never   Smokeless Tobacco Never       Current Outpatient Medications:     adalimumab-adaz 40 mg/0.4 mL PnIj, Inject 0.4 mLs (40 mg total) into the skin every 14 (fourteen) days., Disp: 2 pen , Rfl: 6    clotrimazole-betamethasone 1-0.05% (LOTRISONE) cream, , Disp: , Rfl:     cyanocobalamin (VITAMIN B-12) 1000 MCG tablet, Take 1 tablet (1,000 mcg total) by mouth once daily. This is a prescription for 1 year.  Take 1 tablet Monday, Wednesday, and Friday, Disp: 36 tablet, Rfl: 3    escitalopram oxalate (LEXAPRO) 10 MG tablet, , Disp: , Rfl:     estradiol cypionate (DEPO-ESTRADIOL) 5 mg/mL injection, Inject into the muscle every 28 days., Disp: , Rfl:     iodoform 1/2 X 5 [facility], change 3 times a week as directed, Disp: 12 each, Rfl: 0    pregabalin (LYRICA) 50 MG capsule, Take 1 capsule (50 mg total) by mouth 3 (three) times daily., Disp: 270 capsule, Rfl: 1    traMADoL (ULTRAM) 50 mg tablet, , Disp: , Rfl:      Objective:     There were no vitals filed for this visit.  Physical Exam   Pulmonary/Chest: Effort normal. No respiratory distress.   Musculoskeletal:         General: No " swelling or tenderness. Normal range of motion.   Skin: No rash noted.       Reviewed available old and all outside pertinent medical records available.    All lab results personally reviewed and interpreted by me.       ASSESSMENT / PLAN     1. Seronegative inflammatory arthritis  CDAI; remission.  Adeqaute response to Humira bio similar, c/ q14 unchanged.  C-Reactive Protein      2. Hidradenitis suppurativa of right axilla  ID and Wound Care PRN      3. Immunosuppression due to drug therapy  Hold immunosuppression in event of active infections, need for surgery.  CBC Auto Differential      4. Medication monitoring encounter  Comprehensive Metabolic Panel              Visit today included increased complexity associated with the care of the episodic problem Seronegative inflammatory arthritis addressed and managing the longitudinal care of the patient due to the serious and/or complex managed problem(s) immunosuppression due to drug therapy, Medication monitoring encounter.    Ken Alberto M.D.

## 2025-01-13 DIAGNOSIS — M06.00 SERONEGATIVE RHEUMATOID ARTHRITIS: ICD-10-CM

## 2025-01-14 RX ORDER — ADALIMUMAB-ADAZ 40 MG/.4ML
40 INJECTION, SOLUTION SUBCUTANEOUS
Qty: 2 PEN | Refills: 6 | Status: ACTIVE | OUTPATIENT
Start: 2025-01-14

## 2025-06-10 ENCOUNTER — LAB VISIT (OUTPATIENT)
Dept: LAB | Facility: HOSPITAL | Age: 45
End: 2025-06-10
Attending: INTERNAL MEDICINE
Payer: COMMERCIAL

## 2025-06-10 DIAGNOSIS — M13.80 SERONEGATIVE INFLAMMATORY ARTHRITIS: ICD-10-CM

## 2025-06-10 DIAGNOSIS — Z51.81 MEDICATION MONITORING ENCOUNTER: ICD-10-CM

## 2025-06-10 DIAGNOSIS — D84.821 IMMUNOSUPPRESSION DUE TO DRUG THERAPY: ICD-10-CM

## 2025-06-10 DIAGNOSIS — Z79.899 IMMUNOSUPPRESSION DUE TO DRUG THERAPY: ICD-10-CM

## 2025-06-10 LAB
ABSOLUTE EOSINOPHIL (OHS): 0.27 K/UL
ABSOLUTE MONOCYTE (OHS): 0.92 K/UL (ref 0.3–1)
ABSOLUTE NEUTROPHIL COUNT (OHS): 3.75 K/UL (ref 1.8–7.7)
ALBUMIN SERPL BCP-MCNC: 3.7 G/DL (ref 3.5–5.2)
ALP SERPL-CCNC: 70 UNIT/L (ref 38–126)
ALT SERPL W/O P-5'-P-CCNC: 26 UNIT/L (ref 10–44)
ANION GAP (OHS): 6 MMOL/L (ref 8–16)
AST SERPL-CCNC: 39 UNIT/L (ref 15–46)
BASOPHILS # BLD AUTO: 0.05 K/UL
BASOPHILS NFR BLD AUTO: 0.6 %
BILIRUB SERPL-MCNC: 0.1 MG/DL (ref 0.1–1)
BUN SERPL-MCNC: 11 MG/DL (ref 7–17)
CALCIUM SERPL-MCNC: 8.2 MG/DL (ref 8.7–10.5)
CHLORIDE SERPL-SCNC: 107 MMOL/L (ref 95–110)
CO2 SERPL-SCNC: 28 MMOL/L (ref 23–29)
CREAT SERPL-MCNC: 0.9 MG/DL (ref 0.5–1.4)
CRP SERPL-MCNC: 0.35 MG/DL
ERYTHROCYTE [DISTWIDTH] IN BLOOD BY AUTOMATED COUNT: 16.8 % (ref 11.5–14.5)
GFR SERPLBLD CREATININE-BSD FMLA CKD-EPI: >60 ML/MIN/1.73/M2
GLUCOSE SERPL-MCNC: 77 MG/DL (ref 70–110)
HCT VFR BLD AUTO: 37 % (ref 37–48.5)
HGB BLD-MCNC: 11.4 GM/DL (ref 12–16)
IMM GRANULOCYTES # BLD AUTO: 0.1 K/UL (ref 0–0.04)
IMM GRANULOCYTES NFR BLD AUTO: 1.1 % (ref 0–0.5)
LYMPHOCYTES # BLD AUTO: 3.93 K/UL (ref 1–4.8)
MCH RBC QN AUTO: 27.9 PG (ref 27–31)
MCHC RBC AUTO-ENTMCNC: 30.8 G/DL (ref 32–36)
MCV RBC AUTO: 91 FL (ref 82–98)
NUCLEATED RBC (/100WBC) (OHS): 0 /100 WBC
PLATELET # BLD AUTO: 377 K/UL (ref 150–450)
PMV BLD AUTO: 8 FL (ref 9.2–12.9)
POTASSIUM SERPL-SCNC: 4.6 MMOL/L (ref 3.5–5.1)
PROT SERPL-MCNC: 8.2 GM/DL (ref 6–8.4)
RBC # BLD AUTO: 4.09 M/UL (ref 4–5.4)
RELATIVE EOSINOPHIL (OHS): 3 %
RELATIVE LYMPHOCYTE (OHS): 43.6 % (ref 18–48)
RELATIVE MONOCYTE (OHS): 10.2 % (ref 4–15)
RELATIVE NEUTROPHIL (OHS): 41.5 % (ref 38–73)
SODIUM SERPL-SCNC: 141 MMOL/L (ref 136–145)
WBC # BLD AUTO: 9.02 K/UL (ref 3.9–12.7)

## 2025-06-10 PROCEDURE — 36415 COLL VENOUS BLD VENIPUNCTURE: CPT | Mod: PN

## 2025-06-10 PROCEDURE — 86140 C-REACTIVE PROTEIN: CPT | Mod: PN

## 2025-06-10 PROCEDURE — 85025 COMPLETE CBC W/AUTO DIFF WBC: CPT | Mod: PN

## 2025-06-10 PROCEDURE — 80053 COMPREHEN METABOLIC PANEL: CPT | Mod: PN

## 2025-06-11 ENCOUNTER — RESULTS FOLLOW-UP (OUTPATIENT)
Dept: RHEUMATOLOGY | Facility: CLINIC | Age: 45
End: 2025-06-11

## 2025-06-16 ENCOUNTER — TELEPHONE (OUTPATIENT)
Dept: RHEUMATOLOGY | Facility: CLINIC | Age: 45
End: 2025-06-16
Payer: COMMERCIAL

## 2025-06-16 NOTE — TELEPHONE ENCOUNTER
Contacted patient to confirm appointment with provider on tomorrow 06/17/2025. Patient confirmed appointment

## 2025-06-17 ENCOUNTER — OFFICE VISIT (OUTPATIENT)
Dept: RHEUMATOLOGY | Facility: CLINIC | Age: 45
End: 2025-06-17
Payer: COMMERCIAL

## 2025-06-17 DIAGNOSIS — R52 BREAKTHROUGH PAIN: ICD-10-CM

## 2025-06-17 DIAGNOSIS — M25.561 CHRONIC PAIN OF RIGHT KNEE: ICD-10-CM

## 2025-06-17 DIAGNOSIS — G89.29 CHRONIC PAIN OF RIGHT KNEE: ICD-10-CM

## 2025-06-17 DIAGNOSIS — E11.622 DIABETES MELLITUS WITH SKIN ULCER: ICD-10-CM

## 2025-06-17 DIAGNOSIS — M15.0 PRIMARY OSTEOARTHRITIS INVOLVING MULTIPLE JOINTS: ICD-10-CM

## 2025-06-17 DIAGNOSIS — Z51.81 MEDICATION MONITORING ENCOUNTER: ICD-10-CM

## 2025-06-17 DIAGNOSIS — R20.2 PARESTHESIAS: ICD-10-CM

## 2025-06-17 DIAGNOSIS — L98.499 DIABETES MELLITUS WITH SKIN ULCER: ICD-10-CM

## 2025-06-17 DIAGNOSIS — Z79.899 IMMUNOSUPPRESSION DUE TO DRUG THERAPY: ICD-10-CM

## 2025-06-17 DIAGNOSIS — M13.80 SERONEGATIVE INFLAMMATORY ARTHRITIS: Primary | ICD-10-CM

## 2025-06-17 DIAGNOSIS — Z71.89 COUNSELING ON HEALTH PROMOTION AND DISEASE PREVENTION: ICD-10-CM

## 2025-06-17 DIAGNOSIS — D84.821 IMMUNOSUPPRESSION DUE TO DRUG THERAPY: ICD-10-CM

## 2025-06-17 DIAGNOSIS — Z98.84 S/P BARIATRIC SURGERY: ICD-10-CM

## 2025-06-17 PROCEDURE — 4010F ACE/ARB THERAPY RXD/TAKEN: CPT | Mod: CPTII,95,, | Performed by: INTERNAL MEDICINE

## 2025-06-17 PROCEDURE — G2211 COMPLEX E/M VISIT ADD ON: HCPCS | Mod: 95,,, | Performed by: INTERNAL MEDICINE

## 2025-06-17 PROCEDURE — 98006 SYNCH AUDIO-VIDEO EST MOD 30: CPT | Mod: 95,,, | Performed by: INTERNAL MEDICINE

## 2025-06-17 RX ORDER — PREGABALIN 50 MG/1
50 CAPSULE ORAL 3 TIMES DAILY
Qty: 270 CAPSULE | Refills: 1 | Status: SHIPPED | OUTPATIENT
Start: 2025-06-17 | End: 2025-12-14

## 2025-06-17 RX ORDER — METHOCARBAMOL 500 MG/1
500 TABLET, FILM COATED ORAL 3 TIMES DAILY PRN
Qty: 30 TABLET | Refills: 0 | Status: SHIPPED | OUTPATIENT
Start: 2025-06-17 | End: 2025-07-17

## 2025-06-17 NOTE — PROGRESS NOTES
[place] patient location is: [place]  The chief complaint leading to consultation is: [place]    Visit type: audiovisual    Face to Face time with patient: 30  30 minutes of total time spent on the encounter, which includes face to face time and non-face to face time preparing to see the patient (eg, review of tests), Obtaining and/or reviewing separately obtained history, Documenting clinical information in the electronic or other health record, Independently interpreting results (not separately reported) and communicating results to the patient/family/caregiver, or Care coordination (not separately reported).         Each patient to whom he or she provides medical services by telemedicine is:  (1) informed of the relationship between the physician and patient and the respective role of any other health care provider with respect to management of the patient; and (2) notified that he or she may decline to receive medical services by telemedicine and may withdraw from such care at any time.       RHEUMATOLOGY OUTPATIENT CLINIC NOTE    6/17/2025    Attending Rheumatologist: Ken Alberto  Primary Care Provider/Physician Requesting Consultation: No, Primary Doctor   Chief Complaint/Reason For Consultation:  No chief complaint on file.      Subjective:     Rodney Infante is a 44 y.o. Black or  female with SNRA    TNFi SC 14d prescribed.  On Lyrica 50mg TID also.  No acute complaints.  Self limited lower back pain w/ features of radiculopathy.  Denies inflammatory arthritis flares since last visit.    Review of Systems   Constitutional:  Negative for fever.   Eyes:  Negative for redness.   Respiratory:  Negative for cough and shortness of breath.    Cardiovascular:  Negative for chest pain.   Gastrointestinal:  Negative for constipation and diarrhea.   Genitourinary:  Negative for dysuria.   Musculoskeletal:  Negative for back pain and joint pain.   Skin:  Negative for rash.   Neurological:  Positive for  tingling. Negative for headaches.   Endo/Heme/Allergies:  Does not bruise/bleed easily.       Chronic comorbid conditions affecting medical decision making today:  Past Medical History:   Diagnosis Date    Anemia     Arthritis     Diabetes mellitus, type 2     Neuropathy      Past Surgical History:   Procedure Laterality Date    CYST REMOVAL  2014, 2/2018    back    EXCISION OF HIDRADENITIS Bilateral 12/24/2019    Procedure: EXCISION, HIDRADENITIS;  Surgeon: Marcel Pennington MD;  Location: [facility] OR;  Service: General;  Laterality: Bilateral;    WOUND DEBRIDEMENT Right 7/17/2020    Procedure: DEBRIDEMENT, WOUND;  Surgeon: Marcel Pennington MD;  Location: [facility] OR;  Service: General;  Laterality: Right;     Family History   Problem Relation Name Age of Onset    No Known Problems Mother      Drug abuse Father      No Known Problems Sister       Tobacco Use History[1]  Current Medications[2]     Objective:     There were no vitals filed for this visit.  Physical Exam   Pulmonary/Chest: Effort normal. No respiratory distress.   Musculoskeletal:         General: No swelling or tenderness. Normal range of motion.   Skin: No rash noted.       Reviewed available old and all outside pertinent medical records available.    All lab results personally reviewed and interpreted by me.       ASSESSMENT / PLAN     1. Seronegative inflammatory arthritis  ASDAS: low disease activity.  Great response to TNFi SC q14, c/ unchanged.  C-Reactive Protein      2. Medication monitoring encounter  Creatinine, Serum    AST (SGOT)    ALT (SGPT)    CANCELED: Comprehensive Metabolic Panel      3. Immunosuppression due to drug therapy  Hold immunosuppression in event of active infections, open wounds, or need for surgery.  CBC Auto Differential    Hepatitis B Surface Antigen    Hepatitis B Core Antibody, Total    Quantiferon Gold TB      4. Breakthrough pain  methocarbamoL (ROBAXIN) 500 MG Tab      5. S/P bariatric surgery  RF for vitamin  insufficiency      6. Diabetes mellitus with skin ulcer  Resolved.  Caution advised with systemic steroid use      7. Primary osteoarthritis involving multiple joints  pregabalin (LYRICA) 50 MG capsule      8. Counseling on health promotion and disease prevention  Ca/vit D supp to target      9. Chronic pain of right knee  PT PRN      10. Paresthesias  pregabalin (LYRICA) 50 MG capsule              Visit today included increased complexity associated with the care of the episodic problem Seronegative inflammatory arthritis addressed and managing the longitudinal care of the patient due to the serious and/or complex managed problem(s) immunosuppression due to drug therapy.      Ken Alberto M.D.         [1]   Social History  Tobacco Use   Smoking Status Never   Smokeless Tobacco Never   [2]   Current Outpatient Medications:     adalimumab-adaz 40 mg/0.4 mL PnIj, Inject 0.4 mLs (40 mg total) into the skin every 14 (fourteen) days., Disp: 2 pen , Rfl: 6    clotrimazole-betamethasone 1-0.05% (LOTRISONE) cream, , Disp: , Rfl:     cyanocobalamin (VITAMIN B-12) 1000 MCG tablet, Take 1 tablet (1,000 mcg total) by mouth once daily. This is a prescription for 1 year.  Take 1 tablet Monday, Wednesday, and Friday, Disp: 36 tablet, Rfl: 3    escitalopram oxalate (LEXAPRO) 10 MG tablet, , Disp: , Rfl:     pregabalin (LYRICA) 50 MG capsule, Take 1 capsule (50 mg total) by mouth 3 (three) times daily., Disp: 270 capsule, Rfl: 1    traMADoL (ULTRAM) 50 mg tablet, , Disp: , Rfl:

## 2025-07-22 DIAGNOSIS — M06.00 SERONEGATIVE RHEUMATOID ARTHRITIS: ICD-10-CM

## 2025-07-23 RX ORDER — ADALIMUMAB-ADAZ 40 MG/.4ML
40 INJECTION, SOLUTION SUBCUTANEOUS
Qty: 2 PEN | Refills: 6 | Status: ACTIVE | OUTPATIENT
Start: 2025-07-23

## (undated) DEVICE — SYR B-D DISP CONTROL 10CC100/C

## (undated) DEVICE — COVER OVERHEAD SURG LT BLUE

## (undated) DEVICE — SEE MEDLINE ITEM 157116

## (undated) DEVICE — GAUZE SPONGE 4X4 12PLY

## (undated) DEVICE — SEE MEDLINE ITEM 146345

## (undated) DEVICE — PACK BASIC

## (undated) DEVICE — GLOVE BIOGEL ECLIPSE SZ 7.5

## (undated) DEVICE — SEE MEDLINE ITEM 156955

## (undated) DEVICE — SYR 10CC LUER LOCK

## (undated) DEVICE — NDL 22GA X1 1/2 REG BEVEL

## (undated) DEVICE — PAD PREP 50/CA

## (undated) DEVICE — SEE MEDLINE ITEM 157131

## (undated) DEVICE — SEE MEDLINE ITEM 152622

## (undated) DEVICE — SEE MEDLINE ITEM 154981

## (undated) DEVICE — CONTAINER SPECIMEN STR 3 0Z

## (undated) DEVICE — PACKING STRIP IDOFORM 1X5YD

## (undated) DEVICE — SUT VICRYL 3-0 27 SH

## (undated) DEVICE — SEE MEDLINE ITEM 157117

## (undated) DEVICE — GAUZE SPONGE 4'X4 12 PLY

## (undated) DEVICE — SWAB CULTURETTE SINGLE

## (undated) DEVICE — CONTAINER MULTIPURPOSE/SPECIME

## (undated) DEVICE — DRESSING XEROFORM 1X8IN

## (undated) DEVICE — MANIFOLD 4 PORT

## (undated) DEVICE — SUT ETHILON 4-0 PS2 18 BLK

## (undated) DEVICE — SUT 4-0 ETHILON 18 PS-2

## (undated) DEVICE — SPONGE GAUZE 16PLY 4X4

## (undated) DEVICE — DRESSING XEROFORM FOIL PK 1X8

## (undated) DEVICE — ELECTRODE REM PLYHSV RETURN 9

## (undated) DEVICE — STRIP PK IODOFORM CURITY 2X5YD

## (undated) DEVICE — PAD ABDOMINAL 5X9 STERILE

## (undated) DEVICE — STAPLER SKIN ROTATING HEAD

## (undated) DEVICE — STRIP WOUND PK BIGUANIDE 36X.5

## (undated) DEVICE — SHEET THYROID W/ISO-BAC

## (undated) DEVICE — KIT COLLECTION E SWAB REGULAR